# Patient Record
Sex: FEMALE | Race: WHITE | NOT HISPANIC OR LATINO | Employment: UNEMPLOYED | URBAN - METROPOLITAN AREA
[De-identification: names, ages, dates, MRNs, and addresses within clinical notes are randomized per-mention and may not be internally consistent; named-entity substitution may affect disease eponyms.]

---

## 2017-07-18 ENCOUNTER — CONVERSION ENCOUNTER (OUTPATIENT)
Dept: OTHER | Facility: HOSPITAL | Age: 82
End: 2017-07-18

## 2017-07-24 ENCOUNTER — CONVERSION ENCOUNTER (OUTPATIENT)
Dept: MAMMOGRAPHY | Facility: HOSPITAL | Age: 82
End: 2017-07-24

## 2018-02-22 ENCOUNTER — OFFICE VISIT CONVERTED (OUTPATIENT)
Dept: FAMILY MEDICINE CLINIC | Age: 83
End: 2018-02-22
Attending: NURSE PRACTITIONER

## 2018-05-10 ENCOUNTER — OFFICE VISIT CONVERTED (OUTPATIENT)
Dept: FAMILY MEDICINE CLINIC | Age: 83
End: 2018-05-10
Attending: NURSE PRACTITIONER

## 2018-06-04 ENCOUNTER — OFFICE VISIT CONVERTED (OUTPATIENT)
Dept: FAMILY MEDICINE CLINIC | Age: 83
End: 2018-06-04
Attending: NURSE PRACTITIONER

## 2018-06-21 ENCOUNTER — OFFICE VISIT CONVERTED (OUTPATIENT)
Dept: FAMILY MEDICINE CLINIC | Age: 83
End: 2018-06-21
Attending: NURSE PRACTITIONER

## 2018-07-12 ENCOUNTER — OFFICE VISIT CONVERTED (OUTPATIENT)
Dept: FAMILY MEDICINE CLINIC | Age: 83
End: 2018-07-12
Attending: NURSE PRACTITIONER

## 2018-07-17 ENCOUNTER — OFFICE VISIT CONVERTED (OUTPATIENT)
Dept: SURGERY | Facility: CLINIC | Age: 83
End: 2018-07-17
Attending: SURGERY

## 2018-08-14 ENCOUNTER — OFFICE VISIT CONVERTED (OUTPATIENT)
Dept: SURGERY | Facility: CLINIC | Age: 83
End: 2018-08-14
Attending: SURGERY

## 2018-08-23 ENCOUNTER — CONVERSION ENCOUNTER (OUTPATIENT)
Dept: MAMMOGRAPHY | Facility: HOSPITAL | Age: 83
End: 2018-08-23

## 2018-10-16 ENCOUNTER — OFFICE VISIT CONVERTED (OUTPATIENT)
Dept: FAMILY MEDICINE CLINIC | Age: 83
End: 2018-10-16
Attending: NURSE PRACTITIONER

## 2018-11-12 ENCOUNTER — OFFICE VISIT CONVERTED (OUTPATIENT)
Dept: FAMILY MEDICINE CLINIC | Age: 83
End: 2018-11-12
Attending: NURSE PRACTITIONER

## 2019-05-13 ENCOUNTER — HOSPITAL ENCOUNTER (OUTPATIENT)
Dept: OTHER | Facility: HOSPITAL | Age: 84
Discharge: HOME | End: 2019-05-13
Attending: NURSE PRACTITIONER

## 2019-05-13 ENCOUNTER — OFFICE VISIT CONVERTED (OUTPATIENT)
Dept: FAMILY MEDICINE CLINIC | Age: 84
End: 2019-05-13
Attending: NURSE PRACTITIONER

## 2019-05-13 LAB
ALBUMIN SERPL-MCNC: 4.5 G/DL (ref 3.5–5)
ALBUMIN/GLOB SERPL: 1.4 {RATIO} (ref 1.4–2.6)
ALP SERPL-CCNC: 66 U/L (ref 43–160)
ALT SERPL-CCNC: 14 U/L (ref 10–40)
ANION GAP SERPL CALC-SCNC: 19 MMOL/L (ref 8–19)
AST SERPL-CCNC: 23 U/L (ref 15–50)
BASOPHILS # BLD MANUAL: 0.05 10*3/UL (ref 0–0.2)
BASOPHILS NFR BLD MANUAL: 0.6 % (ref 0–3)
BILIRUB SERPL-MCNC: 0.28 MG/DL (ref 0.2–1.3)
BUN SERPL-MCNC: 15 MG/DL (ref 5–25)
BUN/CREAT SERPL: 20 {RATIO} (ref 6–20)
CALCIUM SERPL-MCNC: 10.1 MG/DL (ref 8.7–10.4)
CHLORIDE SERPL-SCNC: 100 MMOL/L (ref 99–111)
CONV CO2: 27 MMOL/L (ref 22–32)
CONV TOTAL PROTEIN: 7.8 G/DL (ref 6.3–8.2)
CREAT UR-MCNC: 0.75 MG/DL (ref 0.5–0.9)
DEPRECATED RDW RBC AUTO: 45.9 FL
EOSINOPHIL # BLD MANUAL: 0.29 10*3/UL (ref 0–0.7)
EOSINOPHIL NFR BLD MANUAL: 3.5 % (ref 0–7)
ERYTHROCYTE [DISTWIDTH] IN BLOOD BY AUTOMATED COUNT: 13.7 % (ref 11.5–14.5)
GFR SERPLBLD BASED ON 1.73 SQ M-ARVRAT: >60 ML/MIN/{1.73_M2}
GLOBULIN UR ELPH-MCNC: 3.3 G/DL (ref 2–3.5)
GLUCOSE SERPL-MCNC: 98 MG/DL (ref 65–99)
GRANS (ABSOLUTE): 5.09 10*3/UL (ref 2–8)
GRANS: 61.5 % (ref 30–85)
HBA1C MFR BLD: 13.9 G/DL (ref 12–16)
HCT VFR BLD AUTO: 42.7 % (ref 37–47)
IMM GRANULOCYTES # BLD: 0.01 10*3/UL (ref 0–0.54)
IMM GRANULOCYTES NFR BLD: 0.1 % (ref 0–0.43)
LYMPHOCYTES # BLD MANUAL: 2.3 10*3/UL (ref 1–5)
LYMPHOCYTES NFR BLD MANUAL: 6.6 % (ref 3–10)
MCH RBC QN AUTO: 29.6 PG (ref 27–31)
MCHC RBC AUTO-ENTMCNC: 32.6 G/DL (ref 33–37)
MCV RBC AUTO: 90.9 FL (ref 81–99)
MONOCYTES # BLD AUTO: 0.55 10*3/UL (ref 0.2–1.2)
OSMOLALITY SERPL CALC.SUM OF ELEC: 295 MOSM/KG (ref 273–304)
PLATELET # BLD AUTO: 419 10*3/UL (ref 130–400)
PMV BLD AUTO: 10.8 FL (ref 7.4–10.4)
POTASSIUM SERPL-SCNC: 4.3 MMOL/L (ref 3.5–5.3)
RBC # BLD AUTO: 4.7 10*6/UL (ref 4.2–5.4)
SODIUM SERPL-SCNC: 142 MMOL/L (ref 135–147)
TSH SERPL-ACNC: 2.56 M[IU]/L (ref 0.27–4.2)
VARIANT LYMPHS NFR BLD MANUAL: 27.7 % (ref 20–45)
VIT B12 SERPL-MCNC: 577 PG/ML (ref 211–911)
WBC # BLD AUTO: 8.29 10*3/UL (ref 4.8–10.8)

## 2019-05-15 LAB — BACTERIA UR CULT: NORMAL

## 2019-06-03 ENCOUNTER — OFFICE VISIT CONVERTED (OUTPATIENT)
Dept: FAMILY MEDICINE CLINIC | Age: 84
End: 2019-06-03
Attending: NURSE PRACTITIONER

## 2019-06-03 ENCOUNTER — HOSPITAL ENCOUNTER (OUTPATIENT)
Dept: OTHER | Facility: HOSPITAL | Age: 84
Discharge: HOME | End: 2019-06-03
Attending: NURSE PRACTITIONER

## 2019-06-03 LAB
BASOPHILS # BLD MANUAL: 0.06 10*3/UL (ref 0–0.2)
BASOPHILS NFR BLD MANUAL: 0.8 % (ref 0–3)
DEPRECATED RDW RBC AUTO: 46.5 FL
EOSINOPHIL # BLD MANUAL: 0.25 10*3/UL (ref 0–0.7)
EOSINOPHIL NFR BLD MANUAL: 3.2 % (ref 0–7)
ERYTHROCYTE [DISTWIDTH] IN BLOOD BY AUTOMATED COUNT: 13.6 % (ref 11.5–14.5)
GRANS (ABSOLUTE): 4.36 10*3/UL (ref 2–8)
GRANS: 55.1 % (ref 30–85)
HBA1C MFR BLD: 12.9 G/DL (ref 12–16)
HCT VFR BLD AUTO: 39 % (ref 37–47)
IMM GRANULOCYTES # BLD: 0 10*3/UL (ref 0–0.54)
IMM GRANULOCYTES NFR BLD: 0 % (ref 0–0.43)
LYMPHOCYTES # BLD MANUAL: 2.45 10*3/UL (ref 1–5)
LYMPHOCYTES NFR BLD MANUAL: 9.9 % (ref 3–10)
MCH RBC QN AUTO: 30.2 PG (ref 27–31)
MCHC RBC AUTO-ENTMCNC: 33.1 G/DL (ref 33–37)
MCV RBC AUTO: 91.3 FL (ref 81–99)
MONOCYTES # BLD AUTO: 0.78 10*3/UL (ref 0.2–1.2)
PLATELET # BLD AUTO: 420 10*3/UL (ref 130–400)
PMV BLD AUTO: 11.4 FL (ref 7.4–10.4)
RBC # BLD AUTO: 4.27 10*6/UL (ref 4.2–5.4)
VARIANT LYMPHS NFR BLD MANUAL: 31 % (ref 20–45)
WBC # BLD AUTO: 7.9 10*3/UL (ref 4.8–10.8)

## 2019-08-08 ENCOUNTER — HOSPITAL ENCOUNTER (OUTPATIENT)
Dept: OTHER | Facility: HOSPITAL | Age: 84
Discharge: HOME | End: 2019-08-08
Attending: NURSE PRACTITIONER

## 2019-08-08 LAB
BASOPHILS # BLD MANUAL: 0.05 10*3/UL (ref 0–0.2)
BASOPHILS NFR BLD MANUAL: 0.7 % (ref 0–3)
DEPRECATED RDW RBC AUTO: 46.7 FL
EOSINOPHIL # BLD MANUAL: 0.32 10*3/UL (ref 0–0.7)
EOSINOPHIL NFR BLD MANUAL: 4.7 % (ref 0–7)
ERYTHROCYTE [DISTWIDTH] IN BLOOD BY AUTOMATED COUNT: 13.9 % (ref 11.5–14.5)
GRANS (ABSOLUTE): 3.55 10*3/UL (ref 2–8)
GRANS: 52.8 % (ref 30–85)
HBA1C MFR BLD: 13.3 G/DL (ref 12–16)
HCT VFR BLD AUTO: 40.6 % (ref 37–47)
IMM GRANULOCYTES # BLD: 0.01 10*3/UL (ref 0–0.54)
IMM GRANULOCYTES NFR BLD: 0.1 % (ref 0–0.43)
LYMPHOCYTES # BLD MANUAL: 2.04 10*3/UL (ref 1–5)
LYMPHOCYTES NFR BLD MANUAL: 11.4 % (ref 3–10)
MCH RBC QN AUTO: 29.6 PG (ref 27–31)
MCHC RBC AUTO-ENTMCNC: 32.8 G/DL (ref 33–37)
MCV RBC AUTO: 90.2 FL (ref 81–99)
MONOCYTES # BLD AUTO: 0.77 10*3/UL (ref 0.2–1.2)
PLATELET # BLD AUTO: 415 10*3/UL (ref 130–400)
PMV BLD AUTO: 11 FL (ref 7.4–10.4)
RBC # BLD AUTO: 4.5 10*6/UL (ref 4.2–5.4)
VARIANT LYMPHS NFR BLD MANUAL: 30.3 % (ref 20–45)
WBC # BLD AUTO: 6.74 10*3/UL (ref 4.8–10.8)

## 2019-08-26 ENCOUNTER — HOSPITAL ENCOUNTER (OUTPATIENT)
Dept: OTHER | Facility: HOSPITAL | Age: 84
Discharge: HOME | End: 2019-08-26
Attending: NURSE PRACTITIONER

## 2019-11-20 ENCOUNTER — HOSPITAL ENCOUNTER (OUTPATIENT)
Dept: OTHER | Facility: HOSPITAL | Age: 84
Discharge: HOME | End: 2019-11-20
Attending: NURSE PRACTITIONER

## 2019-11-20 LAB
BASOPHILS # BLD AUTO: 0.06 10*3/UL (ref 0–0.2)
BASOPHILS NFR BLD AUTO: 0.8 % (ref 0–3)
CONV ABS IMM GRAN: 0.01 10*3/UL (ref 0–0.2)
CONV IMMATURE GRAN: 0.1 % (ref 0–1.8)
DEPRECATED RDW RBC AUTO: 46.4 FL (ref 36.4–46.3)
EOSINOPHIL # BLD AUTO: 0.24 10*3/UL (ref 0–0.7)
EOSINOPHIL # BLD AUTO: 3.2 % (ref 0–7)
ERYTHROCYTE [DISTWIDTH] IN BLOOD BY AUTOMATED COUNT: 13.5 % (ref 11.7–14.4)
HCT VFR BLD AUTO: 42 % (ref 37–47)
HGB BLD-MCNC: 13 G/DL (ref 12–16)
LYMPHOCYTES # BLD AUTO: 2.24 10*3/UL (ref 1–5)
LYMPHOCYTES NFR BLD AUTO: 30.1 % (ref 20–45)
MCH RBC QN AUTO: 29 PG (ref 27–31)
MCHC RBC AUTO-ENTMCNC: 31 G/DL (ref 33–37)
MCV RBC AUTO: 93.8 FL (ref 81–99)
MONOCYTES # BLD AUTO: 0.6 10*3/UL (ref 0.2–1.2)
MONOCYTES NFR BLD AUTO: 8.1 % (ref 3–10)
NEUTROPHILS # BLD AUTO: 4.29 10*3/UL (ref 2–8)
NEUTROPHILS NFR BLD AUTO: 57.7 % (ref 30–85)
NRBC CBCN: 0 % (ref 0–0.7)
PLATELET # BLD AUTO: 368 10*3/UL (ref 130–400)
PMV BLD AUTO: 11.3 FL (ref 9.4–12.3)
RBC # BLD AUTO: 4.48 10*6/UL (ref 4.2–5.4)
WBC # BLD AUTO: 7.44 10*3/UL (ref 4.8–10.8)

## 2019-11-26 ENCOUNTER — HOSPITAL ENCOUNTER (OUTPATIENT)
Dept: OTHER | Facility: HOSPITAL | Age: 84
Discharge: HOME | End: 2019-11-26
Attending: NURSE PRACTITIONER

## 2019-11-26 ENCOUNTER — OFFICE VISIT CONVERTED (OUTPATIENT)
Dept: FAMILY MEDICINE CLINIC | Age: 84
End: 2019-11-26
Attending: NURSE PRACTITIONER

## 2019-12-04 ENCOUNTER — HOSPITAL ENCOUNTER (OUTPATIENT)
Dept: OTHER | Facility: HOSPITAL | Age: 84
Discharge: HOME | End: 2019-12-04
Attending: NURSE PRACTITIONER

## 2019-12-09 ENCOUNTER — OFFICE VISIT CONVERTED (OUTPATIENT)
Dept: FAMILY MEDICINE CLINIC | Age: 84
End: 2019-12-09
Attending: NURSE PRACTITIONER

## 2020-03-05 ENCOUNTER — OFFICE VISIT CONVERTED (OUTPATIENT)
Dept: FAMILY MEDICINE CLINIC | Age: 85
End: 2020-03-05
Attending: NURSE PRACTITIONER

## 2020-03-05 ENCOUNTER — HOSPITAL ENCOUNTER (OUTPATIENT)
Dept: OTHER | Facility: HOSPITAL | Age: 85
Discharge: HOME | End: 2020-03-05
Attending: NURSE PRACTITIONER

## 2020-03-05 LAB
APPEARANCE UR: CLEAR
BACTERIA UR QL AUTO: NORMAL
BILIRUB UR QL: NEGATIVE
CASTS URNS QL MICRO: NORMAL /[LPF]
COLOR UR: YELLOW
CONV LEUKOCYTE ESTERASE: NEGATIVE
CONV UROBILINOGEN IN URINE BY AUTOMATED TEST STRIP: 0.2 {EHRLICHU}/DL (ref 0.1–1)
EPI CELLS #/AREA URNS HPF: NORMAL /[HPF]
GLUCOSE 24H UR-MCNC: NEGATIVE MG/DL
HGB UR QL STRIP: NEGATIVE
KETONES UR QL STRIP: NEGATIVE MG/DL
MUCOUS THREADS URNS QL MICRO: NORMAL
NITRITE UR-MCNC: NEGATIVE MG/ML
PH UR STRIP.AUTO: 5.5 [PH] (ref 5–8)
PROT UR-MCNC: NEGATIVE MG/DL
RBC # BLD AUTO: NORMAL /[HPF]
SP GR UR STRIP: 1.01 (ref 1–1.03)
SPECIMEN SOURCE: NORMAL
UNIDENT CRYS URNS QL MICRO: NORMAL /[HPF]
WBC #/AREA URNS HPF: NORMAL /[HPF]

## 2020-04-08 ENCOUNTER — OFFICE VISIT CONVERTED (OUTPATIENT)
Dept: FAMILY MEDICINE CLINIC | Age: 85
End: 2020-04-08
Attending: NURSE PRACTITIONER

## 2020-04-09 ENCOUNTER — HOSPITAL ENCOUNTER (OUTPATIENT)
Dept: OTHER | Facility: HOSPITAL | Age: 85
Discharge: HOME | End: 2020-04-09
Attending: FAMILY MEDICINE

## 2020-04-09 LAB
ALBUMIN SERPL-MCNC: 3.4 G/DL (ref 3.5–5)
ALBUMIN/GLOB SERPL: 1.2 {RATIO} (ref 1.4–2.6)
ALP SERPL-CCNC: 54 U/L (ref 43–160)
ALT SERPL-CCNC: 18 U/L (ref 10–40)
ANION GAP SERPL CALC-SCNC: 19 MMOL/L (ref 8–19)
AST SERPL-CCNC: 24 U/L (ref 15–50)
BASOPHILS # BLD MANUAL: 0.1 10*3/UL (ref 0–0.2)
BASOPHILS NFR BLD MANUAL: 0.8 % (ref 0–3)
BILIRUB SERPL-MCNC: 0.24 MG/DL (ref 0.2–1.3)
BUN SERPL-MCNC: 21 MG/DL (ref 5–25)
BUN/CREAT SERPL: 27 {RATIO} (ref 6–20)
CALCIUM SERPL-MCNC: 9.2 MG/DL (ref 8.7–10.4)
CHLORIDE SERPL-SCNC: 102 MMOL/L (ref 99–111)
CONV CO2: 22 MMOL/L (ref 22–32)
CONV TOTAL PROTEIN: 6.3 G/DL (ref 6.3–8.2)
CREAT UR-MCNC: 0.77 MG/DL (ref 0.5–0.9)
DEPRECATED RDW RBC AUTO: 50.3 FL
EOSINOPHIL # BLD MANUAL: 0.4 10*3/UL (ref 0–0.7)
EOSINOPHIL NFR BLD MANUAL: 3.2 % (ref 0–7)
ERYTHROCYTE [DISTWIDTH] IN BLOOD BY AUTOMATED COUNT: 15.2 % (ref 11.5–14.5)
GFR SERPLBLD BASED ON 1.73 SQ M-ARVRAT: >60 ML/MIN/{1.73_M2}
GLOBULIN UR ELPH-MCNC: 2.9 G/DL (ref 2–3.5)
GLUCOSE SERPL-MCNC: 104 MG/DL (ref 65–99)
GRANS (ABSOLUTE): 8.52 10*3/UL (ref 2–8)
GRANS: 68.1 % (ref 30–85)
HBA1C MFR BLD: 6.2 G/DL (ref 12–16)
HCT VFR BLD AUTO: 19.5 % (ref 37–47)
IMM GRANULOCYTES # BLD: 0.07 10*3/UL (ref 0–0.54)
IMM GRANULOCYTES NFR BLD: 0.6 % (ref 0–0.43)
LYMPHOCYTES # BLD MANUAL: 2.53 10*3/UL (ref 1–5)
LYMPHOCYTES NFR BLD MANUAL: 7 % (ref 3–10)
MAGNESIUM SERPL-MCNC: 2.14 MG/DL (ref 1.6–2.3)
MCH RBC QN AUTO: 29.7 PG (ref 27–31)
MCHC RBC AUTO-ENTMCNC: 31.8 G/DL (ref 33–37)
MCV RBC AUTO: 93.3 FL (ref 81–99)
MONOCYTES # BLD AUTO: 0.87 10*3/UL (ref 0.2–1.2)
OSMOLALITY SERPL CALC.SUM OF ELEC: 289 MOSM/KG (ref 273–304)
PLATELET # BLD AUTO: 618 10*3/UL (ref 130–400)
PMV BLD AUTO: 10.2 FL (ref 7.4–10.4)
POTASSIUM SERPL-SCNC: 5 MMOL/L (ref 3.5–5.3)
RBC # BLD AUTO: 2.09 10*6/UL (ref 4.2–5.4)
SODIUM SERPL-SCNC: 138 MMOL/L (ref 135–147)
VARIANT LYMPHS NFR BLD MANUAL: 20.3 % (ref 20–45)
WBC # BLD AUTO: 12.49 10*3/UL (ref 4.8–10.8)

## 2020-04-20 ENCOUNTER — HOSPITAL ENCOUNTER (OUTPATIENT)
Dept: OTHER | Facility: HOSPITAL | Age: 85
Discharge: HOME | End: 2020-04-20
Attending: FAMILY MEDICINE

## 2020-04-20 LAB
BASOPHILS # BLD MANUAL: 0.06 10*3/UL (ref 0–0.2)
BASOPHILS NFR BLD MANUAL: 0.7 % (ref 0–3)
DEPRECATED RDW RBC AUTO: 48.3 FL
EOSINOPHIL # BLD MANUAL: 0.7 10*3/UL (ref 0–0.7)
EOSINOPHIL NFR BLD MANUAL: 8.4 % (ref 0–7)
ERYTHROCYTE [DISTWIDTH] IN BLOOD BY AUTOMATED COUNT: 14.5 % (ref 11.5–14.5)
GRANS (ABSOLUTE): 5.26 10*3/UL (ref 2–8)
GRANS: 62.9 % (ref 30–85)
HBA1C MFR BLD: 12.5 G/DL (ref 12–16)
HCT VFR BLD AUTO: 39.7 % (ref 37–47)
IMM GRANULOCYTES # BLD: 0.02 10*3/UL (ref 0–0.54)
IMM GRANULOCYTES NFR BLD: 0.2 % (ref 0–0.43)
LYMPHOCYTES # BLD MANUAL: 1.71 10*3/UL (ref 1–5)
LYMPHOCYTES NFR BLD MANUAL: 7.4 % (ref 3–10)
MCH RBC QN AUTO: 28.5 PG (ref 27–31)
MCHC RBC AUTO-ENTMCNC: 31.5 G/DL (ref 33–37)
MCV RBC AUTO: 90.4 FL (ref 81–99)
MONOCYTES # BLD AUTO: 0.62 10*3/UL (ref 0.2–1.2)
PLATELET # BLD AUTO: 604 10*3/UL (ref 130–400)
PMV BLD AUTO: 10.2 FL (ref 7.4–10.4)
RBC # BLD AUTO: 4.39 10*6/UL (ref 4.2–5.4)
VARIANT LYMPHS NFR BLD MANUAL: 20.4 % (ref 20–45)
WBC # BLD AUTO: 8.37 10*3/UL (ref 4.8–10.8)

## 2020-04-22 ENCOUNTER — OFFICE VISIT CONVERTED (OUTPATIENT)
Dept: FAMILY MEDICINE CLINIC | Age: 85
End: 2020-04-22
Attending: NURSE PRACTITIONER

## 2020-04-27 ENCOUNTER — HOSPITAL ENCOUNTER (OUTPATIENT)
Dept: OTHER | Facility: HOSPITAL | Age: 85
Discharge: HOME | End: 2020-04-27
Attending: NURSE PRACTITIONER

## 2020-04-27 LAB
ANION GAP SERPL CALC-SCNC: 18 MMOL/L (ref 8–19)
BUN SERPL-MCNC: 16 MG/DL (ref 5–25)
BUN/CREAT SERPL: 20 {RATIO} (ref 6–20)
CALCIUM SERPL-MCNC: 9.6 MG/DL (ref 8.7–10.4)
CHLORIDE SERPL-SCNC: 102 MMOL/L (ref 99–111)
CONV CO2: 25 MMOL/L (ref 22–32)
CREAT UR-MCNC: 0.82 MG/DL (ref 0.5–0.9)
GFR SERPLBLD BASED ON 1.73 SQ M-ARVRAT: >60 ML/MIN/{1.73_M2}
GLUCOSE SERPL-MCNC: 112 MG/DL (ref 65–99)
OSMOLALITY SERPL CALC.SUM OF ELEC: 294 MOSM/KG (ref 273–304)
POTASSIUM SERPL-SCNC: 4.4 MMOL/L (ref 3.5–5.3)
SODIUM SERPL-SCNC: 141 MMOL/L (ref 135–147)

## 2020-04-29 LAB — ALT SERPL-CCNC: 20 U/L (ref 10–40)

## 2020-05-18 ENCOUNTER — OFFICE VISIT CONVERTED (OUTPATIENT)
Dept: FAMILY MEDICINE CLINIC | Age: 85
End: 2020-05-18
Attending: NURSE PRACTITIONER

## 2020-05-21 ENCOUNTER — HOSPITAL ENCOUNTER (OUTPATIENT)
Dept: OTHER | Facility: HOSPITAL | Age: 85
Discharge: HOME | End: 2020-05-21
Attending: NURSE PRACTITIONER

## 2020-05-21 LAB
BASOPHILS # BLD MANUAL: 0.07 10*3/UL (ref 0–0.2)
BASOPHILS NFR BLD MANUAL: 0.7 % (ref 0–3)
DEPRECATED RDW RBC AUTO: 49.1 FL
EOSINOPHIL # BLD MANUAL: 0.86 10*3/UL (ref 0–0.7)
EOSINOPHIL NFR BLD MANUAL: 8.9 % (ref 0–7)
ERYTHROCYTE [DISTWIDTH] IN BLOOD BY AUTOMATED COUNT: 14.8 % (ref 11.5–14.5)
GRANS (ABSOLUTE): 5.64 10*3/UL (ref 2–8)
GRANS: 58.8 % (ref 30–85)
HBA1C MFR BLD: 12.7 G/DL (ref 12–16)
HCT VFR BLD AUTO: 40.4 % (ref 37–47)
IMM GRANULOCYTES # BLD: 0.01 10*3/UL (ref 0–0.54)
IMM GRANULOCYTES NFR BLD: 0.1 % (ref 0–0.43)
LYMPHOCYTES # BLD MANUAL: 2.25 10*3/UL (ref 1–5)
LYMPHOCYTES NFR BLD MANUAL: 8.1 % (ref 3–10)
MCH RBC QN AUTO: 28.3 PG (ref 27–31)
MCHC RBC AUTO-ENTMCNC: 31.4 G/DL (ref 33–37)
MCV RBC AUTO: 90.2 FL (ref 81–99)
MONOCYTES # BLD AUTO: 0.78 10*3/UL (ref 0.2–1.2)
PLATELET # BLD AUTO: 393 10*3/UL (ref 130–400)
PMV BLD AUTO: 10.7 FL (ref 7.4–10.4)
RBC # BLD AUTO: 4.48 10*6/UL (ref 4.2–5.4)
VARIANT LYMPHS NFR BLD MANUAL: 23.4 % (ref 20–45)
WBC # BLD AUTO: 9.61 10*3/UL (ref 4.8–10.8)

## 2020-09-23 ENCOUNTER — OFFICE VISIT CONVERTED (OUTPATIENT)
Dept: FAMILY MEDICINE CLINIC | Age: 85
End: 2020-09-23
Attending: NURSE PRACTITIONER

## 2021-01-11 ENCOUNTER — HOSPITAL ENCOUNTER (OUTPATIENT)
Dept: OTHER | Facility: HOSPITAL | Age: 86
Discharge: HOME | End: 2021-01-11
Attending: NURSE PRACTITIONER

## 2021-03-23 ENCOUNTER — HOSPITAL ENCOUNTER (OUTPATIENT)
Dept: OTHER | Facility: HOSPITAL | Age: 86
Discharge: HOME | End: 2021-03-23
Attending: NURSE PRACTITIONER

## 2021-03-23 ENCOUNTER — OFFICE VISIT CONVERTED (OUTPATIENT)
Dept: FAMILY MEDICINE CLINIC | Age: 86
End: 2021-03-23
Attending: NURSE PRACTITIONER

## 2021-03-23 LAB
ALBUMIN SERPL-MCNC: 4.3 G/DL (ref 3.5–5)
ALBUMIN/GLOB SERPL: 1.4 {RATIO} (ref 1.4–2.6)
ALP SERPL-CCNC: 57 U/L (ref 43–160)
ALT SERPL-CCNC: 13 U/L (ref 10–40)
ANION GAP SERPL CALC-SCNC: 16 MMOL/L (ref 8–19)
AST SERPL-CCNC: 25 U/L (ref 15–50)
BASOPHILS # BLD MANUAL: 0.06 10*3/UL (ref 0–0.2)
BASOPHILS NFR BLD MANUAL: 0.8 % (ref 0–3)
BILIRUB SERPL-MCNC: 0.29 MG/DL (ref 0.2–1.3)
BUN SERPL-MCNC: 17 MG/DL (ref 5–25)
BUN/CREAT SERPL: 22 {RATIO} (ref 6–20)
CALCIUM SERPL-MCNC: 9.7 MG/DL (ref 8.7–10.4)
CHLORIDE SERPL-SCNC: 101 MMOL/L (ref 99–111)
CHOLEST SERPL-MCNC: 290 MG/DL (ref 107–200)
CHOLEST/HDLC SERPL: 4.8 {RATIO} (ref 3–6)
CONV CO2: 26 MMOL/L (ref 22–32)
CONV TOTAL PROTEIN: 7.3 G/DL (ref 6.3–8.2)
CREAT UR-MCNC: 0.76 MG/DL (ref 0.5–0.9)
DEPRECATED RDW RBC AUTO: 45.2 FL
EOSINOPHIL # BLD MANUAL: 0.15 10*3/UL (ref 0–0.7)
EOSINOPHIL NFR BLD MANUAL: 2.1 % (ref 0–7)
ERYTHROCYTE [DISTWIDTH] IN BLOOD BY AUTOMATED COUNT: 13.4 % (ref 11.5–14.5)
GFR SERPLBLD BASED ON 1.73 SQ M-ARVRAT: >60 ML/MIN/{1.73_M2}
GLOBULIN UR ELPH-MCNC: 3 G/DL (ref 2–3.5)
GLUCOSE SERPL-MCNC: 91 MG/DL (ref 65–99)
GRANS (ABSOLUTE): 3.97 10*3/UL (ref 2–8)
GRANS: 55.9 % (ref 30–85)
HBA1C MFR BLD: 12.9 G/DL (ref 12–16)
HCT VFR BLD AUTO: 39.2 % (ref 37–47)
HDLC SERPL-MCNC: 61 MG/DL (ref 40–60)
IMM GRANULOCYTES # BLD: 0 10*3/UL (ref 0–0.54)
IMM GRANULOCYTES NFR BLD: 0 % (ref 0–0.43)
LDLC SERPL CALC-MCNC: 202 MG/DL (ref 70–100)
LYMPHOCYTES # BLD MANUAL: 2.26 10*3/UL (ref 1–5)
LYMPHOCYTES NFR BLD MANUAL: 9.4 % (ref 3–10)
MCH RBC QN AUTO: 29.7 PG (ref 27–31)
MCHC RBC AUTO-ENTMCNC: 32.9 G/DL (ref 33–37)
MCV RBC AUTO: 90.1 FL (ref 81–99)
MONOCYTES # BLD AUTO: 0.67 10*3/UL (ref 0.2–1.2)
OSMOLALITY SERPL CALC.SUM OF ELEC: 287 MOSM/KG (ref 273–304)
PLATELET # BLD AUTO: 373 10*3/UL (ref 130–400)
PMV BLD AUTO: 10 FL (ref 7.4–10.4)
POTASSIUM SERPL-SCNC: 5 MMOL/L (ref 3.5–5.3)
RBC # BLD AUTO: 4.35 10*6/UL (ref 4.2–5.4)
SODIUM SERPL-SCNC: 138 MMOL/L (ref 135–147)
TRIGL SERPL-MCNC: 134 MG/DL (ref 40–150)
TSH SERPL-ACNC: 3 M[IU]/L (ref 0.27–4.2)
VARIANT LYMPHS NFR BLD MANUAL: 31.8 % (ref 20–45)
VLDLC SERPL-MCNC: 27 MG/DL (ref 5–37)
WBC # BLD AUTO: 7.11 10*3/UL (ref 4.8–10.8)

## 2021-04-27 ENCOUNTER — OFFICE VISIT CONVERTED (OUTPATIENT)
Dept: SURGERY | Facility: CLINIC | Age: 86
End: 2021-04-27
Attending: SURGERY

## 2021-05-06 ENCOUNTER — HOSPITAL ENCOUNTER (OUTPATIENT)
Dept: OTHER | Facility: HOSPITAL | Age: 86
Discharge: HOME | End: 2021-05-06
Attending: SURGERY

## 2021-05-11 ENCOUNTER — HOSPITAL ENCOUNTER (OUTPATIENT)
Dept: OTHER | Facility: HOSPITAL | Age: 86
Discharge: HOME | End: 2021-05-11
Attending: SURGERY

## 2021-05-11 ENCOUNTER — OFFICE VISIT CONVERTED (OUTPATIENT)
Dept: SURGERY | Facility: CLINIC | Age: 86
End: 2021-05-11
Attending: SURGERY

## 2021-05-11 NOTE — H&P
"   History and Physical      Patient Name: Danyell Osborne   Patient ID: [id number]   Sex: Female   YOB: 1935    Primary Care Provider: Shanae VELEZ   Referring Provider: Shanae VELEZ    Visit Date: April 27, 2021    Provider: Gabriel Friend MD   Location: [facility] [place]   Location Address: [address] [place] [address]  [address] [place] [address]  [id number]   Location Phone: [phone]          Chief Complaint  · Hemorrhoids  · epigastric pain      History Of Present Illness     Ms. Osborne came back for follow-up. She is a very nice lady that we have taken care of in the past. I saw her a few years ago with some hemorrhoid symptoms. She had gotten better with some hydrocortisone ointment. Her hemorrhoids are bothering her a little bit more now. They frequently prolapse out and require manual reduction. She is not seeing a whole lot of blood. She is also complaining of episodic upper abdominal pain. She was questioning whether she might be having some gallbladder related problems.       Past Medical History  Anxiety; Coronary artery disease; Rectal bleeding         Past Surgical History  Breast biopsy; Colonoscopy; Coronary artery bypass graft; EGD; Hysterectomy; Neuroma excision         Medication List  aspirin 81 mg oral tablet,chewable; Centrum  mg-mcg oral tablet; clopidogrel 75 mg oral tablet; hydrocortisone 2.5 % topical ointment; metoprolol succinate 50 mg oral tablet extended release 24 hr; Miralax 17 gram/dose oral powder; pantoprazole 40 mg oral tablet,delayed release (DR/EC); Vitamin D3 50 mcg (2,000 unit) oral capsule         Allergy List  CT Contrast Dye; PENICILLINS         Social History  Tobacco (Never)         Vitals  Date Time BP Position Site L\R Cuff Size HR RR TEMP (F) WT  HT  BMI kg/m2 BSA m2 O2 Sat FR L/min FiO2 HC       04/27/2021 10:21 AM       16 97.5 126lbs 4oz 5'  6\" 20.38 1.63             Physical Examination     Today on " physical exam, she appears well. Her abdomen is soft.           Assessment  · Abdominal Pain, Epigastric     789.06/R10.13  · Hemorrhoids, Internal     455.0/K64.8       1. Prolapsing internal hemorrhoids.   2. Epigastric abdominal pain of uncertain etiology.       Plan  · Orders  o Gallbladder/Liver U/S (41419) - - 04/27/2021  · Medications  o Medications have been Reconciled  o Transition of Care or Provider Policy     We will go ahead and get a gallbladder ultrasound and make sure she does not have any gallstones. I also briefly talked to her about doing a hemorrhoid banding. We had talked about that a couple of years ago. She indicated that will think about that and she will come back to see me in two weeks. We will review her ultrasound and make some plans regarding what she wants to do about the hemorrhoid complaints.             Electronically Signed by: Rakel Loza-, -Author on April 28, 2021 12:47:24 PM  Electronically Co-signed by: Gabriel Friend MD -Reviewer on April 28, 2021 03:09:08 PM

## 2021-05-14 VITALS — WEIGHT: 126.25 LBS | BODY MASS INDEX: 20.29 KG/M2 | RESPIRATION RATE: 16 BRPM | TEMPERATURE: 97.5 F | HEIGHT: 66 IN

## 2021-05-16 VITALS — WEIGHT: 130 LBS | RESPIRATION RATE: 16 BRPM | HEIGHT: 64 IN | BODY MASS INDEX: 22.2 KG/M2

## 2021-05-16 VITALS — WEIGHT: 128.56 LBS | BODY MASS INDEX: 20.66 KG/M2 | HEIGHT: 66 IN | RESPIRATION RATE: 16 BRPM

## 2021-05-18 NOTE — PROGRESS NOTES
"Danyell Osborne 1935     Office/Outpatient Visit    Visit Date: Mon, Jun 4, 2018 10:34 am    Provider: Kimberley Osorio N.P. (Assistant: Rosie Nino MA)    Location: [facility]        Electronically signed by Kimberley Osorio N.P. on  06/04/2018 08:59:03 PM                             SUBJECTIVE:        CC:     Ms. Osborne is a 82 year old White female.  cough;         HPI:         Patient complains of cough.  This has been a problem for the past one week.  It is associated with hemoptysis ( scant amount, last PM ), scant amount, \"yellow\" colored productive sputum and L ear pain.  There are no associated symptoms of chest pain, dyspnea, nasal congestion, night sweats, peripheral edema or wheezing.  It seems worse with out in yard around pine trees.  The cough is lessened with Mucinex 12 hour & Loratidine.  No new medications have been initiated recently.  She does not use tobacco products and is not exposed to passive cigarette smoke.  Her medical history is remarkable for allergies.          Additionally, she presents with history of ear pain.      Ms. Osborne complains of left ear pain.  Associated symptoms include productive, scant production cough.  The symptoms began 2 days ago.  She has not tried any medications for symptomatic relief.      ROS:     CONSTITUTIONAL:  Negative for chills, fatigue, fever, and weight change.      EYES:  Negative for blurred vision.      E/N/T:  Positive for ear pain ( left ).      CARDIOVASCULAR:  Negative for chest pain, orthopnea, paroxysmal nocturnal dyspnea and pedal edema.      RESPIRATORY:  Positive for recent cough ( with scant amounts of purulent sputum; worse at night ) and hemoptysis ( infrequent coughing up of scant, blood tinged sputum ).   Negative for dyspnea, pleuritic chest pain or frequent wheezing.      GASTROINTESTINAL:  Negative for abdominal pain, constipation, diarrhea, nausea and vomiting.      NEUROLOGICAL:  Negative for " dizziness, headaches, paresthesias, and weakness.      PSYCHIATRIC:  Negative for anxiety, depression, and sleep disturbances.          PMH/FMH/SH:     Last Reviewed on 2018 08:57 PM by Kimberley Osorio    Past Medical History:             PAST MEDICAL HISTORY         Carotid Artery Stenosis: yuridia madera;     AAA             PAST MEDICAL HISTORY             CURRENT MEDICAL PROVIDERS:    Cardiologist: Fozia         PREVENTIVE HEALTH MAINTENANCE             COLORECTAL CANCER SCREENING: Up to date (colonoscopy q10y; sigmoidoscopy q5y; Cologuard q3y) was last done 3/2017, Results are in chart; colonoscopy with normal results     MAMMOGRAM: Done within last 2 years and results in are chart was last done 2017 with normal results         Surgical History:         Biopsy of breast; benign    Coronary Artery Stent Placement: 2011;     Hysterectomy: at age 30; ovaries intact;     left foot neuroma;     Procedures:    Colonoscopy ( 11/normal/Ronna )    EGD (  normal )         Family History:     Father:  at age 80's; Cause of death was TB;  Parkinson's Disease     Mother:  at age 30's; Cause of death was renal issues;  goiter     Sister(s): 3 sister(s) total; 1 ; Neurological/Genetic Cerebrovascular Accident; Endocrine Type 2 Diabetes         Social History:     Occupation: quit working 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/[employer];     Marital Status:      Children: 6         Tobacco/Alcohol/Supplements:     Last Reviewed on 2018 10:40 AM by Rosie Nino    Tobacco: She has never smoked.          Substance Abuse History:     Last Reviewed on 2017 02:58 PM by Quin Calixto        Mental Health History:     Last Reviewed on 2017 02:58 PM by Quin Calixto        Communicable Diseases (eg STDs):     Last Reviewed on 2017 02:58 PM by Quin Calixto            Current Problems:     Last Reviewed on 2018 08:57 PM by Kimberley Osorio    Coronary artery  disease, of native coronary artery     Joint pain, other specified site     Other constipation     Fatigue     Hip pain     Urinary frequency     Dizziness     Anxiety     Low back pain     Coronary artery disease     Ear pain     Cough         Immunizations:     None        Allergies:     Last Reviewed on 6/04/2018 10:38 AM by Rosie Nino    Penicillins:    radio active dye:        Current Medications:     Last Reviewed on 6/04/2018 08:57 PM by Kimberley Osorio    Amlodipine  2.5mg Tablet 1 tab daily     Metoprolol Succinate 50mg Tablets, Extended Release Take 1 tablet(s) by mouth daily     Aspirin (ASA) 81mg Tablets, Enteric Coated 1 tab daily     Centrum Silver Vitamin*     Loratadine     Vitamin D3 2,000IU Capsules 1 capsule daily         OBJECTIVE:        Vitals:         Current: 6/4/2018 10:37:16 AM    Ht:  5 ft, 3 in;  Wt: 130.9 lbs;  BMI: 23.2    T: 99.5 F (oral);  BP: 137/64 mm Hg (left arm, sitting);  P: 62 bpm (left arm (BP Cuff), sitting);  sCr: 0.77 mg/dL;  GFR: 53.68    O2 Sat: 98 % (room air)        Exams:     PHYSICAL EXAM:     GENERAL: vital signs recorded - well developed, well nourished;  no apparent distress;     EYES: extraocular movements intact; conjunctiva and cornea are normal; PERRLA;     E/N/T: EARS: external auditory canal erythematous on the left;  the left TM is bulging and has fluid behind it;  NOSE: nasal mucosa is erythematous;  turbinates are mildly swollen bilaterally;  no sinus tenderness;     NECK: range of motion is normal; thyroid is non-palpable;     RESPIRATORY: normal respiratory rate and pattern with no distress; coarse breath sounds in the bases;     CARDIOVASCULAR: normal rate; rhythm is regular;  no systolic murmur; no edema;     GASTROINTESTINAL: nontender; normal bowel sounds; no organomegaly;     NEUROLOGICAL:  cranial nerves, motor and sensory function, reflexes, gait and coordination are all intact;     PSYCHIATRIC:  appropriate affect and demeanor; normal  speech pattern; grossly normal memory;         ASSESSMENT:           786.2   R05  Cough              DDx:     388.70   H65.02  Ear pain              DDx:         ORDERS:         Meds Prescribed:       Cefdinir 300mg Capsules 1 bid for 7 days  #14 (Fourteen) capsule(s) Refills: 0       Benzonatate 200mg Capsules 1 capsule tid  #30 (Thirty) capsule(s) Refills: 0                 PLAN:          Cough           Prescriptions:       Benzonatate 200mg Capsules 1 capsule tid  #30 (Thirty) capsule(s) Refills: 0          Ear pain           Prescriptions:       Cefdinir 300mg Capsules 1 bid for 7 days  #14 (Fourteen) capsule(s) Refills: 0             CHARGE CAPTURE:           Primary Diagnosis:     786.2 Cough            R05    Cough              Orders:          02086   Office/outpatient visit; established patient, level 3  (In-House)           388.70 Ear pain            H65.02    Acute serous otitis media, left ear

## 2021-05-18 NOTE — PROGRESS NOTES
Danyell Osborne 1935     Office/Outpatient Visit    Visit Date: Mon, Femi 3, 2019 01:52 pm    Provider: Shanae Osborne N.P. (Assistant: Page Loza LPN)    Location: [facility]        Electronically signed by Shanae Osborne N.P. on  06/03/2019 02:41:05 PM                             SUBJECTIVE:        CC:     Ms. Osborne is a 83 year old White female.  Medicare Wellness;         HPI:         Ms. Osborne is here for a Medicare wellness visit.  ADVANCED DIRECTIVES: None             Returning to health checkup, self-Assessment of Health: She rates her health as good. She rates her confidence of being able to control/manage most of her health problems as somewhat confident. Her physical/emotional health has limited her social activites slightly.  A review of cognitive impairment was performed, including ability to drive a car, manage finances, and any memory changes, and was found to be negative.  A review of functional ability, including bathing, dressing, walking, and urine/bowel continence as well as level of safety was performed and was found to be negative.  Falls Risk: Has not had any falls or only one fall without injury in the past year.  She denies having trouble hearing the TV/radio when others do not, having to strain to hear or understand conversations and wearing hearing aid(s).  Concerning home safety, she reports that at home she DOES have adequate lighting, a skid resistant shower/tub, grab bars in the bath, handrails on stairs and functioning smoke alarms, but not absence of throw rugs.          Immunization Status: Up to date; Physical Activity: She never excercises.; Type of diet patient normally eats is described as well-balanced with fruits and vegetables Tobacco: She has never smoked.  Preventative Health updated today         PHQ-9 Depression Screening: Completed form scanned and in chart; Total Score 5 Alcohol Consumption Screening: Completed form scanned and in chart;  Total Score 0     ROS:     CONSTITUTIONAL:  Positive for fatigue.   Negative for chills or fever.      EYES:  Negative for blurred vision.      E/N/T:  Negative for diminished hearing and nasal congestion.      CARDIOVASCULAR:  Negative for chest pain and palpitations.      RESPIRATORY:  Negative for recent cough and dyspnea.      GASTROINTESTINAL:  Positive for constipation.   Negative for abdominal pain, nausea or vomiting.  if she takes the miralax daily it helps     MUSCULOSKELETAL:  Positive for back pain ( chronic ) and legs hurt at night.   Negative for arthralgias.  says she cannot lay on her back at all     INTEGUMENTARY/BREAST:  Negative for atypical mole(s) and rash.      NEUROLOGICAL:  Negative for paresthesias and weakness.      PSYCHIATRIC:  Positive for feelings of stress ( (related to her daughter's health issues) ) and insomnia.  wakes up frequently         PMH/FMH/SH:     Last Reviewed on 2019 02:37 PM by Shanae Osborne    Past Medical History:             PAST MEDICAL HISTORY         Carotid Artery Stenosis: yuridia madera;     AAA             PAST MEDICAL HISTORY             CURRENT MEDICAL PROVIDERS:    Cardiologist: Fozia         PREVENTIVE HEALTH MAINTENANCE             BONE DENSITY: was last done 06/25/15 with the following abnormality noted-- Osteoporosis     COLORECTAL CANCER SCREENING: Up to date (colonoscopy q10y; sigmoidoscopy q5y; Cologuard q3y) was last done 3/2017, Results are in chart; colonoscopy with normal results     MAMMOGRAM: Done within last 2 years and results in are chart was last done 2018 stable         Surgical History:         Biopsy of breast; benign    Coronary Artery Stent Placement: 2011;     Hysterectomy: at age 30; ovaries intact;     left foot neuroma;     Procedures:    Colonoscopy ( 11/normal/Ronna )    EGD (  normal )         Family History:     Father:  at age 80's; Cause of death was TB;  Parkinson's Disease     Mother:   at age 30's; Cause of death was renal issues;  goiter     Sister(s): 3 sister(s) total; 1 ; Neurological/Genetic Cerebrovascular Accident; Endocrine Type 2 Diabetes         Social History:     Occupation: 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              Immunizations:     None        Allergies:     Last Reviewed on 2019 11:22 AM by Rosie Nino    Penicillins:    radio active dye:        Current Medications:     Last Reviewed on 2019 02:10 PM by Shanae Osborne    Amlodipine  2.5mg Tablet 1 tab daily     Metoprolol Succinate 50mg Tablets, Extended Release Take 1 tablet(s) by mouth daily     Gas X PRN     hydrocortisone ointment     Miralax     Phyillips stool softner PRN     Aspirin (ASA) 81mg Tablets, Enteric Coated 1 tab daily     Centrum Silver Vitamin*     Loratadine 10mg Tablet 1 tab daily     Vitamin D3 2,000IU Capsules 1 capsule daily         OBJECTIVE:        Vitals:         Current: 6/3/2019 2:02:39 PM    Ht:  5 ft, 3 in;  Wt: 132 lbs;  BMI: 23.4    T: 98.3 F (oral);  BP: 135/54 mm Hg (left arm, sitting);  P: 66 bpm (left arm (BP Cuff), sitting);  sCr: 0.75 mg/dL;  GFR: 54.41    VA: 20/70 OD, 20/70 OS        Exams:     PHYSICAL EXAM:     GENERAL: vital signs recorded - well developed, well nourished;  no apparent distress;     EYES: extraocular movements intact; conjunctiva and cornea are normal; PERRLA;     E/N/T:  normal EACs, TMs, nasal/oral mucosa, teeth, gingiva, and oropharynx;     NECK: range of motion is normal; thyroid is non-palpable;     RESPIRATORY: normal respiratory rate and pattern with no distress; normal breath sounds with no rales, rhonchi, wheezes or rubs;     CARDIOVASCULAR: normal rate; rhythm is regular;  no systolic murmur; no edema;     GASTROINTESTINAL: nontender; normal  bowel sounds; no organomegaly;     LYMPHATIC: no enlargement of cervical or facial nodes; no axillary adenopathy;     BREAST/INTEGUMENT: BREASTS: breast exam is normal without masses, skin changes, or nipple discharge; SKIN: no significant rashes or lesions; no suspicious moles;     MUSCULOSKELETAL: legs symmetrical;     NEUROLOGIC: GROSSLY INTACT     PSYCHIATRIC:  appropriate affect and demeanor; normal speech pattern; grossly normal memory;         ASSESSMENT           V70.0   Z00.00  Health checkup              DDx:     V79.0   Z13.89  Screening for depression              DDx:     238.71   R79.89  Essential thrombocytosis              DDx:     V76.19   Z12.39  Screening breast exam - other              DDx:     729.5   M79.604   M79.605  Leg pain              DDx:     414.01   I25.10  Coronary artery disease              DDx:     780.52   G47.00  Insomnia              DDx:         ORDERS:         Meds Prescribed:       Refill of: Amlodipine  2.5mg Tablet 1 tab daily  #90 (Ninety) tablet(s) Refills: 1       Refill of: Metoprolol Succinate 50mg Tablets, Extended Release Take 1 tablet(s) by mouth daily  #90 (Ninety) tablet(s) Refills: 1       Requip (Ropinirole HCl) 0.5mg Tablet 1 tab q hs  #30 (Thirty) tablet(s) Refills: 1         Radiology/Test Orders:       [id number]  Screening digital breast tomosynthesis bi  (Send-Out)           Lab Orders:       [id number]  [facility] CBC with 3 part diff  (Send-Out)           Procedures Ordered:         Annual wellness visit, includes a PPPS, subsequent visit  (In-House)           Other Orders:         Depression screen negative  (In-House)           Negative EtOH screen  (In-House)         1101F  Pt screen for fall risk; document no falls in past year or only 1 fall w/o injury in past year (CESAR)  (In-House)                   PLAN:          Health checkup declines dexa scan and vaccines         COUNSELING provided on: breast self-exam, fall prevention, healthy eating  habits, regular exercise, use of seat belts, Advance Directive info given., and ADVISED TO SEE AN EYE DOCTOR AND DENTIST REGULARLY.  MIPS Has had no falls in the past year           Orders:       1101F  Pt screen for fall risk; document no falls in past year or only 1 fall w/o injury in past year (CESAR)  (In-House)           Annual wellness visit, includes a PPPS, subsequent visit  (In-House)            Screening for depression     MIPS PHQ-9 Depression Screening Completed form scanned and in chart; Total Score 5 Negative alcohol screen           Orders:         Depression screen negative  (In-House)           Negative EtOH screen  (In-House)            Essential thrombocytosis reviewed recent labs, recheck a CBC for platelets     LABORATORY:  Labs ordered to be performed today include CBC.            Orders:       [id number]  [facility] CBC with 3 part diff  (Send-Out)            Screening breast exam - other         FOLLOW-UP TESTING #1:    RADIOLOGY:  I have ordered Screening 3D Mammogram Bilateral to be done today.            Orders:       [id number]  Screening digital breast tomosynthesis bi  (Send-Out)            Leg pain trial of requip         FOLLOW-UP: Advised to call if there is no improvement in 3-4 week(s).            Prescriptions:       Requip (Ropinirole HCl) 0.5mg Tablet 1 tab q hs  #30 (Thirty) tablet(s) Refills: 1          Coronary artery disease she needs refills of her rx's           Prescriptions:       Refill of: Amlodipine  2.5mg Tablet 1 tab daily  #90 (Ninety) tablet(s) Refills: 1       Refill of: Metoprolol Succinate 50mg Tablets, Extended Release Take 1 tablet(s) by mouth daily  #90 (Ninety) tablet(s) Refills: 1          Insomnia will see if the requip helps             Patient Recommendations:        For  Health checkup:         You should regularly examine your breasts, easily done while in the shower or with lotion.  Feel and look for differences in consistency from month to  month, especially noting knots or lumps, changes in skin appearance, nipple retraction or discharge.    Regularly exercise within recommended guidelines, especially to maintain balance. Remove obstacles in walkways at home.  Use non-skid material for bathtub safety.  Remove loose throw rugs from floor. Use nightlights in bedrooms, hallways, and bathrooms.    Limit dietary intake of fat (especially saturated fat) and cholesterol.  Eat a variety of foods, including plenty of fruits, vegetables, and grain containg fiber, limit fat intake to 30% of total calories. Balance caloric intake with energy expended.    Maintaining regular physical activity is advised to help prevent heart disease, hypertension, diabetes, and obesity.    Always use shoulder/lap restraints when driving or riding in a vehicle, even those equipped with air bags.              CHARGE CAPTURE           **Please note: ICD descriptions below are intended for billing purposes only and may not represent clinical diagnoses**        Primary Diagnosis:         V70.0 Health checkup            Z00.00    Encounter for general adult medical examination without abnormal findings              Orders:          1101F   Pt screen for fall risk; document no falls in past year or only 1 fall w/o injury in past year (CESAR)  (In-House)                Annual wellness visit, includes a PPPS, subsequent visit  (In-House)           V79.0 Screening for depression            Z13.89    Encounter for screening for other disorder              Orders:             Depression screen negative  (In-House)                Negative EtOH screen  (In-House)           238.71 Essential thrombocytosis            R79.89    Other specified abnormal findings of blood chemistry    V76.19 Screening breast exam - other            Z12.39    Encounter for other screening for malignant neoplasm of breast    729.5 Leg pain            M79.604    Pain in right leg           M79.605    Pain  in left leg    414.01 Coronary artery disease            I25.10    Atherosclerotic heart disease of native coronary artery without angina pectoris    780.52 Insomnia            G47.00    Insomnia, unspecified

## 2021-05-18 NOTE — PROGRESS NOTES
Danyell Osborne  1935     Office/Outpatient Visit    Visit Date: Mon, Dec 9, 2019 11:06 am    Provider: Shanae Osborne N.P. (Assistant: Hermila Rebollar, )    Location: [facility]        Electronically signed by Shanae Osborne N.P. on  12/09/2019 11:34:46 AM                             Subjective:        CC: Ms. Osborne is a 83 year old White female.  presents today due to headache, cough         HPI:           Ms. Osborne presents with acute nasopharyngitis [common cold].  These have been present for the past 6 days.  The symptoms include productive cough, headache and purulent sputum production.  She denies exposure to ill contacts.  She has already tried to relieve the symptoms with antihistamines and mucinex.  Medical history is significant for raked leaves last week.      ROS:     CONSTITUTIONAL:  Positive for fever ( subjective ).      E/N/T:  Positive for sore throat.      CARDIOVASCULAR:  Negative for chest pain, palpitations, tachycardia, orthopnea, and edema.      RESPIRATORY:  Negative for dyspnea and frequent wheezing.      MUSCULOSKELETAL:  Positive for history of back pain / had a MRI of her lumbar spine last week.      NEUROLOGICAL:  Positive for left leg pain /numbness better.          Past Medical History / Family History / Social History:         Last Reviewed on 12/09/2019 11:32 AM by Shanae Osborne    Past Medical History:             PAST MEDICAL HISTORY         Carotid Artery Stenosis: yuridia madera;     AAA             PAST MEDICAL HISTORY             CURRENT MEDICAL PROVIDERS:    Cardiologist: Fozia         PREVENTIVE HEALTH MAINTENANCE             BONE DENSITY: was last done 06/25/15 with the following abnormality noted-- Osteoporosis     COLORECTAL CANCER SCREENING: Up to date (colonoscopy q10y; sigmoidoscopy q5y; Cologuard q3y) was last done 3/2017, Results are in chart; colonoscopy with normal results     MAMMOGRAM: Done within last 2 years and results  in are chart was last done 2019 stable         Surgical History:         Biopsy of breast; benign    Coronary Artery Stent Placement:  2;     Hysterectomy: at age 30; ovaries intact;     left foot neuroma;     Procedures:    Colonoscopy ( 11/normal/Ronna )    EGD (  normal )         Family History:     Father:  at age 80's; Cause of death was TB;  Parkinson's Disease     Mother:  at age 30's; Cause of death was renal issues;  goiter     Sister(s): 3 sister(s) total; 1 ; Neurological/Genetic Cerebrovascular Accident; Endocrine Type 2 Diabetes         Social History:     Occupation: quit working 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/[employer];     Marital Status:      Children: 6         Tobacco/Alcohol/Supplements:     Last Reviewed on 2019 03:18 PM by Bud Berrios    Tobacco: She has never smoked.          Substance Abuse History:     Last Reviewed on 2018 09:49 AM by Shanae Osborne        Mental Health History:     Last Reviewed on 2018 09:49 AM by Shanae Osborne        family issues/ deaths in the past         Communicable Diseases (eg STDs):     Last Reviewed on 2018 09:49 AM by Shanae Osborne    Reportable health conditions; NEGATIVE         Immunizations:     None        Allergies:     Last Reviewed on 2019 11:10 AM by Hermila Rebollar    Penicillins:      radio active dye:          Current Medications:     Last Reviewed on 2019 11:10 AM by Hermila Rebollar    Centrum Silver Vitamin*     Vitamin D3 2,000 unit oral capsule [1 capsule daily]    Loratadine 10 mg oral tablet [1 tab daily]    aspirin 81 mg oral tablet, delayed release (enteric coated) [1 tab daily]    Amlodipine  2.5 mg oral tablet [1 tab daily]    Metoprolol Succinate 50 mg oral Tablet, Extended Release 24 hr [Take 1 tablet(s) by mouth daily]    Miralax      Phyillips stool softner PRN     hydrocortisone ointment      Gas X PRN         Objective:        Vitals:          Current: 12/9/2019 11:13:00 AM    Ht:  5 ft, 3 in;  Wt: 129.6 lbs;  BMI: 23.0T: 98.9 F (oral);  BP: 127/69 mm Hg (left arm, sitting);  P: 77 bpm (left arm (BP Cuff), sitting);  sCr: 0.75 mg/dL;  GFR: 53.99        Exams:     PHYSICAL EXAM:     GENERAL:  well developed and nourished; appropriately groomed; in no apparent distress;     E/N/T: EARS:  normal external auditory canals and tympanic membranes;  grossly normal hearing; NOSE: no sinus tenderness; OROPHARYNX:  normal mucosa, dentition, gingiva, and posterior pharynx;     RESPIRATORY: normal respiratory rate and pattern with no distress; normal breath sounds with no rales, rhonchi, wheezes or rubs;     CARDIOVASCULAR: normal rate; rhythm is regular;  no systolic murmur;     LYMPHATIC: no enlargement of cervical or facial nodes;     MUSCULOSKELETAL: normal gait;         Assessment:         J20   Acute bronchitis       M54.5   Low back pain           ORDERS:         Meds Prescribed:       [New Rx] Zithromax 250 mg oral tablet [take 2 tablets (500 mg) by oral route once daily for 1 day then 1 tablet (250 mg) by oral route once daily for 4 days], #6 (six) tablets, Refills: 0 (zero)                 Plan:         Acute bronchitiswants codeine cough rx ( sending in jorje with codeine to her pharmacy 1 tsp at HS 4 oz)        RECOMMENDATIONS given include: rest, increase fluids.      FOLLOW-UP: Advised to call if there is no improvement 3 days.  :.            Prescriptions:       [New Rx] Zithromax 250 mg oral tablet [take 2 tablets (500 mg) by oral route once daily for 1 day then 1 tablet (250 mg) by oral route once daily for 4 days], #6 (six) tablets, Refills: 0 (zero)         Low back paingave her a copy of and reviewed MRI of back with patient, follow up if anything changes, persist or worsens             Patient Recommendations:        For  Acute bronchitis:    Follow-up by phone if no improvement in 3 days.                APPOINTMENT INFORMATION:        Monday   Tuesday Wednesday Thursday Friday Saturday Sunday            Time:___________________AM  PM   Date:_____________________             Charge Capture:         Primary Diagnosis:     J20  Acute bronchitis           Orders:      [id number]  Office/outpatient visit; established patient, level 3  (In-House)              M54.5  Low back pain

## 2021-05-18 NOTE — PROGRESS NOTES
Danyell Osborne  1935     Office/Outpatient Visit    Visit Date: Tue, Nov 26, 2019 03:15 pm    Provider: Shanae Osborne NARISTEO (Assistant: Bud Berrios, )    Location: [facility]        Electronically signed by Shanae Osborne N.P. on  11/26/2019 06:02:14 PM                             Subjective:        CC: Ms. Osborne is a 83 year old White female.  presents today due to left leg numbness x2 days         HPI:           Low back pain noted.  The location is primarily in the lower, left lumbar spine.  She states that the current episode of pain started 3 days ago.  Associated symptoms include numbness in the left lower leg.  feels numbness in rectum and vulva as well (she has a history of back pain, but 3 days area she had some discomfort in the left lower back but is just now having numbness in her left leg down to her foot and the vaginal / rectal area           Patient needs a refill of her Bp rx's her amlodipine and her beta blocker     ROS:     CONSTITUTIONAL:  Negative for chills, fatigue, fever, and weight change.      GASTROINTESTINAL:  Negative for change in bowel control and stool incontinence.      GENITOURINARY:  Negative for urinary incontinence and change in bladder control.      NEUROLOGICAL:  Negative for paresthesias.          Past Medical History / Family History / Social History:         Last Reviewed on 11/26/2019 05:53 PM by Shanae Osborne    Past Medical History:             PAST MEDICAL HISTORY         Carotid Artery Stenosis: yuridia madera;     AAA             PAST MEDICAL HISTORY             CURRENT MEDICAL PROVIDERS:    Cardiologist: [facility]         PREVENTIVE HEALTH MAINTENANCE             BONE DENSITY: was last done 06/25/15 with the following abnormality noted-- Osteoporosis     COLORECTAL CANCER SCREENING: Up to date (colonoscopy q10y; sigmoidoscopy q5y; Cologuard q3y) was last done 3/2017, Results are in chart; colonoscopy with normal results      MAMMOGRAM: Done within last 2 years and results in are chart was last done 2019 stable         Surgical History:         Biopsy of breast; benign    Coronary Artery Stent Placement:  2;     Hysterectomy: at age 30; ovaries intact;     left foot neuroma;     Procedures:    Colonoscopy ( 11/normal/Rnona )    EGD (  normal )         Family History:     Father:  at age 80's; Cause of death was TB;  Parkinson's Disease     Mother:  at age 30's; Cause of death was renal issues;  goiter     Sister(s): 3 sister(s) total; 1 ; Neurological/Genetic Cerebrovascular Accident; Endocrine Type 2 Diabetes         Social History:     Occupation: quit working 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/[employer];     Marital Status:      Children: 6         Tobacco/Alcohol/Supplements:     Last Reviewed on 2019 03:18 PM by Bud Berrios    Tobacco: She has never smoked.          Substance Abuse History:     Last Reviewed on 2018 09:49 AM by Shanae Osborne        Mental Health History:     Last Reviewed on 2018 09:49 AM by Shanae Osborne        family issues/ deaths in the past         Communicable Diseases (eg STDs):     Last Reviewed on 2018 09:49 AM by Shanae Osborne    Reportable health conditions; NEGATIVE         Immunizations:     None        Allergies:     Last Reviewed on 2019 03:18 PM by Bud Berrios    Penicillins:      radio active dye:          Current Medications:     Last Reviewed on 2019 03:18 PM by Bud Berrios    Centrum Silver Vitamin*     Vitamin D3 2,000IU Capsules [1 capsule daily]    Loratadine 10mg Tablet [1 tab daily]    Aspirin (ASA) 81mg Tablets, Enteric Coated [1 tab daily]    Amlodipine  2.5mg Tablet [1 tab daily]    Metoprolol Succinate 50mg Tablets, Extended Release [Take 1 tablet(s) by mouth daily]    Miralax     Phyillips stool softner PRN    hydrocortisone ointment     Gas X PRN        Objective:        Vitals:         Current:  11/26/2019 3:20:11 PM    Ht:  5 ft, 3 in;  Wt: 132.4 lbs;  BMI: 23.5T: 98.3 F (oral);  BP: 144/82 mm Hg (left arm, sitting);  P: 72 bpm (left arm (BP Cuff), sitting);  sCr: 0.75 mg/dL;  GFR: 54.48        Exams:     PHYSICAL EXAM:     GENERAL: vital signs recorded - well developed, well nourished;  no apparent distress;     RESPIRATORY: normal respiratory rate and pattern with no distress; normal breath sounds with no rales, rhonchi, wheezes or rubs;     CARDIOVASCULAR: normal rate; rhythm is regular;  no systolic murmur;     MUSCULOSKELETAL: pain with range of motion in: back left lateral flexion;  SLR negative, equal strength and reflexes to bilateral  lower ext     PSYCHIATRIC:  appropriate affect and demeanor; normal speech pattern; grossly normal memory;         Assessment:         M54.5   Low back pain       I25.10   Atherosclerotic heart disease of native coronary artery without angina pectoris       R20.2   Paresthesia of skin           ORDERS:         Meds Prescribed:       [Refilled] Amlodipine  2.5 mg oral tablet [1 tab daily], #90 (ninety) tablets, Refills: 1 (one)       [Refilled] Metoprolol Succinate 50 mg oral Tablet, Extended Release 24 hr [Take 1 tablet(s) by mouth daily], #90 (ninety) tablets, Refills: 1 (one)         Radiology/Test Orders:       [id number]  Radiologic examination, spine, lumbosacral;  minimum of four views  (Send-Out)                      Plan:         Low back paindeclines flu vaccine, she may need a MRI, will get plain films, reviewed previous x-ray and MRI's        RADIOLOGY:  I have ordered Lumbar/Sacral Spine X-ray to be done today.      FOLLOW-UP:.  :for pending x-ray results           Orders:       [id number]  Radiologic examination, spine, lumbosacral;  minimum of four views  (Send-Out)              Atherosclerotic heart disease of native coronary artery without angina pectoris2 rx to crume          Prescriptions:       [Refilled] Amlodipine  2.5 mg oral tablet [1 tab daily], #90  (ninety) tablets, Refills: 1 (one)       [Refilled] Metoprolol Succinate 50 mg oral Tablet, Extended Release 24 hr [Take 1 tablet(s) by mouth daily], #90 (ninety) tablets, Refills: 1 (one)         Paresthesia of skinafter x-ray will determine what further evaluation is needed             Patient Recommendations:        For  Low back pain:                    APPOINTMENT INFORMATION:        Monday Tuesday Wednesday Thursday Friday Saturday Sunday            Time:___________________AM  PM   Date:_____________________             Charge Capture:         Primary Diagnosis:     M54.5  Low back pain           Orders:      [id number]  Office/outpatient visit; established patient, level 3  (In-House)              I25.10  Atherosclerotic heart disease of native coronary artery without angina pectoris     R20.2  Paresthesia of skin

## 2021-05-18 NOTE — PROGRESS NOTES
Danyell Osborne 1935     Office/Outpatient Visit    Visit Date: Thu, Jun 21, 2018 11:37 am    Provider: Shanae Osborne N.P. (Assistant: Ml Dubon MA)    Location: [facility]        Electronically signed by Shanae Osborne N.P. on  06/21/2018 02:21:51 PM                             SUBJECTIVE:        CC:     Ms. Osborne is a 82 year old White female.  presents today due to rectal bleeding; pain in left knee due to fall 2 weeks ago         HPI:         Ms. Osborne presents with melena.  The first time the hematochezia occured was 6 months ago.  The hematochezia is described as blood in toilet, quite a bit last night.  She has also noted fatigue.  She denies constipation or diarrhea.      ROS:     CONSTITUTIONAL:  Negative for fever.      CARDIOVASCULAR:  Negative for chest pain, palpitations, tachycardia, orthopnea, and edema.      RESPIRATORY:  Negative for cough, dyspnea, and hemoptysis.      GASTROINTESTINAL:  Negative for abdominal pain.      MUSCULOSKELETAL:  Positive for left knee pain, fell two weeks ago.  tried tylenol     NEUROLOGICAL:  Negative for dizziness, headaches, paresthesias, and weakness.      PSYCHIATRIC:  Positive for feelings of stress ( (with an issue with one of her daughter's) ).          PMH/FMH/SH:     Last Reviewed on 6/21/2018 02:19 PM by Shanae Osborne    Past Medical History:             PAST MEDICAL HISTORY         Carotid Artery Stenosis: yuridia madera;     AAA             PAST MEDICAL HISTORY             CURRENT MEDICAL PROVIDERS:    Cardiologist: Fozia         PREVENTIVE HEALTH MAINTENANCE             COLORECTAL CANCER SCREENING: Up to date (colonoscopy q10y; sigmoidoscopy q5y; Cologuard q3y) was last done 3/2017, Results are in chart; colonoscopy with normal results     MAMMOGRAM: Done within last 2 years and results in are chart was last done 7/2017 with normal results         Surgical History:         Biopsy of breast; benign    Coronary Artery  Stent Placement: 2011;     Hysterectomy: at age 30; ovaries intact;     left foot neuroma;     Procedures:    Colonoscopy ( 11/normal/Ronna )    EGD (  normal )         Family History:     Father:  at age 80's; Cause of death was TB;  Parkinson's Disease     Mother:  at age 30's; Cause of death was renal issues;  goiter     Sister(s): 3 sister(s) total; 1 ; Neurological/Genetic Cerebrovascular Accident; Endocrine Type 2 Diabetes         Social History:     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              Immunizations:     None        Allergies:     Last Reviewed on 2018 11:39 AM by Ml Dubon    Penicillins:    radio active dye:        Current Medications:     Last Reviewed on 2018 11:40 AM by Ml Dubon    Amlodipine  2.5mg Tablet 1 tab daily     Metoprolol Succinate 50mg Tablets, Extended Release Take 1 tablet(s) by mouth daily     Aspirin (ASA) 81mg Tablets, Enteric Coated 1 tab daily     Centrum Silver Vitamin*     Loratadine     Vitamin D3 2,000IU Capsules 1 capsule daily     Vitamin B12         OBJECTIVE:        Vitals:         Current: 2018 11:38:46 AM    Ht:  5 ft, 3 in;  Wt: 131.9 lbs;  BMI: 23.4    T: 98.6 F (oral);  BP: 134/69 mm Hg (left arm, sitting);  P: 64 bpm (left arm (BP Cuff), sitting);  sCr: 0.77 mg/dL;  GFR: 53.86        Exams:     PHYSICAL EXAM:     GENERAL: vital signs recorded - well developed, well nourished;  no apparent distress;     RESPIRATORY: normal respiratory rate and pattern with no distress; normal breath sounds with no rales, rhonchi, wheezes or rubs;     CARDIOVASCULAR: normal rate; rhythm is regular;  no systolic murmur;     MUSCULOSKELETAL: no pain with ROM of left knee, but tender to palpation of anterior knee;      PSYCHIATRIC:  appropriate affect and demeanor; normal speech pattern; grossly normal memory;         ASSESSMENT           578.1   K92.1  Melena              DDx:     719.46   M25.562  Knee pain              DDx:         ORDERS:         Radiology/Test Orders:       [id number]  Left  radiologic examination, knee; three views  (Send-Out)           Lab Orders:       [id number]  [facility] Basic Metabolic Panel  (Send-Out)         [id number]  [facility] CBC with 3 part diff  (Send-Out)           Procedures Ordered:       REFER  Referral to Specialist or Other Facility  (Send-Out)                   PLAN:          Melena     LABORATORY:  Labs ordered to be performed today include basic metabolic panel and CBC.      REFERRALS:  Referral initiated to a general surgeon ( Dr. Gabriel Friend ).            Orders:       REFER  Referral to Specialist or Other Facility  (Send-Out)         [id number]  [facility] Basic Metabolic Panel  (Send-Out)         [id number]  [facility] CBC with 3 part diff  (Send-Out)            Knee pain         RADIOLOGY:  I have ordered a left knee x-ray to be done today.            Orders:       [id number]  Left  radiologic examination, knee; three views  (Send-Out)               CHARGE CAPTURE           **Please note: ICD descriptions below are intended for billing purposes only and may not represent clinical diagnoses**        Primary Diagnosis:         578.1 Melena            K92.1    Melena              Orders:          [id number]   Office/outpatient visit; established patient, level 3  (In-House)           719.46 Knee pain            M25.562    Pain in left knee        ADDENDUMS:      ____________________________________    Addendum: 06/22/2018 11:25 AM - Paola John         Visit Note Faxed to:        Gabriel Friend  (General Surgery); Number [phone]

## 2021-05-18 NOTE — PROGRESS NOTES
Danyell Osborne 1935     Office/Outpatient Visit    Visit Date: Mon, May 13, 2019 11:20 am    Provider: Kimberley Osorio N.P. (Assistant: Rosie Nino MA)    Location: [facility]        Electronically signed by Kimberley Osorio N.P. on  05/13/2019 12:37:14 PM                             SUBJECTIVE:        CC:     Ms. Osborne is a 83 year old White female.  She presents with weak, tired,pelvis pain, mid back pain.          HPI:         Ms. Osborne complains of fatigue.  This has been a problem for 3 weeks.  It is described as quite severe in intensity.  She averages 3 to 4 hours of sleep per night.  Patient states that she is not depressed.  Associated symptoms include chronic back pain, constipation.  She denies chills, depression or fever.  Current affective symptoms include sadness.  Medical history is negative for depression.  Ms. Osborne notes recent stressors, including has granddaughter that is still missing - deaths in the family - daughter lives in home that has some problems of her own.      ROS:     CONSTITUTIONAL:  Negative for chills, fatigue, fever, and weight change.      CARDIOVASCULAR:  Negative for chest pain, orthopnea, paroxysmal nocturnal dyspnea and pedal edema.      RESPIRATORY:  Negative for dyspnea.      GASTROINTESTINAL:  Positive for abdominal pain, abdominal bloating, constipation ( takes miralax PRN ) and pelvic pain - sharp and shooting.   Negative for diarrhea, nausea or vomiting.      MUSCULOSKELETAL:  Positive for back pain.      NEUROLOGICAL:  Negative for dizziness, headaches, paresthesias, and weakness.      ALLERGIC/IMMUNOLOGIC:  Positive for seasonal allergies.          PMH/FMH/SH:     Last Reviewed on 5/13/2019 12:36 PM by Kimberley Osorio    Past Medical History:             PAST MEDICAL HISTORY         Carotid Artery Stenosis: seegill madera;     AAA             PAST MEDICAL HISTORY             CURRENT MEDICAL PROVIDERS:    Cardiologist: Fozia          PREVENTIVE HEALTH MAINTENANCE             COLORECTAL CANCER SCREENING: Up to date (colonoscopy q10y; sigmoidoscopy q5y; Cologuard q3y) was last done 3/2017, Results are in chart; colonoscopy with normal results     MAMMOGRAM: Done within last 2 years and results in are chart was last done 2018 stable         Surgical History:         Biopsy of breast; benign    Coronary Artery Stent Placement: 2011;     Hysterectomy: at age 30; ovaries intact;     left foot neuroma;     Procedures:    Colonoscopy ( 11/normal/Ronna )    EGD (  normal )         Family History:     Father:  at age 80's; Cause of death was TB;  Parkinson's Disease     Mother:  at age 30's; Cause of death was renal issues;  goiter     Sister(s): 3 sister(s) total; 1 ; Neurological/Genetic Cerebrovascular Accident; Endocrine Type 2 Diabetes         Social History:     Occupation: 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          Substance Abuse History:     Last Reviewed on 2018 09:49 AM by Shanae Osborne        Mental Health History:     Last Reviewed on 2018 09:49 AM by Shanae Osborne        family issues/ deaths in the past         Communicable Diseases (eg STDs):     Last Reviewed on 2018 09:49 AM by Shanae Osborne    Reportable health conditions; NEGATIVE             Current Problems:     Last Reviewed on 2019 12:36 PM by Kimberley Osorio    Fatigue     Coronary artery disease, of native coronary artery     Joint pain, other specified site     Other constipation     Hip pain     Urinary frequency     Dizziness     Anxiety     Low back pain     Coronary artery disease     Insomnia     Pelvic pain, female     Cough         Immunizations:     None        Allergies:     Last Reviewed on 2019 11:22 AM by  Rosei Nino    Penicillins:    radio active dye:        Current Medications:     Last Reviewed on 5/13/2019 11:25 AM by Rosie Nino    Amlodipine  2.5mg Tablet 1 tab daily     Metoprolol Succinate 50mg Tablets, Extended Release Take 1 tablet(s) by mouth daily     Aspirin (ASA) 81mg Tablets, Enteric Coated 1 tab daily     Centrum Silver Vitamin*     Loratadine 10mg Tablet 1 tab daily     Vitamin D3 2,000IU Capsules 1 capsule daily     Gas X PRN     hydrocortisone ointment     Miralax     Phyillips stool softner PRN         OBJECTIVE:        Vitals:         Current: 5/13/2019 11:27:40 AM    Ht:  5 ft, 3 in;  Wt: 134 lbs;  BMI: 23.7    T: 98.2 F (oral);  BP: 140/52 mm Hg (right arm, sitting);  P: 62 bpm (right arm (BP Cuff), sitting);  sCr: 0.83 mg/dL;  GFR: 49.48        Repeat:     11:30:39 AM     BP:   141/58mm Hg (left arm, sitting)         Exams:     PHYSICAL EXAM:     GENERAL: vital signs recorded - well developed, well nourished;  no apparent distress;     NECK: range of motion is normal; thyroid is non-palpable;     RESPIRATORY: normal respiratory rate and pattern with no distress; normal breath sounds with no rales, rhonchi, wheezes or rubs;     CARDIOVASCULAR: normal rate; rhythm is regular;  no systolic murmur; no edema;     GASTROINTESTINAL: nontender; normal bowel sounds; firm but not rigid;     NEUROLOGICAL:  cranial nerves, motor and sensory function, reflexes, gait and coordination are all intact;     PSYCHIATRIC: affect/demeanor: tearful;         Lab/Test Results:             Glucose, Urine:  Neg (05/13/2019),     Bilirubin, urine:  Negative (05/13/2019),     Ketones, Urine Strip:  Negative (05/13/2019),     Specific Gravity, urine:  1.025 (05/13/2019),     Blood in Urine:  negative (05/13/2019),     pH, urine:  5.5 (05/13/2019),     Protein Urine QL:  negative (05/13/2019),     Urobilinogen, urine:  0.2 E.U./dL (05/13/2019),     Nitrite, Urine:  Negative (05/13/2019),     Leukoctyes, urine:   Negative (05/13/2019),     Appearance:  Clear (05/13/2019),     collection source:  Clean-catch (05/13/2019),     Color:  Yellow (05/13/2019),     Performed by::  [facility] (05/13/2019),             ASSESSMENT:           780.79   R53.83  Fatigue              DDx:     625.9   R10.2  Pelvic pain, female              DDx:     780.52   G47.00  Insomnia              DDx:         ORDERS:         Radiology/Test Orders:       [id number]  Radiologic exam abdomen 2 views  (Send-Out)           Lab Orders:       [id number]  URCU - [facility] Urine Culture  (Send-Out)         16701  Urinalysis, automated, without microscopy  (In-House)         98769  FF[facility] - [facility] CBC, CMP, TSH, B12 levels FATIGUE PANEL  (Send-Out)                   PLAN:          Fatigue will check labs  - this may be due to many things - insomnia, constipation, allergies, stress/depression - will start with labs and have her follwo up with PCP if no indication on labs - may need assistance with sleeping     LABORATORY:  Labs ordered to be performed today include Female Fatigue Panel (CBC, CMP, TSH, B12).            Orders:       [id number]  FFAT - [facility] CBC, CMP, TSH, B12 levels FATIGUE PANEL  (Send-Out)            Pelvic pain, female will check to make sure not due to constipation - if not, will need to follow up with PCP     LABORATORY:  Labs ordered to be performed today include Urine culture and UA dip in office no micro.      RADIOLOGY:  I have ordered Abdomen Flat & Upright to be done today.            Orders:       [id number]  URCU - [facility] Urine Culture  (Send-Out)         95775  Urinalysis, automated, without microscopy  (In-House)         38156  Radiologic exam abdomen 2 views  (Send-Out)            Insomnia follow up with PCP             CHARGE CAPTURE:           Primary Diagnosis:     780.79 Fatigue            R53.83    Other fatigue              Orders:          [id number]   Office/outpatient visit; established patient, level 3  (In-House)           625.9 Pelvic pain, female             R10.2    Pelvic and perineal pain              Orders:          [id number]   Urinalysis, automated, without microscopy  (In-House)           780.52 Insomnia            G47.00    Insomnia, unspecified

## 2021-05-18 NOTE — PROGRESS NOTES
Danyell Osborne 1935     Office/Outpatient Visit    Visit Date: Thu, Jul 12, 2018 09:24 am    Provider: Shanae Osborne N.P. (Assistant: Karena Vernon MA)    Location: [facility]        Electronically signed by Shanae Osborne N.P. on  07/12/2018 01:33:20 PM                             SUBJECTIVE:        CC:     Ms. Osborne is a 82 year old White female.  patient is here for congestion;         HPI:         Patient to be evaluated for cough.  This has been a problem for the past one month.  It is associated with hoarseness.  There are no associated symptoms of productive sputum.  was feeling better but now flared again Went to [facility] on 7-2-18 and was given Mucinex, claritin and Zpack.  But has stopped/ finished all this.      ROS:     CONSTITUTIONAL:  Positive for fatigue.   Negative for fever.      E/N/T:  Negative for sore throat.      CARDIOVASCULAR:  Negative for chest pain, palpitations, tachycardia, orthopnea, and edema.      RESPIRATORY:  Negative for frequent wheezing.      GASTROINTESTINAL:  Positive for constipation, nausea and gas.  (has an appt next week with Ronna, not seeing much blood)     PSYCHIATRIC:  Positive for feelings of stress.  this the anniversary of her grand daughter's disappearance and stress related to the murder of her son in the past          PMH/[facility]/:     Last Reviewed on 7/12/2018 09:49 AM by Shanae Osborne    Past Medical History:             PAST MEDICAL HISTORY         Carotid Artery Stenosis: sees santy;     AAA             PAST MEDICAL HISTORY             CURRENT MEDICAL PROVIDERS:    Cardiologist: [facility]ia         PREVENTIVE HEALTH MAINTENANCE             COLORECTAL CANCER SCREENING: Up to date (colonoscopy q10y; sigmoidoscopy q5y; Cologuard q3y) was last done 3/2017, Results are in chart; colonoscopy with normal results     MAMMOGRAM: Done within last 2 years and results in are chart was last done 7/2017 with normal results         Surgical  History:         Biopsy of breast; benign    Coronary Artery Stent Placement: 2011;     Hysterectomy: at age 30; ovaries intact;     left foot neuroma;     Procedures:    Colonoscopy ( 11/normal/Ronna )    EGD (  normal )         Family History:     Father:  at age 80's; Cause of death was TB;  Parkinson's Disease     Mother:  at age 30's; Cause of death was renal issues;  goiter     Sister(s): 3 sister(s) total; 1 ; Neurological/Genetic Cerebrovascular Accident; Endocrine Type 2 Diabetes         Social History:     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              Immunizations:     None        Allergies:     Last Reviewed on 2018 09:26 AM by Karena Vernon    Penicillins:    radio active dye:        Current Medications:     Last Reviewed on 2018 09:28 AM by Karena Vernon    Amlodipine  2.5mg Tablet 1 tab daily     Metoprolol Succinate 50mg Tablets, Extended Release Take 1 tablet(s) by mouth daily     Aspirin (ASA) 81mg Tablets, Enteric Coated 1 tab daily     Centrum Silver Vitamin*     Loratadine     Vitamin D3 2,000IU Capsules 1 capsule daily         OBJECTIVE:        Vitals:         Current: 2018 9:26:24 AM    Ht:  5 ft, 3 in;  Wt: 128.1 lbs;  BMI: 22.7    T: 99.5 F (oral);  BP: 133/70 mm Hg (left arm, sitting);  P: 64 bpm (left arm (BP Cuff), sitting);  sCr: 0.83 mg/dL;  GFR: 49.35        Exams:     PHYSICAL EXAM:     GENERAL:  well developed and nourished; appropriately groomed; in no apparent distress;     E/N/T: EARS:  normal external auditory canals and tympanic membranes;  grossly normal hearing; NOSE:  normal nasal mucosa, septum, turbinates, and sinuses; OROPHARYNX:  normal mucosa, dentition, gingiva, and posterior pharynx;     RESPIRATORY: normal respiratory rate  and pattern with no distress; normal breath sounds with no rales, rhonchi, wheezes or rubs;     CARDIOVASCULAR: normal rate; rhythm is regular;  no systolic murmur;     LYMPHATIC: no enlargement of cervical or facial nodes;         ASSESSMENT           786.2   R05  Cough              DDx:         ORDERS:         Meds Prescribed:       Medrol (Methylprednisolone) 4mg Dosepak Take as directed with food  #1 (One) dose pack Refills: 0         Radiology/Test Orders:       [id number]  Radiologic exam chest 2 views  (Send-Out)                   PLAN:          Cough will consider medrol dose to crume /chronic changes seen on CXR, will try a medrol dose pack and see how she does         RADIOLOGY:  I have ordered a chest x-ray (PA and lateral) to be done today.      FOLLOW-UP: Advised to call if there is no improvement in 1 week(s).            Prescriptions:       Medrol (Methylprednisolone) 4mg Dosepak Take as directed with food  #1 (One) dose pack Refills: 0           Orders:       [id number]  Radiologic exam chest 2 views  (Send-Out)               CHARGE CAPTURE           **Please note: ICD descriptions below are intended for billing purposes only and may not represent clinical diagnoses**        Primary Diagnosis:         786.2 Cough            R05    Cough              Orders:          [id number]   Office/outpatient visit; established patient, level 3  (In-House)

## 2021-05-18 NOTE — PROGRESS NOTES
Danyell Osborne 1935     Office/Outpatient Visit    Visit Date: Mon, Nov 12, 2018 06:13 pm    Provider: Shanae Osborne N.P. (Assistant: Melanie Finch MA)    Location: [facility]        Electronically signed by Shanae Osborne N.P. on  11/12/2018 10:28:58 PM                             SUBJECTIVE:        CC:     Ms. Osborne is a 82 year old White female.  presents today due to complaints of back pain that has been going on for a couple of years but is now getting worse         HPI:         Low back pain noted.  Reason for visit: Pain.  The discomfort is most prominent in the lower thoracic spine.  She characterizes it as rubbing pain.  This is a chronic, but intermittent problem with an acute exacerbation.  She states that the current episode of pain started one week ago.  She does not recall any precipitating event or injury.  The pain worsens with certain movements.  Other details: worse in last week was going to chiropractor  2 years ago and stopped.  (she stopped when her son was murdered, which will be 2 years later this month )     ROS:     CONSTITUTIONAL:  Negative for chills, fatigue, fever, and weight change.      CARDIOVASCULAR:  Negative for chest pain, palpitations, tachycardia, orthopnea, and edema.      RESPIRATORY:  Negative for cough, dyspnea, and hemoptysis.      MUSCULOSKELETAL:  Positive for recent rib fractures, improving.      NEUROLOGICAL:  Negative for paresthesias.      PSYCHIATRIC:  Positive for feelings of stress ( (related to several family member murders and her daughter's health) ).          PMH/FM/SH:     Last Reviewed on 11/12/2018 10:22 PM by Shanae Osborne    Past Medical History:             PAST MEDICAL HISTORY         Carotid Artery Stenosis: yuridia madera;     AAA             PAST MEDICAL HISTORY             CURRENT MEDICAL PROVIDERS:    Cardiologist: Fozia         PREVENTIVE HEALTH MAINTENANCE             COLORECTAL CANCER SCREENING: Up to date  (colonoscopy q10y; sigmoidoscopy q5y; Cologuard q3y) was last done 3/2017, Results are in chart; colonoscopy with normal results     MAMMOGRAM: Done within last 2 years and results in are chart was last done 2018 stable         Surgical History:         Biopsy of breast; benign    Coronary Artery Stent Placement: 2011;     Hysterectomy: at age 30; ovaries intact;     left foot neuroma;     Procedures:    Colonoscopy ( 11/normal/Ronna )    EGD (  normal )         Family History:     Father:  at age 80's; Cause of death was TB;  Parkinson's Disease     Mother:  at age 30's; Cause of death was renal issues;  goiter     Sister(s): 3 sister(s) total; 1 ; Neurological/Genetic Cerebrovascular Accident; Endocrine Type 2 Diabetes         Social History:     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          Substance Abuse History:     Last Reviewed on 2018 09:49 AM by Shanae Osborne        Mental Health History:     Last Reviewed on 2018 09:49 AM by Shanae Osborne        family issues/ deaths in the past         Communicable Diseases (eg STDs):     Last Reviewed on 2018 09:49 AM by Shanae Osborne    Reportable health conditions; NEGATIVE             Immunizations:     None        Allergies:     Last Reviewed on 2018 06:15 PM by Melanie Finch    Penicillins:    radio active dye:        Current Medications:     Last Reviewed on 2018 06:15 PM by Melanie Finch    Amlodipine  2.5mg Tablet 1 tab daily     Metoprolol Succinate 50mg Tablets, Extended Release Take 1 tablet(s) by mouth daily     Aspirin (ASA) 81mg Tablets, Enteric Coated 1 tab daily     Centrum Silver Vitamin*     Loratadine     Vitamin D3 2,000IU Capsules 1 capsule daily         OBJECTIVE:        Vitals:          Current: 11/12/2018 6:15:11 PM    Ht:  5 ft, 3 in;  Wt: 132.8 lbs;  BMI: 23.5    T: 99.1 F (oral);  BP: 150/63 mm Hg (right arm, sitting);  P: 70 bpm (right arm (BP Cuff), sitting);  sCr: 0.83 mg/dL;  GFR: 50.11        Exams:     PHYSICAL EXAM:     GENERAL: vital signs recorded - well developed, well nourished;  no apparent distress;     RESPIRATORY: normal respiratory rate and pattern with no distress; normal breath sounds with no rales, rhonchi, wheezes or rubs;     CARDIOVASCULAR: normal rate; rhythm is regular;  no systolic murmur;     MUSCULOSKELETAL: spine: kyphosis;  no pain with ROM of back, pain to left mid to lower thoracic para spinous muscles;     PSYCHIATRIC:  appropriate affect and demeanor; normal speech pattern; grossly normal memory;         ASSESSMENT           724.2   M54.5  Low back pain              DDx:     724.1   M54.6  Mid back pain              DDx:         ORDERS:         Radiology/Test Orders:       [id number]  Radiologic examination, spine, lumbosacral;  minimum of four views  (Send-Out)         31379  Radiologic examination, spine; thoracic, three views  (Send-Out)         92002  DXA, bone density study, 1 or more sites; axial skeleton (eg hips, pelvis, spine)  (Send-Out)                   PLAN:          Low back pain reviewed recent rib/ chest x-ray, her bones were osteopenic, no recent dexa, vit d was normal in 2017 /may need a lateral views, discussed compression fractures         RADIOLOGY:  I have ordered Lumbar/Sacral Spine X-ray to be done today.      FOLLOW-UP TESTING #1:    RADIOLOGY:  I have ordered Dexa Scan to be done today.      FOLLOW-UP: after x-ray's           Orders:       [id number]  Radiologic examination, spine, lumbosacral;  minimum of four views  (Send-Out)         92710  DXA, bone density study, 1 or more sites; axial skeleton (eg hips, pelvis, spine)  (Send-Out)            Mid back pain         RADIOLOGY:  I have ordered Thoracic Spine to be done today.             Orders:       [id number]  Radiologic examination, spine; thoracic, three views  (Send-Out)               CHARGE CAPTURE           **Please note: ICD descriptions below are intended for billing purposes only and may not represent clinical diagnoses**        Primary Diagnosis:         724.2 Low back pain            M54.5    Low back pain              Orders:          [id number]   Office/outpatient visit; established patient, level 4  (In-House)           724.1 Mid back pain            M54.6    Pain in thoracic spine        ADDENDUMS:      ____________________________________    Date: 11/16/2018 04:07 PM    Author: Chandrika Irizarry         Visit Note Faxed to:        User Entered Recipient; Number [phone]

## 2021-05-18 NOTE — PROGRESS NOTES
Danyell Osborne  1935     Office/Outpatient Visit    Visit Date: Thu, Mar 5, 2020 12:12 pm    Provider: Shanae Osborne NARISTEO (Assistant: Ml Dubon MA)    Location: [facility]        Electronically signed by Shanae Osborne N.P. on  03/05/2020 12:47:20 PM                             Subjective:        CC: Ms. Osborne is a 84 year old White female.  presents today due to discharge, pt unsure if its from rectum or vagina         HPI:           Ms. Osborne presents with other hemorrhoids.  The patient has been having gastrointestinal problems for more than 5 years.  Gastrointestinal symptoms include nausea, blood with hemorrhoids, gas and leaking from stool vs vagina, odor for two days.  She estimates the stool frequency to be 1 stools per day.  Usually is constipated, so has taken some miralax and now having loose stools. Trying rx rectal cream for hemorrhoids not helping. Has seen Ronna.  She and her  are not sexually active.    ROS:     CONSTITUTIONAL:  Negative for fever.      CARDIOVASCULAR:  Negative for chest pain, palpitations, tachycardia, orthopnea, and edema.      RESPIRATORY:  Negative for cough, dyspnea, and hemoptysis.      GASTROINTESTINAL:  Positive for acid reflux symptoms.   Negative for abdominal pain.      GENITOURINARY:  Negative for dysuria.      NEUROLOGICAL:  Negative for dizziness, headaches, paresthesias, and weakness.      PSYCHIATRIC:  Positive for feelings of stress ( (dealing with health issues of her daughter) ).          Past Medical History / Family History / Social History:         Last Reviewed on 3/05/2020 12:33 PM by Shanae Osborne    Past Medical History:             PAST MEDICAL HISTORY         Carotid Artery Stenosis: seegill madera;     AAA             PAST MEDICAL HISTORY             CURRENT MEDICAL PROVIDERS:    Cardiologist: Fozia         PREVENTIVE HEALTH MAINTENANCE             BONE DENSITY: was last done 06/25/15 with the  following abnormality noted-- Osteoporosis     COLORECTAL CANCER SCREENING: Up to date (colonoscopy q10y; sigmoidoscopy q5y; Cologuard q3y) was last done 3/2017, Results are in chart; colonoscopy with normal results     MAMMOGRAM: Done within last 2 years and results in are chart was last done 2019 stable         Surgical History:         Biopsy of breast; benign    Coronary Artery Stent Placement: 2011;     Hysterectomy: at age 30; ovaries intact;     left foot neuroma;     Procedures:    Colonoscopy ( 11/normal/Ronna )    EGD (  normal )         Family History:     Father:  at age 80's; Cause of death was TB;  Parkinson's Disease     Mother:  at age 30's; Cause of death was renal issues;  goiter     Sister(s): 3 sister(s) total; 1 ; Neurological/Genetic Cerebrovascular Accident; Endocrine Type 2 Diabetes         Social History:     Occupation: 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          Substance Abuse History:     Last Reviewed on 2018 09:49 AM by Shanae Osborne        Mental Health History:     Last Reviewed on 2018 09:49 AM by Shanae Osborne        family issues/ deaths in the past         Communicable Diseases (eg STDs):     Last Reviewed on 2018 09:49 AM by Shanae Osborne    Reportable health conditions; NEGATIVE         Immunizations:     None        Allergies:     Last Reviewed on 3/05/2020 12:15 PM by Ml Dubon    Penicillins:      radio active dye:          Current Medications:     Last Reviewed on 3/05/2020 12:15 PM by Ml Dubon    Centrum Silver Vitamin*     Vitamin D3 2,000 unit oral capsule [1 capsule daily]    Loratadine 10 mg oral tablet [1 tab daily]    aspirin 81 mg oral tablet, delayed release (enteric coated) [1 tab daily]     Amlodipine  2.5 mg oral tablet [1 tab daily]    Metoprolol Succinate 50 mg oral Tablet, Extended Release 24 hr [Take 1 tablet(s) by mouth daily]    Miralax      Phyillips stool softner PRN     hydrocortisone ointment      Gas X PRN         Objective:        Vitals:         Current: 3/5/2020 12:17:24 PM    Ht:  5 ft, 3 in;  Wt: 130.8 lbs;  BMI: 23.2T: 98.2 F (oral);  BP: 136/62 mm Hg (left arm, sitting);  P: 65 bpm (left arm (BP Cuff), sitting);  sCr: 0.75 mg/dL;  GFR: 53.31        Exams:     PHYSICAL EXAM:     GENERAL: vital signs recorded - well developed, well nourished;  no apparent distress;     RESPIRATORY: normal respiratory rate and pattern with no distress; normal breath sounds with no rales, rhonchi, wheezes or rubs;     CARDIOVASCULAR: normal rate; rhythm is regular;  no systolic murmur;     GASTROINTESTINAL: rectal exam: (+) internal hemorrhoid(s);  guaiac NEGATIVE stool;     GENITOURINARY: external genitalia: no lesions; ; vagina: atrophic mucosa; rectocele; ;     MUSCULOSKELETAL: No CVA tenderness     PSYCHIATRIC:  appropriate affect and demeanor; normal speech pattern; grossly normal memory;         Assessment:         K64.8   Other hemorrhoids       R82.90   Unspecified abnormal findings in urine           ORDERS:         Lab Orders:       [id number]  [facility] hospital Urinalysis, automated, with micro  (Send-Out)            65110  Blood occult, peroxidase act, qual; feces, colorectal screening  (In-House)              Procedures Ordered:       REFER  Referral to Specialist or Other Facility  (Send-Out)                      Plan:         Other hemorrhoidsreviewed her last colonoscopy and note from surgeon, continue to use miralax daily or QOD, continiue to use the topical hemorrhoid cream he gave her         REFERRALS:  Referral initiated to a general surgeon ( Dr. Gabriel Friend; for evaluation of internal hemorrhoid ).      RECOMMENDATIONS given include: drink plenty of water.            Orders:       REFER   Referral to Specialist or Other Facility  (Send-Out)            01088  Blood occult, peroxidase act, qual; feces, colorectal screening  (In-House)      X 1 @ [facility] [place]        Unspecified abnormal findings in urine    LABORATORY:  Labs ordered to be performed today include urinalysis with micro.      FOLLOW-UP:.  :for pending u/a results           [facility]:       [id number], with micro  (Send-Out)                  Patient Recommendations:        For  Unspecified abnormal findings in urine:                    APPOINTMENT INFORMATION:        Monday Tuesday Wednesday Thursday Friday Saturday Sunday            Time:___________________AM  PM   Date:_____________________             Charge Capture:         Primary Diagnosis:     K64.8  Other hemorrhoids           Orders:      86631  Office/outpatient visit; established patient, level 3  (In-House)            24517  Blood occult, peroxidase act, qual; feces, colorectal screening  (In-House)              R82.90  Unspecified abnormal findings in urine

## 2021-05-18 NOTE — PROGRESS NOTES
Danyell Osborne 1935     Office/Outpatient Visit    Visit Date: Thu, Feb 22, 2018 11:57 am    Provider: Shanae Osborne N.P. (Assistant: Keily Arias MA)    Location: [facility]        Electronically signed by Shanae Osborne N.P. on  02/22/2018 01:16:57 PM                             SUBJECTIVE:        CC:     Ms. Osborne is a 82 year old White female.  check up, sores in mouth, ears stopped up;         HPI:         Ms. Osborne presents with cerumen impaction.      Hearing is diminished in both ears.  Associated symptoms include diminished hearing.  The symptoms began 4 weeks ago.  she has tried using sweet oil     ROS:     CONSTITUTIONAL:  Negative for chills, fatigue, fever, and weight change.      E/N/T:  Positive for mouth sores, intermittent issue.      CARDIOVASCULAR:  Negative for chest pain, palpitations, tachycardia, orthopnea, and edema.      RESPIRATORY:  Negative for cough, dyspnea, and hemoptysis.      GASTROINTESTINAL:  Positive for constipation ( takes miralax prn ) and hemorrhoids.   Negative for melena.      NEUROLOGICAL:  Negative for dizziness, headaches, paresthesias, and weakness.      PSYCHIATRIC:  Positive for feelings of stress ( (related to Crystal / chilo death) ).          PMH/FMH/SH:     Last Reviewed on 2/22/2018 12:06 PM by Shanae Osborne    Past Medical History:             PAST MEDICAL HISTORY         Carotid Artery Stenosis: yuridia madera;     AAA             PAST MEDICAL HISTORY             CURRENT MEDICAL PROVIDERS:    Cardiologist         PREVENTIVE HEALTH MAINTENANCE             COLORECTAL CANCER SCREENING: Up to date (colonoscopy q10y; sigmoidoscopy q5y; Cologuard q3y) was last done 3/2017, Results are in chart; colonoscopy with normal results     MAMMOGRAM: Done within last 2 years and results in are chart was last done 7/2017 with normal results         Surgical History:         Biopsy of breast; benign    Coronary Artery Stent Placement: 2011 2;      Hysterectomy: at age 30; ovaries intact;     left foot neuroma;     Procedures:    Colonoscopy ( 11/normal/Ronna )    EGD (  normal )         Family History:     Father:  at age 80's; Cause of death was TB;  Parkinson's Disease     Mother:  at age 30's; Cause of death was renal issues;  goiter     Sister(s): 3 sister(s) total; 1 ; Neurological/Genetic Cerebrovascular Accident; Endocrine Type 2 Diabetes         Social History:     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              Immunizations:     None        Allergies:     Last Reviewed on 2018 11:59 AM by Keily Arias    Penicillins:    radio active dye:        Current Medications:     Last Reviewed on 2018 12:01 PM by Keily Arias    Metoprolol Succinate 50mg Tablets, Extended Release Take 1 tablet(s) by mouth daily     Amlodipine  2.5mg Tablet 1 tab daily     Aspirin (ASA) 81mg Tablets, Enteric Coated 1 tab daily     Centrum Silver Vitamin*     Loratadine     Vitamin B12 1,000mcg Tablet 1 tab daily     Vitamin D3 2,000IU Capsules 1 capsule daily         OBJECTIVE:        Vitals:         Current: 2018 11:59:27 AM    Ht:  5 ft, 3 in;  Wt: 129.7 lbs;  BMI: 23.0    T: 98.7 F (oral);  BP: 133/69 mm Hg (left arm, sitting);  P: 70 bpm (left arm (BP Cuff), sitting);  sCr: 0.78 mg/dL;  GFR: 52.79        Exams:     PHYSICAL EXAM:     GENERAL: vital signs recorded - well developed, well nourished;  no apparent distress;     E/N/T: EARS: external auditory canal occluded by cerumen on the right;  left TM is normal;  OROPHARYNX: oral mucosa reveals tiny slightly raised area on left distal oral mucosa;;     RESPIRATORY: normal respiratory rate and pattern with no distress; normal breath sounds with no rales, rhonchi, wheezes or rubs;      CARDIOVASCULAR: normal rate; rhythm is regular;  no systolic murmur; no edema;     LYMPHATIC: no enlargement of cervical or facial nodes;     PSYCHIATRIC:  appropriate affect and demeanor; normal speech pattern; grossly normal memory;         Procedures:     Cerumen impaction     Cerumen impaction is noted in both ears The degree of wax accumulation is minor in the left ear and moderate in the right ear.  With minimal difficulty, using a syringe irrigation, the wax is removed.  Removed from ear was hard balls of wax.  The patient tolerated the procedure well.      There were no complications.  Performed by: pr             ASSESSMENT           380.4   H61.21  Cerumen impaction              DDx:     564.09   K59.09  Other constipation              DDx:     528.9   K13.70  Mouth sore              DDx:         ORDERS:         Lab Orders:       [id number]  BDCBC - [facility] CBC with 3 part diff  (Send-Out)         20809  TSH - [facility] TSH  (Send-Out)         62401  FOL - [facility] Folate; folic acid serum  (Send-Out)         95953  VB12 - [facility] Vitamin B12  (Send-Out)           Procedures Ordered:       50946  Removal of impacted cerumen BILATERAL (NURSE)  (In-House)                   PLAN:          Cerumen impaction         TESTS/PROCEDURES:  Will proceed with Cerumen Removal--by Nurse: Both ears, to be performed/scheduled now.      FOLLOW-UP: Schedule follow-up appointments on a p.r.n. basis..            Orders:       [id number]  Removal of impacted cerumen BILATERAL (NURSE)  (In-House)            Other constipation recommend colace and miralax daily, reviewed colon screen done 3-2017     LABORATORY:  Labs ordered to be performed today include CBC and TSH.      RECOMMENDATIONS given include: increase fluid intake and increase fiber, regular exercise.      FOLLOW-UP: Advised to call if there is no improvement Follow-up by phone if no improvement in 1 month..  .            Orders:       88944  BDCBC - [facility] CBC with 3 part diff  (Send-Out)          79674  TSH - [facility] TSH  (Send-Out)             Patient Education Handouts:       Constipation (Adult)           Mouth sore check some labs     LABORATORY:  Labs ordered to be performed today include Anemia profile Folate Vitamin B12.            Prescriptions: nystatin           Orders:       77046  FOL - [facility] Folate; folic acid serum  (Send-Out)         37684  VB12 - [facility] Vitamin B12  (Send-Out)               Patient Recommendations:        For  Cerumen impaction:     Schedule follow-up appointments as needed.                APPOINTMENT INFORMATION:        Monday Tuesday Wednesday Thursday Friday Saturday Sunday            Time:___________________AM  PM   Date:_____________________         For  Other constipation:     Drink plenty of fluids.  Fever increases the loss of fluids and can lead to dehydration.  Follow-up by phone if no improvement in 1 month.                APPOINTMENT INFORMATION:        Monday Tuesday Wednesday Thursday Friday Saturday Sunday            Time:___________________AM  PM   Date:_____________________             CHARGE CAPTURE           **Please note: ICD descriptions below are intended for billing purposes only and may not represent clinical diagnoses**        Primary Diagnosis:         380.4 Cerumen impaction            H61.21    Impacted cerumen, right ear              Orders:          70143   Office/outpatient visit; established patient, level 4  (In-House)             56796   Removal of impacted cerumen BILATERAL (NURSE)  (In-House)           564.09 Other constipation            K59.09    Other constipation    528.9 Mouth sore            K13.70    Unspecified lesions of oral mucosa

## 2021-05-18 NOTE — PROGRESS NOTES
Danyell Osborne  1935     Office/Outpatient Visit    Visit Date: Wed, Sep 23, 2020 02:03 pm    Provider: Shanae Osborne N.P. (Assistant: Nancy Au MA)    Location: [facility]        Electronically signed by Shanae Osborne N.P. on  09/23/2020 03:38:03 PM                             Subjective:        CC: Ms. Osborne is a 84 year old White female.  She presents with mid back pain.          HPI:           In regard to the pain in thoracic spine, the discomfort is most prominent in the right, mid thoracic spine.  She characterizes it as spasm.  This is a chronic, but intermittent problem with an acute exacerbation.  She states that the current episode of pain started >4 hours ago.  She does not recall any precipitating event or injury.  Associated symptoms include spasms.  The pain worsens with movement.  Medical history is significant for osteoporosis.  Other details: She tried tylenol but and it helped some.      ROS:     CONSTITUTIONAL:  Negative for fever.      CARDIOVASCULAR:  Negative for chest pain, palpitations, tachycardia, orthopnea, and edema.      RESPIRATORY:  Negative for recent cough and dyspnea.      GASTROINTESTINAL:  Positive for constipation.  takes miralax for this     GENITOURINARY:  Negative for dysuria.      NEUROLOGICAL:  Negative for dizziness, headaches, paresthesias, and weakness.          Past Medical History / Family History / Social History:         Last Reviewed on 9/23/2020 02:22 PM by Shanae Osborne    Past Medical History:             PAST MEDICAL HISTORY         Atrial Fibrillation: dx'd in 3-2020;     Carotid Artery Stenosis: yuridia madera;     Coronary Artery Disease: dx'd in 3-23-20;     Myocardial Infarction: due to occlusion of the LAD, RCA, and stened again 3-25-20; heart cath/ stenting for NSTEMI;     AAA             PAST MEDICAL HISTORY     Hospitalizations:    Coronary Artery Disease last admit date 3-23-20 anemia, admitted once on  4-9-20         CURRENT MEDICAL PROVIDERS:    Cardiologist: Fozia         PREVENTIVE HEALTH MAINTENANCE             BONE DENSITY: was last done 06/25/15 with the following abnormality noted-- Osteoporosis     COLORECTAL CANCER SCREENING: Up to date (colonoscopy q10y; sigmoidoscopy q5y; Cologuard q3y) was last done 3/2017, Results are in chart; colonoscopy with normal results     MAMMOGRAM: Done within last 2 years and results in are chart was last done 2019 stable         Surgical History:         Biopsy of breast; benign    Coronary Artery Stent Placement: 2011;     Hysterectomy: at age 30; ovaries intact;     left foot neuroma;     Procedures:    Colonoscopy ( 11/normal/Ronna )    EGD (  normal )         Family History:     Father:  at age 80's; Cause of death was TB;  Parkinson's Disease     Mother:  at age 30's; Cause of death was renal issues;  goiter     Sister(s): 3 sister(s) total; 1 ; Neurological/Genetic Cerebrovascular Accident; Endocrine Type 2 Diabetes         Social History:     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          Substance Abuse History:     Last Reviewed on 2018 09:49 AM by Shanae Osborne        Mental Health History:     Last Reviewed on 2018 09:49 AM by Shanae Osborne        family issues/ deaths in the past         Communicable Diseases (eg STDs):     Last Reviewed on 2018 09:49 AM by Shanae Osborne    Reportable health conditions; NEGATIVE         Immunizations:     None        Allergies:     Last Reviewed on 2020 02:09 PM by Nancy Au    Penicillins:      radio active dye:          Current Medications:     Last Reviewed on 2020 03:33 PM by Shanae Osborne    Centrum Silver Vitamin*     aspirin 81 mg oral tablet,  delayed release (enteric coated) [1 tab daily]    Metoprolol Succinate 50 mg oral Tablet, Extended Release 24 hr [Take 1 tablet(s) by mouth daily]    Miralax      Phyillips stool softner PRN     hydrocortisone ointment      Gas X PRN     Lipitor 80 mg oral tablet [take 1 tablet (80 mg) by oral route once daily]    Plavix 75 mg oral tablet [take 1 tablet (75 mg) by oral route once daily]    pantoprazole 40 mg oral tablet, delayed release (enteric coated) [TAKE 1 TABLET BY MOUTH EVERY DAY]    baclofen 10 mg oral tablet [take 1/2-1 1 po tablet (10 mg) by oral route 3 times per day prn muscle pain ]        Objective:        Vitals:         Current: 9/23/2020 2:11:16 PM    Ht:  5 ft, 3 in;  Wt: 125.2 lbs;  BMI: 22.2T: 97.4 F (temporal);  BP: 156/58 mm Hg (left arm, sitting);  P: 66 bpm (left arm (BP Cuff), sitting);  sCr: 0.82 mg/dL;  GFR: 47.86        Repeat:     2:40:11 PM  BP:   156/50mm Hg (left arm, sitting, HR: 60)     Exams:     PHYSICAL EXAM:     GENERAL: vital signs recorded - well developed, well nourished;  no apparent distress, seems to be in pain;     NECK: carotid exam reveals no bruits;     RESPIRATORY: normal respiratory rate and pattern with no distress; normal breath sounds with no rales, rhonchi, wheezes or rubs;     CARDIOVASCULAR: normal rate; rhythm is regular;  no systolic murmur; no edema;     MUSCULOSKELETAL: pain with range of motion in: back flexion and extension;  no tenderness to palpation of thoracic para spinous muscles     PSYCHIATRIC: affect/demeanor: anxious;         Procedures:     Pain in thoracic spine    1. Toradol 60 mg given IM in the right hip; administered by KG;  lot number [id number]; expires 01/01/2022             Assessment:         M54.6   Pain in thoracic spine       I10   Essential (primary) hypertension           ORDERS:         Meds Prescribed:       [Refilled] baclofen 10 mg oral tablet [take 1/2-1 1 po tablet (10 mg) by oral route 3 times per day prn muscle pain ], #20  (twenty) tablets, Refills: 0 (zero)         Radiology/Test Orders:       [id number]  Radiologic examination, spine; thoracic, three views  (Send-Out)              Other Orders:       [id number]  Therapeutic injection  (In-House)              Toradol, per 15 mg  (x1)                  Plan:         Pain in thoracic spine(she took depression screen home today, did not feel like filling out paper work; reviewed last dexa we have 2015 showing osteoporois and the last thoracic x-ray 2018 there appeared to be a compression fracture, she requested toradol, will give her that and can continue tylenol and trial of baclofen, get the x-ray, she may come back to get that and to try OTC pain patch topical relief        RADIOLOGY:  I have ordered Thoracic Spine to be done today.  Narcotic or pain medication Toradol 60 mg           Prescriptions:       [Refilled] baclofen 10 mg oral tablet [take 1/2-1 1 po tablet (10 mg) by oral route 3 times per day prn muscle pain ], #20 (twenty) tablets, Refills: 0 (zero)           Orders:       [id number]  Radiologic examination, spine; thoracic, three views  (Send-Out)            48525  Therapeutic injection  (In-House)              Toradol, per 15 mg  (x1)          Essential (primary) hypertensionrecheck bp         RECOMMENDATIONS given include: perform routine monitoring of blood pressure with home blood pressure cuff.              Patient Recommendations:        For  Essential (primary) hypertension:    Begin monitoring your blood pressure by brief nurse visits at our office, a home blood pressure monitor, or by checking on the machines in pharmacies or stores.  Keep a log of the readings.              Charge Capture:         Primary Diagnosis:     M54.6  Pain in thoracic spine           Orders:      21292  Office/outpatient visit; established patient, level 4  (In-House)            31078  Therapeutic injection  (In-House)              Toradol, per 15 mg  (x1)          I10  Essential  (primary) hypertension

## 2021-05-18 NOTE — PROGRESS NOTES
Danyell Osborne  1935     Office/Outpatient Visit    Visit Date: Wed, Apr 22, 2020 02:31 pm    Provider: Shanae Osborne N.P. (Assistant: Rosie Nino MA)    Location: [facility]        Electronically signed by Shanae Osborne N.P. on  04/22/2020 04:02:22 PM                             Subjective:        CC: CONSENT BY MNP(PT IS NO LONGER TAKING LISINOPRIL OR XARELTO)[phone]  VIDEO/FACETIMEMsScott Osborne is a 84 year old White female.  She is here today following a transition of care from an inpatient hospital: [facility]. The patient was admitted on 4/7/20--4/13/20 for low hemoglobin. Our office called the patient within 48 hours of discharge and scheduled the follow-up appointment.. During the patient's hospital stay the patient was treated by Dr. Flores.  REVIEW LABS        HPI: TELEMEDICINE VISIT:    - Ms Child  consented to this telemedicine visit.    - Persons present during the telemedicine consultation include: Ms Child - patient,  and her daughter Elizabeth VELEZ and patient's wife     - This visit is being conducted over FaceTime with audio and video.          Ms. Osborne presents in follow up from hospital admission. She was admitted to the hospital on 4-9-20 and discharged on 4-13-20.  She was diagnosed with acute blood loss anemia, fatiuge, hypotension, AF, CAD.  The following lab tests were done: troponin I ( 0.99-0.118 ), CBC ( hmg 7 at admission, less than 7 when she had labs done here, at discharge H& 10.8 & 33.6, WBC 8.2 ), electrolyte panel ( Ca 8.1 ).  The following radiology tests were done: chest x-ray ( scattered bilateral opacities ).  The following procedures were done: EKG ( 4-11-20NSR ) The patient received the following prescriptions: Protonix, stopped eliquis and 4 units of PRBC's.  The patient's course has improved.  Associated symptoms include palpitations and weakness.  She only has a occ irr heart beat and it last seconds.  Her weakness has  overall resolved. Her ACE was also stopped.  BP running 130's over 60's usually at home. She had a tele visit earlier this week with cardiology.    ROS:     CONSTITUTIONAL:  Negative for fever.      CARDIOVASCULAR:  Negative for chest pain.      RESPIRATORY:  Negative for recent cough and dyspnea.      GASTROINTESTINAL:  Negative for melena.  she reports that she had some black stools previous this hospitalization and has a history of hemorrhoids/ she has not any any trouble with her bowels since she has been home     NEUROLOGICAL:  Negative for headaches.          Past Medical History / Family History / Social History:         Last Reviewed on 2020 03:55 PM by Shanae Osborne    Past Medical History:             PAST MEDICAL HISTORY         Atrial Fibrillation: dx'd in 3-2020;     Carotid Artery Stenosis: yuridia madera;     Coronary Artery Disease: dx'd in 3-23-20;     Myocardial Infarction: due to occlusion of the LAD, RCA, and stened again 3-25-20; heart cath/ stenting for NSTEMI;     AAA             PAST MEDICAL HISTORY     Hospitalizations:    Coronary Artery Disease last admit date 3-23-20 anemia, admitted once on 20         CURRENT MEDICAL PROVIDERS:    Cardiologist: [facility]         PREVENTIVE HEALTH MAINTENANCE             BONE DENSITY: was last done 06/25/15 with the following abnormality noted-- Osteoporosis     COLORECTAL CANCER SCREENING: Up to date (colonoscopy q10y; sigmoidoscopy q5y; Cologuard q3y) was last done 3/2017, Results are in chart; colonoscopy with normal results     MAMMOGRAM: Done within last 2 years and results in are chart was last done 2019 stable         Surgical History:         Biopsy of breast; benign    Coronary Artery Stent Placement:  2;     Hysterectomy: at age 30; ovaries intact;     left foot neuroma;     Procedures:    Colonoscopy ( 11/normal/Ronna )    EGD (  normal )         Family History:     Father:  at age 80's; Cause of death was  TB;  Parkinson's Disease     Mother:  at age 30's; Cause of death was renal issues;  goiter     Sister(s): 3 sister(s) total; 1 ; Neurological/Genetic Cerebrovascular Accident; Endocrine Type 2 Diabetes         Social History:     Occupation: quit working 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/[employer];     Marital Status:      Children: 6         Tobacco/Alcohol/Supplements:     Last Reviewed on 2020 02:37 PM by Rosie Nino    Tobacco: She has never smoked.          Substance Abuse History:     Last Reviewed on 2018 09:49 AM by Shanae Osborne        Mental Health History:     Last Reviewed on 2018 09:49 AM by Shanae Osborne        family issues/ deaths in the past         Communicable Diseases (eg STDs):     Last Reviewed on 2018 09:49 AM by Shanae Osborne    Reportable health conditions; NEGATIVE         Immunizations:     None        Allergies:     Last Reviewed on 2020 02:34 PM by Rosie Nino    Penicillins:      radio active dye:          Current Medications:     Last Reviewed on 2020 02:37 PM by Rosie Nino    Lipitor 80 mg oral tablet [take 1 tablet (80 mg) by oral route once daily]    Protonix 40 mg oral tablet, delayed release (enteric coated) [take 1 tablet (40 mg) by oral route 1 time daily]    Metoprolol Succinate 50 mg oral Tablet, Extended Release 24 hr [Take 1 tablet(s) by mouth daily]    Centrum Silver Vitamin*     aspirin 81 mg oral tablet, delayed release (enteric coated) [1 tab daily]    Miralax      Phyillips stool softner PRN     hydrocortisone ointment      Gas X PRN     CLOPIDOGREL  TAB 75MG         Objective:        Vitals:         Current: 2020 2:37:58 PM    Ht:  5 ft, 3 inT: 96.7 F (oral);  BP: 112/63 mm Hg (left arm, sitting);  P: 63 bpm (left arm (BP Cuff), sitting);  sCr: 0.77 mg/dL;  GFR: 55.64O2 Sat: 98 % (room air)        Exams:     PHYSICAL EXAM:     GENERAL: vital signs recorded - well developed, well nourished;  no  apparent distress;     RESPIRATORY: normal appearance and symmetric expansion of chest wall;     NEUROLOGIC: GROSSLY INTACT     PSYCHIATRIC:  appropriate affect and demeanor; normal speech pattern; grossly normal memory;         Assessment:         D64.9   Anemia, unspecified       I25.10   Atherosclerotic heart disease of native coronary artery without angina pectoris       I48.0   Paroxysmal atrial fibrillation           ORDERS:         Lab Orders:       [id number]  [facility] Basic Metabolic Panel  (Send-Out)                      Plan:         Anemia, unspecifiedreviewed her discharge summary and the CBC done on 4-20-20        RECOMMENDATIONS given include: continue current medications, her daughter and  live with her and are able to assist her if  needed, she feels so much better, home health comes weekly.  Telehealth: Verbal consent obtained for visit to occur via televideo conferencing     FOLLOW-UP TESTING #1: may need repeat CBC, call her in 2 weeks to see how she is doing         Atherosclerotic heart disease of native coronary artery without angina pectorisreviewed cardiology note from 4-20-20, see if we can add a BMP /continue current rx's  and monitoring her bp and pulse    LABORATORY:  Labs ordered to be performed today include basic metabolic panel.      FOLLOW-UP:    - with me in late May or early June, but may need follow up labs before then and has a follow up with cardiology Bita 3, 2020           Orders:       [id number]  [facility] Basic Metabolic Panel  (Send-Out)              Paroxysmal atrial fibrillationshe has a loop recorder, if her symptoms increase or persist, to let her cardiologist know             Patient Recommendations:        For  Atherosclerotic heart disease of native coronary artery without angina pectoris:        FOLLOW-UP: - with me in late May or early June, but may need follow up labs before then and has a follow up with cardiology Bita 3, 2020             Charge Capture:          Primary Diagnosis:     D64.9  Anemia, unspecified           Orders:      [id number]  Transitional care manage service 14 day discharge  (In-House)              I25.10  Atherosclerotic heart disease of native coronary artery without angina pectoris     I48.0  Paroxysmal atrial fibrillation

## 2021-05-18 NOTE — PROGRESS NOTES
Dnayell Osborne 1935     Office/Outpatient Visit    Visit Date: Thu, May 10, 2018 10:56 am    Provider: Shanae Osborne N.P. (Assistant: Karena Vernon MA)    Location: [facility]        Electronically signed by Shanae Osborne N.P. on  05/10/2018 12:16:59 PM                             SUBJECTIVE:        CC:     Ms. Osborne is a 82 year old White female.  medicine refill;         HPI:         Patient presents with coronary artery disease.  Ms. Osborne denies history of prior myocardial infarction.  Her heart disease was first diagnosed more than 5 years ago.  Currently, her treatment regimen consists of Toprol-XL and Norvasc.  She denies orthopnea.  Had heart cath 2011.     ROS:     CONSTITUTIONAL:  Positive for fatigue.      CARDIOVASCULAR:  Negative for chest pain, palpitations, tachycardia, orthopnea, and edema.      RESPIRATORY:  Negative for cough, dyspnea, and hemoptysis.      GASTROINTESTINAL:  Positive for hemorrhoids and melena.   Negative for abdominal pain.  is not constipated     GENITOURINARY:  Positive for urine dark.   Negative for dysuria or hematuria.      MUSCULOSKELETAL:  Positive for arthralgias.  left elbow pain for a few days and other aches and pains     NEUROLOGICAL:  Negative for dizziness, headaches, paresthesias, and weakness.          PM/FM/SH:     Last Reviewed on 5/10/2018 11:09 AM by Shanae Osborne    Past Medical History:             PAST MEDICAL HISTORY         Carotid Artery Stenosis: yuridia madera;     AAA             PAST MEDICAL HISTORY             CURRENT MEDICAL PROVIDERS:    Cardiologist: [facility]         PREVENTIVE HEALTH MAINTENANCE             COLORECTAL CANCER SCREENING: Up to date (colonoscopy q10y; sigmoidoscopy q5y; Cologuard q3y) was last done 3/2017, Results are in chart; colonoscopy with normal results     MAMMOGRAM: Done within last 2 years and results in are chart was last done 7/2017 with normal results         Surgical History:          Biopsy of breast; benign    Coronary Artery Stent Placement: 2011;     Hysterectomy: at age 30; ovaries intact;     left foot neuroma;     Procedures:    Colonoscopy ( 11/normal/Ronna )    EGD (  normal )         Family History:     Father:  at age 80's; Cause of death was TB;  Parkinson's Disease     Mother:  at age 30's; Cause of death was renal issues;  goiter     Sister(s): 3 sister(s) total; 1 ; Neurological/Genetic Cerebrovascular Accident; Endocrine Type 2 Diabetes         Social History:     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              Immunizations:     None        Allergies:     Last Reviewed on 5/10/2018 11:00 AM by Karena Vernon    Penicillins:    radio active dye:        Current Medications:     Last Reviewed on 5/10/2018 12:11 PM by Shanae Osborne    Metoprolol Succinate 50mg Tablets, Extended Release Take 1 tablet(s) by mouth daily     Amlodipine  2.5mg Tablet 1 tab daily     Aspirin (ASA) 81mg Tablets, Enteric Coated 1 tab daily     Centrum Silver Vitamin*     Loratadine     Vitamin B12 1,000mcg Tablet 1 tab daily     Vitamin D3 2,000IU Capsules 1 capsule daily         OBJECTIVE:        Vitals:         Current: 5/10/2018 10:58:57 AM    Ht:  5 ft, 3 in;  Wt: 131 lbs;  BMI: 23.2    T: 97.8 F (oral);  BP: 132/68 mm Hg (left arm, sitting);  P: 66 bpm (left arm (BP Cuff), sitting);  sCr: 0.78 mg/dL;  GFR: 53.01        Exams:     PHYSICAL EXAM:     GENERAL: vital signs recorded - well developed, well nourished;  no apparent distress;     NECK: carotid exam reveals no bruits;     RESPIRATORY: normal respiratory rate and pattern with no distress; normal breath sounds with no rales, rhonchi, wheezes or rubs;     CARDIOVASCULAR: normal rate; rhythm is regular;  no systolic  murmur; no edema;     MUSCULOSKELETAL: pain to palpation of left elbow, no redness or swelling noted     PSYCHIATRIC:  appropriate affect and demeanor; normal speech pattern; grossly normal memory;         ASSESSMENT           414.01   I25.10  Coronary artery disease, of native coronary artery              DDx:     578.1   K92.1  Melena              DDx:     719.48   M25.50  Joint pain, other specified site              DDx:     791.9   R82.90  Unusual urine odor              DDx:         ORDERS:         Meds Prescribed:       Refill of: Metoprolol Succinate 50mg Tablets, Extended Release Take 1 tablet(s) by mouth daily  #90 (Ninety) tablet(s) Refills: 1       Refill of: Amlodipine  2.5mg Tablet 1 tab daily  #90 (Ninety) tablet(s) Refills: 1         Lab Orders:       [id number]  [facility] CBC with 3 part diff  (Send-Out)         [id number]  [facility] Comp. Metabolic Panel  (Send-Out)         [id number]  [facility] Urinalysis, automated, with micro  (Send-Out)                   PLAN:          Coronary artery disease, of native coronary artery     LABORATORY:  Labs ordered to be performed today include Comprehensive metabolic panel.            Prescriptions:       Refill of: Metoprolol Succinate 50mg Tablets, Extended Release Take 1 tablet(s) by mouth daily  #90 (Ninety) tablet(s) Refills: 1       Refill of: Amlodipine  2.5mg Tablet 1 tab daily  #90 (Ninety) tablet(s) Refills: 1           Orders:       [id number]  [facility] Comp. Metabolic Panel  (Send-Out)            Melena H&H normal /last colon screen was 3-2017     LABORATORY:  Labs ordered to be performed today include CBC.            Orders:       [id number]  [facility] CBC with 3 part diff  (Send-Out)            Joint pain, other specified site to try tylenol arthritis         FOLLOW-UP: Advised to call if there is no improvement in 2 week(s).           Unusual urine odor     LABORATORY:  Labs ordered to be performed today include urinalysis with micro.            Orders:        [id number]  [facility] Urinalysis, automated, with micro  (Send-Out)               CHARGE CAPTURE           **Please note: ICD descriptions below are intended for billing purposes only and may not represent clinical diagnoses**        Primary Diagnosis:         414.01 Coronary artery disease, of native coronary artery            I25.10    Atherosclerotic heart disease of native coronary artery without angina pectoris              Orders:          [id number]   Office/outpatient visit; established patient, level 4  (In-House)           578.1 Melena            K92.1    Melena    719.48 Joint pain, other specified site            M25.50    Pain in unspecified joint    791.9 Unusual urine odor            R82.90    Unspecified abnormal findings in urine

## 2021-05-18 NOTE — PROGRESS NOTES
Danyell Osborne SAMIA  1935     Office/Outpatient Visit    Visit Date: Wed, Apr 8, 2020 01:13 pm    Provider: Shanae Osborne N.P. (Assistant: Ml Dubon MA)    Location: [facility]        Electronically signed by Shanae Osborne N.P. on  04/08/2020 02:35:34 PM                             Subjective:        CC: Ms. Osborne is a 84 year old White female.  She is here today following a transition of care from an inpatient hospital: [facility] for chest pain, a fib, stents placed. The patient was admitted on 3-22-20 and discharged on 3-28-20. Our office called the patient within 48 hours of discharge and scheduled the follow-up appointment.. During the patient's hospital stay the patient was treated by .  information given from daughter Lauren; not taking amlodipine, taken off amiodarone;         HPI:  TELEMEDICINE VISIT:    - Ms Child consented to this telemedicine visit.    - Persons present during the telemedicine consultation include:  Ms Child - patient, BAILEE Osborne APRN    - This visit is being conducted over FaceTime with audio and video.              Ms. Osbonre presents in follow up from hospital admission. She was admitted to the hospital on 3-22-20 and discharged on 3-28-20.  She was diagnosed with NSTEMIs/p PCI to RCA, DAP, X 1 year,  Afib, HTN.  The following lab tests were done: troponin I ( 0.41 followed by 2.61 ), CBC ( hmg 8.7/ hct 25.9 ), creatinine ( 0.79 ), electrolyte panel ( K 4.1 ), Mg 1.86.  The following radiology tests were done: ultrasound of arm, no DVT.  The following procedures were done: heart cath/PCI The patient received the following prescriptions: eliquis and brilinta / 81 mg ASA , ipitor 80.  The patient's course has improved.  she will stop the ASA after one month and see cardiology 4 weeks out She did start having left neck pain radiating across shoulder then to her anterior chest on 3-22-20, went by ambulance to [facility], heart cath on 3-23-20.   She has been feeling weak since she came home and does not feel up to coming into office.  Is due a BMP for cardiology.  Spoke with otilia be today, he stopped her amioderone.      ROS:     CONSTITUTIONAL:  Positive for fatigue.      CARDIOVASCULAR:  Negative for chest pain and pedal edema.      RESPIRATORY:  Negative for recent cough and dyspnea.      GASTROINTESTINAL:  Positive for anorexia and bowels  moving fine.      GENITOURINARY:  Negative for dysuria.      NEUROLOGICAL:  Positive for weakness ( generalized ).      PSYCHIATRIC:  Positive for anxiety ( (felt anxious this am) ).          Past Medical History / Family History / Social History:         Last Reviewed on 2020 02:33 PM by Shanae Osborne    Past Medical History:             PAST MEDICAL HISTORY         Atrial Fibrillation: dx'd in 3-2020;     Carotid Artery Stenosis: yuridia madera;     Coronary Artery Disease: dx'd in 3-23-20;     Myocardial Infarction: due to occlusion of the LAD, RCA, and stened again 3-25-20; heart cath/ stenting for NSTEMI;     AAA             PAST MEDICAL HISTORY     Hospitalizations:    Coronary Artery Disease last admit date 3-23-20         CURRENT MEDICAL PROVIDERS:    Cardiologist: [facility]         PREVENTIVE HEALTH MAINTENANCE             BONE DENSITY: was last done 06/25/15 with the following abnormality noted-- Osteoporosis     COLORECTAL CANCER SCREENING: Up to date (colonoscopy q10y; sigmoidoscopy q5y; Cologuard q3y) was last done 3/2017, Results are in chart; colonoscopy with normal results     MAMMOGRAM: Done within last 2 years and results in are chart was last done 2019 stable         Surgical History:         Biopsy of breast; benign    Coronary Artery Stent Placement:  2;     Hysterectomy: at age 30; ovaries intact;     left foot neuroma;     Procedures:    Colonoscopy ( 11/normal/Ronna )    EGD (  normal )         Family History:     Father:  at age 80's; Cause of death was  TB;  Parkinson's Disease     Mother:  at age 30's; Cause of death was renal issues;  goiter     Sister(s): 3 sister(s) total; 1 ; Neurological/Genetic Cerebrovascular Accident; Endocrine Type 2 Diabetes         Social History:     Occupation: quit working 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/[employer];     Marital Status:      Children: 6         Tobacco/Alcohol/Supplements:     Last Reviewed on 2020 01:17 PM by Ml Dubon    Tobacco: She has never smoked.          Substance Abuse History:     Last Reviewed on 2018 09:49 AM by Shanae Osborne        Mental Health History:     Last Reviewed on 2018 09:49 AM by Shanae Osborne        family issues/ deaths in the past         Communicable Diseases (eg STDs):     Last Reviewed on 2018 09:49 AM by Shanae Osborne    Reportable health conditions; NEGATIVE         Immunizations:     None        Allergies:     Last Reviewed on 2020 01:14 PM by Ml Dubon    Penicillins:      radio active dye:          Current Medications:     Last Reviewed on 2020 01:17 PM by Ml Dubon    amiodarone 200 mg oral tablet [take 1 tablet daily]    lisinopriL 5 mg oral tablet [take 1 tablet (5 mg) by oral route once daily]    Amlodipine  2.5 mg oral tablet [1 tab daily]    Metoprolol Succinate 50 mg oral Tablet, Extended Release 24 hr [Take 1 tablet(s) by mouth daily]    Centrum Silver Vitamin*     Vitamin D3 2,000 unit oral capsule [1 capsule daily]    aspirin 81 mg oral tablet, delayed release (enteric coated) [1 tab daily]    Miralax      Phyillips stool softner PRN     hydrocortisone ointment      Gas X PRN     XARELTO      TAB 20MG  [Take one daily]    Lipitor 80 mg oral tablet [take 1 tablet (80 mg) by oral route once daily]    CLOPIDOGREL  TAB 75MG        Objective:        Vitals:         Current: 2020 1:49:11 PM    Ht:  5 ft, 3 inBP: 112/72 mm Hg (left arm, sitting);  P: 72 bpm (left arm (BP Cuff), sitting, regular);   sCr: 0.75 mg/dL;  GFR: 57.12O2 Sat: 99 %        Exams:     PHYSICAL EXAM:     GENERAL: vital signs recorded - well developed, well nourished;  no apparent distress, tired-appearing;     RESPIRATORY: normal appearance and symmetric expansion of chest wall;     NEUROLOGIC: GROSSLY INTACT     PSYCHIATRIC:  appropriate affect and demeanor; normal speech pattern; grossly normal memory;         Assessment:         I21.4   Non-ST elevation (NSTEMI) myocardial infarction       E61.2   Magnesium deficiency       I48.0   Paroxysmal atrial fibrillation       M62.81   Muscle weakness (generalized)       I10   Essential (primary) hypertension           ORDERS:         Lab Orders:       FUTURE  Future order to be done at patients convenience  (Send-Out)            55183  [facility] - [facility] Magnesium, Serum  (Send-Out)            FUTURE  Future order to be done at patients convenience  (Send-Out)            93140  [facility] Comp. Metabolic Panel  (Send-Out)            FUTURE  Future order to be done at patients convenience  (Send-Out)            [id number]  [facility] CBC with 3 part diff  (Send-Out)                      Plan:         Non-ST elevation (NSTEMI) myocardial infarctionreviewed discharge summary / will initiate home health to go and make an assessment, get labs    Telehealth: Verbal consent obtained for visit to occur via televideo conferencing; Total time spent was 15 minutes     FOLLOW-UP TESTING #1: FOLLOW-UP LABORATORY:  Labs to be scheduled in the future include CMP.            Orders:       FUTURE  Future order to be done at patients convenience  (Send-Out)            [id number]  COMP - [facility] Comp. Metabolic Panel  (Send-Out)              Magnesium deficiency        FOLLOW-UP TESTING #1: FOLLOW-UP LABORATORY:  Labs to be scheduled in the future include Magnesium level.            Orders:       FUTURE  Future order to be done at patients convenience  (Send-Out)            86472  [facility] - [facility] Magnesium, Serum  (Send-Out)               Paroxysmal atrial fibrillationshe had already taken her dose of amioderone today, will be holding that rx from this point on, will send for cardiology note from today, her daughter has been monitoring her BP, Pulse and oxygen, to continue         Muscle weakness (generalized)she is off vit d, was told to hold, will continue holding that for now /discussed staying well hydrated        FOLLOW-UP TESTING #1: FOLLOW-UP LABORATORY:  Labs to be scheduled in the future include CBC.            Orders:       FUTURE  Future order to be done at patients convenience  (Send-Out)            [id number]  [facility] CBC with 3 part diff  (Send-Out)              Essential (primary) hypertension        RECOMMENDATIONS given include: perform routine monitoring of blood pressure with home blood pressure cuff and continue current therapy.              Patient Recommendations:        For  Non-ST elevation (NSTEMI) myocardial infarction:            The following laboratory testing has been ordered: metabolic panel, comprehensive         For  Magnesium deficiency:            The following laboratory testing has been ordered:         For  Muscle weakness (generalized):            The following laboratory testing has been ordered: CBC         For  Essential (primary) hypertension:    Begin monitoring your blood pressure by brief nurse visits at our office, a home blood pressure monitor, or by checking on the machines in pharmacies or stores.  Keep a log of the readings.              Charge Capture:         Primary Diagnosis:     I21.4  Non-ST elevation (NSTEMI) myocardial infarction           Orders:      [id number]  Transitional care manage service 14 day discharge  (In-House)              E61.2  Magnesium deficiency     I48.0  Paroxysmal atrial fibrillation     M62.81  Muscle weakness (generalized)     I10  Essential (primary) hypertension

## 2021-05-18 NOTE — PROGRESS NOTES
Danyell Osborne 1935     Office/Outpatient Visit    Visit Date: Tue, Oct 16, 2018 01:40 pm    Provider: Shanae Osborne N.P. (Assistant: Amara Arnold LPN)    Location: [facility]        Electronically signed by Shanae Osborne N.P. on  10/16/2018 07:22:42 PM                             SUBJECTIVE:        CC:     Ms. Osborne is a 82 year old White female.  Fell 10/12/18 getting in bed, left upper ribs hit the night stand.;         HPI:         Ms. Osborne presents with rib pain.  The discomfort is located primarily in the left axilla.  The pain initially began 5 days ago.  It seems to be worse with deep breathing.  fell out of bed and hit her left axilla on night stand     ROS:     CONSTITUTIONAL:  Negative for chills, fatigue, fever, and weight change.      CARDIOVASCULAR:  Negative for chest pain, palpitations, tachycardia, orthopnea, and edema.      RESPIRATORY:  Negative for dyspnea.      MUSCULOSKELETAL:  Positive for upper left back pain.  her back bothers her every night, she used to go to a chiropractor     NEUROLOGICAL:  Negative for dizziness, headaches, paresthesias, and weakness.      PSYCHIATRIC:  Positive for feelings of stress.  Dealing with her daughter in law /loss of her son, her grand daughter is missing and her daughter's health issues.         [facility]/:     Last Reviewed on 10/16/2018 07:18 PM by Shanae Osborne    Past Medical History:             PAST MEDICAL HISTORY         Carotid Artery Stenosis: sees santy;     AAA             PAST MEDICAL HISTORY             CURRENT MEDICAL PROVIDERS:    Cardiologist: [facility]         PREVENTIVE HEALTH MAINTENANCE             COLORECTAL CANCER SCREENING: Up to date (colonoscopy q10y; sigmoidoscopy q5y; Cologuard q3y) was last done 3/2017, Results are in chart; colonoscopy with normal results     MAMMOGRAM: Done within last 2 years and results in are chart was last done 08- stable         Surgical History:          Biopsy of breast; benign    Coronary Artery Stent Placement:  2;     Hysterectomy: at age 30; ovaries intact;     left foot neuroma;     Procedures:    Colonoscopy ( 11/normal/Ronna )    EGD (  normal )         Family History:     Father:  at age 80's; Cause of death was TB;  Parkinson's Disease     Mother:  at age 30's; Cause of death was renal issues;  goiter     Sister(s): 3 sister(s) total; 1 ; Neurological/Genetic Cerebrovascular Accident; Endocrine Type 2 Diabetes         Social History:     Occupation: quit working 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/[employer];     Marital Status:      Children: 6         Tobacco/Alcohol/Supplements:     Last Reviewed on 10/16/2018 01:44 PM by Amara Arnold    Tobacco: She has never smoked.          Substance Abuse History:     Last Reviewed on 2018 09:49 AM by Shanae Osborne        Mental Health History:     Last Reviewed on 2018 09:49 AM by Shanae Osborne        family issues/ deaths in the past         Communicable Diseases (eg STDs):     Last Reviewed on 2018 09:49 AM by Shanae Osborne    Reportable health conditions; NEGATIVE             Immunizations:     None        Allergies:     Last Reviewed on 10/16/2018 01:44 PM by Amraa Arnold    Penicillins:    radio active dye:        Current Medications:     Last Reviewed on 10/16/2018 01:45 PM by Amara Arnold    Amlodipine  2.5mg Tablet 1 tab daily     Metoprolol Succinate 50mg Tablets, Extended Release Take 1 tablet(s) by mouth daily     Aspirin (ASA) 81mg Tablets, Enteric Coated 1 tab daily     Centrum Silver Vitamin*     Loratadine     Vitamin D3 2,000IU Capsules 1 capsule daily         OBJECTIVE:        Vitals:         Current: 10/16/2018 1:44:24 PM    Ht:  5 ft, 3 in;  Wt: 131.6 lbs;  BMI: 23.3    T: 98.2 F (oral);  BP: 159/65 mm Hg (right arm, sitting);  P: 67 bpm (right arm (BP Cuff), sitting);  sCr: 0.83 mg/dL;  GFR: 49.92    O2 Sat: 99 %        Repeat:      1:50:12 PM     BP:   171/77mm Hg (right arm, sitting)     1:50:39 PM     P:   67bpm (right arm (BP Cuff), sitting)         Exams:     PHYSICAL EXAM:     GENERAL: vital signs recorded - well developed, well nourished;  no apparent distress;     NECK: carotid exam reveals no bruits;     RESPIRATORY: normal respiratory rate and pattern with no distress; normal breath sounds with no rales, rhonchi, wheezes or rubs;     CARDIOVASCULAR: normal rate; rhythm is regular;  no systolic murmur; no edema;     BREAST/INTEGUMENT: skin of anterior left chest and axilla clear;     MUSCULOSKELETAL: pain to palpation of lext axilla and left upper ribs inferior to axilla;     PSYCHIATRIC: affect/demeanor: anxious;         ASSESSMENT           786.50   R07.9  Rib pain              DDx:     414.01   I25.10  Coronary artery disease, of native coronary artery              DDx:         ORDERS:         Radiology/Test Orders:       [id number]  Radiologic exam chest 2 views  (Send-Out)         [id number]  Radiologic exam, ribs, left bilateral; 3 views  (Send-Out)                   PLAN:          Rib pain will call her at home with x-ray results         RADIOLOGY:  I have ordered CXR 2 view with PA and Left Unilateral Rib 2 view Chest XRAY Left Unilateral Rib to be done today.            Orders:       [id number]  Radiologic exam chest 2 views  (Send-Out)         [id number]  Radiologic exam, ribs, left bilateral; 3 views  (Send-Out)            Coronary artery disease, of native coronary artery have her blood pressure rechecked             CHARGE CAPTURE           **Please note: ICD descriptions below are intended for billing purposes only and may not represent clinical diagnoses**        Primary Diagnosis:         786.50 Rib pain            R07.9    Chest pain, unspecified              Orders:          [id number]   Office/outpatient visit; established patient, level 3  (In-House)           414.01 Coronary artery disease, of native coronary artery             I25.10    Atherosclerotic heart disease of native coronary artery without angina pectoris

## 2021-05-18 NOTE — PROGRESS NOTES
Danyell Osborne  1935     Office/Outpatient Visit    Visit Date: Mon, May 18, 2020 10:59 am    Provider: Shanae Osborne NARISTEO (Assistant: Harika Biggs MA)    Location: [facility]        Electronically signed by Shanae Osborne N.P. on  05/18/2020 01:40:42 PM                             Subjective:        CC: Ms. Osborne is a 84 year old White female.  This is a follow-up visit.  phone call [phone]        HPI: Ms Child consented to this telemedicine visit.    - Persons present during the telemedicine consultation include: Ms Child - patient, BAILEE VELEZ and daughter Elizabeth    - This visit is being conducted with audio and video          elevated platelets     Prior work-up has included labwork ( including platelets; Abnormal results: 618,  604 ).            Dx with anemia, unspecified; the patients has anemia due to acute gastrointestinal blood loss.  Hmg of 6.2 last month, then was hospitalized She was going to have an EGD, but  due to COVID she was given packed RBC's and rx was changed.      ROS:     CONSTITUTIONAL:  Negative for fatigue and fever.      CARDIOVASCULAR:  Negative for chest pain and palpitations.  has a fib/ apt in June with cardiology     RESPIRATORY:  Negative for recent cough and dyspnea.      HEMATOLOGIC/LYMPHATIC:  Positive for easy bruising (seems to bruise easily).      MUSCULOSKELETAL:  Positive for occ she has joint pains in her fingers.      NEUROLOGICAL:  Negative for dizziness, headaches, paresthesias, and weakness.          Past Medical History / Family History / Social History:         Last Reviewed on 5/18/2020 01:36 PM by Shanae Osborne    Past Medical History:             PAST MEDICAL HISTORY         Atrial Fibrillation: dx'd in 3-2020;     Carotid Artery Stenosis: yuridia madera;     Coronary Artery Disease: dx'd in 3-23-20;     Myocardial Infarction: due to occlusion of the LAD, RCA, and stened again 3-25-20; heart cath/ stenting for NSTEMI;      AAA             PAST MEDICAL HISTORY     Hospitalizations:    Coronary Artery Disease last admit date 3-23-20 anemia, admitted once on 20         CURRENT MEDICAL PROVIDERS:    Cardiologist: [facility]         PREVENTIVE HEALTH MAINTENANCE             BONE DENSITY: was last done 06/25/15 with the following abnormality noted-- Osteoporosis     COLORECTAL CANCER SCREENING: Up to date (colonoscopy q10y; sigmoidoscopy q5y; Cologuard q3y) was last done 3/2017, Results are in chart; colonoscopy with normal results     MAMMOGRAM: Done within last 2 years and results in are chart was last done 2019 stable         Surgical History:         Biopsy of breast; benign    Coronary Artery Stent Placement: 2011;     Hysterectomy: at age 30; ovaries intact;     left foot neuroma;     Procedures:    Colonoscopy ( 11/normal/Ronna )    EGD (  normal )         Family History:     Father:  at age 80's; Cause of death was TB;  Parkinson's Disease     Mother:  at age 30's; Cause of death was renal issues;  goiter     Sister(s): 3 sister(s) total; 1 ; Neurological/Genetic Cerebrovascular Accident; Endocrine Type 2 Diabetes         Social History:     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          Substance Abuse History:     Last Reviewed on 2018 09:49 AM by Shanae Osborne        Mental Health History:     Last Reviewed on 2018 09:49 AM by Shanae Osborne        family issues/ deaths in the past         Communicable Diseases (eg STDs):     Last Reviewed on 2018 09:49 AM by Shanae Osborne    Reportable health conditions; NEGATIVE         Immunizations:     None        Allergies:     Last Reviewed on 2020 10:59 AM by Harika Biggs    Penicillins:      radio active dye:          Current  Medications:     Last Reviewed on 5/18/2020 10:59 AM by Harika Biggs    Protonix 40 mg oral tablet, delayed release (enteric coated) [take 1 tablet (40 mg) by oral route 1 time daily]    Metoprolol Succinate 50 mg oral Tablet, Extended Release 24 hr [Take 1 tablet(s) by mouth daily]    Centrum Silver Vitamin*     aspirin 81 mg oral tablet, delayed release (enteric coated) [1 tab daily]    Miralax      Phyillips stool softner PRN     hydrocortisone ointment      Gas X PRN     CLOPIDOGREL  TAB 75MG     Lipitor 80 mg oral tablet [take 1 tablet (80 mg) by oral route once daily]        Objective:        Vitals:         Current: 5/18/2020 11:33:28 AM    Ht:  5 ft, 3 inBP: 135/75 mm Hg (left arm, sitting);  P: 68 bpm (left arm (BP Cuff), sitting);  sCr: 0.82 mg/dL;  GFR: 52.24O2 Sat: 97 %        Exams:     PHYSICAL EXAM:     GENERAL: vital signs recorded - well developed, well nourished;  no apparent distress;     RESPIRATORY: normal appearance and symmetric expansion of chest wall;     BREAST/INTEGUMENT: bruising noted on the left hand, dorsum:     NEUROLOGIC: GROSSLY INTACT     PSYCHIATRIC:  appropriate affect and demeanor; normal speech pattern; grossly normal memory;         Assessment:         R79.89   Other specified abnormal findings of blood chemistry       D64.9   Anemia, unspecified       I48.0   Paroxysmal atrial fibrillation           ORDERS:         Lab Orders:       FUTURE  Future order to be done at patients convenience  (Send-Out)            [id number]  [facility] CBC with 3 part diff  (Send-Out)                      Plan:         Other specified abnormal findings of blood chemistryhome health nurse will be out to her home later this week, reviewed last labs, will recheck a CBC, she asks about taking vit d, okay to restart that OTC dose     Telehealth: Verbal consent obtained for visit to occur via televideo conferencing     FOLLOW-UP:.  :for pending CBC results     FOLLOW-UP TESTING #1: FOLLOW-UP LABORATORY:   Labs to be scheduled in the future include CBC.            Orders:       FUTURE  Future order to be done at patients convenience  (Send-Out)            [id number]  [facility] CBC with 3 part diff  (Send-Out)              Anemia, unspecifiedrechecking CBC         Paroxysmal atrial fibrillationkeep cardiology follow up appt             Patient Recommendations:        For  Other specified abnormal findings of blood chemistry:                    APPOINTMENT INFORMATION:        Monday Tuesday Wednesday Thursday Friday Saturday Sunday            Time:___________________AM  PM   Date:_____________________         The following laboratory testing has been ordered: CBC             Charge Capture:         Primary Diagnosis:     R79.89  Other specified abnormal findings of blood chemistry           Orders:      [id number]  Office/outpatient visit; established patient, level 3  (In-House)              D64.9  Anemia, unspecified     I48.0  Paroxysmal atrial fibrillation

## 2021-05-18 NOTE — PROGRESS NOTES
Danyell Osborne  1935     Office/Outpatient Visit    Visit Date: Tue, Mar 23, 2021 01:36 pm    Provider: Shanae Osborne NARISTEO (Assistant: Karlie Osman MA)    Location: [facility]        Electronically signed by Shanae Osborne N.P. on  03/23/2021 03:54:44 PM                             Subjective:        CC: Ms. Osborne is a 85 year old White female.  presents today due to stomach issues - lots of gas, stomach makes noises congestion    not taking baclofen or lipitorlight headed/ dizzy when gets up in night         HPI:           Patient complains of dizziness and giddiness.  This first began 3 days ago.  She describes the sensation as lightheadedness and imbalance.  The dizziness improves with lying still.  No associated symptoms are reported.  She has noted this when she got up to go to the bathroom for the last 3 nights     ROS:     CONSTITUTIONAL:  Negative for fever.      CARDIOVASCULAR:  Negative for chest pain and palpitations ( has a fib ).      RESPIRATORY:  Positive for dyspnea ( with moderate exertion ).   Negative for recent cough.  appt thursday with her cardiologist     GASTROINTESTINAL:  Positive for constipation and gas pains /noisy.  over a year, was on Protonix, stopped since it did not seem to help, has a hemorrhoid, bulges but not bleed /3-6-17 last colonoscopy    NEUROLOGICAL:  Negative for headaches.      PSYCHIATRIC:  Positive for feelings of stress ( (about some health issues of her daughter's) ).          Past Medical History / Family History / Social History:         Last Reviewed on 3/23/2021 03:50 PM by Shanae Osborne    Past Medical History:             PAST MEDICAL HISTORY         Atrial Fibrillation: dx'd in 3-2020;     Carotid Artery Stenosis: sees santy;     Coronary Artery Disease: dx'd in 3-23-20;     Myocardial Infarction: due to occlusion of the LAD, RCA, and stened again 3-25-20; heart cath/ stenting for NSTEMI;     AAA             PAST  MEDICAL HISTORY     Hospitalizations:    Coronary Artery Disease last admit date 3-23-20 anemia, admitted once on 20         CURRENT MEDICAL PROVIDERS:    Cardiologist: [facility]         PREVENTIVE HEALTH MAINTENANCE             BONE DENSITY: was last done 06/25/15 with the following abnormality noted-- Osteoporosis     COLORECTAL CANCER SCREENING: Up to date (colonoscopy q10y; sigmoidoscopy q5y; Cologuard q3y) was last done 3/2017, Results are in chart; colonoscopy with normal results     MAMMOGRAM: Done within last 2 years and results in are chart was last done 21 stable         Surgical History:         Biopsy of breast; benign    Coronary Artery Stent Placement: 2011;     Hysterectomy: at age 30; ovaries intact;     left foot neuroma;     Procedures:    Colonoscopy ( 11/normal/Ronna )    EGD (  normal )         Family History:     Father:  at age 80's; Cause of death was TB;  Parkinson's Disease     Mother:  at age 30's; Cause of death was renal issues;  goiter     Sister(s): 3 sister(s) total; 1 ; Neurological/Genetic Cerebrovascular Accident; Endocrine Type 2 Diabetes         Social History:     Occupation: quit working 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/[employer];     Marital Status:      Children: 6         Tobacco/Alcohol/Supplements:     Last Reviewed on 3/23/2021 01:53 PM by Karlie Osman    Tobacco: She has never smoked.          Substance Abuse History:     Last Reviewed on 2018 09:49 AM by Shanae Osborne        Mental Health History:     Last Reviewed on 2018 09:49 AM by Shanae Osborne        family issues/ deaths in the past         Communicable Diseases (eg STDs):     Last Reviewed on 2018 09:49 AM by Shanae Osborne    Reportable health conditions; NEGATIVE         Immunizations:     SARS-COV-2 (COVID-19) vaccine, UNSPECIFIED 2021    SARS-COV-2 (COVID-19) vaccine, UNSPECIFIED 3/10/2021        Allergies:     Last Reviewed on 3/23/2021  01:53 PM by Karlie Osman    Penicillins:      radio active dye:          Current Medications:     Last Reviewed on 3/23/2021 01:53 PM by Karlie Osman    Centrum Silver Vitamin*     aspirin 81 mg oral tablet, delayed release (enteric coated) [1 tab daily]    Metoprolol Succinate 50 mg oral Tablet, Extended Release 24 hr [Take 1 tablet(s) by mouth daily]    Miralax      Phyillips stool softner PRN     Gas X PRN     Lipitor 80 mg oral tablet [take 1 tablet (80 mg) by oral route once daily]    Plavix 75 mg oral tablet [take 1 tablet (75 mg) by oral route once daily]    baclofen 10 mg oral tablet [take 1/2-1 1 po tablet (10 mg) by oral route 3 times per day prn muscle pain ]        Objective:        Vitals:         Current: 3/23/2021 1:51:37 PM    Ht: 160.02 cm;  Wt: 126.4 lbs;  BMI: 22.4T: 96.8 F (temporal);  BP: 176/62 mm Hg (right arm, sitting);  P: 62 bpm (right arm (BP Cuff), sitting);  sCr: 0.82 mg/dL;  GFR: 47.24        Repeat:     2:24:40 PM  BP:   156/49mm Hg (left arm, sitting, pulse-62)     Exams:     PHYSICAL EXAM:     GENERAL: vital signs recorded - well developed, well nourished;  no apparent distress;     EYES: PERRLA;     RESPIRATORY: normal respiratory rate and pattern with no distress; normal breath sounds with no rales, rhonchi, wheezes or rubs;     CARDIOVASCULAR: normal rate; rhythm is regular;  no systolic murmur; no edema;     GASTROINTESTINAL: nontender; normal bowel sounds; no organomegaly;     NEUROLOGIC: GROSSLY INTACT     PSYCHIATRIC:  appropriate affect and demeanor; normal speech pattern; grossly normal memory;         Assessment:         R42   Dizziness and giddiness       K64.8   Other hemorrhoids       I48.0   Paroxysmal atrial fibrillation       I65.23   Occlusion and stenosis of bilateral carotid arteries           ORDERS:         Lab Orders:       [id number]  [facility] CBC with 3 part diff  (Send-Out)            37692  [facility] Comp. Metabolic Panel  (Send-Out)             30593  TSH - [facility] TSH  (Send-Out)            51506  LPDP - [facility] Lipid Panel  (Send-Out)                      Plan:         Dizziness and giddinessCBC okay, other labs pending     LABORATORY:  Labs ordered to be performed today include CBC, Comprehensive metabolic panel, and TSH.      RECOMMENDATIONS given include: rest whenever possible while symptoms persist.      FOLLOW-UP: pending labs           Orders:       [id number]  BDCBC - [facility] CBC with 3 part diff  (Send-Out)            [id number]  COMP - [facility] Comp. Metabolic Panel  (Send-Out)            59838  TSH - [facility] TSH  (Send-Out)              Other hemorrhoidsafter labs will get her back in with Dr Friend /recommend she try probiotic OTC/reviewed her last colonoscopy from 3-2017        Paroxysmal atrial fibrillationkeep appt with cardiology thursday /off lipitor since  due to leg pain, but admits she has issues with her knees and it may not be related to lipitor        Occlusion and stenosis of bilateral carotid arteriesseen vascular earlier this month, I reviewed her note /had a small bowl of raisin bran and 2 % milk this am    LABORATORY:  Labs ordered to be performed today include lipid panel.            Orders:       [id number]  LP - [facility] Lipid Panel  (Send-Out)                  Patient Recommendations:        For  Dizziness and giddiness:    Get plenty of rest.              Charge Capture:         Primary Diagnosis:     R42  Dizziness and giddiness           Orders:      [id number]  Office/outpatient visit; established patient, level 4  (In-House)              K64.8  Other hemorrhoids     I48.0  Paroxysmal atrial fibrillation     I65.23  Occlusion and stenosis of bilateral carotid arteries

## 2021-05-20 ENCOUNTER — OFFICE VISIT CONVERTED (OUTPATIENT)
Dept: FAMILY MEDICINE CLINIC | Age: 86
End: 2021-05-20
Attending: NURSE PRACTITIONER

## 2021-06-05 NOTE — H&P
History and Physical      Patient Name: Danyell Osborne   Patient ID: [id number]   Sex: Female   YOB: 1935    Primary Care Provider: Shanae VELEZ   Referring Provider: Shanae VELEZ    Visit Date: May 11, 2021    Provider: Gabriel Friend MD   Location: [facility] [place]   Location Address: [address] [place] [address]  [address] [place] [address]  [id number]   Location Phone: [phone]          Chief Complaint  · Hemorrhoids  · epigastric pain      History Of Present Illness     Ms. Osborne came back for follow-up. We did an abdominal ultrasound to evaluate her upper abdominal pain. Her gallbladder looked normal. Her bile duct was mildly dilated and she did have a little bit of an ectatic aorta but it was not even 3.0 cm in diameter. She has been off of Protonix for 2-3 days because she was not sure if that had anything to do with her abdominal pain. She does have a prior history of an H. Pylori infection and was treated for that a few years back. Her hemorrhoids are not bothering her too much. She is not having severe pain or severe bleeding.       Past Medical History  Anxiety; Coronary artery disease; Rectal bleeding         Past Surgical History  Breast biopsy; Colonoscopy; Coronary artery bypass graft; EGD; Hysterectomy; Neuroma excision         Medication List  aspirin 81 mg oral tablet,chewable; Centrum  mg-mcg oral tablet; clopidogrel 75 mg oral tablet; hydrocortisone 2.5 % topical ointment; metoprolol succinate 50 mg oral tablet extended release 24 hr; Miralax 17 gram/dose oral powder; pantoprazole 40 mg oral tablet,delayed release (DR/EC); Vitamin D3 50 mcg (2,000 unit) oral capsule         Allergy List  CT Contrast Dye; PENICILLINS         Social History  Tobacco (Never)         Vitals  Date Time BP Position Site L\R Cuff Size HR RR TEMP (F) WT  HT  BMI kg/m2 BSA m2 O2 Sat FR L/min FiO2 HC       05/11/2021 10:28 AM       16 97.2 125lbs 2oz  "5'  6\" 20.2 1.63             Physical Examination     Today on physical exam, she appears well. Her abdomen is soft.           Assessment  · Abdominal Pain, Epigastric     789.06/R10.13  · Hemorrhoids     455.6/K64.9       1. Epigastric abdominal pain. Her gallbladder ultrasound was normal. She does have a prior history of H. Pylori infection.   2. Hemorrhoids, which appear to be stable. At this point, she indicated that she really did not want  to do any type of procedure for her hemorrhoids.       Plan  · Orders  o Urea breath test, C-14; analysis (85316) - - 05/11/2021  · Medications  o Medications have been Reconciled  o Transition of Care or Provider Policy     We will get a urea breath test and make sure she does not have an active H. Pylori infection. We will have her continue her current PPI for now.             Electronically Signed by: Rakel Loza-, -Author on May 11, 2021 03:20:52 PM  Electronically Co-signed by: Gabriel Friend MD -Reviewer on May 12, 2021 03:49:22 PM  "

## 2021-06-05 NOTE — PROGRESS NOTES
Danyell Osborne  1935     Office/Outpatient Visit    Visit Date: Thu, May 20, 2021 11:36 am    Provider: Shanae Osborne NARISTEO (Assistant: July Yeung MA)    Location: [facility]        Electronically signed by Shanae Osborne N.P. on  05/20/2021 01:01:13 PM                             Subjective:        CC: Ms. Osborne is a 85 year old White female.  Knots on the back of head, appeared Sunday morning, size has decreased since.;         HPI:           Ms. Osborne presents with a rash.  It is located on the occipital scalp.  She describes the rash as red,  raised, and 3 areas in posterior scalp.  This has been a problem for 5 days.  Symptoms include moderate pruritus.  She denies any associated symptoms.  She says today they are better, don't feel as swollen.      ROS:     CONSTITUTIONAL:  Negative for fever.      CARDIOVASCULAR:  Negative for chest pain, palpitations, tachycardia, orthopnea, and edema.      RESPIRATORY:  Negative for recent cough and dyspnea.      GASTROINTESTINAL:  Positive for abdominal pain ( epigastric ) and constipation ( issues vary with her stools ).   Negative for acid reflux symptoms ( and reports her H pylori test was negative ).  is on PPI, comes and goes, better now but wonders if there is any other evaluation she needs, saw Dr Friend, she had an abd ultrasound    NEUROLOGICAL:  Negative for dizziness, headaches, paresthesias, and weakness.          Past Medical History / Family History / Social History:         Last Reviewed on 5/20/2021 12:59 PM by Shanae Osborne    Past Medical History:             PAST MEDICAL HISTORY         Atrial Fibrillation: dx'd in 3-2020;     Carotid Artery Stenosis: [facility];     Coronary Artery Disease: dx'd in 3-23-20;     Myocardial Infarction: due to occlusion of the LAD, RCA, and stened again 3-25-20; heart cath/ stenting for NSTEMI;     AAA             PAST MEDICAL HISTORY     Hospitalizations:    Coronary  Artery Disease last admit date 3-23-20 anemia, admitted once on 20         CURRENT MEDICAL PROVIDERS:    Cardiologist: [facility]         PREVENTIVE HEALTH MAINTENANCE             BONE DENSITY: was last done 06/25/15 with the following abnormality noted-- Osteoporosis     COLORECTAL CANCER SCREENING: Up to date (colonoscopy q10y; sigmoidoscopy q5y; Cologuard q3y) was last done 3/2017, Results are in chart; colonoscopy with normal results     MAMMOGRAM: Done within last 2 years and results in are chart was last done 21 stable         Surgical History:         Biopsy of breast; benign    Coronary Artery Stent Placement: 2011;     Hysterectomy: at age 30; ovaries intact;     left foot neuroma;     Procedures:    Colonoscopy ( 11/normal/Ronna )    EGD (  normal )         Family History:     Father:  at age 80's; Cause of death was TB;  Parkinson's Disease     Mother:  at age 30's; Cause of death was renal issues;  goiter     Sister(s): 3 sister(s) total; 1 ; Neurological/Genetic Cerebrovascular Accident; Endocrine Type 2 Diabetes         Social History:     Occupation: quit working 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/[employer];     Marital Status:      Children: 6         Tobacco/Alcohol/Supplements:     Last Reviewed on 2021 11:43 AM by July Yeung    Tobacco: She has never smoked.          Substance Abuse History:     Last Reviewed on 2018 09:49 AM by Shanae Osborne        Mental Health History:     Last Reviewed on 2018 09:49 AM by Shanae Osborne        family issues/ deaths in the past         Communicable Diseases (eg STDs):     Last Reviewed on 2018 09:49 AM by Shanae Osborne    Reportable health conditions; NEGATIVE         Immunizations:     SARS-COV-2 (COVID-19) vaccine, UNSPECIFIED 2021    SARS-COV-2 (COVID-19) vaccine, UNSPECIFIED 3/10/2021        Allergies:     Last Reviewed on 2021 11:43 AM by July Yeung    Penicillins:       radio active dye:          Current Medications:     Last Reviewed on 5/20/2021 11:43 AM by July Yeung Silver Vitamin*     aspirin 81 mg oral tablet, delayed release (enteric coated) [1 tab daily]    Metoprolol Succinate 50 mg oral Tablet, Extended Release 24 hr [Take 1 tablet(s) by mouth daily]    Miralax      Phyillips stool softner PRN     Gas X PRN     Lipitor 80 mg oral tablet [take 1 tablet (80 mg) by oral route once daily]    Plavix 75 mg oral tablet [take 1 tablet (75 mg) by oral route once daily]    baclofen 10 mg oral tablet [take 1/2-1 1 po tablet (10 mg) by oral route 3 times per day prn muscle pain ]        Objective:        Vitals:         Current: 5/20/2021 11:42:57 AM    Ht:  5 ft, 3 in;  Wt: 124.6 lbs;  BMI: 22.1T: 97.1 F (temporal);  BP: 151/63 mm Hg (left arm, sitting);  P: 66 bpm (left arm (BP Cuff), sitting);  sCr: 0.76 mg/dL;  GFR: 50.66        Exams:     PHYSICAL EXAM:     GENERAL: vital signs recorded - well developed, well nourished;  no apparent distress;     NECK: carotid exam reveals no bruits;     RESPIRATORY: normal respiratory rate and pattern with no distress; normal breath sounds with no rales, rhonchi, wheezes or rubs;     CARDIOVASCULAR: normal rate; rhythm is regular;  no systolic murmur; no edema;     BREAST/INTEGUMENT: three raised/indurated erythematous slightly tender in her posterior scalp;     PSYCHIATRIC:  appropriate affect and demeanor; normal speech pattern; grossly normal memory;         Assessment:         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  :.            Prescriptions:       [New Rx] doxycycline monohydrate 100 mg oral capsule [take 1 capsule (100 mg) by oral route 2 times per day], #20 (twenty) capsules, Refills: 0 (zero)         Upper abdominal pain, unspecifiedcontinue PPI, consider refer to [facility] at [facility], reviewed Dr Friend's notes, don't eat late, may need to raise the head of her bed             Patient Recommendations:        For  Other specified follicular disorders:    Follow-up by phone if no improvement in 1 week.                APPOINTMENT INFORMATION:        Monday Tuesday Wednesday Thursday Friday Saturday Sunday            Time:___________________AM  PM   Date:_____________________             Charge Capture:         Primary Diagnosis:     L73.8  Other specified follicular disorders           Orders:      [id number]  Office/outpatient visit; established patient, level 3  (In-House)              R10.10  Upper abdominal pain, unspecified

## 2021-07-01 VITALS
HEIGHT: 63 IN | SYSTOLIC BLOOD PRESSURE: 144 MMHG | DIASTOLIC BLOOD PRESSURE: 82 MMHG | WEIGHT: 132.4 LBS | TEMPERATURE: 98.3 F | BODY MASS INDEX: 23.46 KG/M2 | HEART RATE: 72 BPM

## 2021-07-01 VITALS
DIASTOLIC BLOOD PRESSURE: 64 MMHG | SYSTOLIC BLOOD PRESSURE: 137 MMHG | OXYGEN SATURATION: 98 % | WEIGHT: 130.9 LBS | TEMPERATURE: 99.5 F | HEART RATE: 62 BPM | BODY MASS INDEX: 23.2 KG/M2 | HEIGHT: 63 IN

## 2021-07-01 VITALS
SYSTOLIC BLOOD PRESSURE: 141 MMHG | WEIGHT: 134 LBS | DIASTOLIC BLOOD PRESSURE: 58 MMHG | BODY MASS INDEX: 23.74 KG/M2 | TEMPERATURE: 98.2 F | HEIGHT: 63 IN | HEART RATE: 62 BPM

## 2021-07-01 VITALS
SYSTOLIC BLOOD PRESSURE: 133 MMHG | BODY MASS INDEX: 22.7 KG/M2 | HEIGHT: 63 IN | DIASTOLIC BLOOD PRESSURE: 70 MMHG | TEMPERATURE: 99.5 F | WEIGHT: 128.1 LBS | HEART RATE: 64 BPM

## 2021-07-01 VITALS
HEART RATE: 64 BPM | DIASTOLIC BLOOD PRESSURE: 69 MMHG | BODY MASS INDEX: 23.37 KG/M2 | SYSTOLIC BLOOD PRESSURE: 134 MMHG | TEMPERATURE: 98.6 F | WEIGHT: 131.9 LBS | HEIGHT: 63 IN

## 2021-07-01 VITALS
SYSTOLIC BLOOD PRESSURE: 127 MMHG | HEIGHT: 63 IN | WEIGHT: 129.6 LBS | TEMPERATURE: 98.9 F | DIASTOLIC BLOOD PRESSURE: 69 MMHG | HEART RATE: 77 BPM | BODY MASS INDEX: 22.96 KG/M2

## 2021-07-01 VITALS
BODY MASS INDEX: 23.39 KG/M2 | HEART RATE: 66 BPM | DIASTOLIC BLOOD PRESSURE: 54 MMHG | TEMPERATURE: 98.3 F | SYSTOLIC BLOOD PRESSURE: 135 MMHG | WEIGHT: 132 LBS | HEIGHT: 63 IN

## 2021-07-01 VITALS
TEMPERATURE: 97.8 F | HEIGHT: 63 IN | BODY MASS INDEX: 23.21 KG/M2 | WEIGHT: 131 LBS | SYSTOLIC BLOOD PRESSURE: 132 MMHG | DIASTOLIC BLOOD PRESSURE: 68 MMHG | HEART RATE: 66 BPM

## 2021-07-01 VITALS
OXYGEN SATURATION: 99 % | HEART RATE: 67 BPM | HEIGHT: 63 IN | WEIGHT: 131.6 LBS | BODY MASS INDEX: 23.32 KG/M2 | SYSTOLIC BLOOD PRESSURE: 171 MMHG | DIASTOLIC BLOOD PRESSURE: 77 MMHG | TEMPERATURE: 98.2 F

## 2021-07-01 VITALS
WEIGHT: 132.8 LBS | BODY MASS INDEX: 23.53 KG/M2 | SYSTOLIC BLOOD PRESSURE: 150 MMHG | DIASTOLIC BLOOD PRESSURE: 63 MMHG | HEIGHT: 63 IN | TEMPERATURE: 99.1 F | HEART RATE: 70 BPM

## 2021-07-01 VITALS
BODY MASS INDEX: 22.98 KG/M2 | DIASTOLIC BLOOD PRESSURE: 69 MMHG | WEIGHT: 129.7 LBS | HEART RATE: 70 BPM | TEMPERATURE: 98.7 F | HEIGHT: 63 IN | SYSTOLIC BLOOD PRESSURE: 133 MMHG

## 2021-07-02 VITALS
HEIGHT: 63 IN | WEIGHT: 125.2 LBS | HEART RATE: 66 BPM | BODY MASS INDEX: 22.18 KG/M2 | DIASTOLIC BLOOD PRESSURE: 50 MMHG | SYSTOLIC BLOOD PRESSURE: 156 MMHG | TEMPERATURE: 97.4 F

## 2021-07-02 VITALS
SYSTOLIC BLOOD PRESSURE: 151 MMHG | TEMPERATURE: 97.1 F | BODY MASS INDEX: 22.08 KG/M2 | HEIGHT: 63 IN | HEART RATE: 66 BPM | WEIGHT: 124.6 LBS | DIASTOLIC BLOOD PRESSURE: 63 MMHG

## 2021-07-02 VITALS
HEART RATE: 62 BPM | SYSTOLIC BLOOD PRESSURE: 156 MMHG | BODY MASS INDEX: 22.39 KG/M2 | DIASTOLIC BLOOD PRESSURE: 49 MMHG | TEMPERATURE: 96.8 F | WEIGHT: 126.4 LBS

## 2021-07-02 VITALS
HEART RATE: 63 BPM | BODY MASS INDEX: 23.17 KG/M2 | OXYGEN SATURATION: 98 % | SYSTOLIC BLOOD PRESSURE: 112 MMHG | HEIGHT: 63 IN | TEMPERATURE: 96.7 F | DIASTOLIC BLOOD PRESSURE: 63 MMHG

## 2021-07-02 VITALS
DIASTOLIC BLOOD PRESSURE: 72 MMHG | HEART RATE: 72 BPM | BODY MASS INDEX: 23.17 KG/M2 | OXYGEN SATURATION: 99 % | SYSTOLIC BLOOD PRESSURE: 112 MMHG | HEIGHT: 63 IN

## 2021-07-02 VITALS
DIASTOLIC BLOOD PRESSURE: 62 MMHG | BODY MASS INDEX: 23.18 KG/M2 | WEIGHT: 130.8 LBS | SYSTOLIC BLOOD PRESSURE: 136 MMHG | HEART RATE: 65 BPM | TEMPERATURE: 98.2 F | HEIGHT: 63 IN

## 2021-07-02 VITALS
SYSTOLIC BLOOD PRESSURE: 135 MMHG | BODY MASS INDEX: 23.17 KG/M2 | DIASTOLIC BLOOD PRESSURE: 75 MMHG | HEART RATE: 68 BPM | HEIGHT: 63 IN | OXYGEN SATURATION: 97 %

## 2021-07-15 VITALS — WEIGHT: 125.12 LBS | RESPIRATION RATE: 16 BRPM | HEIGHT: 66 IN | TEMPERATURE: 97.2 F | BODY MASS INDEX: 20.11 KG/M2

## 2021-07-27 RX ORDER — CODEINE PHOSPHATE AND GUAIFENESIN 10; 100 MG/5ML; MG/5ML
SOLUTION ORAL
Qty: 120 ML | Refills: 0 | OUTPATIENT
Start: 2021-07-27

## 2021-10-12 ENCOUNTER — NURSE TRIAGE (OUTPATIENT)
Dept: CALL CENTER | Facility: HOSPITAL | Age: 86
End: 2021-10-12

## 2021-10-12 ENCOUNTER — TELEPHONE (OUTPATIENT)
Dept: FAMILY MEDICINE CLINIC | Age: 86
End: 2021-10-12

## 2021-10-12 NOTE — TELEPHONE ENCOUNTER
Daughter Elizabeth said her mother started to c/o of her neck hurting in the front, she was pale and felt sick to her stomach. She said she checked her heart rate and it would go between  and her b/p 119//106. She went ahead and took her to [facility] ER. They are there now.

## 2021-10-12 NOTE — TELEPHONE ENCOUNTER
Hub to read    pts daughter called in regards to needing to speak with nurse. Best call back number is [phone]

## 2021-10-12 NOTE — TELEPHONE ENCOUNTER
"HUB call, Shanae, wanting appt. With office,  Pt. HR is  135 no lower than 110, no chest pain, but does feel sick, just started few minutes ago, daughter says looks sick, no chest pain, had MI last year and has hx. Of afib.  says she feels sick, bp is little high, now back down, what to do? Told her to go to ER,      Reason for Disposition  • [1] Heart beating very rapidly (e.g., > 140 / minute) AND [2] present now  (Exception: during exercise)    Additional Information  • Negative: Passed out (i.e., lost consciousness, collapsed and was not responding)  • Negative: Shock suspected (e.g., cold/pale/clammy skin, too weak to stand, low BP, rapid pulse)  • Negative: Difficult to awaken or acting confused (e.g., disoriented, slurred speech)  • Negative: Visible sweat on face or sweat dripping down face  • Negative: Unable to walk, or can only walk with assistance (e.g., requires support)  • Negative: [1] Received SHOCK from implantable cardiac defibrillator AND [2] persisting symptoms (i.e., palpitations, lightheadedness)  • Negative: [1] Dizziness, lightheadedness, or weakness AND [2] heart beating very rapidly (e.g., > 140 / minute)  • Negative: [1] Dizziness, lightheadedness, or weakness AND [2] heart beating very slowly  (e.g., < 50 / minute)  • Negative: Sounds like a life-threatening emergency to the triager  • Negative: Chest pain  • Negative: Implantable Cardiac Defibrillator (ICD) or a pacemaker symptoms or questions  • Negative: Difficulty breathing  • Negative: Dizziness, lightheadedness, or weakness  • Negative: Heart beating very slowly (e.g., < 50 / minute)  (Exception: athlete and heart rate normal for caller)  • Negative: New or worsened shortness of breath with activity (dyspnea on exertion)    Answer Assessment - Initial Assessment Questions  1. DESCRIPTION: \"Please describe your heart rate or heartbeat that you are having\" (e.g., fast/slow, regular/irregular, skipped or extra beats, \"palpitations\")    " "  HR fast 100-135  2. ONSET: \"When did it start?\" (Minutes, hours or days)       Few minutes ago  3. DURATION: \"How long does it last\" (e.g., seconds, minutes, hours)      constant  4. PATTERN \"Does it come and go, or has it been constant since it started?\"  \"Does it get worse with exertion?\"   \"Are you feeling it now?\"      Constant since few minutes ago  5. TAP: \"Using your hand, can you tap out what you are feeling on a chair or table in front of you, so that I can hear?\" (Note: not all patients can do this)        irregular  6. HEART RATE: \"Can you tell me your heart rate?\" \"How many beats in 15 seconds?\"  (Note: not all patients can do this)        100-135  7. RECURRENT SYMPTOM: \"Have you ever had this before?\" If Yes, ask: \"When was the last time?\" and \"What happened that time?\"       afib  8. CAUSE: \"What do you think is causing the palpitations?\"      Feeling palpitations  9. CARDIAC HISTORY: \"Do you have any history of heart disease?\" (e.g., heart attack, angina, bypass surgery, angioplasty, arrhythmia)       Has had MI  10. OTHER SYMPTOMS: \"Do you have any other symptoms?\" (e.g., dizziness, chest pain, sweating, difficulty breathing)        Looks pale  11. PREGNANCY: \"Is there any chance you are pregnant?\" \"When was your last menstrual period?\"        no    Protocols used: HEART RATE AND HEARTBEAT QUESTIONS-ADULT-AH      "

## 2021-10-14 NOTE — TELEPHONE ENCOUNTER
Talked to Mrs Osborne and she said the ER told her it was her A Fib. They gave her 4 baby aspirins while she was there and told her to f/u with STEPHEN Osborne. No further issues. She has an appt here on Monday 10/18/21.   Requested ER notes from [facility].

## 2021-10-15 NOTE — TELEPHONE ENCOUNTER
If it was her A fib, I would usually recommend she see her cardiologist, but I can tell her that Monday

## 2021-10-18 ENCOUNTER — HOSPITAL ENCOUNTER (OUTPATIENT)
Dept: GENERAL RADIOLOGY | Facility: HOSPITAL | Age: 86
Discharge: HOME | End: 2021-10-18
Admitting: NURSE PRACTITIONER

## 2021-10-18 ENCOUNTER — OFFICE VISIT (OUTPATIENT)
Dept: FAMILY MEDICINE CLINIC | Age: 86
End: 2021-10-18

## 2021-10-18 VITALS
WEIGHT: 122.2 LBS | SYSTOLIC BLOOD PRESSURE: 165 MMHG | BODY MASS INDEX: 21.65 KG/M2 | DIASTOLIC BLOOD PRESSURE: 48 MMHG | HEART RATE: 63 BPM

## 2021-10-18 DIAGNOSIS — I25.10 CORONARY ARTERY DISEASE INVOLVING NATIVE CORONARY ARTERY OF NATIVE HEART WITHOUT ANGINA PECTORIS: ICD-10-CM

## 2021-10-18 DIAGNOSIS — I10 HYPERTENSION, UNSPECIFIED TYPE: ICD-10-CM

## 2021-10-18 DIAGNOSIS — R93.89 ABNORMAL CHEST X-RAY: ICD-10-CM

## 2021-10-18 DIAGNOSIS — R93.89 ABNORMAL CHEST X-RAY: Primary | ICD-10-CM

## 2021-10-18 PROCEDURE — 99214 OFFICE O/P EST MOD 30 MIN: CPT | Performed by: NURSE PRACTITIONER

## 2021-10-18 PROCEDURE — 71046 X-RAY EXAM CHEST 2 VIEWS: CPT

## 2021-10-18 RX ORDER — POLYETHYLENE GLYCOL 3350 17 G/17G
17 POWDER, FOR SOLUTION ORAL DAILY PRN
COMMUNITY
End: 2022-02-16

## 2021-10-18 RX ORDER — PANTOPRAZOLE SODIUM 40 MG/1
40 TABLET, DELAYED RELEASE ORAL DAILY
COMMUNITY
Start: 2021-03-25 | End: 2022-03-25

## 2021-10-18 RX ORDER — CLOPIDOGREL BISULFATE 75 MG/1
1 TABLET ORAL DAILY
COMMUNITY
Start: 2021-08-03

## 2021-10-18 RX ORDER — ASPIRIN 81 MG/1
81 TABLET ORAL DAILY
COMMUNITY

## 2021-10-18 RX ORDER — METOPROLOL SUCCINATE 50 MG/1
1.5 TABLET, EXTENDED RELEASE ORAL DAILY
COMMUNITY
Start: 2021-08-17 | End: 2023-01-04 | Stop reason: SDUPTHER

## 2021-10-18 RX ORDER — VITAMIN E 268 MG
1 CAPSULE ORAL DAILY
COMMUNITY
End: 2022-02-16

## 2021-10-18 NOTE — PROGRESS NOTES
Danyell Osborne presents to [facility].    Chief Complaint:  Hospital Follow Up Visit ([facility] ER 10/13/21 A-fib)         History of Present Illness:  Follow up ER  Went to [facility] ER 10-12-21 for palpitations, no chest pain  She had labs and a CXR, opacity in left upper lobe, ER labs, CXR and ER record, but no EKG, note said monitor in A fib, she was given ASA per pt and takes a beta blocker, has been fine since she was released from ER     Last cardiology note, seen in 9-2021: CAD stable, continue dual anti platelet therapy, follow up in 6 months   Cardiac monitor note, A fib, no EKG sent, labs available and reviewed             Review of Systems:  Review of Systems   Constitutional: Negative for fatigue and fever.   Respiratory: Positive for cough (tickle in her throat ) and shortness of breath (with exertion, occ ).    Cardiovascular: Negative for chest pain, palpitations and leg swelling.   Neurological: Negative for numbness.          Vital Signs:   /48 (BP Location: Left arm, Patient Position: Sitting)   Pulse 63   Wt 55.4 kg (122 lb 3.2 oz)   BMI 21.65 kg/m²       Physical Exam:  Physical Exam  Vitals reviewed.   Constitutional:       General: She is not in acute distress.     Appearance: Normal appearance.   Neck:      Vascular: No carotid bruit.   Cardiovascular:      Rate and Rhythm: Normal rate and regular rhythm.      Heart sounds: Normal heart sounds. No murmur heard.      Pulmonary:      Effort: Pulmonary effort is normal. No respiratory distress.      Breath sounds: Normal breath sounds.   Musculoskeletal:      Right lower leg: No edema.      Left lower leg: No edema.   Neurological:      Mental Status: She is alert.   Psychiatric:         Mood and Affect: Mood normal.         Behavior: Behavior normal.         Result Review   {Labs  Result Review  Imaging  Med Tab  Media  Procedures :23}   The following data was reviewed by: AGUSTIN Jimenez on  10/18/2021:    No results found for this or any previous visit.            Assessment and Plan:          Diagnoses and all orders for this visit:    1. Abnormal chest x-ray (Primary)  Assessment & Plan:  Reviewed CXR, send over to recheck CXR    Orders:  -     XR Chest 2 View; Future  -     Ambulatory Referral to Cardiology    2. Hypertension, unspecified type  Assessment & Plan:  Continue current rx       3. Coronary artery disease involving native coronary artery of native heart without angina pectoris  Assessment & Plan:  Reviewed ER records, and last cardiology note, send her back to cardiology          Follow Up   Return for follow up pending x-ray result.  Patient was given instructions and counseling regarding her condition or for health maintenance advice. Please see specific information pulled into the AVS if appropriate.

## 2022-01-03 ENCOUNTER — IMMUNIZATION (OUTPATIENT)
Dept: FAMILY MEDICINE CLINIC | Age: 87
End: 2022-01-03

## 2022-01-03 DIAGNOSIS — Z23 ENCOUNTER FOR IMMUNIZATION: Primary | ICD-10-CM

## 2022-01-03 PROCEDURE — 0003A COVID-19 (PFIZER): CPT | Performed by: FAMILY MEDICINE

## 2022-01-03 PROCEDURE — 91300 COVID-19 (PFIZER): CPT | Performed by: FAMILY MEDICINE

## 2022-02-16 ENCOUNTER — OFFICE VISIT (OUTPATIENT)
Dept: FAMILY MEDICINE CLINIC | Age: 87
End: 2022-02-16

## 2022-02-16 VITALS
WEIGHT: 123 LBS | TEMPERATURE: 98.3 F | HEIGHT: 63 IN | BODY MASS INDEX: 21.79 KG/M2 | HEART RATE: 58 BPM | DIASTOLIC BLOOD PRESSURE: 74 MMHG | SYSTOLIC BLOOD PRESSURE: 161 MMHG

## 2022-02-16 DIAGNOSIS — K59.00 CONSTIPATION, UNSPECIFIED CONSTIPATION TYPE: Primary | ICD-10-CM

## 2022-02-16 PROCEDURE — 99212 OFFICE O/P EST SF 10 MIN: CPT | Performed by: NURSE PRACTITIONER

## 2022-02-16 RX ORDER — ISOSORBIDE MONONITRATE 30 MG/1
1 TABLET, EXTENDED RELEASE ORAL DAILY
COMMUNITY
Start: 2021-11-24 | End: 2022-02-16

## 2022-02-16 RX ORDER — MULTIPLE VITAMINS W/ MINERALS TAB 9MG-400MCG
1 TAB ORAL DAILY
COMMUNITY

## 2022-02-16 NOTE — PROGRESS NOTES
"Chief Complaint  Constipation and Hernia    Subjective    Patient is an 86-year-old female in today with complaints of constipation.  Patient reports she sometimes has to digitally remove the stool, and sometimes notices blood.  Patient reports stool is firm.  Patiently currently taking MiraLAX and Dulcolax with no relief.  Patient denies fever, nausea or abdominal pain at this time.          Danyell Osborne presents to [facility]  Constipation  This is a chronic problem. The current episode started more than 1 year ago. The problem has been waxing and waning since onset. The stool is described as firm and blood tinged. There has not been adequate water intake. Associated symptoms include diarrhea and hemorrhoids. Pertinent negatives include no abdominal pain, fecal incontinence, fever, nausea or weight loss. She has tried manual disimpaction and stool softeners for the symptoms. The treatment provided mild relief.       Objective   Vital Signs:   /74 (BP Location: Left arm, Patient Position: Sitting)   Pulse 58   Temp 98.3 °F (36.8 °C) (Oral)   Ht 160 cm (62.99\")   Wt 55.8 kg (123 lb)   BMI 21.79 kg/m²     Physical Exam  HENT:      Head: Normocephalic.   Cardiovascular:      Rate and Rhythm: Normal rate and regular rhythm.   Pulmonary:      Effort: Pulmonary effort is normal.      Breath sounds: Normal breath sounds.   Abdominal:      General: Bowel sounds are normal.      Palpations: Abdomen is soft.   Skin:     General: Skin is warm and dry.   Neurological:      Mental Status: She is alert and oriented to person, place, and time.   Psychiatric:         Mood and Affect: Mood normal.        Result Review :                 Assessment and Plan {CC Problem List  Visit Diagnosis   ROS  Review (Popup)  Health Maintenance  Quality  BestPractice  Medications  SmartSets  SnapShot Encounters  Media :23}   Diagnoses and all orders for this visit:    1. Constipation, " unspecified constipation type (Primary)  Comments:  Increase fiber and fluid intake, daily walk for 15 - 30 minutes.        Follow Up   Return in about 3 months (around 5/16/2022) for Medicare Wellness.  Patient was given instructions and counseling regarding her condition or for health maintenance advice. Please see specific information pulled into the AVS if appropriate.

## 2022-02-21 ENCOUNTER — TELEPHONE (OUTPATIENT)
Dept: FAMILY MEDICINE CLINIC | Age: 87
End: 2022-02-21

## 2022-02-21 NOTE — TELEPHONE ENCOUNTER
Yes, I often recommend them, best to ask her pharmacist where she goes, which OTC one would they recommend for her.

## 2022-06-06 ENCOUNTER — OFFICE VISIT (OUTPATIENT)
Dept: FAMILY MEDICINE CLINIC | Age: 87
End: 2022-06-06

## 2022-06-06 VITALS
SYSTOLIC BLOOD PRESSURE: 150 MMHG | WEIGHT: 121 LBS | HEART RATE: 68 BPM | DIASTOLIC BLOOD PRESSURE: 78 MMHG | TEMPERATURE: 98.5 F | BODY MASS INDEX: 21.44 KG/M2 | OXYGEN SATURATION: 94 % | HEIGHT: 63 IN

## 2022-06-06 DIAGNOSIS — R05.9 COUGH: ICD-10-CM

## 2022-06-06 DIAGNOSIS — I10 HYPERTENSION, UNSPECIFIED TYPE: ICD-10-CM

## 2022-06-06 DIAGNOSIS — J00 COMMON COLD: Primary | ICD-10-CM

## 2022-06-06 LAB
EXPIRATION DATE: NORMAL
EXPIRATION DATE: NORMAL
FLUAV AG NPH QL: NEGATIVE
FLUBV AG NPH QL: NEGATIVE
INTERNAL CONTROL: NORMAL
INTERNAL CONTROL: NORMAL
Lab: NORMAL
Lab: NORMAL
SARS-COV-2 AG UPPER RESP QL IA.RAPID: NOT DETECTED

## 2022-06-06 PROCEDURE — 87426 SARSCOV CORONAVIRUS AG IA: CPT | Performed by: NURSE PRACTITIONER

## 2022-06-06 PROCEDURE — 87804 INFLUENZA ASSAY W/OPTIC: CPT | Performed by: NURSE PRACTITIONER

## 2022-06-06 PROCEDURE — 99213 OFFICE O/P EST LOW 20 MIN: CPT | Performed by: NURSE PRACTITIONER

## 2022-06-06 RX ORDER — PANTOPRAZOLE SODIUM 40 MG/1
TABLET, DELAYED RELEASE ORAL
COMMUNITY
Start: 2022-04-15 | End: 2022-10-03

## 2022-06-06 RX ORDER — DEXTROMETHORPHAN HYDROBROMIDE AND PROMETHAZINE HYDROCHLORIDE 15; 6.25 MG/5ML; MG/5ML
5 SYRUP ORAL 4 TIMES DAILY PRN
Qty: 120 ML | Refills: 0 | Status: SHIPPED | OUTPATIENT
Start: 2022-06-06 | End: 2023-01-09 | Stop reason: SDUPTHER

## 2022-06-06 NOTE — PROGRESS NOTES
Assessment and Plan    Diagnoses and all orders for this visit:    1. Common cold (Primary)  Comments:  symptaomtic treatment with mucinex, saline spray and follow up if new or worsening   Orders:  -     POCT SARS-CoV-2 Antigen BRAYDEN  -     POCT Influenza A/B    2. Cough  -     POCT SARS-CoV-2 Antigen BRAYDEN  -     POCT Influenza A/B  -     promethazine-dextromethorphan (PROMETHAZINE-DM) 6.25-15 MG/5ML syrup; Take 5 mL by mouth 4 (Four) Times a Day As Needed for Cough.  Dispense: 120 mL; Refill: 0    3. Hypertension, unspecified type  Comments:  Continue ambulatory monitoring follow-up if BP remains elevated when well        Follow Up   Return if symptoms worsen or fail to improve.    Chief Complaint  Danyell Osborne presents to [facility] for Cough, Headache, Sinusitis, and Sore Throat    Subjective          Patient here today for concerns of possible covid infection or URI     Known Exposure to positive case?   none  Date of exposure?   N/a -  sick but no diagosed   Date of symptoms start?   yesterday  (Symptoms may appear 2-14 days after exposure )    Fever or chills?   Feel fecverish (not taking temperature)  Positive chills   Cough?   yes  Shortness of breath or difficulty breathing?  no  Fatigue?   yes  Muscle or body aches?  yes   Headache?   yes  New loss of taste or smell? no  Sore throat?  no  Congestion or runny nose? yes  Nausea or vomiting?   no  Diarrhea?   no  Any  emergency warning signs for COVID-19.   Trouble breathing?  no  Persistent pain or pressure in the chest?   no  New confusion? no  Inability to wake or stay awake?no  Pale, gray, or blue-colored skin, lips, or nail beds, depending on skin tone?   no    Taking any medications at home to help with symptoms?yes  mucinex  Any prior vaccine to covid?   yes  Any significant health problems / existing lung / heart problems?  no              Review of Systems    Objective     Vitals:    06/06/22 1434 06/06/22  "1438   BP: 157/66 150/78   Pulse: 69 68   Temp: 98.5 °F (36.9 °C)    TempSrc: Oral    SpO2: 94%    Weight: 54.9 kg (121 lb)    Height: 160 cm (62.99\")      Body mass index is 21.44 kg/m².     Physical Exam  Vitals and nursing note reviewed.   Constitutional:       Appearance: She is ill-appearing.   HENT:      Right Ear: Tympanic membrane and ear canal normal. Drainage and tenderness present. No middle ear effusion. There is no impacted cerumen. Tympanic membrane is not scarred or erythematous.      Left Ear: Tympanic membrane and ear canal normal. Drainage and tenderness present.  No middle ear effusion. There is no impacted cerumen. Tympanic membrane is not scarred or erythematous.      Nose:      Right Sinus: No maxillary sinus tenderness or frontal sinus tenderness.      Left Sinus: No maxillary sinus tenderness or frontal sinus tenderness.      Mouth/Throat:      Pharynx: No pharyngeal swelling, oropharyngeal exudate, posterior oropharyngeal erythema or uvula swelling.      Tonsils: No tonsillar exudate. 0 on the right. 0 on the left.   Cardiovascular:      Rate and Rhythm: Normal rate and regular rhythm.   Pulmonary:      Effort: Pulmonary effort is normal.      Breath sounds: Normal breath sounds. No wheezing or rhonchi.   Lymphadenopathy:      Head:      Right side of head: No submandibular or preauricular adenopathy.      Left side of head: No submandibular or preauricular adenopathy.   Skin:     General: Skin is warm and dry.   Neurological:      Mental Status: She is alert and oriented to person, place, and time.   Psychiatric:         Attention and Perception: Attention and perception normal.         Behavior: Behavior normal.         Result Review :     The following data was reviewed by: AGUSTIN Alvarado on 06/06/2022:       POCT Influenza A/B (06/06/2022 14:36)  POCT SARS-CoV-2 Antigen BRAYDEN (06/06/2022 14:36)          Allergies   Allergen Reactions   • Contrast Dye Unknown - High Severity   • " Iodinated Diagnostic Agents Unknown - High Severity   • Penicillins Rash      History reviewed. No pertinent past medical history.  Current Outpatient Medications   Medication Sig Dispense Refill   • aspirin 81 MG EC tablet Take 81 mg by mouth Daily.     • Cholecalciferol 50 MCG (2000 UT) capsule Take 1 capsule by mouth Daily.     • clopidogrel (PLAVIX) 75 MG tablet Take 1 tablet by mouth Daily.     • metoprolol succinate XL (TOPROL-XL) 50 MG 24 hr tablet Take 1.5 tablets by mouth Daily.     • multivitamin with minerals tablet tablet Take 1 tablet by mouth Daily.     • pantoprazole (PROTONIX) 40 MG EC tablet TAKE 1 TABLET (40 MG TOTAL) BY MOUTH 1 (ONE) TIME EACH DAY BEFORE BREAKFAST. DO NOT CRUSH, CHEW, OR SPLIT.     • Polyethylene Glycol 3350 (MIRALAX PO) Take  by mouth.     • Simethicone (GAS-X PO) Take  by mouth.     • promethazine-dextromethorphan (PROMETHAZINE-DM) 6.25-15 MG/5ML syrup Take 5 mL by mouth 4 (Four) Times a Day As Needed for Cough. 120 mL 0     No current facility-administered medications for this visit.     History reviewed. No pertinent surgical history.   Social History     Tobacco Use   • Smoking status: Never Smoker   • Smokeless tobacco: Never Used   Vaping Use   • Vaping Use: Never used   Substance Use Topics   • Alcohol use: Never   • Drug use: Never     History reviewed. No pertinent family history.  Health Maintenance Due   Topic Date Due   • DXA SCAN  Never done   • TDAP/TD VACCINES (1 - Tdap) Never done   • ZOSTER VACCINE (1 of 2) Never done   • Pneumococcal Vaccine 65+ (1 - PCV) Never done   • ANNUAL WELLNESS VISIT  10/12/2021   • COVID-19 Vaccine (4 - Booster for Pfizer series) 05/03/2022      Immunization History   Administered Date(s) Administered   • COVID-19 (PFIZER) PURPLE CAP 02/17/2021, 03/10/2021, 01/03/2022

## 2022-06-09 ENCOUNTER — OFFICE VISIT (OUTPATIENT)
Dept: FAMILY MEDICINE CLINIC | Age: 87
End: 2022-06-09

## 2022-06-09 VITALS
OXYGEN SATURATION: 97 % | HEIGHT: 63 IN | SYSTOLIC BLOOD PRESSURE: 156 MMHG | HEART RATE: 67 BPM | TEMPERATURE: 98.4 F | BODY MASS INDEX: 21.23 KG/M2 | WEIGHT: 119.8 LBS | DIASTOLIC BLOOD PRESSURE: 54 MMHG

## 2022-06-09 DIAGNOSIS — R05.9 COUGH: ICD-10-CM

## 2022-06-09 DIAGNOSIS — U07.1 COVID-19: Primary | ICD-10-CM

## 2022-06-09 DIAGNOSIS — I10 HYPERTENSION, UNSPECIFIED TYPE: ICD-10-CM

## 2022-06-09 LAB
EXPIRATION DATE: ABNORMAL
FLUAV AG UPPER RESP QL IA.RAPID: NOT DETECTED
FLUBV AG UPPER RESP QL IA.RAPID: NOT DETECTED
INTERNAL CONTROL: ABNORMAL
Lab: ABNORMAL
SARS-COV-2 AG UPPER RESP QL IA.RAPID: DETECTED

## 2022-06-09 PROCEDURE — 99213 OFFICE O/P EST LOW 20 MIN: CPT | Performed by: NURSE PRACTITIONER

## 2022-06-09 PROCEDURE — 87428 SARSCOV & INF VIR A&B AG IA: CPT | Performed by: NURSE PRACTITIONER

## 2022-06-09 RX ORDER — AZITHROMYCIN 250 MG/1
TABLET, FILM COATED ORAL
Qty: 6 TABLET | Refills: 0 | Status: SHIPPED | OUTPATIENT
Start: 2022-06-09 | End: 2022-10-03

## 2022-06-09 NOTE — PROGRESS NOTES
Patient here today for concerns of possible covid infection or URI     Known Exposure to positive case?   yes  Date of exposure?   6/4/2022  Date of symptoms start?   6/5/2022  (Symptoms may appear 2-14 days after exposure )    Fever or chills?   yes  Cough?   yes  Shortness of breath or difficulty breathing?  yes  Fatigue?   no  Muscle or body aches?  yes   Headache?   yes  New loss of taste or smell? no  Sore throat?  yes  Congestion or runny nose? yes  Nausea or vomiting?   no  Diarrhea?   no  Any  emergency warning signs for COVID-19.   Trouble breathing?  no  Persistent pain or pressure in the chest?   no  New confusion? no  Inability to wake or stay awake?no  Pale, gray, or blue-colored skin, lips, or nail beds, depending on skin tone?   no    Taking any medications at home to help with symptoms?yes  Any prior vaccine to covid?   yes  Any significant health problems / existing lung / heart problems?  yes  Interested in monoclonal antibody treatment if test result is positive? no  (must be symptomatic, have a positive PCR covid test and meet ONE of the following criteria and be at least 18 YOA)  Age 65 or older  Obesity (BMI of 25 or more)  Pregnancy  Immunosuppressed  Diabetes  CKD  Cardiovascular disease, hypertension, chronic lung disease  Sickle cell disease   Neurodevelopment disorderChief Complaint  Arm Pain ((L). Ongoing for about a couple weeks. ), Earache ((L). ), and Sore Throat (Pt declines strep test. )    Subjective        Danyell Osborne presents to [facility]  Cough  This is a new problem. The current episode started in the past 7 days. The problem has been unchanged. The cough is non-productive. Associated symptoms include chills, a fever, headaches, nasal congestion, postnasal drip, a sore throat and shortness of breath. She has tried OTC cough suppressant for the symptoms. The treatment provided no relief.       Objective   Vital Signs:  /54 (BP  "Location: Left arm, Patient Position: Sitting, Cuff Size: Adult)   Pulse 67   Temp 98.4 °F (36.9 °C) (Oral)   Ht 160 cm (62.99\")   Wt 54.3 kg (119 lb 12.8 oz)   SpO2 97% Comment: room air  BMI 21.23 kg/m²   Estimated body mass index is 21.23 kg/m² as calculated from the following:    Height as of this encounter: 160 cm (62.99\").    Weight as of this encounter: 54.3 kg (119 lb 12.8 oz).    BMI is within normal parameters. No other follow-up for BMI required.      Physical Exam  HENT:      Head: Normocephalic.      Nose: Congestion present.   Cardiovascular:      Rate and Rhythm: Normal rate and regular rhythm.   Pulmonary:      Effort: Pulmonary effort is normal. No respiratory distress.      Breath sounds: Normal breath sounds. No stridor. No wheezing, rhonchi or rales.   Skin:     General: Skin is warm and dry.   Neurological:      Mental Status: She is alert and oriented to person, place, and time.   Psychiatric:         Mood and Affect: Mood normal.        Result Review :                Assessment and Plan   Diagnoses and all orders for this visit:    1. COVID-19 (Primary)  Comments:  Does not wish to receive paxlovid at this time, ER if shortness of breath worsens or you develop chest pain  Orders:  -     azithromycin (Zithromax Z-Jose Miguel) 250 MG tablet; Take 2 tablets by mouth on day 1, then 1 tablet daily on days 2-5  Dispense: 6 tablet; Refill: 0    2. Cough  -     POCT SARS-CoV-2 Antigen BRAYDEN + Flu    3. Hypertension, unspecified type  Assessment & Plan:  Hypertension is elevtaed due to illness.  Continue current treatment regimen.  Dietary sodium restriction.  Regular aerobic exercise.  Continue current medications.  Ambulatory blood pressure monitoring.  Blood pressure will be reassessed at the next regular appointment.             Follow Up   Return if symptoms worsen or fail to improve.  Patient was given instructions and counseling regarding her condition or for health maintenance advice. Please see " specific information pulled into the AVS if appropriate.

## 2022-06-22 NOTE — ASSESSMENT & PLAN NOTE
Hypertension is elevtaed due to illness.  Continue current treatment regimen.  Dietary sodium restriction.  Regular aerobic exercise.  Continue current medications.  Ambulatory blood pressure monitoring.  Blood pressure will be reassessed at the next regular appointment.

## 2022-06-27 ENCOUNTER — TRANSCRIBE ORDERS (OUTPATIENT)
Dept: ADMINISTRATIVE | Facility: HOSPITAL | Age: 87
End: 2022-06-27

## 2022-06-27 DIAGNOSIS — Z12.31 SCREENING MAMMOGRAM FOR BREAST CANCER: Primary | ICD-10-CM

## 2022-07-26 ENCOUNTER — HOSPITAL ENCOUNTER (OUTPATIENT)
Dept: MAMMOGRAPHY | Facility: HOSPITAL | Age: 87
Discharge: HOME | End: 2022-07-26
Admitting: NURSE PRACTITIONER

## 2022-07-26 DIAGNOSIS — Z12.31 SCREENING MAMMOGRAM FOR BREAST CANCER: ICD-10-CM

## 2022-07-26 PROCEDURE — 77063 BREAST TOMOSYNTHESIS BI: CPT

## 2022-07-26 PROCEDURE — 77067 SCR MAMMO BI INCL CAD: CPT

## 2022-10-03 ENCOUNTER — OFFICE VISIT (OUTPATIENT)
Dept: FAMILY MEDICINE CLINIC | Age: 87
End: 2022-10-03

## 2022-10-03 VITALS
TEMPERATURE: 98.4 F | HEIGHT: 63 IN | HEART RATE: 64 BPM | BODY MASS INDEX: 21.15 KG/M2 | OXYGEN SATURATION: 99 % | DIASTOLIC BLOOD PRESSURE: 59 MMHG | SYSTOLIC BLOOD PRESSURE: 169 MMHG | WEIGHT: 119.38 LBS

## 2022-10-03 DIAGNOSIS — K22.4 ESOPHAGEAL SPASM: ICD-10-CM

## 2022-10-03 DIAGNOSIS — F43.9 STRESS: ICD-10-CM

## 2022-10-03 DIAGNOSIS — L98.9 SKIN LESIONS: Primary | ICD-10-CM

## 2022-10-03 DIAGNOSIS — I25.10 CORONARY ARTERY DISEASE INVOLVING NATIVE CORONARY ARTERY OF NATIVE HEART WITHOUT ANGINA PECTORIS: ICD-10-CM

## 2022-10-03 PROCEDURE — 99214 OFFICE O/P EST MOD 30 MIN: CPT | Performed by: NURSE PRACTITIONER

## 2022-10-03 RX ORDER — FAMOTIDINE 40 MG/1
TABLET, FILM COATED ORAL DAILY
COMMUNITY

## 2022-10-03 NOTE — PROGRESS NOTES
Danyell Osborne presents to [facility].    Chief Complaint:  Follow-up (4 month follow up /Was in hospital a few weeks ago for afib and was told to follow up )         History of Present Illness:  A fib/ CAD   Current medication: plavik and toprol   Yuridia Yanes /cardiology   Labs:  Lab Results       Component                Value               Date                       GLUCOSE                  91                  03/23/2021                 BUN                      17                  03/23/2021                 CREATININE               0.76                03/23/2021                 BCR                      22 (H)              03/23/2021                 K                        5.0                 03/23/2021                 CO2                      26                  03/23/2021                 CALCIUM                  9.7                 03/23/2021                 ALBUMIN                  4.3                 03/23/2021                 LABIL2                   1.4                 03/23/2021                 AST                      25                  03/23/2021                 ALT                      13                  03/23/2021             She has seen flaget surgeon and had upper endoscopy     PAST MEDICAL HISTORY changes:       covid 6-2022    Atrial Fibrillation: dx'd in 3-2020;     Carotid Artery Stenosis: yuridia madera;     Coronary Artery Disease: dx'd in 3-23-20;     Myocardial Infarction: due to occlusion of the LAD, RCA, and stened again 3-25-20; heart cath/ stenting for NSTEMI;     AAA         Hospitalizations:    A fib 9-2022    Coronary Artery Disease last admit date 3-23-20 anemia, admitted once on 4-9-20         CURRENT MEDICAL PROVIDERS:    Cardiologist: Fozia         PREVENTIVE HEALTH MAINTENANCE             BONE DENSITY: was last done 06/25/15 with the following abnormality noted-- Osteoporosis     COLORECTAL CANCER SCREENING: Up to date (colonoscopy q10y;  sigmoidoscopy q5y; Cologuard q3y) was last done 3/2017, Results are in chart; colonoscopy with normal results     MAMMOGRAM: Done within last 2 years and results in are chart was last done  stable         Surgical History:         Biopsy of breast; benign    Coronary Artery Stent Placement: 2011;     Hysterectomy: at age 30; ovaries intact;     left foot neuroma;     Procedures:    Colonoscopy ( 11/normal/Ronna )    EGD (  normal ) &  esophageal spasms         Family History:     Father:  at age 80's; Cause of death was TB;  Parkinson's Disease     Mother:  at age 30's; Cause of death was renal issues;  goiter     Sister(s): 3 sister(s) total; 1 ; Neurological/Genetic Cerebrovascular Accident; Endocrine Type 2 Diabetes         Social History:     Occupation: quit working 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/[employer];     Marital Status:      Children: 6          Review of Systems:  Review of Systems   Constitutional: Negative for fatigue and fever.   HENT: Positive for postnasal drip (taking mucinex ).    Respiratory: Negative for cough and shortness of breath.    Cardiovascular: Negative for chest pain, palpitations and leg swelling.   Gastrointestinal: Positive for constipation.        Chest pain, burping helps, canceled her GI work up   Skin:        Skin lesions needs checking    Neurological: Negative for numbness.   Psychiatric/Behavioral: Positive for stress (daughter's health issues, [employer] coming to see her,  appliance issues ).          Current Outpatient Medications:   •  aspirin 81 MG EC tablet, Take 81 mg by mouth Daily., Disp: , Rfl:   •  Cholecalciferol 50 MCG (2000 UT) capsule, Take 1 capsule by mouth Daily., Disp: , Rfl:   •  clopidogrel (PLAVIX) 75 MG tablet, Take 1 tablet by mouth Daily., Disp: , Rfl:   •  famotidine (PEPCID) 40 MG tablet, Take  by mouth Daily., Disp: , Rfl:   •  metoprolol succinate XL (TOPROL-XL) 50 MG 24 hr tablet, Take 1.5 tablets by mouth  "Daily., Disp: , Rfl:   •  multivitamin with minerals tablet tablet, Take 1 tablet by mouth Daily., Disp: , Rfl:   •  Polyethylene Glycol 3350 (MIRALAX PO), Take  by mouth., Disp: , Rfl:   •  Simethicone (GAS-X PO), Take  by mouth., Disp: , Rfl:   •  promethazine-dextromethorphan (PROMETHAZINE-DM) 6.25-15 MG/5ML syrup, Take 5 mL by mouth 4 (Four) Times a Day As Needed for Cough., Disp: 120 mL, Rfl: 0    Vital Signs:   Vitals:    10/03/22 1135   BP: 169/59   BP Location: Right arm   Patient Position: Sitting   Cuff Size: Adult   Pulse: 64   Temp: 98.4 °F (36.9 °C)   TempSrc: Oral   SpO2: 99%   Weight: 54.1 kg (119 lb 6 oz)   Height: 160 cm (62.99\")         Physical Exam:  Physical Exam  Vitals reviewed.   Constitutional:       General: She is not in acute distress.     Appearance: Normal appearance.   Neck:      Vascular: No carotid bruit.   Cardiovascular:      Rate and Rhythm: Normal rate and regular rhythm.      Heart sounds: Normal heart sounds. No murmur heard.  Pulmonary:      Effort: Pulmonary effort is normal. No respiratory distress.      Breath sounds: Normal breath sounds.   Musculoskeletal:      Right lower leg: No edema.      Left lower leg: No edema.   Skin:     Comments: Tiny white scaly papules on fingers, scalp line    Neurological:      Mental Status: She is alert.   Psychiatric:         Mood and Affect: Mood is anxious.         Behavior: Behavior normal.         Result Review      The following data was reviewed by: AGUSTIN Jimenez on 10/03/2022:    Results for orders placed or performed in visit on 06/09/22   POCT SARS-CoV-2 Antigen BRAYDEN + Flu    Specimen: Swab   Result Value Ref Range    SARS Antigen Detected (A) Not Detected, Presumptive Negative    Influenza A Antigen BRAYDEN Not Detected Not Detected    Influenza B Antigen BRAYDEN Not Detected Not Detected    Internal Control Passed Passed    Lot Number [id number]     Expiration Date 12/22/23                Assessment and Plan:        "   Diagnoses and all orders for this visit:    1. Skin lesions (Primary)  Assessment & Plan:  Refer back to derm for skin evaluation     Orders:  -     Ambulatory Referral to Dermatology    2. Coronary artery disease involving native coronary artery of native heart without angina pectoris  Assessment & Plan:  Reviewed cardiology note/ vital signs from last week, continue current rx and follow up with him   Advised flu vaccine, declines       3. Esophageal spasm  Assessment & Plan:  Continue to follow up with surgeon who has evaluated her, reviewed EGD, take pepcid as directed, discussed constipation and OTC treatment, fluids, fiber to be increased      4. Stress  Assessment & Plan:  Discussed her stress issues/will observe for now, follow up if symptoms persist           Follow Up   Return if symptoms worsen or fail to improve.  Patient was given instructions and counseling regarding her condition or for health maintenance advice. Please see specific information pulled into the AVS if appropriate.

## 2022-10-03 NOTE — ASSESSMENT & PLAN NOTE
Reviewed cardiology note/ vital signs from last week, continue current rx and follow up with him   Advised flu vaccine, declines

## 2022-10-03 NOTE — ASSESSMENT & PLAN NOTE
Continue to follow up with surgeon who has evaluated her, reviewed EGD, take pepcid as directed, discussed constipation and OTC treatment, fluids, fiber to be increased

## 2022-12-08 ENCOUNTER — OFFICE VISIT (OUTPATIENT)
Dept: FAMILY MEDICINE CLINIC | Age: 87
End: 2022-12-08

## 2022-12-08 VITALS
BODY MASS INDEX: 20.84 KG/M2 | HEART RATE: 62 BPM | RESPIRATION RATE: 16 BRPM | HEIGHT: 63 IN | DIASTOLIC BLOOD PRESSURE: 57 MMHG | SYSTOLIC BLOOD PRESSURE: 171 MMHG | WEIGHT: 117.6 LBS | OXYGEN SATURATION: 97 %

## 2022-12-08 DIAGNOSIS — F43.9 STRESS: ICD-10-CM

## 2022-12-08 DIAGNOSIS — I48.91 ATRIAL FIBRILLATION, UNSPECIFIED TYPE: ICD-10-CM

## 2022-12-08 DIAGNOSIS — K22.4 ESOPHAGEAL SPASM: ICD-10-CM

## 2022-12-08 DIAGNOSIS — G44.89 OTHER HEADACHE SYNDROME: Primary | ICD-10-CM

## 2022-12-08 PROBLEM — R93.89 ABNORMAL CHEST X-RAY: Status: RESOLVED | Noted: 2021-10-18 | Resolved: 2022-12-08

## 2022-12-08 PROCEDURE — 99214 OFFICE O/P EST MOD 30 MIN: CPT | Performed by: NURSE PRACTITIONER

## 2022-12-08 NOTE — ASSESSMENT & PLAN NOTE
Take her rx as directed, she reports that her cardiologist told her to take some of her rx prn, she has not been doing, follow with them as directed, reviewed hospital ER records/ labs CXR

## 2022-12-08 NOTE — PROGRESS NOTES
Danyell Osborne presents to [facility].    Chief Complaint:  Headache (Right sided headache, intermittently over the few months. Reports the pain is located more in right temple and forehead region. /) and ER follow up ([facility] 12/7/2022 for Afib treated by Dr. Hopper)         History of Present Illness:  Headache   Symptoms started: few months ago, intermittently, not daily  Description/location of symptoms: right temple and forehead   Associated symptoms: none   Treatment tried:  tylneol pm helps        Went for palpitations to ER yesterday   In ER had labs / look fine, cbc, cmp and cardiac labs TSH normal / neg covid and flu screen  CXR: Coarse interstitial changes.   Otherwise no acute process.      12-7-22  seen in ER for AF Sees Can Yanes ( last visit 9-29--22 and then has an appt 3-2023)  is on plavik and beta blocker and was given CCB in ER no palpatitaitons today     PAST MEDICAL HISTORY changes since :       covid 6-2022    Atrial Fibrillation: dx'd in 3-2020;     Carotid Artery Stenosis: seegill madera;     Coronary Artery Disease: dx'd in 3-23-20;     Myocardial Infarction: due to occlusion of the LAD, RCA, and stened again 3-25-20; heart cath/ stenting for NSTEMI;     AAA         Hospitalizations:     A fib 9-2022    Coronary Artery Disease last admit date 3-23-20 anemia, admitted once on 4-9-20         CURRENT MEDICAL PROVIDERS:    Cardiologist: Fozia         PREVENTIVE HEALTH MAINTENANCE             BONE DENSITY: was last done 06/25/15 with the following abnormality noted-- Osteoporosis     COLORECTAL CANCER SCREENING: Up to date (colonoscopy q10y; sigmoidoscopy q5y; Cologuard q3y) was last done 3/2017, Results are in chart; colonoscopy with normal results     MAMMOGRAM: Done within last 2 years and results in are chart was last done 7-2022 stable         Surgical History:         Biopsy of breast; benign    Coronary Artery Stent Placement: 2011 2;      Hysterectomy: at age 30; ovaries intact;     left foot neuroma;     Procedures:    Colonoscopy ( 11/normal/Ronna )    EGD (  normal ) &  esophageal spasms         Family History:     Father:  at age 80's; Cause of death was TB;  Parkinson's Disease     Mother:  at age 30's; Cause of death was renal issues;  goiter     Sister(s): 3 sister(s) total; 1 ; Neurological/Genetic Cerebrovascular Accident; Endocrine Type 2 Diabetes         Social History:     Occupation: quit working 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/brown [employer];     Marital Status:      Children: 6        Review of Systems:  Review of Systems   Constitutional: Negative for fatigue and fever.   Respiratory: Negative for cough and shortness of breath.    Cardiovascular: Negative for chest pain, palpitations and leg swelling.   Gastrointestinal:        Having GI issues, frequent BM's and issues with her esophagus, seeing Dr Singh, has a follow up appt in a few weeks    Neurological: Negative for numbness.   Psychiatric/Behavioral: Positive for stress (related her daughter and her family issues related to the murder of her son, grand daughter and the [employer] investiation ).          Current Outpatient Medications:   •  aspirin 81 MG EC tablet, Take 81 mg by mouth Daily., Disp: , Rfl:   •  Cholecalciferol 50 MCG (2000 UT) capsule, Take 1 capsule by mouth Daily., Disp: , Rfl:   •  clopidogrel (PLAVIX) 75 MG tablet, Take 1 tablet by mouth Daily., Disp: , Rfl:   •  famotidine (PEPCID) 40 MG tablet, Take  by mouth Daily., Disp: , Rfl:   •  metoprolol succinate XL (TOPROL-XL) 50 MG 24 hr tablet, Take 1.5 tablets by mouth Daily., Disp: , Rfl:   •  multivitamin with minerals tablet tablet, Take 1 tablet by mouth Daily., Disp: , Rfl:   •  Polyethylene Glycol 3350 (MIRALAX PO), Take  by mouth., Disp: , Rfl:   •  promethazine-dextromethorphan (PROMETHAZINE-DM) 6.25-15 MG/5ML syrup, Take 5 mL by mouth 4 (Four) Times a Day As Needed for Cough.,  "Disp: 120 mL, Rfl: 0  •  Simethicone (GAS-X PO), Take  by mouth., Disp: , Rfl:     Vital Signs:   Vitals:    12/08/22 1108 12/08/22 1116 12/08/22 1131   BP: 174/76 171/73 171/57   BP Location: Right arm Right arm Right arm   Patient Position: Sitting Sitting Sitting   Cuff Size: Adult Adult Adult   Pulse: 62  62   Resp: 16     SpO2: 97%  Comment: room air     Weight: 53.3 kg (117 lb 9.6 oz)     Height: 160 cm (62.99\")     PainSc: 0-No pain     PainLoc: Head           Physical Exam:  Physical Exam  Vitals reviewed.   Constitutional:       General: She is not in acute distress.     Appearance: Normal appearance.   Eyes:      Extraocular Movements: Extraocular movements intact.      Pupils: Pupils are equal, round, and reactive to light.   Neck:      Vascular: No carotid bruit.   Cardiovascular:      Rate and Rhythm: Normal rate and regular rhythm.      Heart sounds: Normal heart sounds. No murmur heard.  Pulmonary:      Effort: Pulmonary effort is normal. No respiratory distress.      Breath sounds: Normal breath sounds.   Musculoskeletal:         General: No tenderness (no temporal tenderness ).      Right lower leg: No edema.      Left lower leg: No edema.   Neurological:      General: No focal deficit present.      Mental Status: She is alert.   Psychiatric:         Mood and Affect: Mood normal.         Behavior: Behavior normal.         Result Review      The following data was reviewed by: AGUSTIN Jimenez on 12/08/2022:    Results for orders placed or performed in visit on 06/09/22   POCT SARS-CoV-2 Antigen BRAYDEN + Flu    Specimen: Swab   Result Value Ref Range    SARS Antigen Detected (A) Not Detected, Presumptive Negative    Influenza A Antigen BRAYDEN Not Detected Not Detected    Influenza B Antigen BRAYDEN Not Detected Not Detected    Internal Control Passed Passed    Lot Number [id number]     Expiration Date 12/22/23                Assessment and Plan:          Diagnoses and all orders for this visit:    1. " Other headache syndrome (Primary)  Assessment & Plan:  To take tylenol pm nightly if that is helping         2. Atrial fibrillation, unspecified type (HCC)  Assessment & Plan:  Take her rx as directed, she reports that her cardiologist told her to take some of her rx prn, she has not been doing, follow with them as directed, reviewed hospital ER records/ labs CXR      3. Esophageal spasm  Assessment & Plan:  Follow up with Dr Singh as directed       4. Stress  Assessment & Plan:  Discussed her stress           Follow Up   Return if symptoms worsen or fail to improve.  Patient was given instructions and counseling regarding her condition or for health maintenance advice. Please see specific information pulled into the AVS if appropriate.

## 2022-12-14 DIAGNOSIS — U07.1 COVID-19: ICD-10-CM

## 2022-12-14 RX ORDER — AZITHROMYCIN 250 MG/1
TABLET, FILM COATED ORAL
Qty: 6 TABLET | Refills: 0 | OUTPATIENT
Start: 2022-12-14

## 2023-01-04 ENCOUNTER — OFFICE VISIT (OUTPATIENT)
Dept: FAMILY MEDICINE CLINIC | Age: 88
End: 2023-01-04
Payer: MEDICARE

## 2023-01-04 VITALS
BODY MASS INDEX: 20.94 KG/M2 | WEIGHT: 118.2 LBS | SYSTOLIC BLOOD PRESSURE: 183 MMHG | HEIGHT: 63 IN | OXYGEN SATURATION: 99 % | DIASTOLIC BLOOD PRESSURE: 73 MMHG | TEMPERATURE: 98.9 F | HEART RATE: 66 BPM

## 2023-01-04 DIAGNOSIS — J40 BRONCHITIS: Primary | ICD-10-CM

## 2023-01-04 DIAGNOSIS — I10 HYPERTENSION, UNSPECIFIED TYPE: Chronic | ICD-10-CM

## 2023-01-04 DIAGNOSIS — H10.32 ACUTE CONJUNCTIVITIS OF LEFT EYE, UNSPECIFIED ACUTE CONJUNCTIVITIS TYPE: ICD-10-CM

## 2023-01-04 LAB
EXPIRATION DATE: NORMAL
FLUAV AG UPPER RESP QL IA.RAPID: NOT DETECTED
FLUBV AG UPPER RESP QL IA.RAPID: NOT DETECTED
INTERNAL CONTROL: NORMAL
Lab: NORMAL
SARS-COV-2 AG UPPER RESP QL IA.RAPID: NOT DETECTED

## 2023-01-04 PROCEDURE — 1159F MED LIST DOCD IN RCRD: CPT | Performed by: NURSE PRACTITIONER

## 2023-01-04 PROCEDURE — 87428 SARSCOV & INF VIR A&B AG IA: CPT | Performed by: NURSE PRACTITIONER

## 2023-01-04 PROCEDURE — 99214 OFFICE O/P EST MOD 30 MIN: CPT | Performed by: NURSE PRACTITIONER

## 2023-01-04 PROCEDURE — 1160F RVW MEDS BY RX/DR IN RCRD: CPT | Performed by: NURSE PRACTITIONER

## 2023-01-04 RX ORDER — METOPROLOL SUCCINATE 50 MG/1
100 TABLET, EXTENDED RELEASE ORAL 2 TIMES DAILY
Qty: 60 TABLET | Refills: 0 | Status: SHIPPED | OUTPATIENT
Start: 2023-01-04

## 2023-01-04 RX ORDER — AZITHROMYCIN 250 MG/1
TABLET, FILM COATED ORAL
Qty: 6 TABLET | Refills: 0 | Status: SHIPPED | OUTPATIENT
Start: 2023-01-04 | End: 2023-01-09

## 2023-01-04 RX ORDER — DM/ACETAMINOPHEN/DOXYLAMINE 10-325/15
20 LIQUID (ML) ORAL 3 TIMES DAILY PRN
Qty: 355 ML | Refills: 0 | Status: SHIPPED | OUTPATIENT
Start: 2023-01-04 | End: 2023-01-09

## 2023-01-04 RX ORDER — POLYMYXIN B SULFATE AND TRIMETHOPRIM 1; 10000 MG/ML; [USP'U]/ML
1 SOLUTION OPHTHALMIC EVERY 6 HOURS
Qty: 10 ML | Refills: 0 | Status: SHIPPED | OUTPATIENT
Start: 2023-01-04

## 2023-01-04 NOTE — PROGRESS NOTES
Chief Complaint  Danyell Osborne presents to [facility] for Cough (X 2-3 days. ), Headache, and Eye Drainage ((L) x last night. Pt c/o yellow discharge, swelling, and itching. )    Subjective          History of Present Illness  Here for acute cough, x3 days, has been around family who has been sick, denies fever, BP has been elevated, has Headache, also has left eye itch burn feeling with yellow drainage. Daughter has pink eye    Review of Systems   Constitutional: Negative for fatigue, fever, unexpected weight gain and unexpected weight loss.   HENT: Negative.  Negative for sinus pressure and sore throat.    Eyes: Positive for discharge (left).   Respiratory: Positive for cough. Negative for shortness of breath and wheezing.    Cardiovascular: Negative for chest pain, palpitations and leg swelling.        BP has been elevated for a few days , thinks its causing her headache   Gastrointestinal: Negative for abdominal pain.   Endocrine: Negative.    Genitourinary: Negative for breast lump, breast pain, dysuria, frequency and urgency.   Musculoskeletal: Negative for gait problem.   Skin: Negative.    Neurological: Positive for headache. Negative for dizziness, tremors, seizures, weakness and memory problem.   Psychiatric/Behavioral: Negative for behavioral problems and suicidal ideas.         Allergies   Allergen Reactions   • Contrast Dye Unknown - High Severity   • Iodinated Contrast Media Unknown - High Severity   • Penicillins Rash      No past medical history on file.  Current Outpatient Medications   Medication Sig Dispense Refill   • aspirin 81 MG EC tablet Take 81 mg by mouth Daily.     • Cholecalciferol 50 MCG (2000 UT) capsule Take 1 capsule by mouth Daily.     • clopidogrel (PLAVIX) 75 MG tablet Take 1 tablet by mouth Daily.     • famotidine (PEPCID) 40 MG tablet Take  by mouth Daily.     • metoprolol succinate XL (TOPROL-XL) 50 MG 24 hr tablet Take 2 tablets by mouth 2  (Two) Times a Day. 60 tablet 0   • multivitamin with minerals tablet tablet Take 1 tablet by mouth Daily.     • Polyethylene Glycol 3350 (MIRALAX PO) Take  by mouth.     • promethazine-dextromethorphan (PROMETHAZINE-DM) 6.25-15 MG/5ML syrup Take 5 mL by mouth 4 (Four) Times a Day As Needed for Cough. 120 mL 0   • Simethicone (GAS-X PO) Take  by mouth.     • azithromycin (ZITHROMAX) 250 MG tablet Take 2 tablets po the first day, then 1 tablet po daily for 4 days. 6 tablet 0   • DM-Doxylamine-Acetaminophen (Coricidin HBP Night Cld/Flu MS) 10-6. MG/15ML liquid Take 20 mL by mouth 3 (Three) Times a Day As Needed (cough). 355 mL 0   • trimethoprim-polymyxin b (Polytrim) 39620-7.1 UNIT/ML-% ophthalmic solution Administer 1 drop into the left eye Every 6 (Six) Hours. 10 mL 0     No current facility-administered medications for this visit.     Past Surgical History:   Procedure Laterality Date   • HYSTERECTOMY        Social History     Tobacco Use   • Smoking status: Never   • Smokeless tobacco: Never   Vaping Use   • Vaping Use: Never used   Substance Use Topics   • Alcohol use: Never   • Drug use: Never     No family history on file.  Health Maintenance Due   Topic Date Due   • DXA SCAN  Never done   • TDAP/TD VACCINES (1 - Tdap) Never done   • ZOSTER VACCINE (1 of 2) Never done   • Pneumococcal Vaccine 65+ (1 - PCV) Never done   • ANNUAL WELLNESS VISIT  10/12/2021   • COVID-19 Vaccine (4 - Booster for Pfizer series) 02/28/2022   • INFLUENZA VACCINE  Never done      Immunization History   Administered Date(s) Administered   • COVID-19 (PFIZER) PURPLE CAP 02/17/2021, 03/10/2021, 01/03/2022        Objective     Vitals:    01/04/23 1134 01/04/23 1158   BP: (!) 188/63 (!) 183/73   BP Location: Left arm Right arm   Patient Position: Sitting Sitting   Cuff Size: Adult    Pulse: 63 66   Temp: 98.9 °F (37.2 °C)    TempSrc: Oral    SpO2: 99%    Weight: 53.6 kg (118 lb 3.2 oz)    Height: 160 cm (62.99\")      Body mass index  is 20.94 kg/m².     Physical Exam  Vitals and nursing note reviewed.   Constitutional:       Appearance: Normal appearance.   Eyes:      General:         Left eye: Discharge present.  Neck:      Vascular: No carotid bruit.   Cardiovascular:      Rate and Rhythm: Normal rate and regular rhythm.      Heart sounds: Normal heart sounds.   Pulmonary:      Effort: Pulmonary effort is normal.      Comments: Breath sounds diminshed. Slight congestion bilaterally  Musculoskeletal:         General: Normal range of motion.   Skin:     General: Skin is warm and dry.   Neurological:      General: No focal deficit present.      Mental Status: She is alert.   Psychiatric:         Mood and Affect: Mood normal.         Behavior: Behavior normal.           Result Review :    Office Visit on 01/04/2023   Component Date Value Ref Range Status   • SARS Antigen 01/04/2023 Not Detected  Not Detected, Presumptive Negative Final   • Influenza A Antigen BRAYDEN 01/04/2023 Not Detected  Not Detected Final   • Influenza B Antigen BRAYDEN 01/04/2023 Not Detected  Not Detected Final   • Internal Control 01/04/2023 Passed  Passed Final   • Lot Number 01/04/2023 [id number]   Final   • Expiration Date 01/04/2023 11/9/23   Final                        Assessment and Plan      Diagnoses and all orders for this visit:    1. Bronchitis (Primary)  -     POCT SARS-CoV-2 Antigen BRAYDEN + Flu  -     DM-Doxylamine-Acetaminophen (Coricidin HBP Night Cld/Flu MS) 10-6. MG/15ML liquid; Take 20 mL by mouth 3 (Three) Times a Day As Needed (cough).  Dispense: 355 mL; Refill: 0    2. Hypertension, unspecified type  Comments:  Monitor BP resting twice a day, bring readings into office   Orders:  -     metoprolol succinate XL (TOPROL-XL) 50 MG 24 hr tablet; Take 2 tablets by mouth 2 (Two) Times a Day.  Dispense: 60 tablet; Refill: 0    3. Acute conjunctivitis of left eye, unspecified acute conjunctivitis type  -     trimethoprim-polymyxin b (Polytrim) 61142-2.1 UNIT/ML-%  ophthalmic solution; Administer 1 drop into the left eye Every 6 (Six) Hours.  Dispense: 10 mL; Refill: 0    Other orders  -     azithromycin (ZITHROMAX) 250 MG tablet; Take 2 tablets po the first day, then 1 tablet po daily for 4 days.  Dispense: 6 tablet; Refill: 0            Follow Up     Return in about 2 weeks (around 1/18/2023) for monitor Blood pressure.

## 2023-01-04 NOTE — Clinical Note
Increased toprol to 75 q am and 25 q hs. , she was already taking 75 daily , pulse was 60s , told her to fu 2 weeks on HTN

## 2023-01-09 ENCOUNTER — OFFICE VISIT (OUTPATIENT)
Dept: FAMILY MEDICINE CLINIC | Age: 88
End: 2023-01-09
Payer: MEDICARE

## 2023-01-09 VITALS
HEIGHT: 63 IN | SYSTOLIC BLOOD PRESSURE: 185 MMHG | OXYGEN SATURATION: 98 % | HEART RATE: 57 BPM | DIASTOLIC BLOOD PRESSURE: 75 MMHG | BODY MASS INDEX: 20.94 KG/M2 | WEIGHT: 118.2 LBS | TEMPERATURE: 98.9 F

## 2023-01-09 DIAGNOSIS — I10 ESSENTIAL HYPERTENSION: ICD-10-CM

## 2023-01-09 DIAGNOSIS — H10.32 ACUTE CONJUNCTIVITIS OF LEFT EYE, UNSPECIFIED ACUTE CONJUNCTIVITIS TYPE: ICD-10-CM

## 2023-01-09 DIAGNOSIS — I25.10 CORONARY ARTERY DISEASE INVOLVING NATIVE CORONARY ARTERY OF NATIVE HEART WITHOUT ANGINA PECTORIS: ICD-10-CM

## 2023-01-09 DIAGNOSIS — Z78.0 POSTMENOPAUSAL: ICD-10-CM

## 2023-01-09 DIAGNOSIS — K59.09 OTHER CONSTIPATION: ICD-10-CM

## 2023-01-09 DIAGNOSIS — Z00.00 ROUTINE GENERAL MEDICAL EXAMINATION AT A HEALTH CARE FACILITY: Primary | ICD-10-CM

## 2023-01-09 DIAGNOSIS — I48.91 ATRIAL FIBRILLATION, UNSPECIFIED TYPE: ICD-10-CM

## 2023-01-09 DIAGNOSIS — Z12.31 SCREENING MAMMOGRAM FOR BREAST CANCER: ICD-10-CM

## 2023-01-09 DIAGNOSIS — R05.9 COUGH: ICD-10-CM

## 2023-01-09 PROCEDURE — 1170F FXNL STATUS ASSESSED: CPT | Performed by: NURSE PRACTITIONER

## 2023-01-09 PROCEDURE — 1159F MED LIST DOCD IN RCRD: CPT | Performed by: NURSE PRACTITIONER

## 2023-01-09 PROCEDURE — 1125F AMNT PAIN NOTED PAIN PRSNT: CPT | Performed by: NURSE PRACTITIONER

## 2023-01-09 PROCEDURE — 1126F AMNT PAIN NOTED NONE PRSNT: CPT | Performed by: NURSE PRACTITIONER

## 2023-01-09 PROCEDURE — G0439 PPPS, SUBSEQ VISIT: HCPCS | Performed by: NURSE PRACTITIONER

## 2023-01-09 PROCEDURE — 1160F RVW MEDS BY RX/DR IN RCRD: CPT | Performed by: NURSE PRACTITIONER

## 2023-01-09 RX ORDER — PANTOPRAZOLE SODIUM 40 MG/1
40 TABLET, DELAYED RELEASE ORAL DAILY
COMMUNITY

## 2023-01-09 RX ORDER — DEXTROMETHORPHAN HYDROBROMIDE AND PROMETHAZINE HYDROCHLORIDE 15; 6.25 MG/5ML; MG/5ML
5 SYRUP ORAL 4 TIMES DAILY PRN
Qty: 120 ML | Refills: 0 | Status: SHIPPED | OUTPATIENT
Start: 2023-01-09

## 2023-01-09 NOTE — ASSESSMENT & PLAN NOTE
Reviewed her bp readings she brought with her today, gave her a log sheet to keep a record this week of her bp, let me know how it is running later this week, may need to get her back in with cardiology sooner and to continue current dose of beta blocker   Reviewed labs from 12-7-22 at [facility]

## 2023-01-09 NOTE — ASSESSMENT & PLAN NOTE
Advise regular exercise, healthy eating, always wear seat belts. Living will discussed, booklet given, fall prevention discussed.  Immunizations discussed. She is considering but wants to get fully over her current URI symptoms   To continue yearly optometry and dental exams.

## 2023-01-09 NOTE — PROGRESS NOTES
The ABCs of the Annual Wellness Visit  Subsequent Medicare Wellness Visit    Subjective    Danyell Osborne is a 87 y.o. female who presents for a Subsequent Medicare Wellness Visit.    Medicare wellness HPI  Exercises regularly:: active but no   Eats healthy : tries   Last mammogram:6- benign   Last DEXA: 2015 osteoporosis   Last pap smear: n/a  BSE: no   Wears seatbelts: yes   Living will:no   Optometrist: Dr Thompson due to make appt   Dentist: ALEXIS Estes   Tobacco: none   Alcohol intake: none   Drugs: none   Falls: none   Colonoscopy: 2017, getting a colonoscopy per Dr Singh in the future, has constipation   Immunizations: 3 covid vaccines     The following portions of the patient's history were reviewed and   updated as appropriate: allergies, current medications, past family history, past medical history, past social history, past surgical history and problem list.    Compared to one year ago, the patient feels her physical   health is the same.    Compared to one year ago, the patient feels her mental   health is the same.    Recent Hospitalizations:  She was not admitted to the hospital during the last year.       Current Medical Providers:  Patient Care Team:  Shanae Osborne APRN as PCP - General (Family Medicine)    Outpatient Medications Prior to Visit   Medication Sig Dispense Refill   • aspirin 81 MG EC tablet Take 81 mg by mouth Daily.     • Cholecalciferol 50 MCG (2000 UT) capsule Take 1 capsule by mouth Daily.     • clopidogrel (PLAVIX) 75 MG tablet Take 1 tablet by mouth Daily.     • famotidine (PEPCID) 40 MG tablet Take  by mouth Daily.     • metoprolol succinate XL (TOPROL-XL) 50 MG 24 hr tablet Take 2 tablets by mouth 2 (Two) Times a Day. 60 tablet 0   • multivitamin with minerals tablet tablet Take 1 tablet by mouth Daily.     • pantoprazole (PROTONIX) 40 MG EC tablet Take 40 mg by mouth Daily.     • Polyethylene Glycol 3350 (MIRALAX PO) Take  by mouth.     • Simethicone (GAS-X  PO) Take  by mouth.     • trimethoprim-polymyxin b (Polytrim) 25752-8.1 UNIT/ML-% ophthalmic solution Administer 1 drop into the left eye Every 6 (Six) Hours. 10 mL 0   • vitamin E 100 UNIT capsule Take 100 Units by mouth Daily.     • azithromycin (ZITHROMAX) 250 MG tablet Take 2 tablets po the first day, then 1 tablet po daily for 4 days. 6 tablet 0   • DM-Doxylamine-Acetaminophen (Coricidin HBP Night Cld/Flu MS) 10-6. MG/15ML liquid Take 20 mL by mouth 3 (Three) Times a Day As Needed (cough). 355 mL 0   • promethazine-dextromethorphan (PROMETHAZINE-DM) 6.25-15 MG/5ML syrup Take 5 mL by mouth 4 (Four) Times a Day As Needed for Cough. 120 mL 0     No facility-administered medications prior to visit.       No opioid medication identified on active medication list. I have reviewed chart for other potential  high risk medication/s and harmful drug interactions in the elderly.          Aspirin is on active medication list. Aspirin use is indicated based on review of current medical condition/s. Pros and cons of this therapy have been discussed today. Benefits of this medication outweigh potential harm.  Patient has been encouraged to continue taking this medication.  .      Patient Active Problem List   Diagnosis   • Essential hypertension   • Coronary artery disease involving native coronary artery of native heart without angina pectoris   • Skin lesions   • Esophageal spasm   • Stress   • Other headache syndrome   • Atrial fibrillation (HCC)   • Routine general medical examination at a health care facility   • Cough   • Screening mammogram for breast cancer   • Postmenopausal   • Acute conjunctivitis of left eye   • Other constipation     Advance Care Planning  Advance Directive is not on file.  ACP discussion was held with the patient during this visit. Patient does not have an advance directive, information provided.     Objective    Vitals:    01/09/23 1247 01/09/23 1323   BP: 162/74 (!) 185/75   BP Location:  Right arm Right arm   Patient Position: Sitting Sitting   Cuff Size: Adult Adult   Pulse: 57    Temp: 98.9 °F (37.2 °C)    TempSrc: Oral    SpO2: 98%  Comment: room air    Weight: 53.6 kg (118 lb 3.2 oz)    Height: 160 cm (62.99\")      Estimated body mass index is 20.94 kg/m² as calculated from the following:    Height as of this encounter: 160 cm (62.99\").    Weight as of this encounter: 53.6 kg (118 lb 3.2 oz).    BMI is within normal parameters. No other follow-up for BMI required.      Does the patient have evidence of cognitive impairment? No          HEALTH RISK ASSESSMENT    Smoking Status:  Social History     Tobacco Use   Smoking Status Never   Smokeless Tobacco Never     Alcohol Consumption:  Social History     Substance and Sexual Activity   Alcohol Use Never     Fall Risk Screen:    DESTINI Fall Risk Assessment was completed, and patient is at LOW risk for falls.Assessment completed on:1/9/2023    Depression Screening:  PHQ-2/PHQ-9 Depression Screening 1/9/2023   Little Interest or Pleasure in Doing Things 0-->not at all   Feeling Down, Depressed or Hopeless 0-->not at all   PHQ-9: Brief Depression Severity Measure Score 0       Health Habits and Functional and Cognitive Screening:  Functional & Cognitive Status 1/9/2023   Do you have difficulty preparing food and eating? No   Do you have difficulty bathing yourself, getting dressed or grooming yourself? No   Do you have difficulty using the toilet? No   Do you have difficulty moving around from place to place? No   Do you have trouble with steps or getting out of a bed or a chair? No   Current Diet Well Balanced Diet   Dental Exam Up to date   Eye Exam Up to date   Exercise (times per week) 2 times per week   Current Exercises Include House Cleaning   Do you need help using the phone?  No   Are you deaf or do you have serious difficulty hearing?  No   Do you need help with transportation? No   Do you need help shopping? No   Do you need help preparing  meals?  No   Do you need help with housework?  No   Do you need help with laundry? No   Do you need help taking your medications? No   Do you need help managing money? No   Do you ever drive or ride in a car without wearing a seat belt? No   Have you felt unusual stress, anger or loneliness in the last month? No   Who do you live with? Spouse   If you need help, do you have trouble finding someone available to you? No   Have you been bothered in the last four weeks by sexual problems? No   Do you have difficulty concentrating, remembering or making decisions? (No Data)       Age-appropriate Screening Schedule:  Refer to the list below for future screening recommendations based on patient's age, sex and/or medical conditions. Orders for these recommended tests are listed in the plan section. The patient has been provided with a written plan.    Health Maintenance   Topic Date Due   • DXA SCAN  Never done   • TDAP/TD VACCINES (1 - Tdap) Never done   • ZOSTER VACCINE (1 of 2) Never done   • INFLUENZA VACCINE  Never done                CMS Preventative Services Quick Reference  Risk Factors Identified During Encounter  Immunizations Discussed/Encouraged: Tdap, Influenza, Prevnar 20 (Pneumococcal 20-valent conjugate), Shingrix and COVID19  The above risks/problems have been discussed with the patient.  Pertinent information has been shared with the patient in the After Visit Summary.  An After Visit Summary and PPPS were made available to the patient.    Follow Up:   Next Medicare Wellness visit to be scheduled in 1 year.       Additional E&M Note during same encounter follows:  Patient has multiple medical problems which are significant and separately identifiable that require additional work above and beyond the Medicare Wellness Visit.      Chief Complaint  Medicare Wellness-subsequent and Conjunctivitis (Pt had Pink eye in (B) eyes, ongoing since 1/4. )    Subjective        Hypertension:  Current medication: toprol  XL   Tolerating Medication: Yes  Checking BP at home and it is: 126-179 over 60-88  Was seen by another provider here last week and she had her double her dose of rx     Labs:  Lab Results       Component                Value               Date                       GLUCOSE                  91                  03/23/2021                 BUN                      17                  03/23/2021                 CREATININE               0.76                03/23/2021                 BCR                      22 (H)              03/23/2021                 K                        5.0                 03/23/2021                 CO2                      26                  03/23/2021                 CALCIUM                  9.7                 03/23/2021                 ALBUMIN                  4.3                 03/23/2021                 LABIL2                   1.4                 03/23/2021                 AST                      25                  03/23/2021                 ALT                      13                  03/23/2021                Danyell Osborne is also being seen today for medicare wellness and HTN     PAST MEDICAL HISTORY changes since :       covid 6-2022    Atrial Fibrillation: dx'd in 3-2020;     Carotid Artery Stenosis: sees santy;     Coronary Artery Disease: dx'd in 3-23-20;     Myocardial Infarction: due to occlusion of the LAD, RCA, and stened again 3-25-20; heart cath/ stenting for NSTEMI;     AAA         Hospitalizations:     A fib 9-2022    Coronary Artery Disease last admit date 3-23-20 anemia, admitted once on 4-9-20         CURRENT MEDICAL PROVIDERS:    Cardiologist: [facility]         PREVENTIVE HEALTH MAINTENANCE             BONE DENSITY: was last done 06/25/15 with the following abnormality noted-- Osteoporosis     COLORECTAL CANCER SCREENING: Up to date (colonoscopy q10y; sigmoidoscopy q5y; Cologuard q3y) was last done 3/2017, Results are in chart; colonoscopy with normal  results     MAMMOGRAM: Done within last 2 years and results in are chart was last done  stable         Surgical History:         Biopsy of breast; benign    Coronary Artery Stent Placement: 2011;     Hysterectomy: at age 30; ovaries intact;     left foot neuroma;     Procedures:    Colonoscopy ( 11/normal/[facility] )    EGD (  normal ) &  esophageal spasms         Family History:     Father:  at age 80's; Cause of death was TB;  Parkinson's Disease     Mother:  at age 30's; Cause of death was renal issues;  goiter     Sister(s): 3 sister(s) total; 1 ; Neurological/Genetic Cerebrovascular Accident; Endocrine Type 2 Diabetes         Social History:     Occupation: quit working 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/[employer];     Marital Status:      Children: 6       Review of Systems   Constitutional: Negative for fatigue and fever.   HENT: Negative for sore throat (slight ).    Eyes: Positive for discharge (treated with ATB drops earlier this month , better but not clear ). Negative for visual disturbance.   Respiratory: Positive for cough (better, would like a rx of phen cough rx she has taken in past to have ). Negative for shortness of breath.         Treated with ATB earlier this month for bronchitis    Cardiovascular: Negative for chest pain, palpitations and leg swelling.   Gastrointestinal: Positive for constipation. Negative for abdominal pain.   Genitourinary: Negative for difficulty urinating.   Musculoskeletal: Negative for arthralgias.   Skin: Negative for rash.   Hematological: Negative for adenopathy.   Psychiatric/Behavioral: Negative for sleep disturbance.       Objective   Vital Signs:  BP (!) 185/75 (BP Location: Right arm, Patient Position: Sitting, Cuff Size: Adult)   Pulse 57   Temp 98.9 °F (37.2 °C) (Oral)   Ht 160 cm (62.99\")   Wt 53.6 kg (118 lb 3.2 oz)   SpO2 98% Comment: room air  BMI 20.94 kg/m²     Physical Exam  Vitals reviewed.   Constitutional:        Appearance: Normal appearance. She is well-developed.   HENT:      Head: Normocephalic and atraumatic.      Right Ear: External ear normal.      Left Ear: External ear normal.      Mouth/Throat:      Pharynx: No oropharyngeal exudate.   Eyes:      General:         Left eye: Discharge (slight crusing in corner of left medial eye) present.     Conjunctiva/sclera: Conjunctivae normal.      Pupils: Pupils are equal, round, and reactive to light.   Cardiovascular:      Rate and Rhythm: Normal rate and regular rhythm.      Heart sounds: No murmur heard.    No friction rub. No gallop.   Pulmonary:      Effort: Pulmonary effort is normal.      Breath sounds: Normal breath sounds. No wheezing or rhonchi.   Chest:   Breasts:     Right: Normal. No mass, nipple discharge or skin change.      Left: Normal. No mass, nipple discharge or skin change.   Abdominal:      General: Bowel sounds are normal. There is no distension.      Palpations: Abdomen is soft.      Tenderness: There is no abdominal tenderness.   Lymphadenopathy:      Upper Body:      Right upper body: No axillary adenopathy.      Left upper body: No axillary adenopathy.   Skin:     General: Skin is warm and dry.   Neurological:      Mental Status: She is alert and oriented to person, place, and time.      Cranial Nerves: No cranial nerve deficit.   Psychiatric:         Mood and Affect: Mood and affect normal.         Behavior: Behavior normal.         Thought Content: Thought content normal.         Judgment: Judgment normal.                         Assessment and Plan   Diagnoses and all orders for this visit:    1. Routine general medical examination at a health care facility (Primary)  Assessment & Plan:  Advise regular exercise, healthy eating, always wear seat belts. Living will discussed, booklet given, fall prevention discussed.  Immunizations discussed. She is considering but wants to get fully over her current URI symptoms   To continue yearly optometry and  dental exams.          2. Atrial fibrillation, unspecified type (HCC)  Assessment & Plan:  Has upcoming appt with cardiology       3. Coronary artery disease involving native coronary artery of native heart without angina pectoris  Assessment & Plan:  She sees cardiology and has an upcoming appt in 3-2023      4. Cough  Assessment & Plan:  refilled her cough rx she prefers, to take prn, to take OTC antihistamines      Orders:  -     promethazine-dextromethorphan (PROMETHAZINE-DM) 6.25-15 MG/5ML syrup; Take 5 mL by mouth 4 (Four) Times a Day As Needed for Cough.  Dispense: 120 mL; Refill: 0    5. Essential hypertension  Assessment & Plan:  Reviewed her bp readings she brought with her today, gave her a log sheet to keep a record this week of her bp, let me know how it is running later this week, may need to get her back in with cardiology sooner and to continue current dose of beta blocker   Reviewed labs from 12-7-22 at [facility]       6. Screening mammogram for breast cancer  Assessment & Plan:  Advise monthly BSE, set up mammogram for after 6-27-23    Orders:  -     Mammo Screening Digital Tomosynthesis Bilateral With CAD; Future    7. Postmenopausal  Assessment & Plan:  Reviewed EMD chart, advised dexa, will schedule with her mammogram     Orders:  -     DEXA Bone Density Axial; Future    8. Acute conjunctivitis of left eye, unspecified acute conjunctivitis type  Assessment & Plan:  Continue rx eye drops, do warm compresses, schedule a follow up with her optometrist       9. Other constipation  Assessment & Plan:  Follow up with surgeon as directed           Follow Up   Return if symptoms worsen or fail to improve.  Patient was given instructions and counseling regarding her condition or for health maintenance advice. Please see specific information pulled into the AVS if appropriate.

## 2023-04-26 ENCOUNTER — OFFICE VISIT (OUTPATIENT)
Dept: FAMILY MEDICINE CLINIC | Age: 88
End: 2023-04-26
Payer: MEDICARE

## 2023-04-26 VITALS
WEIGHT: 119.6 LBS | HEIGHT: 63 IN | SYSTOLIC BLOOD PRESSURE: 155 MMHG | OXYGEN SATURATION: 99 % | BODY MASS INDEX: 21.19 KG/M2 | HEART RATE: 62 BPM | DIASTOLIC BLOOD PRESSURE: 59 MMHG | TEMPERATURE: 98.4 F

## 2023-04-26 DIAGNOSIS — R11.0 NAUSEA: ICD-10-CM

## 2023-04-26 DIAGNOSIS — R04.0 EPISTAXIS: ICD-10-CM

## 2023-04-26 DIAGNOSIS — R05.9 COUGH: Primary | ICD-10-CM

## 2023-04-26 DIAGNOSIS — H92.03 EAR PAIN, BILATERAL: ICD-10-CM

## 2023-04-26 DIAGNOSIS — J30.2 SEASONAL ALLERGIC RHINITIS, UNSPECIFIED TRIGGER: ICD-10-CM

## 2023-04-26 PROCEDURE — 99213 OFFICE O/P EST LOW 20 MIN: CPT | Performed by: NURSE PRACTITIONER

## 2023-04-26 PROCEDURE — 1159F MED LIST DOCD IN RCRD: CPT | Performed by: NURSE PRACTITIONER

## 2023-04-26 PROCEDURE — 1160F RVW MEDS BY RX/DR IN RCRD: CPT | Performed by: NURSE PRACTITIONER

## 2023-04-26 PROCEDURE — 87428 SARSCOV & INF VIR A&B AG IA: CPT | Performed by: NURSE PRACTITIONER

## 2023-04-26 RX ORDER — ONDANSETRON 4 MG/1
4 TABLET, ORALLY DISINTEGRATING ORAL EVERY 8 HOURS PRN
Qty: 15 TABLET | Refills: 0 | Status: SHIPPED | OUTPATIENT
Start: 2023-04-26

## 2023-04-26 RX ORDER — LISINOPRIL 5 MG/1
5 TABLET ORAL DAILY
Qty: 30 TABLET | Refills: 11 | COMMUNITY
Start: 2023-04-17 | End: 2024-04-16

## 2023-04-26 RX ORDER — DEXTROMETHORPHAN HYDROBROMIDE AND PROMETHAZINE HYDROCHLORIDE 15; 6.25 MG/5ML; MG/5ML
5 SYRUP ORAL 4 TIMES DAILY PRN
Qty: 180 ML | Refills: 0 | Status: SHIPPED | OUTPATIENT
Start: 2023-04-26

## 2023-04-26 NOTE — PROGRESS NOTES
"Chief Complaint  Cough (Sx started 3 days ago ) and Earache    Subjective        Danyell Osborne presents to [facility]  Cough  This is a new problem. The current episode started in the past 7 days. The problem has been gradually improving. Episode frequency: intermittent. The cough is non-productive. Associated symptoms include ear pain, nasal congestion, rhinorrhea, a sore throat and shortness of breath. Pertinent negatives include no chest pain, chills, ear congestion, fever, headaches or wheezing. Associated symptoms comments: Reports clear nasal drainage. Denies illness contact. Also reports itchy, watery eyes. The symptoms are aggravated by pollens. Treatments tried: prescription cough syrup  The treatment provided moderate relief. Her past medical history is significant for environmental allergies. There is no history of asthma, bronchiectasis, bronchitis, COPD or emphysema.   Earache   There is pain in both ears. This is a new problem. The current episode started in the past 7 days (started this past Monday). Episode frequency: intermittent. The problem has been gradually improving. There has been no fever. Pain severity now: sharp, shooting pain intermittently. Associated symptoms include coughing, rhinorrhea and a sore throat. Pertinent negatives include no abdominal pain, diarrhea, ear discharge, headaches, hearing loss or vomiting. She has tried acetaminophen for the symptoms. The treatment provided mild relief. There is no history of a chronic ear infection, hearing loss or a tympanostomy tube.   Arm Pain   The incident occurred more than 1 week ago. The incident occurred at home (States \"aching pain\" in both arms. It hurts more in the elbow on the right but all down her arm in the left). There was no injury mechanism. The quality of the pain is described as aching. The pain has been intermittent since the incident. Pertinent negatives include no chest pain, muscle " "weakness, numbness or tingling. Exacerbated by: states has been sitting more at home. She has tried acetaminophen for the symptoms. The treatment provided mild relief.       Objective   Vital Signs:  /59 (BP Location: Right arm, Patient Position: Sitting, Cuff Size: Adult)   Pulse 62   Temp 98.4 °F (36.9 °C) (Oral)   Ht 160 cm (62.99\")   Wt 54.3 kg (119 lb 9.6 oz)   SpO2 99% Comment: room air  BMI 21.19 kg/m²   Estimated body mass index is 21.19 kg/m² as calculated from the following:    Height as of this encounter: 160 cm (62.99\").    Weight as of this encounter: 54.3 kg (119 lb 9.6 oz).       BMI is within normal parameters. No other follow-up for BMI required.      Physical Exam  Constitutional:       General: She is not in acute distress.     Appearance: Normal appearance. She is normal weight. She is not ill-appearing or toxic-appearing.   HENT:      Head: Normocephalic and atraumatic.      Right Ear: Tympanic membrane and external ear normal.      Left Ear: Tympanic membrane and external ear normal.      Ears:      Comments: Moderate cerumen bilateral ear canals.     Nose:      Comments: Mild nose bleed in office relieved after a couple minutes of holding pressure.  Eyes:      General:         Right eye: No discharge.         Left eye: No discharge.   Cardiovascular:      Rate and Rhythm: Normal rate and regular rhythm.      Heart sounds: Normal heart sounds.   Pulmonary:      Effort: Pulmonary effort is normal. No respiratory distress.      Breath sounds: Normal breath sounds. No stridor. No wheezing, rhonchi or rales.   Skin:     General: Skin is warm and dry.   Neurological:      Mental Status: She is alert and oriented to person, place, and time.   Psychiatric:         Mood and Affect: Mood normal.         Behavior: Behavior normal.        Result Review :          Results for orders placed or performed in visit on 04/26/23   POCT SARS-CoV-2 Antigen BRAYDEN + Flu    Specimen: Swab   Result Value Ref " Range    SARS Antigen Not Detected Not Detected, Presumptive Negative    Influenza A Antigen BRAYDEN Not Detected Not Detected    Influenza B Antigen BRAYDEN Not Detected Not Detected    Internal Control Passed Passed    Lot Number [id number]     Expiration Date 12/6/23               Assessment and Plan   Diagnoses and all orders for this visit:    1. Cough (Primary)  Comments:  Encouraged to use mucinex as needed for congestion with plenty of fluids. Use flonase 1 spray to each nostril daily and zyrtec 10 mg daily for allergies.  Orders:  -     POCT SARS-CoV-2 Antigen BRAYDEN + Flu  -     promethazine-dextromethorphan (PROMETHAZINE-DM) 6.25-15 MG/5ML syrup; Take 5 mL by mouth 4 (Four) Times a Day As Needed for Cough.  Dispense: 180 mL; Refill: 0    2. Nausea  Comments:  Will prescribe zofran as needed per patient request in case of nasuea from phlegm.  Orders:  -     ondansetron ODT (ZOFRAN-ODT) 4 MG disintegrating tablet; Place 1 tablet on the tongue Every 8 (Eight) Hours As Needed for Nausea.  Dispense: 15 tablet; Refill: 0    3. Seasonal allergic rhinitis, unspecified trigger  Comments:  See above.    4. Epistaxis  Comments:  Encouraged to continue humidifier at home and Ayr nasal gel. Encouraged use of OTC saline nasal rinse for moisture.     5. Ear pain, bilateral  Comments:  Discussed no signs of ear infection today and start Flonase to help reduce inflammation and fluid in the ear.     Follow-up for any worsening symptoms such as SOA, fever, or purulent productive sputum.         Follow Up   Return if symptoms worsen or fail to improve.  Patient was given instructions and counseling regarding her condition or for health maintenance advice. Please see specific information pulled into the AVS if appropriate.     Seen with NP student Lou Cuello.

## 2023-05-10 ENCOUNTER — HOSPITAL ENCOUNTER (OUTPATIENT)
Dept: GENERAL RADIOLOGY | Facility: HOSPITAL | Age: 88
Discharge: HOME | End: 2023-05-10
Payer: MEDICARE

## 2023-05-10 ENCOUNTER — OFFICE VISIT (OUTPATIENT)
Dept: FAMILY MEDICINE CLINIC | Age: 88
End: 2023-05-10
Payer: MEDICARE

## 2023-05-10 DIAGNOSIS — R07.81 RIB PAIN ON RIGHT SIDE: Primary | ICD-10-CM

## 2023-05-10 DIAGNOSIS — R07.81 RIB PAIN ON RIGHT SIDE: ICD-10-CM

## 2023-05-10 DIAGNOSIS — I10 ESSENTIAL HYPERTENSION: ICD-10-CM

## 2023-05-10 PROCEDURE — 71100 X-RAY EXAM RIBS UNI 2 VIEWS: CPT

## 2023-05-10 PROCEDURE — 71046 X-RAY EXAM CHEST 2 VIEWS: CPT

## 2023-05-10 NOTE — PROGRESS NOTES
Subjective     CHIEF COMPLAINT    Chief Complaint   Patient presents with   • Rib Pain     Right side rib pain x 2 days     History of Present Illness  Patient is an 87 year old female who presents to the clinic today with complaints of right sided rib pain present for two days. She denies any known injury, trauma or falls. She states the pain feels like a bruise. Denies pain when touching the area. She denies any fever, chills, shortness of breath, wheezing or chest pain. She was recently seen in office on 4/26/23 for a cough. She states this has improved. Denies any rashes or wounds to the area. She has tried a muscle relaxer at home for the pain which seems to help.     Notably, her blood pressure is elevated in office today. She states she has stopped taking her lisinopril as she was worried this may have caused her cough recently. Her blood pressure is managed by cardiology (AGUSTIN Lundy).     Review of Systems   Constitutional: Negative for chills and fever.   Respiratory: Negative for shortness of breath and wheezing.    Cardiovascular: Negative for chest pain.   Gastrointestinal: Negative for nausea and vomiting.   Musculoskeletal: Positive for arthralgias and myalgias.   Skin: Negative for color change, rash and wound.     Allergies   Allergen Reactions   • Contrast Dye (Echo Or Unknown Ct/Mr) Unknown - High Severity   • Iodinated Contrast Media Unknown - High Severity   • Penicillins Rash        Current Outpatient Medications on File Prior to Visit   Medication Sig Dispense Refill   • aspirin 81 MG EC tablet Take 1 tablet by mouth Daily.     • Cholecalciferol 50 MCG (2000 UT) capsule Take 1 capsule by mouth Daily.     • clopidogrel (PLAVIX) 75 MG tablet Take 1 tablet by mouth Daily.     • famotidine (PEPCID) 40 MG tablet Take  by mouth Daily.     • lisinopril (PRINIVIL,ZESTRIL) 5 MG tablet Take 1 tablet by mouth Daily. 30 tablet 11   • metoprolol succinate XL (TOPROL-XL) 50 MG 24 hr tablet Take 2  "tablets by mouth 2 (Two) Times a Day. 60 tablet 0   • multivitamin with minerals tablet tablet Take 1 tablet by mouth Daily.     • ondansetron ODT (ZOFRAN-ODT) 4 MG disintegrating tablet Place 1 tablet on the tongue Every 8 (Eight) Hours As Needed for Nausea. 15 tablet 0   • pantoprazole (PROTONIX) 40 MG EC tablet Take 1 tablet by mouth Daily.     • Polyethylene Glycol 3350 (MIRALAX PO) Take  by mouth.     • promethazine-dextromethorphan (PROMETHAZINE-DM) 6.25-15 MG/5ML syrup Take 5 mL by mouth 4 (Four) Times a Day As Needed for Cough. 180 mL 0   • Simethicone (GAS-X PO) Take  by mouth.     • vitamin E 100 UNIT capsule Take 1 capsule by mouth Daily.     • [DISCONTINUED] trimethoprim-polymyxin b (Polytrim) 03595-7.1 UNIT/ML-% ophthalmic solution Administer 1 drop into the left eye Every 6 (Six) Hours. (Patient not taking: Reported on 5/10/2023) 10 mL 0     No current facility-administered medications on file prior to visit.     /78   Pulse 61   Temp 98.4 °F (36.9 °C) (Oral)   Ht 160 cm (62.99\")   Wt 54 kg (119 lb)   SpO2 98%   BMI 21.09 kg/m²     Objective     Physical Exam  Vitals and nursing note reviewed.   Constitutional:       General: She is not in acute distress.     Appearance: Normal appearance. She is not ill-appearing.   HENT:      Mouth/Throat:      Lips: Pink.   Cardiovascular:      Rate and Rhythm: Normal rate and regular rhythm.      Heart sounds: Normal heart sounds. No murmur heard.  Pulmonary:      Effort: Pulmonary effort is normal. No accessory muscle usage or respiratory distress.      Breath sounds: Normal breath sounds. No wheezing or rhonchi.   Chest:          Comments: Area noted above is approximately area of pain noted per patient. Nontender to palpation, no rashes noted. Small round area of resolving ecchymosis noted.   Skin:     General: Skin is warm and dry.   Neurological:      General: No focal deficit present.      Mental Status: She is alert and oriented to person, place, " and time.   Psychiatric:         Mood and Affect: Mood and affect normal.         Behavior: Behavior normal.          Diagnoses and all orders for this visit:    1. Rib pain on right side (Primary)  -     XR Ribs 2 View Right; Future  -     XR Chest PA & Lateral; Future    2. Essential hypertension      No concerning findings on physical exam today. There is a small area of ecchymosis over the right ribs in the area of pain. Patient does not recall a specific injury, however she says she may have injured it without knowing. She is on aspirin and Plavix so she states she bruises easily. We will proceed with an x ray of the right ribs for further evaluation. Given her recent cough, we will also check a chest x ray to rule out any pneumonia. For now, patient to continue Tylenol, ice. She states she has a muscle relaxer at home that has helped, advised ok to use but cautioned her on side effects of muscle relaxers. She is to monitor for increased bruising, pain, shortness of breath or chest pain and proceed to ER if present.     Regarding her blood pressure, cardiology manges. She has stopped her lisinopril. I advised her to notify [facility] Lundy office of her elevated BP and that she has chosen to stop the lisinopril, in case they want to switch her to a different medication. She states she will call their office.     Patient voiced understanding of all instructions and had no further questions at this time.        Follow up:  Return if symptoms worsen or fail to improve.  Patient was given instructions and counseling regarding her condition or for health maintenance advice. Please see specific information pulled into the AVS if appropriate.

## 2023-05-12 VITALS
BODY MASS INDEX: 21.09 KG/M2 | SYSTOLIC BLOOD PRESSURE: 168 MMHG | OXYGEN SATURATION: 98 % | WEIGHT: 119 LBS | TEMPERATURE: 98.4 F | DIASTOLIC BLOOD PRESSURE: 78 MMHG | HEIGHT: 63 IN | HEART RATE: 61 BPM

## 2023-07-10 PROBLEM — R41.3 MEMORY LOSS: Status: ACTIVE | Noted: 2023-07-10

## 2023-07-10 PROBLEM — M67.449 GANGLION CYST OF FINGER: Status: ACTIVE | Noted: 2023-07-10

## 2023-07-28 ENCOUNTER — HOSPITAL ENCOUNTER (OUTPATIENT)
Dept: MAMMOGRAPHY | Facility: HOSPITAL | Age: 88
Discharge: HOME | End: 2023-07-28
Admitting: NURSE PRACTITIONER
Payer: MEDICARE

## 2023-07-28 ENCOUNTER — APPOINTMENT (OUTPATIENT)
Dept: BONE DENSITY | Facility: HOSPITAL | Age: 88
End: 2023-07-28
Payer: MEDICARE

## 2023-07-28 DIAGNOSIS — Z12.31 SCREENING MAMMOGRAM FOR BREAST CANCER: ICD-10-CM

## 2023-07-28 PROCEDURE — 77067 SCR MAMMO BI INCL CAD: CPT

## 2023-07-28 PROCEDURE — 77063 BREAST TOMOSYNTHESIS BI: CPT

## 2023-07-31 DIAGNOSIS — R92.8 ABNORMAL MAMMOGRAM OF LEFT BREAST: Primary | ICD-10-CM

## 2023-08-02 ENCOUNTER — TELEPHONE (OUTPATIENT)
Dept: FAMILY MEDICINE CLINIC | Age: 88
End: 2023-08-02

## 2023-08-02 NOTE — TELEPHONE ENCOUNTER
Have her follow up, her bp was up, ARB's don't cause cough, so It may be something else, may want her to see cardiology re the crestor / legs ache and alternative treatment

## 2023-08-02 NOTE — TELEPHONE ENCOUNTER
HUB WAS UNABLE TO WARM TRANSFER     Caller: Danyell Osborne    Relationship: Self    Best call back number: [phone]     What is the best time to reach you: ANYTIME     Who are you requesting to speak with (clinical staff, provider,  specific staff member): CLINICAL     What was the call regarding: PATIENT IS CALLING REQUESTING TO SPEAK WITH NURSE VITALIY IN REGARDS TO A ULTRASOUND AND STATUS. PATIENT IS ALSO STATING CONCERNED ABOUT A MEDICATION, THAT IS MAKING LEGS ACHE.

## 2023-08-02 NOTE — TELEPHONE ENCOUNTER
Pt informed.  Offered appt for tomorrow with A Osborne, she said she had another appt and that wouldn't work.  A Osborne is off on Friday. She scheduled appt for Monday 08/07/23.

## 2023-08-02 NOTE — TELEPHONE ENCOUNTER
1)  STEPHNE Osborne has placed a referral for a breast u/s.  Referrals will be intouch once the have the appt made.     2) She has been taking the crestor every other day and her legs still ache.  Worse at night and she can't sleep.     3) She said the lorsartan is making her cough.

## 2023-08-07 ENCOUNTER — OFFICE VISIT (OUTPATIENT)
Dept: FAMILY MEDICINE CLINIC | Age: 88
End: 2023-08-07
Payer: MEDICARE

## 2023-08-07 VITALS
HEIGHT: 63 IN | WEIGHT: 120 LBS | SYSTOLIC BLOOD PRESSURE: 157 MMHG | BODY MASS INDEX: 21.26 KG/M2 | OXYGEN SATURATION: 98 % | DIASTOLIC BLOOD PRESSURE: 56 MMHG | HEART RATE: 63 BPM

## 2023-08-07 DIAGNOSIS — I10 ESSENTIAL HYPERTENSION: ICD-10-CM

## 2023-08-07 DIAGNOSIS — E78.00 HYPERCHOLESTEREMIA: Primary | ICD-10-CM

## 2023-08-07 PROCEDURE — 99213 OFFICE O/P EST LOW 20 MIN: CPT | Performed by: NURSE PRACTITIONER

## 2023-08-07 NOTE — ASSESSMENT & PLAN NOTE
Reviewed last labs, she is willing to try the Crestor 3 days a week and see how she does.  Recheck lab in one month.

## 2023-08-07 NOTE — ASSESSMENT & PLAN NOTE
continue toprol XL 50 mg that she is taking BID, hold off on ARB, may consider HCTZ, continue to monitor bp, follow up with cardiology as directed

## 2023-08-07 NOTE — PROGRESS NOTES
Danyell Osborne presents to [facility].    Chief Complaint:  Hypertension and Hyperlipidemia         History of Present Illness:  Hypertension:  Current medication: losaran and toprol XL 50 mg BID   Tolerating Medication: no, has a cough still at HS, stopped losartan   Stressed at home with her daughter   Checking BP at home and it is: 134 / 75   Needs refills:No  Labs:  Lab Results       Component                Value               Date                       GLUCOSE                  104 (H)             07/10/2023                 BUN                      14                  07/10/2023                 CREATININE               0.78                07/10/2023                 BCR                      17.9                07/10/2023                 K                        5.2                 07/10/2023                 CO2                      26.3                07/10/2023                 CALCIUM                  9.9                 07/10/2023                 ALBUMIN                  4.2                 07/10/2023                 LABIL2                   1.4                 03/23/2021                 AST                      23                  07/10/2023                 ALT                      11                  07/10/2023        Hyperlipidemia  Sees Can Yanes  Current medication: was on lipitor and had leg pain, I  her to crestor to take QOD  Tolerating medication: no, legs ached so stopped a few days ago   Needs Refill: no     Lab Results       Component                Value               Date                       CHOL                     275 (H)             07/10/2023                 CHLPL                    290 (H)             03/23/2021                 TRIG                     129                 07/10/2023                 HDL                      57                  07/10/2023                 LDL                      195 (H)             07/10/2023              PAST  MEDICAL HISTORY changes since 7-:       covid     Atrial Fibrillation: dx'd in 3-2020;     Carotid Artery Stenosis: yuridia madera;     Coronary Artery Disease: dx'd in 3-23-20;     Myocardial Infarction: due to occlusion of the LAD, RCA, and stened again 3-25-20; heart cath/ stenting for NSTEMI;     AAA         Hospitalizations:     A fib     Coronary Artery Disease last admit date 3-23-20 anemia, admitted once on 20         CURRENT MEDICAL PROVIDERS:    Cardiologist: [facility]         PREVENTIVE HEALTH MAINTENANCE             BONE DENSITY: was last done 06/25/15 with the following abnormality noted-- Osteoporosis     COLORECTAL CANCER SCREENING: Up to date (colonoscopy q10y; sigmoidoscopy q5y; Cologuard q3y) was last done  3-2023 no colitits   Also CLN done on 3/2017, Results are in chart; colonoscopy with normal results     MAMMOGRAM: Done within last 2 years and results in are chart was last done  stable         Surgical History:       EGD and CLN 3-2023    Biopsy of breast; benign    Coronary Artery Stent Placement:  2;     Hysterectomy: at age 30; ovaries intact;     left foot neuroma;     Procedures:    Colonoscopy ( 11/normal/Ronna )    EGD (  normal ) &  esophageal spasms         Family History:     Father:  at age 80's; Cause of death was TB;  Parkinson's Disease     Mother:  at age 30's; Cause of death was renal issues;  goiter     Sister(s): 3 sister(s) total; 1 ; Neurological/Genetic Cerebrovascular Accident; Endocrine Type 2 Diabetes         Social History:     Occupation: quit working 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/[employer];     Marital Status:      Children: 6                               Review of Systems:  Review of Systems   Constitutional:  Negative for fatigue and fever.   Respiratory:  Positive for cough (cough at HS, intermittently/ not as bad since she stopped the ARB).    Cardiovascular:  Negative for chest pain, palpitations  "and leg swelling.   Gastrointestinal:         Increased gas pains    Musculoskeletal:         Finger cyst on fourth right / saw specialist    Psychiatric/Behavioral:          Sad, yesterday was the anniversary of her daughter's death         Current Outpatient Medications:     aspirin 81 MG EC tablet, Take 1 tablet by mouth Daily., Disp: , Rfl:     Cholecalciferol 50 MCG (2000 UT) capsule, Take 1 capsule by mouth Daily., Disp: , Rfl:     clopidogrel (PLAVIX) 75 MG tablet, Take 1 tablet by mouth Daily., Disp: , Rfl:     metoprolol succinate XL (TOPROL-XL) 50 MG 24 hr tablet, Take 2 tablets by mouth 2 (Two) Times a Day. (Patient taking differently: Take 1 tablet by mouth 2 (Two) Times a Day.), Disp: 60 tablet, Rfl: 0    multivitamin with minerals tablet tablet, Take 1 tablet by mouth Daily., Disp: , Rfl:     ondansetron ODT (ZOFRAN-ODT) 4 MG disintegrating tablet, Place 1 tablet on the tongue Every 8 (Eight) Hours As Needed for Nausea., Disp: 15 tablet, Rfl: 0    Polyethylene Glycol 3350 (MIRALAX PO), Take  by mouth., Disp: , Rfl:     rosuvastatin (Crestor) 5 MG tablet, Take 1 tablet by mouth Every Other Day., Disp: 30 tablet, Rfl: 0    Simethicone (GAS-X PO), Take  by mouth., Disp: , Rfl:     Vital Signs:   Vitals:    08/07/23 1134 08/07/23 1200   BP: 164/64 157/56   BP Location: Left arm Left arm   Patient Position: Sitting Sitting   Cuff Size: Adult Adult   Pulse: 63 63   SpO2: 98%    Weight: 54.4 kg (120 lb)    Height: 160 cm (62.99\")               Physical Exam:  Physical Exam  Vitals reviewed.   Constitutional:       General: She is not in acute distress.     Appearance: Normal appearance.   Neck:      Vascular: No carotid bruit.   Cardiovascular:      Rate and Rhythm: Normal rate and regular rhythm.      Heart sounds: Normal heart sounds. No murmur heard.  Pulmonary:      Effort: Pulmonary effort is normal. No respiratory distress.      Breath sounds: Normal breath sounds.   Musculoskeletal:      Right lower " leg: No edema.      Left lower leg: No edema.   Neurological:      Mental Status: She is alert.   Psychiatric:         Mood and Affect: Mood normal.         Behavior: Behavior normal.       Result Review      The following data was reviewed by: AGUSTIN Jimenez on 08/07/2023:    Results for orders placed or performed in visit on 07/10/23   CBC Auto Differential    Specimen: Blood   Result Value Ref Range    WBC 7.17 3.40 - 10.80 10*3/mm3    RBC 4.28 3.77 - 5.28 10*6/mm3    Hemoglobin 12.7 12.0 - 15.9 g/dL    Hematocrit 39.1 34.0 - 46.6 %    MCV 91.4 79.0 - 97.0 fL    MCH 29.7 26.6 - 33.0 pg    MCHC 32.5 31.5 - 35.7 g/dL    RDW 14.4 12.3 - 15.4 %    RDW-SD 48.6 37.0 - 54.0 fl    MPV 10.1 6.0 - 12.0 fL    Platelets 386 140 - 450 10*3/mm3    Neutrophil % 62.2 42.7 - 76.0 %    Lymphocyte % 26.2 19.6 - 45.3 %    Monocyte % 8.4 5.0 - 12.0 %    Eosinophil % 2.4 0.3 - 6.2 %    Basophil % 0.7 0.0 - 1.5 %    Immature Grans % 0.1 0.0 - 0.5 %    Neutrophils, Absolute 4.46 1.70 - 7.00 10*3/mm3    Lymphocytes, Absolute 1.88 0.70 - 3.10 10*3/mm3    Monocytes, Absolute 0.60 0.10 - 0.90 10*3/mm3    Eosinophils, Absolute 0.17 0.00 - 0.40 10*3/mm3    Basophils, Absolute 0.05 0.00 - 0.20 10*3/mm3    Immature Grans, Absolute 0.01 0.00 - 0.05 10*3/mm3               Assessment and Plan:          Diagnoses and all orders for this visit:    1. Hypercholesteremia (Primary)  Assessment & Plan:  Reviewed last labs, she is willing to try the Crestor 3 days a week and see how she does.  Recheck lab in one month.    Orders:  -     Lipid panel; Future    2. Essential hypertension  Assessment & Plan:  continue toprol XL 50 mg that she is taking BID, hold off on ARB, may consider HCTZ, continue to monitor bp, follow up with cardiology as directed           BMI is within normal parameters. No other follow-up for BMI required.         Follow Up   Return for fasting for labs.  Patient was given instructions and counseling regarding her  condition or for health maintenance advice. Please see specific information pulled into the AVS if appropriate.

## 2023-08-09 ENCOUNTER — HOSPITAL ENCOUNTER (OUTPATIENT)
Dept: ULTRASOUND IMAGING | Facility: HOSPITAL | Age: 88
Discharge: HOME | End: 2023-08-09
Admitting: NURSE PRACTITIONER
Payer: MEDICARE

## 2023-08-09 DIAGNOSIS — R92.8 ABNORMAL MAMMOGRAM OF LEFT BREAST: ICD-10-CM

## 2023-08-09 PROCEDURE — 76642 ULTRASOUND BREAST LIMITED: CPT

## 2023-09-13 ENCOUNTER — TELEPHONE (OUTPATIENT)
Dept: FAMILY MEDICINE CLINIC | Age: 88
End: 2023-09-13
Payer: MEDICARE

## 2023-10-09 ENCOUNTER — CLINICAL SUPPORT (OUTPATIENT)
Dept: FAMILY MEDICINE CLINIC | Age: 88
End: 2023-10-09
Payer: MEDICARE

## 2023-10-09 ENCOUNTER — TELEPHONE (OUTPATIENT)
Dept: FAMILY MEDICINE CLINIC | Age: 88
End: 2023-10-09

## 2023-10-09 ENCOUNTER — HOSPITAL ENCOUNTER (INPATIENT)
Facility: HOSPITAL | Age: 88
LOS: 2 days | Discharge: HOME HEALTH | DRG: 065 | End: 2023-10-11
Attending: INTERNAL MEDICINE | Admitting: HOSPITALIST
Payer: MEDICARE

## 2023-10-09 DIAGNOSIS — Z09 FOLLOW-UP EXAM: Primary | ICD-10-CM

## 2023-10-09 DIAGNOSIS — I10 HYPERTENSION, UNSPECIFIED TYPE: Chronic | ICD-10-CM

## 2023-10-09 PROBLEM — I63.9 ACUTE CVA (CEREBROVASCULAR ACCIDENT): Status: ACTIVE | Noted: 2023-10-09

## 2023-10-09 RX ORDER — SODIUM CHLORIDE 0.9 % (FLUSH) 0.9 %
10 SYRINGE (ML) INJECTION EVERY 12 HOURS SCHEDULED
Status: DISCONTINUED | OUTPATIENT
Start: 2023-10-09 | End: 2023-10-11 | Stop reason: HOSPADM

## 2023-10-09 RX ORDER — SODIUM CHLORIDE 0.9 % (FLUSH) 0.9 %
10 SYRINGE (ML) INJECTION AS NEEDED
Status: DISCONTINUED | OUTPATIENT
Start: 2023-10-09 | End: 2023-10-11 | Stop reason: HOSPADM

## 2023-10-09 RX ORDER — ATORVASTATIN CALCIUM 80 MG/1
80 TABLET, FILM COATED ORAL NIGHTLY
Status: DISCONTINUED | OUTPATIENT
Start: 2023-10-09 | End: 2023-10-11 | Stop reason: HOSPADM

## 2023-10-09 RX ORDER — SODIUM CHLORIDE 9 MG/ML
40 INJECTION, SOLUTION INTRAVENOUS AS NEEDED
Status: DISCONTINUED | OUTPATIENT
Start: 2023-10-09 | End: 2023-10-11 | Stop reason: HOSPADM

## 2023-10-09 RX ADMIN — Medication 10 ML: at 23:40

## 2023-10-09 RX ADMIN — ATORVASTATIN CALCIUM 80 MG: 80 TABLET, FILM COATED ORAL at 23:39

## 2023-10-09 NOTE — TELEPHONE ENCOUNTER
Pt came into the office today and said yesterday she had a heaviness in her left arm and leg, it lasted a couple hours.  No chest pain or soa.  Today her left foot is still numb feeling.   B/p- 186/72, hr- 60.  She said she has been having garfield horses in her legs too.  No office appts open this am.   Denies chest pain or soa.  Does not appear to be in any distress.  Talked to A Osborne and she advised her to go to the ER for evaluation.  She has a history of A Fib and has been under a lot of stress lately.  She said her  is out in the parking lot and he can take her.   Walked with her out and helped her in the truck with her , he will take her to the ER.

## 2023-10-09 NOTE — TELEPHONE ENCOUNTER
Caller: Danyell Osborne    Relationship: Self    Best call back number: [phone]     What is the best time to reach you: ANY    Who are you requesting to speak with (clinical staff, provider,  specific staff member): CLINICAL STAFF    What was the call regarding: PATIENT CALLED STATING THAT SHE WOULD LIKE TO SPEAK TO VITALIY ABOUT HER HEALTH. SHE DID NOT DISCLOSE ANY FURTHER INFORMATION

## 2023-10-10 ENCOUNTER — APPOINTMENT (OUTPATIENT)
Dept: OTHER | Facility: HOSPITAL | Age: 88
DRG: 065 | End: 2023-10-10
Payer: MEDICARE

## 2023-10-10 ENCOUNTER — APPOINTMENT (OUTPATIENT)
Dept: CARDIOLOGY | Facility: HOSPITAL | Age: 88
DRG: 065 | End: 2023-10-10
Payer: MEDICARE

## 2023-10-10 ENCOUNTER — APPOINTMENT (OUTPATIENT)
Dept: MRI IMAGING | Facility: HOSPITAL | Age: 88
DRG: 065 | End: 2023-10-10
Payer: MEDICARE

## 2023-10-10 LAB
BH CV XLRA MEAS LEFT DIST CCA EDV: -15.3 CM/SEC
BH CV XLRA MEAS LEFT DIST CCA PSV: -64.3 CM/SEC
BH CV XLRA MEAS LEFT DIST ICA EDV: -29.8 CM/SEC
BH CV XLRA MEAS LEFT DIST ICA PSV: -93.2 CM/SEC
BH CV XLRA MEAS LEFT ICA/CCA RATIO: 1.98
BH CV XLRA MEAS LEFT MID ICA EDV: -29.8 CM/SEC
BH CV XLRA MEAS LEFT MID ICA PSV: -111.3 CM/SEC
BH CV XLRA MEAS LEFT PROX CCA EDV: 15.5 CM/SEC
BH CV XLRA MEAS LEFT PROX CCA PSV: 92.6 CM/SEC
BH CV XLRA MEAS LEFT PROX ECA EDV: 7.4 CM/SEC
BH CV XLRA MEAS LEFT PROX ECA PSV: 85 CM/SEC
BH CV XLRA MEAS LEFT PROX ICA EDV: 29.8 CM/SEC
BH CV XLRA MEAS LEFT PROX ICA PSV: 127 CM/SEC
BH CV XLRA MEAS LEFT PROX SCLA PSV: 96.7 CM/SEC
BH CV XLRA MEAS LEFT VERTEBRAL A EDV: -10.9 CM/SEC
BH CV XLRA MEAS LEFT VERTEBRAL A PSV: -58.1 CM/SEC
BH CV XLRA MEAS RIGHT DIST CCA EDV: 12.3 CM/SEC
BH CV XLRA MEAS RIGHT DIST CCA PSV: 62.3 CM/SEC
BH CV XLRA MEAS RIGHT DIST ICA EDV: -17.7 CM/SEC
BH CV XLRA MEAS RIGHT DIST ICA PSV: -78.6 CM/SEC
BH CV XLRA MEAS RIGHT ICA/CCA RATIO: 1.97
BH CV XLRA MEAS RIGHT MID ICA EDV: -19.6 CM/SEC
BH CV XLRA MEAS RIGHT MID ICA PSV: -105.1 CM/SEC
BH CV XLRA MEAS RIGHT PROX CCA EDV: 9.9 CM/SEC
BH CV XLRA MEAS RIGHT PROX CCA PSV: 84.5 CM/SEC
BH CV XLRA MEAS RIGHT PROX ECA EDV: -7.5 CM/SEC
BH CV XLRA MEAS RIGHT PROX ECA PSV: -76 CM/SEC
BH CV XLRA MEAS RIGHT PROX ICA EDV: -25.9 CM/SEC
BH CV XLRA MEAS RIGHT PROX ICA PSV: -122.6 CM/SEC
BH CV XLRA MEAS RIGHT PROX SCLA PSV: 104.3 CM/SEC
BH CV XLRA MEAS RIGHT VERTEBRAL A EDV: 17.2 CM/SEC
BH CV XLRA MEAS RIGHT VERTEBRAL A PSV: 76.2 CM/SEC
CHOLEST SERPL-MCNC: 264 MG/DL (ref 0–200)
GLUCOSE BLDC GLUCOMTR-MCNC: 110 MG/DL (ref 70–130)
GLUCOSE BLDC GLUCOMTR-MCNC: 129 MG/DL (ref 70–130)
GLUCOSE BLDC GLUCOMTR-MCNC: 95 MG/DL (ref 70–130)
HBA1C MFR BLD: 5.9 % (ref 4.8–5.6)
HDLC SERPL-MCNC: 50 MG/DL (ref 40–60)
LDLC SERPL CALC-MCNC: 190 MG/DL (ref 0–100)
LDLC/HDLC SERPL: 3.74 {RATIO}
TRIGL SERPL-MCNC: 134 MG/DL (ref 0–150)
VLDLC SERPL-MCNC: 24 MG/DL (ref 5–40)

## 2023-10-10 PROCEDURE — 93880 EXTRACRANIAL BILAT STUDY: CPT

## 2023-10-10 PROCEDURE — 82948 REAGENT STRIP/BLOOD GLUCOSE: CPT

## 2023-10-10 PROCEDURE — 97161 PT EVAL LOW COMPLEX 20 MIN: CPT | Performed by: PHYSICAL THERAPIST

## 2023-10-10 PROCEDURE — 92610 EVALUATE SWALLOWING FUNCTION: CPT

## 2023-10-10 PROCEDURE — 70544 MR ANGIOGRAPHY HEAD W/O DYE: CPT

## 2023-10-10 PROCEDURE — 97110 THERAPEUTIC EXERCISES: CPT | Performed by: PHYSICAL THERAPIST

## 2023-10-10 PROCEDURE — 80061 LIPID PANEL: CPT | Performed by: NURSE PRACTITIONER

## 2023-10-10 PROCEDURE — 83036 HEMOGLOBIN GLYCOSYLATED A1C: CPT | Performed by: NURSE PRACTITIONER

## 2023-10-10 PROCEDURE — 99222 1ST HOSP IP/OBS MODERATE 55: CPT | Performed by: NURSE PRACTITIONER

## 2023-10-10 RX ORDER — ASPIRIN 81 MG/1
81 TABLET, CHEWABLE ORAL DAILY
Status: DISCONTINUED | OUTPATIENT
Start: 2023-10-10 | End: 2023-10-10

## 2023-10-10 RX ORDER — CLOPIDOGREL BISULFATE 75 MG/1
75 TABLET ORAL DAILY
Status: DISCONTINUED | OUTPATIENT
Start: 2023-10-10 | End: 2023-10-11 | Stop reason: HOSPADM

## 2023-10-10 RX ADMIN — CLOPIDOGREL BISULFATE 75 MG: 75 TABLET, FILM COATED ORAL at 13:16

## 2023-10-10 RX ADMIN — ATORVASTATIN CALCIUM 80 MG: 80 TABLET, FILM COATED ORAL at 20:15

## 2023-10-10 RX ADMIN — ASPIRIN 81 MG: 81 TABLET, CHEWABLE ORAL at 13:16

## 2023-10-10 RX ADMIN — Medication 10 ML: at 13:17

## 2023-10-10 RX ADMIN — APIXABAN 2.5 MG: 2.5 TABLET, FILM COATED ORAL at 20:15

## 2023-10-10 RX ADMIN — Medication 10 ML: at 20:18

## 2023-10-10 NOTE — THERAPY EVALUATION
Acute Care - Speech Language Pathology   Swallow Initial Evaluation [facility]     Patient Name: Danyell Osborne  : 1935  MRN: [id number]  Today's Date: 10/10/2023               Admit Date: 10/9/2023    Visit Dx:     ICD-10-CM ICD-9-CM   1. Follow-up exam  Z09 V67.9     Patient Active Problem List   Diagnosis    Essential hypertension    Coronary artery disease involving native coronary artery of native heart without angina pectoris    Skin lesions    Esophageal spasm    Stress    Other headache syndrome    Atrial fibrillation    Routine general medical examination at a health care facility    Cough    Screening mammogram for breast cancer    Postmenopausal    Acute conjunctivitis of left eye    Other constipation    Memory loss    Ganglion cyst of finger    Hypercholesteremia    Acute CVA (cerebrovascular accident)     Past Medical History:   Diagnosis Date    Coronary artery disease     Hypertension     Stroke      Past Surgical History:   Procedure Laterality Date    HYSTERECTOMY         SLP Recommendation and Plan  SLP Swallowing Diagnosis: R/O pharyngeal dysphagia (10/10/23 1345)  SLP Diet Recommendation: regular textures, no mixed consistencies, thin liquids (10/10/23 1345)  Recommended Precautions and Strategies: upright posture during/after eating, small bites of food and sips of liquid (10/10/23 1345)  SLP Rec. for Method of Medication Administration: as tolerated (10/10/23 1345)     Monitor for Signs of Aspiration: yes, notify SLP if any concerns (10/10/23 1345)  Recommended Diagnostics: reassess via clinical swallow evaluation, SLE/Cog/Motor Speech Evaluation (10/10/23 1345)  Swallow Criteria for Skilled Therapeutic Interventions Met: demonstrates skilled criteria (10/10/23 1345)  Anticipated Discharge Disposition (SLP): unknown (10/10/23 1345)  Rehab Potential/Prognosis, Swallowing: good, to achieve stated therapy goals (10/10/23 1345)  Therapy Frequency (Swallow): PRN (10/10/23  1345)  Predicted Duration Therapy Intervention (Days): until discharge (10/10/23 1345)  Oral Care Recommendations: Oral Care BID/PRN (10/10/23 1345)                                      Oral Care Recommendations: Oral Care BID/PRN (10/10/23 1345)    Outcome Evaluation: Clinical swallow assessment completed. Patient seated in bedside chair upon arrival. Family at bedside. Patient reported hx of globus sensation, but no significant dysphagia hx. Agreeable to p.o. trials. Tolerated ice chips, thin via spoon, and puree with no overt s/sx of aspiration or penetration. Immediate cough with initial trial of thin via cup. No further overt s/sx of aspiration noted with thin by cup or straw. Mastication fxnal/timely with soft and regular solids. Immediate cough with mixed consistencies. Patient reported that she has globus sensation at times with meals but notes improvement with slow rate/small bites. Recommend regular (no mixed) with thin liquids. Medication as tolerated. Precautions: upright, small bites/sips, slow rate. ST to follow for tolerance of diet and need for speech/cognitive evaluation.      SWALLOW EVALUATION (last 72 hours)       SLP Adult Swallow Evaluation       Row Name 10/10/23 1345       Rehab Evaluation    Document Type evaluation  -SR    Subjective Information no complaints  -SR    Patient Observations alert;cooperative;agree to therapy  -SR    Patient Effort good  -SR    Symptoms Noted During/After Treatment none  -SR       General Information    Patient Profile Reviewed yes  -SR    Pertinent History Of Current Problem 87 y.o. female; presented to [facility] with L sided weakness; admitted with R basal ganglia CVA  -SR    Current Method of Nutrition regular textures;thin liquids  -SR    Precautions/Limitations, Vision WFL;for purposes of eval  -SR    Precautions/Limitations, Hearing WFL;for purposes of eval  -SR    Prior Level of Function-Communication unknown  -SR    Prior Level of Function-Swallowing no diet  consistency restrictions  -SR    Plans/Goals Discussed with patient;agreed upon  -SR    Barriers to Rehab none identified  -SR       Pain Scale: Numbers Pre/Post-Treatment    Pretreatment Pain Rating 0/10 - no pain  -SR    Posttreatment Pain Rating 0/10 - no pain  -SR       Oral Motor Structure and Function    Dentition Assessment upper dentures/partial in place  -SR    Secretion Management WNL/WFL  -SR    Mucosal Quality moist, healthy  -SR    Volitional Swallow WFL  -SR    Volitional Cough WFL  -SR       Oral Musculature and Cranial Nerve Assessment    Oral Motor General Assessment WFL  -SR       General Eating/Swallowing Observations    Respiratory Support Currently in Use room air  -SR    Eating/Swallowing Skills self-fed;fed by SLP  -SR    Positioning During Eating upright 90 degree;upright in chair  -SR    Utensils Used spoon;cup;straw  -SR    Consistencies Trialed ice chips;thin liquids;pureed;mechanical ground textures;mixed consistency;regular textures  -SR       Clinical Swallow Eval    Clinical Swallow Evaluation Summary Clinical swallow assessment completed. Patient seated in bedside chair upon arrival. Family at bedside. Patient reported hx of globus sensation, but no significant dysphagia hx. Agreeable to p.o. trials. Tolerated ice chips, thin via spoon, and puree with no overt s/sx of aspiration or penetration. Immediate cough with initial trial of thin via cup. No further overt s/sx of aspiration noted with thin by cup or straw. Mastication fxnal/timely with soft and regular solids. Immediate cough with mixed consistencies. Patient reported that she has globus sensation at times with meals but notes improvement with slow rate/small bites. Recommend regular (no mixed) with thin liquids. Medication as tolerated. Precautions: upright, small bites/sips, slow rate. ST to follow for tolerance of diet and need for speech/cognitive evaluation.  -SR       SLP Evaluation Clinical Impression    SLP Swallowing  Diagnosis R/O pharyngeal dysphagia  -SR    Functional Impact risk of aspiration/pneumonia  -SR    Rehab Potential/Prognosis, Swallowing good, to achieve stated therapy goals  -SR    Swallow Criteria for Skilled Therapeutic Interventions Met demonstrates skilled criteria  -SR       Recommendations    Therapy Frequency (Swallow) PRN  -SR    Predicted Duration Therapy Intervention (Days) until discharge  -SR    SLP Diet Recommendation regular textures;no mixed consistencies;thin liquids  -SR    Recommended Diagnostics reassess via clinical swallow evaluation;SLE/Cog/Motor Speech Evaluation  -SR    Recommended Precautions and Strategies upright posture during/after eating;small bites of food and sips of liquid  -SR    Oral Care Recommendations Oral Care BID/PRN  -SR    SLP Rec. for Method of Medication Administration as tolerated  -SR    Monitor for Signs of Aspiration yes;notify SLP if any concerns  -SR    Anticipated Discharge Disposition (SLP) unknown  -SR       Swallow Goals (SLP)    Swallow LTGs Patient will demonstrate functional swallow for  -SR       (LTG) Patient will demonstrate functional swallow for    Diet Texture (Demonstrate functional swallow) regular textures  -SR    Liquid viscosity (Demonstrate functional swallow) thin liquids  -SR    [place] (Demonstrate functional swallow) independently (over 90% accuracy)  -SR    Time Frame (Demonstrate functional swallow) 1 week  -SR    Progress/Outcomes (Demonstrate functional swallow) new goal  -SR              User Key  (r) = Recorded By, (t) = Taken By, (c) = Cosigned By      Initials Name Effective Dates    SR Andreina Yin CCC-SLP 11/10/22 -                     EDUCATION  The patient has been educated in the following areas:   Dysphagia (Swallowing Impairment) Oral Care/Hydration.        SLP GOALS       Row Name 10/10/23 1345             (LTG) Patient will demonstrate functional swallow for    Diet Texture (Demonstrate functional swallow) regular  textures  -SR      Liquid viscosity (Demonstrate functional swallow) thin liquids  -SR      [place] (Demonstrate functional swallow) independently (over 90% accuracy)  -SR      Time Frame (Demonstrate functional swallow) 1 week  -SR      Progress/Outcomes (Demonstrate functional swallow) new goal  -SR                User Key  (r) = Recorded By, (t) = Taken By, (c) = Cosigned By      Initials Name Provider Type    SR Andreina Yin CCC-SLP Speech and Language Pathologist                       Time Calculation:    Time Calculation- SLP       Row Name 10/10/23 1538             Time Calculation- SLP    SLP Start Time 1345  -SR      SLP Received On 10/10/23  -SR         Untimed Charges    59571-PY Eval Oral Pharyng Swallow Minutes 45  -SR         Total Minutes    Untimed Charges Total Minutes 45  -SR       Total Minutes 45  -SR                User Key  (r) = Recorded By, (t) = Taken By, (c) = Cosigned By      Initials Name Provider Type    SR Andreina Yin CCC-SLP Speech and Language Pathologist                    Therapy Charges for Today       Code Description Service Date Service Provider Modifiers Qty    [id number]  ST EVAL ORAL PHARYNG SWALLOW 3 10/10/2023 Andreina Yin CCC-SLP GN 1                 [place]  10/10/2023

## 2023-10-10 NOTE — PLAN OF CARE
Goal Outcome Evaluation:  Plan of Care Reviewed With: patient, spouse, daughter        Progress: no change

## 2023-10-10 NOTE — SIGNIFICANT NOTE
10/10/23 1520   OTHER   Discipline occupational therapist   Rehab Time/Intention   Session Not Performed patient unavailable for evaluation  (pt off the floor. Will follow-up if warranted)   Recommendation   OT - Next Appointment 10/11/23

## 2023-10-10 NOTE — PROGRESS NOTES
[facility] Acute Inpt Rehab Note     Referral received via stroke order set.  Please note this is a screening only, rehab admissions will not actively be evaluating this patient.  If felt patient is appropriate for our services once therapies begin, please call our office at [phone], to initiate a full referral.    Thank you,   Priscilla Florian RN   Rehab Admission Nurse

## 2023-10-10 NOTE — THERAPY EVALUATION
Patient Name: Danyell Osborne  : 1935    MRN: [id number]                              Today's Date: 10/10/2023       Admit Date: 10/9/2023    Visit Dx:     ICD-10-CM ICD-9-CM   1. Follow-up exam  Z09 V67.9     Patient Active Problem List   Diagnosis    Essential hypertension    Coronary artery disease involving native coronary artery of native heart without angina pectoris    Skin lesions    Esophageal spasm    Stress    Other headache syndrome    Atrial fibrillation    Routine general medical examination at a health care facility    Cough    Screening mammogram for breast cancer    Postmenopausal    Acute conjunctivitis of left eye    Other constipation    Memory loss    Ganglion cyst of finger    Hypercholesteremia    Acute CVA (cerebrovascular accident)     Past Medical History:   Diagnosis Date    Coronary artery disease     Hypertension     Stroke      Past Surgical History:   Procedure Laterality Date    HYSTERECTOMY        General Information       Row Name 10/10/23 1445          Physical Therapy Time and Intention    Document Type evaluation  -     Mode of Treatment individual therapy  -       Row Name 10/10/23 1445          General Information    Patient Profile Reviewed yes  -     Prior Level of Function independent:  -     Barriers to Rehab none identified  -       Row Name 10/10/23 1445          Living Environment    People in Home child(javier), adult;spouse  -       Row Name 10/10/23 1445          Home Main Entrance    Number of Stairs, Main Entrance three  -       Row Name 10/10/23 1445          Cognition    Orientation Status (Cognition) oriented x 4  -               User Key  (r) = Recorded By, (t) = Taken By, (c) = Cosigned By      Initials Name Provider Type    Katerin Harrington, PT Physical Therapist                   Mobility       Row Name 10/10/23 1446          Bed Mobility    Comment, (Bed Mobility) up in chair  -       Row Name 10/10/23 1447           Sit-Stand Transfer    Sit-Stand [place] (Transfers) independent  -       Row Name 10/10/23 1446          Gait/Stairs (Locomotion)    [place] Level (Gait) standby assist  -     Distance in Feet (Gait) 400 ft  -     [place] Level (Stairs) contact guard  -     Number of Steps (Stairs) 4  -KH     Ascending Technique (Stairs) step-to-step  -KH     Descending Technique (Stairs) step-to-step  -KH               User Key  (r) = Recorded By, (t) = Taken By, (c) = Cosigned By      Initials Name Provider Type    Katerin Harrington PT Physical Therapist                   Obj/Interventions       Row Name 10/10/23 1447          Range of Motion Comprehensive    Comment, General Range of Motion WFL  -       Row Name 10/10/23 1447          Strength Comprehensive (MMT)    Comment, General Manual Muscle Testing (MMT) Assessment 5/5 except LLE 4+/5  -       Row Name 10/10/23 1447          Balance    Balance Assessment sitting static balance;sitting dynamic balance;standing static balance;standing dynamic balance  -KH     Static Sitting Balance independent  -KH     Dynamic Sitting Balance independent  -KH     Static Standing Balance independent  -KH     Dynamic Standing Balance standby assist;contact guard  -               User Key  (r) = Recorded By, (t) = Taken By, (c) = Cosigned By      Initials Name Provider Type    Katerin Harrington PT Physical Therapist                   Goals/Plan    No documentation.                  Clinical Impression       Row Name 10/10/23 1447          Pain    Pretreatment Pain Rating 0/10 - no pain  -     Posttreatment Pain Rating 0/10 - no pain  -       Row Name 10/10/23 1447          Plan of Care Review    Plan of Care Reviewed With patient;spouse;daughter  -     Outcome Evaluation Pt was admitted with LLE weakness and acute CVA. Pt presents with slight weakness of LLE, but is ambulating with SBA and no gait impairments noted. Pt safely navigated 4  stairs and is safe to d/c home when medically stable. Pt will sign off.  -       Row Name 10/10/23 1447 10/10/23 1320       Therapy Assessment/Plan (PT)    Patient/Family Therapy Goals Statement (PT) return home to PL  - --    Criteria for Skilled Interventions Met (PT) -- no problems identified which require skilled intervention  -    Therapy Frequency (PT) -- evaluation only  -      Row Name 10/10/23 1447          Positioning and Restraints    Pre-Treatment Position sitting in chair/recliner  no alarm  -     Post Treatment Position chair  -     In Chair sitting;call light within reach;encouraged to call for assist;with family/caregiver  -               User Key  (r) = Recorded By, (t) = Taken By, (c) = Cosigned By      Initials Name Provider Type    Katerin Harrington, PT Physical Therapist                   Outcome Measures       Row Name 10/10/23 1529 10/10/23 0927       How much help from another person do you currently need...    Turning from your back to your side while in flat bed without using bedrails? 4  -KH 4 (P)   -JR    Moving from lying on back to sitting on the side of a flat bed without bedrails? 4  -KH 4 (P)   -JR    Moving to and from a bed to a chair (including a wheelchair)? 4  -KH 4 (P)   -JR    Standing up from a chair using your arms (e.g., wheelchair, bedside chair)? 4  -KH 4 (P)   -JR    Climbing 3-5 steps with a railing? 4  -KH 4 (P)   -JR    To walk in hospital room? 4  -KH 4 (P)   -JR    AM-PAC 6 Clicks Score (PT) 24  - 24 (P)   -JR    Highest level of mobility 8 --> Walked 250 feet or more  - 8 --> Walked 250 feet or more (P)   -JR      Row Name 10/10/23 1529          Functional Assessment    Outcome Measure Options AM-PAC 6 Clicks Basic Mobility (PT)  -               User Key  (r) = Recorded By, (t) = Taken By, (c) = Cosigned By      Initials Name Provider Type    Katerin Harrington, PT Physical Therapist    Orly Ching, Nursing Student  Nursing Student                                   PT Recommendation and Plan     Plan of Care Reviewed With: patient, spouse, daughter  Outcome Evaluation: Pt was admitted with LLE weakness and acute CVA. Pt presents with slight weakness of LLE, but is ambulating with SBA and no gait impairments noted. Pt safely navigated 4 stairs and is safe to d/c home when medically stable. Pt will sign off.     Time Calculation:         PT Charges       Row Name 10/10/23 1529             Time Calculation    Start Time 1253  -KH      Stop Time 1309  -KH      Time Calculation (min) 16 min  -KH      PT Received On 10/10/23  -KH         Time Calculation- PT    Total Timed Code Minutes- PT 8 minute(s)  -KH         Timed Charges    26738 - PT Therapeutic Exercise Minutes 8  -KH         Untimed Charges    PT Eval/Re-eval Minutes 8  -KH         Total Minutes    Timed Charges Total Minutes 8  -KH      Untimed Charges Total Minutes 8  -KH       Total Minutes 16  -KH                User Key  (r) = Recorded By, (t) = Taken By, (c) = Cosigned By      Initials Name Provider Type    Katerin Harrington, PT Physical Therapist                  Therapy Charges for Today       Code Description Service Date Service Provider Modifiers Qty    58990908288 HC PT THER PROC EA 15 MIN 10/10/2023 Katerin Hidalgo, PT GP 1    78420979461 HC PT EVAL LOW COMPLEXITY 1 10/10/2023 Katerin Hidalgo, PT GP 1            PT G-Codes  Outcome Measure Options: AM-PAC 6 Clicks Basic Mobility (PT)  AM-PAC 6 Clicks Score (PT): 24  PT Discharge Summary  Anticipated Discharge Disposition (PT): home with assist    Katerin Hidalgo PT  10/10/2023

## 2023-10-10 NOTE — PLAN OF CARE
Goal Outcome Evaluation:              Outcome Evaluation: Clinical swallow assessment completed. Patient seated in bedside chair upon arrival. Family at bedside. Patient reported hx of globus sensation, but no significant dysphagia hx. Agreeable to p.o. trials. Tolerated ice chips, thin via spoon, and puree with no overt s/sx of aspiration or penetration. Immediate cough with initial trial of thin via cup. No further overt s/sx of aspiration noted with thin by cup or straw. Mastication fxnal/timely with soft and regular solids. Immediate cough with mixed consistencies. Patient reported that she has globus sensation at times with meals but notes improvement with slow rate/small bites. Recommend regular (no mixed) with thin liquids. Medication as tolerated. Precautions: upright, small bites/sips, slow rate. ST to follow for tolerance of diet and need for speech/cognitive evaluation.

## 2023-10-10 NOTE — CONSULTS
Neurology Consult Note    Consult Date: 10/10/2023    Referring MD: Karena Hopper DO    Reason for Consult I have been asked to see the patient in neurological consultation to render advice and opinion regarding stroke      Danyell Osborne is a 87 y.o. female with hypertension, hyperlipidemia intolerant to statins, PAF not anticoagulated, carotid disease and CAD status post stent (approximately 3 years prior, on DAPT with low-dose aspirin and Plavix) who was admitted with stroke.    The patient and her peripherally family provided the history.  The patient states last Wednesday she had an episode of left leg and arm and hand heaviness which resolved.  Symptoms recurred on Sunday.  She denies any associated speech difficulty, vision change, or headache.  She reports she is back to baseline.  She went to [facility] yesterday and CT head which showed no acute findings but MRI brain showed right basal ganglia stroke per report so she was transferred here for further evaluation.  She does have a reported contrast allergy and notes previous rash following contrast administration.    She is unsure if she has ever been on anticoagulation for PAF.  She reports after her cardiac stent approximately 3 years ago she was on a medication and she became anemic requiring transfusion of 4 units of blood so she was switched to Plavix.  She is currently prescribed Crestor 5 mg but states she was on able to tolerate that and multiple other statins due to leg pain.    She reports history of carotid stenosis, approximately 60% bilaterally, for which she is followed by Dr. Mosqueda as outpatient.    Past Medical/Surgical Hx:  Past Medical History:   Diagnosis Date    Coronary artery disease     Hypertension     Stroke      Past Surgical History:   Procedure Laterality Date    HYSTERECTOMY         Medications On Admission  Medications Prior to Admission   Medication Sig Dispense Refill Last Dose    aspirin 81 MG EC tablet Take 1  tablet by mouth Daily.   10/9/2023 at 0800    Cholecalciferol 50 MCG (2000 UT) capsule Take 1 capsule by mouth Daily.   10/9/2023 at 0800    clopidogrel (PLAVIX) 75 MG tablet Take 1 tablet by mouth Daily.   10/9/2023 at 0800    metoprolol succinate XL (TOPROL-XL) 50 MG 24 hr tablet Take 2 tablets by mouth 2 (Two) Times a Day. (Patient taking differently: Take 1 tablet by mouth 2 (Two) Times a Day.) 60 tablet 0 10/9/2023 at 0800    multivitamin with minerals tablet tablet Take 1 tablet by mouth Daily.   10/9/2023 at 0800    ondansetron ODT (ZOFRAN-ODT) 4 MG disintegrating tablet Place 1 tablet on the tongue Every 8 (Eight) Hours As Needed for Nausea. 15 tablet 0 Unknown    Polyethylene Glycol 3350 (MIRALAX PO) Take  by mouth.   Unknown    rosuvastatin (Crestor) 5 MG tablet Take 1 tablet by mouth Every Other Day. 30 tablet 0 More than a month    Simethicone (GAS-X PO) Take  by mouth.   More than a month       Allergies:  Allergies   Allergen Reactions    Contrast Dye (Echo Or Unknown Ct/Mr) Unknown - High Severity    Iodinated Contrast Media Unknown - High Severity    Penicillins Rash       Social Hx:  Social History     Socioeconomic History    Marital status:    Tobacco Use    Smoking status: Never     Passive exposure: Never    Smokeless tobacco: Never   Vaping Use    Vaping Use: Never used   Substance and Sexual Activity    Alcohol use: Never    Drug use: Never    Sexual activity: Defer       Family Hx:  History reviewed. No pertinent family history.    REVIEW OF SYSTEMS:  Constitutional: [No fevers, chills, sweats or weight loss/gain]   Eye: [No change in vision, double vision, or loss of vision]   HEENT: [No headaches, tenderness, dizziness, or tinnitus. Normal smell and taste. Normal speech and swallowing]   Respiratory: [No shortness of breath, coughing, wheezing]   Cardiovascular: [No Chest pain, palpitations, syncope, KEBEDE]   Gastrointestinal: [Normal bowel function. No nausea, vomiting, diarrhea]  "  Genitourinary: [Normal bladder function]   Musculoskeletal: [No trauma, joint or neck pain, myalgias, cramping]   Skin: [No itching, burning, pain, rashes, or birthmarks]   Endocrinology: [No heat or cold intolerance]   Psychiatric: [No sleep disturbance. No anxiety or depression]   Neurologic: [See HPI, above]       Exam    /65 (BP Location: Left arm, Patient Position: Lying)   Pulse 68   Temp 97.9 øF (36.6 øC) (Oral)   Resp 17   Ht 162.6 cm (64\")   Wt 53.7 kg (118 lb 6.2 oz)   SpO2 98%   BMI 20.32 kg/mý   General appearance: Well developed, well nourished, well groomed, alert and cooperative.   HEENT: Normocephalic.   Neck; Supple.  Cardiac: Regular rate and rhythm.    Peripheral Vasculature: Radial pulses are equal and symmetric.  Chest Exam: Clear to auscultation bilaterally, no wheezes, no rhonchi.  Extremities: Normal, no edema.   Skin: No rashes or birthmarks.     Higher integrative function: Oriented to time, place, person, intact recent and remote memory, attention span, concentration and language. Spontaneous speech, fund of vocabulary are normal.   CN II: Normal visual fields.   CN III IV VI: Extraocular movements are full without nystagmus. Pupils are equal, round, and reactive to light.   CN V: Decreased left facial sensation.  CN VII: Facial movements are symmetric, no weakness.   CN VIII: Auditory acuity is normal.   CN IX & X: Symmetric palatal movement.   CN XI: Sternocleidomastoid and trapezius are normal. No weakness.   CN XII: The tongue is midline. No atrophy or fasciculations.   Motor: Normal muscle strength, bulk, and tone in upper extremities, right lower extremity 5 out of 5, left lower extremity with mild weakness, 4+ out of 5.. No fasciculations, rigidity, spasticity or abnormal movements.   Sensation: Intact light touch in all extremities.  Station and gait: Normal gait and station.   Muscle stretch reflexes: Reflexes are normal and symmetric in the upper and lower " extremities.   Plantar reflexes are flexor bilaterally.   Coordination: Finger to nose test showed no dysmetria. Rapid alternating movements were normal. Heel to shin normal.         Pre-stroke MRS: 0      DATA:    Lab Results   Component Value Date    GLUCOSE 104 (H) 07/10/2023    CALCIUM 9.9 07/10/2023     07/10/2023    K 5.2 07/10/2023    CO2 26.3 07/10/2023     07/10/2023    BUN 14 07/10/2023    CREATININE 0.78 07/10/2023    BCR 17.9 07/10/2023    ANIONGAP 10.7 07/10/2023     Lab Results   Component Value Date    WBC 7.17 07/10/2023    HGB 12.7 07/10/2023    HCT 39.1 07/10/2023    MCV 91.4 07/10/2023     07/10/2023     Lab Results   Component Value Date    CHOL 264 (H) 10/10/2023    CHOL 275 (H) 07/10/2023     Lab Results   Component Value Date    HDL 50 10/10/2023    HDL 57 07/10/2023    HDL 61 (H) 03/23/2021     Lab Results   Component Value Date     (H) 10/10/2023     (H) 07/10/2023     (H) 03/23/2021     Lab Results   Component Value Date    TRIG 134 10/10/2023    TRIG 129 07/10/2023    TRIG 134 03/23/2021     Lab Results   Component Value Date    HGBA1C 5.90 (H) 10/10/2023     Lab Results   Component Value Date    INR 0.98 10/09/2023    PROTIME 12.8 10/09/2023       Imaging review: No images to review    Impression/Plan:  1) right basal ganglia stroke, nursing to upload MRI images so this can be reviewed. Likely will start AC once MRI brain images viewed. Decrease to single antiplatelet once AC started.  Check MRI head and carotid u/s.   Check 2D echo  Cardiology consulted to weigh in on anticoagulation for history of PAF  Statin started, , need to monitor for myalgias she has a history of intolerance  Restart home antiplatelets for now    2) PAF  3) CAD status post remote stent  4) history of carotid stenosis followed by Dr. Mosqueda    D/W Dr Soto today. Will follow.

## 2023-10-10 NOTE — PLAN OF CARE
Goal Outcome Evaluation:  Plan of Care Reviewed With: patient, spouse, daughter           Outcome Evaluation: Pt was admitted with LLE weakness and acute CVA. Pt presents with slight weakness of LLE, but is ambulating with SBA and no gait impairments noted. Pt safely navigated 4 stairs and is safe to d/c home when medically stable. Pt will sign off.      Anticipated Discharge Disposition (PT): home with assist

## 2023-10-10 NOTE — CASE MANAGEMENT/SOCIAL WORK
Discharge Planning Assessment  [facility]     Patient Name: Danyell Osborne  MRN: [id number]  Today's Date: 10/10/2023    Admit Date: 10/9/2023    Plan: home with spouse   Discharge Needs Assessment       Row Name 10/10/23 1130       Living Environment    People in Home child(javier), adult;spouse    Current Living Arrangements home    Potentially Unsafe Housing Conditions none    Primary Care Provided by self    Provides Primary Care For no one    Family Caregiver if Needed spouse;child(javier), adult    Quality of Family Relationships helpful;involved    Able to Return to Prior Arrangements yes       Transition Planning    Patient/Family Anticipates Transition to home with family    Patient/Family Anticipated Services at Transition none    Transportation Anticipated family or friend will provide       Discharge Needs Assessment    Readmission Within the Last 30 Days no previous admission in last 30 days    Equipment Currently Used at Home none    Concerns to be Addressed discharge planning    Anticipated Changes Related to Illness none    Equipment Needed After Discharge none    Provided Post Acute Provider List? Refused    Refused Provider List Comment declined HH need                   Discharge Plan       Row Name 10/10/23 1130       Plan    Plan home with spouse    Plan Comments Spoke with pt and pt's spouse bedside. Confirmed facesheet correct. Explained role of CCP. Pt reports she lives with her spouse and daughter. She is IADLs no DME used. Verified PCP and pharmacy. Pt reports she was told she will d/c on Eliquis and she is concerned about cost. Agreeable to meds to beds so CCP can check price at d/c. Pt has no HH or SNF history. She reports good family support and plans home at d/c. CCP to follow for needs.                  Continued Care and Services - Admitted Since 10/9/2023    Coordination has not been started for this encounter.          Demographic Summary    No documentation.                   Functional Status    No documentation.                  Psychosocial    No documentation.                  Abuse/Neglect    No documentation.                  Legal    No documentation.                  Substance Abuse    No documentation.                  Patient Forms    No documentation.                     MARLIN Nation

## 2023-10-10 NOTE — NURSING NOTE
Pt recd to unit via ems/stretcher.  Family at bs.  NIH +0.  Admitted to cardiac monitor is sinus sukh 58-60bpm.  Oriented pt and fam to staff/unit/callbell and bed controls.  Pt comforrtable in bed, rails x 2, cbir, nad.

## 2023-10-10 NOTE — H&P
"    Patient Name:  Danyell Osborne  YOB: 1935  MRN:  [id number]  Admit Date:  10/9/2023  Patient Care Team:  Shanae Osborne APRN as PCP - General (Family Medicine)      Subjective   History Present Illness     Chief complaint:  left side weakness    History of Present Illness  Mrs. Osborne is a 87-year-old female with history of A-fib on Plavix, previous MI, hypertension, CAD, hypercholesterolemia, memory loss who presents to an outside emergency room with left-sided weakness.  Patient is transferred here to [facility] for further evaluation and neuro evaluation.  Patient states that she had some left-sided \"heaviness\" for the last 2 to 3 days, she has had a few episodes of difficulty walking, yesterday her  states he had a hard time helping her from the truck to the house due to her left leg feeling heavy.  She denies having any chest pain or shortness of breath.  She denies having any headache.  She has been taking her Plavix and 81 mg aspirin.  Patient is seen by cardiology in [place], Dr. Yanes.  She states about 3 years ago she had a MI and was placed on anticoagulant but then developed some bleeding and required 4 units of blood so they stopped the anticoagulant and put her on Plavix although she does not remember the name of the anticoagulant.  She denies having any difficulty talking or eating.  Although she does states she has had some difficulty with her esophagus and feeling full, but is seeing a surgeon in [place] for that.  Those symptoms have not gotten worse or changed recently.  At [facility] she had a CT scan of her head that was negative, but they did perform an MRI that showed no acute hemorrhage but an acute multifocal infarct in her right basal ganglia.  Her urinalysis was negative, white blood cell count 7.1, hemoglobin 14.0, platelets 407, sodium 136, potassium 4.0, creatinine 0.7, BUN 14.  She denies any recent illnesses.  Patient was " "given a full dose of aspirin at outside facility and they did speak with stroke neurologist on-call who recommended transfer here to [facility].  Patient does have a allergy to contrast dye.    Review of Systems   Constitutional:  Negative for appetite change and fever.   HENT:  Negative for nosebleeds and trouble swallowing.    Eyes:  Negative for photophobia, redness and visual disturbance.   Respiratory:  Negative for cough, chest tightness, shortness of breath and wheezing.    Cardiovascular:  Negative for chest pain, palpitations and leg swelling.   Gastrointestinal:  Negative for abdominal distention, abdominal pain, nausea and vomiting.   Endocrine: Negative.    Genitourinary: Negative.    Musculoskeletal:  Positive for gait problem. Negative for joint swelling.   Skin: Negative.    Neurological:  Positive for weakness (left leg and arm \"heaviness\" for a few days). Negative for dizziness, seizures, speech difficulty, light-headedness and headaches.   Hematological: Negative.    Psychiatric/Behavioral:  Negative for behavioral problems and confusion.         Personal History     No past medical history on file.  Past Surgical History:   Procedure Laterality Date    HYSTERECTOMY       No family history on file.  Social History     Tobacco Use    Smoking status: Never    Smokeless tobacco: Never   Vaping Use    Vaping Use: Never used   Substance Use Topics    Alcohol use: Never    Drug use: Never     No current facility-administered medications on file prior to encounter.     Current Outpatient Medications on File Prior to Encounter   Medication Sig Dispense Refill    aspirin 81 MG EC tablet Take 1 tablet by mouth Daily.      Cholecalciferol 50 MCG (2000 UT) capsule Take 1 capsule by mouth Daily.      clopidogrel (PLAVIX) 75 MG tablet Take 1 tablet by mouth Daily.      metoprolol succinate XL (TOPROL-XL) 50 MG 24 hr tablet Take 2 tablets by mouth 2 (Two) Times a Day. (Patient taking differently: " Take 1 tablet by mouth 2 (Two) Times a Day.) 60 tablet 0    multivitamin with minerals tablet tablet Take 1 tablet by mouth Daily.      ondansetron ODT (ZOFRAN-ODT) 4 MG disintegrating tablet Place 1 tablet on the tongue Every 8 (Eight) Hours As Needed for Nausea. 15 tablet 0    Polyethylene Glycol 3350 (MIRALAX PO) Take  by mouth.      rosuvastatin (Crestor) 5 MG tablet Take 1 tablet by mouth Every Other Day. 30 tablet 0    Simethicone (GAS-X PO) Take  by mouth.       Allergies   Allergen Reactions    Contrast Dye (Echo Or Unknown Ct/Mr) Unknown - High Severity    Iodinated Contrast Media Unknown - High Severity    Penicillins Rash       Objective    Objective     Vital Signs  Temp:  [97.9 øF (36.6 øC)] 97.9 øF (36.6 øC)  Heart Rate:  [56] 56  Resp:  [18] 18  BP: (162)/(59) 162/59  SpO2:  [96 %] 96 %  on   ;   Device (Oxygen Therapy): room air  There is no height or weight on file to calculate BMI.    Physical Exam  Vitals and nursing note reviewed.   Constitutional:       General: She is not in acute distress.     Appearance: She is well-developed.   HENT:      Head: Normocephalic.   Neck:      Vascular: No JVD.   Cardiovascular:      Rate and Rhythm: Normal rate and regular rhythm.      Comments: Normal sinus rhythm on the monitor at this time with heart rate 68, she denies any chest pain or shortness of breath  Pulmonary:      Effort: Pulmonary effort is normal.      Breath sounds: Normal breath sounds.      Comments: Lung sounds clear, sats 97% on room air  Abdominal:      General: There is no distension.      Palpations: Abdomen is soft.      Tenderness: There is no abdominal tenderness.   Musculoskeletal:         General: Normal range of motion.      Cervical back: Normal range of motion.   Skin:     General: Skin is warm and dry.      Capillary Refill: Capillary refill takes less than 2 seconds.   Neurological:      Mental Status: She is alert and oriented to person, place, and time.      Comments: [facility]  stroke scale of 1, some mild left leg drift   Psychiatric:         Mood and Affect: Mood normal.         Speech: Speech normal.         Behavior: Behavior normal.         Cognition and Memory: Cognition normal.         Judgment: Judgment normal.         Results Review:  I reviewed the patient's new clinical results.  I reviewed the patient's new imaging results and agree with the interpretation.  I reviewed the patient's other test results and agree with the interpretation  I personally viewed and interpreted the patient's EKG/Telemetry data  Discussed with ED provider.    Lab Results (last 24 hours)       Procedure Component Value Units Date/Time    PROTIME-INR [[id number]]  (Normal) Collected: 10/09/23 1250    Specimen: Blood Updated: 10/09/23 1351     Protime 12.8 seconds      INR 0.98     Comment: Recommended therapeutic ranges using International Normalized Ratio (INR) are:    INR RANGE   2.0 - 3.0       Routine oral anticoagulant therapy    2.5 - 3.5       Oral anticoagulant therapy for patients with thromboembolic events on standard doses of Coumadin and those with mechanical heart valves.        APTT [[id number]]  (Normal) Collected: 10/09/23 1250    Specimen: Blood Updated: 10/09/23 1351     PTT 30.4 seconds     High Sensitivity Troponin I [[id number]]  (Normal) Collected: 10/09/23 1250     Updated: 10/09/23 1351    COMPREHENSIVE METABOLIC PANEL [[id number]]  (Abnormal) Collected: 10/09/23 1250     Updated: 10/09/23 1351    CBC with Auto Diff [[id number]]  (Abnormal) Collected: 10/09/23 1250     Updated: 10/09/23 1351    Urinalysis, Reflex Microscopic and Culture If Indicated [[id number]] Collected: 10/09/23 1251     Updated: 10/09/23 1351            Imaging Results (Last 24 Hours)       ** No results found for the last 24 hours. **                No orders to display        Assessment/Plan     Active Hospital Problems    Diagnosis  POA    **Acute CVA (cerebrovascular accident) [I63.9]  Yes    Hypercholesteremia  [E78.00]  Yes    Memory loss [R41.3]  Yes    Atrial fibrillation [I48.91]  Yes    Essential hypertension [I10]  Yes    Coronary artery disease involving native coronary artery of native heart without angina pectoris [I25.10]  Yes     Mrs. Osborne is a 87-year-old female with history of A-fib on Plavix, previous MI, hypertension, CAD, hypercholesterolemia, memory loss who presents to an outside emergency room with left-sided weakness.    Acute CVA  -Stroke order set initiated  -Consult neurology  -Neurochecks every 4 hours  -2D echo in a.m.  -Consult cardiology for recommendations of anticoagulation  -PT/OT to eval and treat  -Speech therapy to eval and treat  -Telemetry unit for monitoring    A-fib/hypertension/CAD  -Cardiology consulted for anticoagulation recommendation, patient does see Dr. Yanes in [place]  -Patient is currently on Plavix, will continue that for now awaiting further recommendations  -2D echo in a.m.  -Telemetry unit for monitoring  -Continue other home medications when med rec is completed with parameters  -Allow for some mild hypertension given acute CVA    I discussed the patient's findings and my recommendations with patient.    VTE Prophylaxis - SCDs.  Code Status - Full code.       AGUSTIN Woodruff  [facility]  10/09/23  22:28 EDT

## 2023-10-10 NOTE — PROGRESS NOTES
[facility] Hospital [facility]    [phone]   LOS: 1 day     Name: Danyell Osborne  Age/Sex: 87 y.o. female  :  1935        PCP: Shanae Osborne APRN  No chief complaint on file.     Subjective   No new neurologic symptoms today.  General: No Fever or Chills, Cardiac: No Chest Pain or Palpitations, Resp: No Cough or SOA, GI: No Nausea, Vomiting, or Diarrhea, and Other: No bleeding    apixaban, 2.5 mg, Oral, Q12H  atorvastatin, 80 mg, Oral, Nightly  clopidogrel, 75 mg, Oral, Daily  sodium chloride, 10 mL, Intravenous, Q12H           Objective   Vital Signs  Temp:  [97.9 øF (36.6 øC)] 97.9 øF (36.6 øC)  Heart Rate:  [56-68] 65  Resp:  [17-18] 18  BP: (126-162)/(58-65) 126/58  Body mass index is 20.32 kg/mý.    Intake/Output Summary (Last 24 hours) at 10/10/2023 1536  Last data filed at 10/9/2023 2304  Gross per 24 hour   Intake 240 ml   Output --   Net 240 ml       Physical Exam  Vitals and nursing note reviewed.   Constitutional:       Appearance: Normal appearance.   Cardiovascular:      Rate and Rhythm: Normal rate and regular rhythm.   Pulmonary:      Effort: No respiratory distress.      Breath sounds: Normal breath sounds.   Neurological:      General: No focal deficit present.      Mental Status: She is alert. Mental status is at baseline.           Results Review:       I reviewed the patient's new clinical results.      CrCl cannot be calculated (Patient's most recent lab result is older than the maximum 30 days allowed.).      Assessment & Plan   Active Hospital Problems    Diagnosis  POA    **Acute CVA (cerebrovascular accident) [I63.9]  Yes    Hypercholesteremia [E78.00]  Yes    Memory loss [R41.3]  Yes    Atrial fibrillation [I48.91]  Yes    Essential hypertension [I10]  Yes    Coronary artery disease involving native coronary artery of native heart without angina pectoris [I25.10]  Yes      Resolved Hospital Problems   No resolved problems to display.       PLAN  She is being admitted  for a stroke/TIA workup.  We'll plan to repeat  MRI.  We'll also check an echocardiogram with bubble study to complete a cardioembolic workup.  We will assess secondary risk factors by checking an A1c and following blood pressure.  We'll monitor on telemetry.  Neuro checks throughout the night tonight.  Neurology has been consulted to evaluate.    Disposition  Expected discharge date/ time has not been documented.       Delmar Burleson MD  [facility]  10/10/23  15:36 EDT

## 2023-10-10 NOTE — CONSULTS
Danyell Osborne   87 y.o.  female    LOS: 1 day   Patient Care Team:  Shanae Osborne APRN as PCP - General (Family Medicine)      Subjective     Chief Complaint:    History of Present Illness:  Ms Osborne is an 87 y.o. female who follows with otilia spears in our [place] office with hypertension, hyperlipidemia intolerant to statins, PAF not anticoagulated, carotid disease and CAD status post stent on DAPT with low-dose aspirin and Plavix who was admitted with stroke. She went to [facility] yesterday,  CT head was done  which showed no acute findings but MRI brain showed right basal ganglia stroke per report so she was transferred to [facility] for further evaluation. She reports that after her stenting in 2020 she had gib, her dapt was changed to asa/plavix, she denies and endoscopy being done at that time and no further bleeding. She also reports a a c/scope last month, she is not aware of any problems with scope and is not aware of any polypectomy. She does have a HH and difficulty swallowing at times with recommendation for procedure but she has not had it as she doesn't want to go [facility] for it.     [facility]  CAD/NSTEMI- stent LAD/RCA 3/25/2020, stent 2011  PAF dx 3/2020  HTN  HLD  GIB      Past Medical History:   Diagnosis Date    Coronary artery disease     Hypertension     Stroke      Past Surgical History:   Procedure Laterality Date    HYSTERECTOMY       Medications Prior to Admission   Medication Sig Dispense Refill Last Dose    aspirin 81 MG EC tablet Take 1 tablet by mouth Daily.   10/9/2023 at 0800    Cholecalciferol 50 MCG (2000 UT) capsule Take 1 capsule by mouth Daily.   10/9/2023 at 0800    clopidogrel (PLAVIX) 75 MG tablet Take 1 tablet by mouth Daily.   10/9/2023 at 0800    metoprolol succinate XL (TOPROL-XL) 50 MG 24 hr tablet Take 2 tablets by mouth 2 (Two) Times a Day. (Patient taking differently: Take 1 tablet by mouth 2 (Two) Times a Day.) 60 tablet 0 10/9/2023 at 0800     multivitamin with minerals tablet tablet Take 1 tablet by mouth Daily.   10/9/2023 at 0800    ondansetron ODT (ZOFRAN-ODT) 4 MG disintegrating tablet Place 1 tablet on the tongue Every 8 (Eight) Hours As Needed for Nausea. 15 tablet 0 Unknown    Polyethylene Glycol 3350 (MIRALAX PO) Take  by mouth.   Unknown    rosuvastatin (Crestor) 5 MG tablet Take 1 tablet by mouth Every Other Day. 30 tablet 0 More than a month    Simethicone (GAS-X PO) Take  by mouth.   More than a month       History reviewed. No pertinent family history.  Social History     Socioeconomic History    Marital status:    Tobacco Use    Smoking status: Never     Passive exposure: Never    Smokeless tobacco: Never   Vaping Use    Vaping Use: Never used   Substance and Sexual Activity    Alcohol use: Never    Drug use: Never    Sexual activity: Defer     Objective       Review of Systems:   Constitutional: Negative for diaphoresis, fatigue, fever and unexpected weight change.   HENT: Negative.    Eyes: Negative.    Respiratory: Negative for cough, shortness of breath and wheezing.    Cardiovascular: Negative for chest pain, palpitations and leg swelling.   Gastrointestinal: Negative for abdominal pain, blood in stool, constipation, diarrhea, nausea and vomiting.   Endocrine: Negative.    Genitourinary: Negative for difficulty urinating, dysuria and frequency.   Musculoskeletal: Negative.    Skin: Negative.    Allergic/Immunologic: Negative for environmental allergies and food allergies.   Neurological: Negative.    Hematological: Negative.    Psychiatric/Behavioral: Negative.        Current Facility-Administered Medications:     aspirin chewable tablet 81 mg, 81 mg, Oral, Daily, Marietta Brown APRN    atorvastatin (LIPITOR) tablet 80 mg, 80 mg, Oral, Nightly, Beth Feliz APRN, 80 mg at 10/09/23 1659    clopidogrel (PLAVIX) tablet 75 mg, 75 mg, Oral, Daily, Marietta Brown APRN    sodium chloride 0.9 % flush 10 mL,  "10 mL, Intravenous, Q12H, Beth Feliz APRN, 10 mL at 10/09/23 2340    sodium chloride 0.9 % flush 10 mL, 10 mL, Intravenous, PRN, Beth Feliz APRN    sodium chloride 0.9 % infusion 40 mL, 40 mL, Intravenous, PRN, Beth Feliz APRN      Physical Exam:   Vital Sign Min/Max for last 24 hours  Temp  Min: 97.9 øF (36.6 øC)  Max: 97.9 øF (36.6 øC)   BP  Min: 142/65  Max: 162/59    Pulse  Min: 56  Max: 68     Wt Readings from Last 3 Encounters:   10/10/23 53.7 kg (118 lb 6.2 oz)   08/07/23 54.4 kg (120 lb)   07/10/23 54.5 kg (120 lb 3.2 oz)       General Appearance:  Awake,  Alert, cooperative, elderly female sitting in chair in no acute distress   Head:  Normocephalic, without obvious abnormality, atraumatic   Eyes:          Conjunctivae normal, anicteric, eom intact    Neck: No adenopathy, supple, trachea midline, no thyromegaly, no   carotid bruit, no JVD, no elevated cvp   Lungs:   Clear to auscultation,respirations regular, even and                  unlabored    Heart:  Regular rhythm and normal rate, normal S1 and S2,            No murmur, no gallop, no rub, no click    Chest Wall:  No abnormalities observed   Abdomen:   Normal bowel sounds, no masses, soft nontender, nondistended                    Rectal:   Deferred   Extremities: No edema. Moves all extremities well, no cyanosis, no erythema   Pulses: Pulses palpable and equal bilaterally   Skin: No bleeding, bruising or rash   Neurologic: Speech clear and appropriate, no facial drooping     : voids      MONITOR: nsr    Results Review:     Sodium No results found for: \"NA\"   Potassium No results found for: \"K\"   Chloride No results found for: \"CL\"   Bicarbonate No results found for: \"PLASMABICARB\"   BUN No results found for: \"BUN\"   Creatinine No results found for: \"CREATININE\"   Calcium No results found for: \"CALCIUM\"   Magnesium No results found for: \"MG\"         No results found for: \"TROPONINT\"  Lab Results   Component " "Value Date    CHOL 264 (H) 10/10/2023    CHOL 275 (H) 07/10/2023     Lab Results   Component Value Date    HDL 50 10/10/2023    HDL 57 07/10/2023    HDL 61 (H) 03/23/2021     Lab Results   Component Value Date     (H) 10/10/2023     (H) 07/10/2023     (H) 03/23/2021     Lab Results   Component Value Date    TRIG 134 10/10/2023    TRIG 129 07/10/2023    TRIG 134 03/23/2021     No components found for: \"CHOLHDL\"  PTT   Date Value Ref Range Status   10/09/2023 30.4 24.9 - 32.6 seconds Final     No components found for: \"PT/INR\"  Lab Results   Component Value Date    HGBA1C 5.90 (H) 10/10/2023      Lab Results   Component Value Date    TSH 2.040 07/10/2023        Echo EF Estimated  )No results found for: \"ECHOEFEST\"      Assessment/ Plan    Active Hospital Problems    Diagnosis  POA    **Acute CVA (cerebrovascular accident) [I63.9]  Yes    Hypercholesteremia [E78.00]  Yes    Memory loss [R41.3]  Yes    Atrial fibrillation [I48.91]  Yes    Essential hypertension [I10]  Yes    Coronary artery disease involving native coronary artery of native heart without angina pectoris [I25.10]  Yes     Acute cva- right basal ganglia   Neuro foll    2. PAF    3 CAD   A. stent LAD/RCA 3/25/2020  B. Stent 2011    4 history of carotid stenosis followed by Dr. Mosqueda    Plan  Currently on asa/plavix/statin  Chadsvasc score 7, annual stroke risk is 11.2%, h/o gib with dapt 2020, reports c/scope 1 month ago without issues  Recommend ac and consider watchman if bleeding returns, dr gonzales to follow    Yvette Dotson, APRN  10/10/23  12:04 EDT    Discussed with dr gonzales  Time:  55mins    Pt seen and examined  Will start eliquis  DC ASA    "

## 2023-10-11 ENCOUNTER — APPOINTMENT (OUTPATIENT)
Dept: CARDIOLOGY | Facility: HOSPITAL | Age: 88
DRG: 065 | End: 2023-10-11
Payer: MEDICARE

## 2023-10-11 ENCOUNTER — READMISSION MANAGEMENT (OUTPATIENT)
Dept: CALL CENTER | Facility: HOSPITAL | Age: 88
End: 2023-10-11
Payer: MEDICARE

## 2023-10-11 VITALS
RESPIRATION RATE: 18 BRPM | DIASTOLIC BLOOD PRESSURE: 56 MMHG | HEIGHT: 64 IN | TEMPERATURE: 98.3 F | BODY MASS INDEX: 20.14 KG/M2 | OXYGEN SATURATION: 95 % | HEART RATE: 91 BPM | SYSTOLIC BLOOD PRESSURE: 120 MMHG | WEIGHT: 118 LBS

## 2023-10-11 LAB
BH CV ECHO MEAS - AO MAX PG: 5.1 MMHG
BH CV ECHO MEAS - AO MEAN PG: 2.7 MMHG
BH CV ECHO MEAS - AO V2 MAX: 112.5 CM/SEC
BH CV ECHO MEAS - AO V2 VTI: 21.9 CM
BH CV ECHO MEAS - AVA(I,D): 2.35 CM2
BH CV ECHO MEAS - EDV(CUBED): 65.8 ML
BH CV ECHO MEAS - EDV(MOD-SP2): 40 ML
BH CV ECHO MEAS - EDV(MOD-SP4): 55 ML
BH CV ECHO MEAS - EF(MOD-BP): 57.9 %
BH CV ECHO MEAS - EF(MOD-SP2): 62.5 %
BH CV ECHO MEAS - EF(MOD-SP4): 56.4 %
BH CV ECHO MEAS - ESV(CUBED): 12.8 ML
BH CV ECHO MEAS - ESV(MOD-SP2): 15 ML
BH CV ECHO MEAS - ESV(MOD-SP4): 24 ML
BH CV ECHO MEAS - FS: 42.1 %
BH CV ECHO MEAS - IVS/LVPW: 1.07 CM
BH CV ECHO MEAS - IVSD: 1.13 CM
BH CV ECHO MEAS - LAT PEAK E' VEL: 7.2 CM/SEC
BH CV ECHO MEAS - LV DIASTOLIC VOL/BSA (35-75): 35.2 CM2
BH CV ECHO MEAS - LV MASS(C)D: 145.7 GRAMS
BH CV ECHO MEAS - LV MAX PG: 3.1 MMHG
BH CV ECHO MEAS - LV MEAN PG: 1.55 MMHG
BH CV ECHO MEAS - LV SYSTOLIC VOL/BSA (12-30): 15.4 CM2
BH CV ECHO MEAS - LV V1 MAX: 87.4 CM/SEC
BH CV ECHO MEAS - LV V1 VTI: 16.3 CM
BH CV ECHO MEAS - LVIDD: 4 CM
BH CV ECHO MEAS - LVIDS: 2.34 CM
BH CV ECHO MEAS - LVOT AREA: 3.2 CM2
BH CV ECHO MEAS - LVOT DIAM: 2.01 CM
BH CV ECHO MEAS - LVPWD: 1.05 CM
BH CV ECHO MEAS - MED PEAK E' VEL: 4.2 CM/SEC
BH CV ECHO MEAS - MV A DUR: 0.13 SEC
BH CV ECHO MEAS - MV A MAX VEL: 87.4 CM/SEC
BH CV ECHO MEAS - MV DEC SLOPE: 345.8 CM/SEC2
BH CV ECHO MEAS - MV DEC TIME: 0.22 SEC
BH CV ECHO MEAS - MV E MAX VEL: 57 CM/SEC
BH CV ECHO MEAS - MV E/A: 0.65
BH CV ECHO MEAS - MV MAX PG: 3.6 MMHG
BH CV ECHO MEAS - MV MEAN PG: 1.34 MMHG
BH CV ECHO MEAS - MV P1/2T: 61.8 MSEC
BH CV ECHO MEAS - MV V2 VTI: 21.2 CM
BH CV ECHO MEAS - MVA(P1/2T): 3.6 CM2
BH CV ECHO MEAS - MVA(VTI): 2.43 CM2
BH CV ECHO MEAS - PULM A REVS DUR: 0.11 SEC
BH CV ECHO MEAS - PULM A REVS VEL: 27.4 CM/SEC
BH CV ECHO MEAS - PULM DIAS VEL: 30.8 CM/SEC
BH CV ECHO MEAS - PULM S/D: 1.74
BH CV ECHO MEAS - PULM SYS VEL: 53.6 CM/SEC
BH CV ECHO MEAS - SI(MOD-SP2): 16 ML/M2
BH CV ECHO MEAS - SI(MOD-SP4): 19.8 ML/M2
BH CV ECHO MEAS - SV(LVOT): 51.5 ML
BH CV ECHO MEAS - SV(MOD-SP2): 25 ML
BH CV ECHO MEAS - SV(MOD-SP4): 31 ML
BH CV ECHO MEAS - TAPSE (>1.6): 1.56 CM
BH CV ECHO MEASUREMENTS AVERAGE E/E' RATIO: 10
BH CV ECHO SHUNT ASSESSMENT PERFORMED (HIDDEN SCRIPTING): 1
BH CV XLRA - RV BASE: 3.2 CM
BH CV XLRA - RV LENGTH: 5.5 CM
BH CV XLRA - RV MID: 2.27 CM
BH CV XLRA - TDI S': 8.9 CM/SEC
LEFT ATRIUM VOLUME INDEX: 24.8 ML/M2
SINUS: 2.7 CM
STJ: 2.6 CM

## 2023-10-11 PROCEDURE — 93306 TTE W/DOPPLER COMPLETE: CPT

## 2023-10-11 PROCEDURE — 99232 SBSQ HOSP IP/OBS MODERATE 35: CPT | Performed by: NURSE PRACTITIONER

## 2023-10-11 RX ORDER — ATORVASTATIN CALCIUM 80 MG/1
80 TABLET, FILM COATED ORAL NIGHTLY
Qty: 90 TABLET | Refills: 0 | Status: SHIPPED | OUTPATIENT
Start: 2023-10-11

## 2023-10-11 RX ORDER — METOPROLOL SUCCINATE 50 MG/1
50 TABLET, EXTENDED RELEASE ORAL 2 TIMES DAILY
Start: 2023-10-11

## 2023-10-11 RX ORDER — METOPROLOL SUCCINATE 50 MG/1
50 TABLET, EXTENDED RELEASE ORAL 2 TIMES DAILY
Status: DISCONTINUED | OUTPATIENT
Start: 2023-10-11 | End: 2023-10-11 | Stop reason: HOSPADM

## 2023-10-11 RX ADMIN — Medication 10 ML: at 09:36

## 2023-10-11 RX ADMIN — METOPROLOL SUCCINATE 50 MG: 50 TABLET, EXTENDED RELEASE ORAL at 16:16

## 2023-10-11 RX ADMIN — CLOPIDOGREL BISULFATE 75 MG: 75 TABLET, FILM COATED ORAL at 09:35

## 2023-10-11 RX ADMIN — APIXABAN 2.5 MG: 2.5 TABLET, FILM COATED ORAL at 09:35

## 2023-10-11 NOTE — PROGRESS NOTES
[facility] Acute Rehab    Noted both PT and OT have signed off. No need for Acute Rehab at this time. Will also sign off    Estee Kahn RN  Acute Rehab Admission Nurse

## 2023-10-11 NOTE — PROGRESS NOTES
Danyell Osborne   87 y.o.  female    LOS: 2 days   Patient Care Team:  Shanae Osborne APRN as PCP - General (Family Medicine)      Subjective   No dyspnea  Interval History:     Patient Complaints:     Review of Systems:       Medication Review:   Current Facility-Administered Medications:     apixaban (ELIQUIS) tablet 2.5 mg, 2.5 mg, Oral, Q12H, Jacqueline Yates MD, 2.5 mg at 10/11/23 0935    atorvastatin (LIPITOR) tablet 80 mg, 80 mg, Oral, Nightly, Beth Feliz APRN, 80 mg at 10/10/23 2015    clopidogrel (PLAVIX) tablet 75 mg, 75 mg, Oral, Daily, Marietta Brown APRN, 75 mg at 10/11/23 0935    metoprolol succinate XL (TOPROL-XL) 24 hr tablet 50 mg, 50 mg, Oral, BID, Delmar Burleson MD    sodium chloride 0.9 % flush 10 mL, 10 mL, Intravenous, Q12H, Beth Feliz APRN, 10 mL at 10/11/23 0936    sodium chloride 0.9 % flush 10 mL, 10 mL, Intravenous, PRN, Beth Feliz APRN    sodium chloride 0.9 % infusion 40 mL, 40 mL, Intravenous, PRN, Beth Feliz APRN      Objective   Vital Sign Min/Max for last 24 hours  Temp  Min: 97.9 øF (36.6 øC)  Max: 98.1 øF (36.7 øC)   BP  Min: 126/58  Max: 178/66    Pulse  Min: 65  Max: 87     Wt Readings from Last 3 Encounters:   10/10/23 53.7 kg (118 lb 6.2 oz)   08/07/23 54.4 kg (120 lb)   07/10/23 54.5 kg (120 lb 3.2 oz)      No intake or output data in the 24 hours ending 10/11/23 1057  Physical Exam:      General Appearance:    Well developed and well nourished in no acute distress   Head:    Normocephalic, atraumatic   Eyes:            Conjunctivae normal, anicteric, no xanthelasma   Neck:   supple, trachea midline, no thyromegaly, no carotid bruit, no JVD, no elevated CVP   Lungs:     Clear to auscultation,respirations regular, even and                  unlabored    Heart:    Regular rhythm and normal rate, normal S1 and S2,            No murmur, no gallop, no rub, no click   Chest Wall:    No abnormalities  "observed   Abdomen:     Normal bowel sounds, no masses, no organomegaly, soft        nontender, nondistended, no guarding, no rebound                tenderness   Rectal:     Deferred   Extremities:   No edema. Moves all extremities well, no cyanosis, no erythema   Pulses:   Pulses palpable and equal bilaterally   Skin:   No bleeding, bruising or rash   Neurologic:   awake alert and oriented x3, speech clear and approp, no facial drooping     :    Monitor:      Results Review:         Sodium No results found for: \"NA\"   Potassium No results found for: \"K\"   Chloride No results found for: \"CL\"   Bicarbonate No results found for: \"PLASMABICARB\"   BUN No results found for: \"BUN\"   Creatinine No results found for: \"CREATININE\"   Calcium No results found for: \"CALCIUM\"   Magnesium No results found for: \"MG\"         No results found for: \"TROPONINT\"         Echo EF Estimated  No results found for: \"ECHOEFEST\"      Assessment/ Plan  Assessment & Plan   Active Hospital Problems    Diagnosis  POA    **Acute CVA (cerebrovascular accident) [I63.9]  Yes    Hypercholesteremia [E78.00]  Yes    Memory loss [R41.3]  Yes    Atrial fibrillation [I48.91]  Yes    Essential hypertension [I10]  Yes    Coronary artery disease involving native coronary artery of native heart without angina pectoris [I25.10]  Yes        Acute cva- right basal ganglia   Neuro foll     2. PAF     3 CAD   A. stent LAD/RCA 3/25/2020  B. Stent 2011     4 history of carotid stenosis followed by Dr. Mosqueda     Plan  Currently on asa/plavix/statin  Chadsvasc score 7, annual stroke risk is 11.2%, h/o gib with dapt 2020, reports c/scope 1 month ago without issues  Recommend ac and consider watchman if bleeding returns  Review echo  On eliquis and plavix        Jacqueline Yates MD  10/11/23  10:57 EDT        Left ventricular systolic function is normal. Left ventricular ejection fraction appears to be 56 - 60%.    Left ventricular diastolic function was " indeterminate.    Saline test results are positive for right to left atrial level shunt.

## 2023-10-11 NOTE — PLAN OF CARE
Goal Outcome Evaluation:  Plan of Care Reviewed With: patient        Progress: no change   NIH (1) Pt able to sleep most of the hs. No pain or discomfort voiced. Family at bedside.

## 2023-10-11 NOTE — DISCHARGE SUMMARY
Patient Name: Danyell Osborne  : 1935  MRN: [id number]    Date of Admission: 10/9/2023  Date of Discharge:  10/11/2023  Primary Care Physician: Shanae Osborne APRN      Chief Complaint:   Stroke found at outside facility with left-sided weakness    Discharge Diagnoses     Active Hospital Problems    Diagnosis  POA    **Acute CVA (cerebrovascular accident) [I63.9]  Yes    Hypercholesteremia [E78.00]  Yes    Memory loss [R41.3]  Yes    Atrial fibrillation [I48.91]  Yes    Essential hypertension [I10]  Yes    Coronary artery disease involving native coronary artery of native heart without angina pectoris [I25.10]  Yes      Resolved Hospital Problems   No resolved problems to display.        Hospital Course     Ms. Osborne is a 87 y.o. female with a history of peripheral vascular disease, atrial fibrillation, hypertension and coronary artery disease who presented to [facility] initially complaining of left-sided weakness.  Please see the admitting history and physical for further details.  She was found to have acute stroke and was admitted to the hospital for further evaluation and treatment.  She initially presented to an outside emergency room where she was seen and evaluated for left-sided weakness.  There she was found to have an acute stroke and transferred to our facility for neuro work-up and evaluation.  She was seen by cardiology and neurology during her evaluation.  Neurology is recommended to continue Plavix and discontinue aspirin with the addition of Eliquis at discharge.  This allows for better treatment of her underlying atrial fibrillation.  Cardiology evaluated and did do an echocardiogram that showed no concerning findings they recommended follow-up in their office in 2 to 3 weeks.  Neurology evaluated the patient and recommended continuing the antiplatelet and anticoagulant with the addition of statin therapy.  The patient's been intolerant to statins in the past  but has tolerated high-dose atorvastatin here in the hospital.  I discussed with her extensively on the day of discharge and she is willing to try to continue atorvastatin at this time.  If she develops myalgias or cramping she can follow-up with her primary care physician to discuss discontinuation of this medication.  Otherwise her labs were reviewed from the outside hospital and within normal limits is recommend that she get a CBC done in the next few weeks to evaluate after initiation of anticoagulation.  The plan was discussed with patient at the bedside all questions addressed and answered she stable to discharge home today          Day of Discharge     Subjective:  She is feeling better no new neurologic symptoms eager to go home.  Walked over 300 feet yesterday with physical therapy.    Physical Exam:  Temp:  [97.9 øF (36.6 øC)-98.3 øF (36.8 øC)] 98.3 øF (36.8 øC)  Heart Rate:  [71-91] 91  Resp:  [18] 18  BP: (120-178)/(47-66) 120/56  Body mass index is 20.25 kg/mý.  Physical Exam  Vitals reviewed.   Constitutional:       General: She is not in acute distress.     Appearance: She is ill-appearing.   Cardiovascular:      Rate and Rhythm: Normal rate and regular rhythm.   Pulmonary:      Effort: No respiratory distress.      Breath sounds: Normal breath sounds.   Abdominal:      General: Bowel sounds are normal.      Palpations: Abdomen is soft.   Neurological:      General: No focal deficit present.      Mental Status: She is alert. Mental status is at baseline.         Consultants     Consult Orders (all) (From admission, onward)       Start     Ordered    10/09/23 2317  Inpatient Cardiology Consult  Once        Specialty:  Cardiology  Provider:  Aliiro Roth MD    10/09/23 2316    10/09/23 2200  Notify Stroke Coordinator  Once        Provider:  (Not yet assigned)    10/09/23 2200    10/09/23 2200  Inpatient Rehab Admission Consult  Once        Provider:  (Not yet assigned)    10/09/23 2200     10/09/23 2200  Inpatient Case Management  Consult  Once        Provider:  (Not yet assigned)    10/09/23 2200    10/09/23 2200  Inpatient Diabetes Educator Consult  Once,   Status:  Canceled        Provider:  (Not yet assigned)    10/09/23 2200    10/09/23 2200  Inpatient Neurology Consult Stroke  Once        Specialty:  Neurology  Provider:  Ja Hudson MD    10/09/23 2200                  Procedures     * Surgery not found *    Imaging Results (All)       Procedure Component Value Units Date/Time    MRI Angiogram Head Without Contrast [[id number]] Collected: 10/10/23 1720     Updated: 10/11/23 0732    Narrative:      MRA OF THE HEAD WITHOUT CONTRAST     HISTORY: Left arm and leg weakness.     COMPARISON: MRI brain 10/09/2023.     FINDINGS: The diffusion sequence again demonstrates acute infarction  involving the posterior aspect of the putamen on the right measuring  approximately 1.5 cm in AP dimension. A smaller separate acute infarct  is identified involving the periventricular white matter of the right  frontal lobe posteriorly measuring approximately 7 mm. This appears  similar to 10/09/2023. Moderate small vessel ischemic disease is noted.     There is signal present within the distal aspect of the internal carotid  arteries which are of normal caliber. There is a focal protuberance  appreciated involving the left internal carotid artery at the ophthalmic  segment. This appears to be an infundibulum at the origin of the  ophthalmic artery. The proximal aspects of the anterior and middle  cerebral arteries appear unremarkable.     There is signal present within the distal aspect of the vertebral  arteries bilaterally. The right vertebral artery is larger than that of  the left. The basilar artery and the proximal aspects of the left  posterior cerebral artery appear unremarkable. There is moderate to  severe stenosis and irregularity involving the right P2 segment.        Impression:      Again identified are areas of acute infarction involving the  posterior aspect of the putamen on the right and to a lesser extent  periventricular white matter of the right frontal lobe posteriorly  similar to 10/09/2023. There is moderate to severe irregularity and  stenosis involving the right P2 segment.     This report was finalized on 10/11/2023 7:29 AM by Dr. Delmar Valadez M.D on Workstation: [id number]       MRI Outside Films [[id number]] Resulted: 10/10/23 1245     Updated: 10/10/23 1245    Narrative:      This procedure was auto-finalized with no dictation required.    CT Outside Films [[id number]] Resulted: 10/10/23 1243     Updated: 10/10/23 1243    Narrative:      This procedure was auto-finalized with no dictation required.    XR Outside Films [[id number]] Resulted: 10/10/23 1241     Updated: 10/10/23 1241    Narrative:      This procedure was auto-finalized with no dictation required.          Results for orders placed during the hospital encounter of 10/09/23    Duplex Carotid Ultrasound CAR    Interpretation Summary    Right internal carotid artery demonstrates a less than 50% stenosis.    Left internal carotid artery demonstrates a 50-69% stenosis.    Results for orders placed during the hospital encounter of 10/09/23    Adult Transthoracic Echo Complete W/ Cont if Necessary Per Protocol (With Agitated Saline)    Interpretation Summary    Left ventricular systolic function is normal. Left ventricular ejection fraction appears to be 56 - 60%.    Left ventricular diastolic function was indeterminate.    Saline test results are positive for right to left atrial level shunt.    Pertinent Labs                     Results from last 7 days   Lab Units 10/10/23  0502   CHOLESTEROL mg/dL 264*   TRIGLYCERIDES mg/dL 134   HDL CHOL mg/dL 50   LDL CHOL mg/dL 190*             Test Results Pending at Discharge       Discharge Details        Discharge Medications        New Medications         Instructions Start Date   apixaban 2.5 MG tablet tablet  Commonly known as: ELIQUIS   2.5 mg, Oral, Every 12 Hours Scheduled      atorvastatin 80 MG tablet  Commonly known as: LIPITOR   80 mg, Oral, Nightly             Continue These Medications        Instructions Start Date   Cholecalciferol 50 MCG (2000 UT) capsule   1 capsule, Oral, Daily      clopidogrel 75 MG tablet  Commonly known as: PLAVIX   1 tablet, Oral, Daily      GAS-X PO   Oral      metoprolol succinate XL 50 MG 24 hr tablet  Commonly known as: TOPROL-XL   50 mg, Oral, 2 Times Daily      MIRALAX PO   Oral      multivitamin with minerals tablet tablet   1 tablet, Oral, Daily      ondansetron ODT 4 MG disintegrating tablet  Commonly known as: ZOFRAN-ODT   4 mg, Translingual, Every 8 Hours PRN             Stop These Medications      aspirin 81 MG EC tablet     rosuvastatin 5 MG tablet  Commonly known as: Crestor              Allergies   Allergen Reactions    Contrast Dye (Echo Or Unknown Ct/Mr) Unknown - High Severity    Iodinated Contrast Media Unknown - High Severity    Penicillins Rash       Discharge Disposition:  Home-Health Care [facility]      Discharge Diet:  Diet Order   Procedures    Diet: Regular/House Diet; No Mixed Consistencies; Texture: Regular Texture (IDDSI 7); Fluid Consistency: Thin (IDDSI 0)       Discharge Activity:   Activity Instructions       Activity as Tolerated              CODE STATUS:    Code Status and Medical Interventions:   Ordered at: 10/09/23 2200     Code Status (Patient has no pulse and is not breathing):    CPR (Attempt to Resuscitate)     Medical Interventions (Patient has pulse or is breathing):    Full Support       Future Appointments   Date Time Provider Department Center   1/10/2024  1:00 PM Shanae Osborne APRN [facility]     Additional Instructions for the Follow-ups that You Need to Schedule       Discharge Follow-up with PCP   As directed       Currently Documented PCP:    Shanae Osborne APRN     PCP Phone Number:    [phone]     Follow Up Details: 1-2 weeks with CBC after initiation of eliquis        Discharge Follow-up with Specified Provider: cardiology; 2 Weeks   As directed      To: cardiology   Follow Up: 2 Weeks        Discharge Follow-up with Specified Provider: neurology; 3 Months   As directed      To: neurology   Follow Up: 3 Months               Follow-up Information       Shanae Osborne APRN .    Specialty: Family Medicine  Why: 1-2 weeks with CBC after initiation of eliquis  Contact information:  [address] [facility] [address]  [facility] [address]  [phone]                             Additional Instructions for the Follow-ups that You Need to Schedule       Discharge Follow-up with PCP   As directed       Currently Documented PCP:    Shanae Osborne APRN    PCP Phone Number:    [phone]     Follow Up Details: 1-2 weeks with CBC after initiation of eliquis        Discharge Follow-up with Specified Provider: cardiology; 2 Weeks   As directed      To: cardiology   Follow Up: 2 Weeks        Discharge Follow-up with Specified Provider: neurology; 3 Months   As directed      To: neurology   Follow Up: 3 Months            Time Spent on Discharge:  Greater than 30 minutes      Delmar Burleson MD  [facility]  10/11/23  15:10 EDT

## 2023-10-11 NOTE — SIGNIFICANT NOTE
10/11/23 1430   OTHER   Discipline occupational therapist   Rehab Time/Intention   Session Not Performed other (see comments)  (up independently, completing ADLs w/o assist. Plans to d/c home today. OT will sign off)   Therapy Assessment/Plan (PT)   Criteria for Skilled Interventions Met (PT) no problems identified which require skilled intervention

## 2023-10-11 NOTE — OUTREACH NOTE
Prep Survey      Flowsheet Row Responses   [facility] patient discharged from? [place]   Is LACE score < 7 ? No   Eligibility [facility]   Date of Admission 10/09/23   Date of Discharge 10/11/23   Discharge Disposition Home or Self Care   Discharge diagnosis Acute CVA (cerebrovascular accident)   Does the patient have one of the following disease processes/diagnoses(primary or secondary)? Stroke   Does the patient have Home health ordered? No   Is there a DME ordered? No   Prep survey completed? Yes            Ml MCKEON - Registered Nurse

## 2023-10-11 NOTE — PROGRESS NOTES
"DOS: 10/11/2023  NAME: Danyell Osborne   : 1935  PCP: Shanae Osborne APRN  Chief complaint: Stroke      Stroke    Subjective: She had episode yesterday afternoon of her left arm feeling heavy briefly but is currently back to baseline.   and daughter at the bedside.    Objective:  Vital signs: /56 (BP Location: Left arm, Patient Position: Lying)   Pulse 91   Temp 98.3 øF (36.8 øC) (Oral)   Resp 18   Ht 162.6 cm (64\")   Wt 53.5 kg (118 lb)   SpO2 95%   BMI 20.25 kg/mý       General appearance: Well developed, well nourished, well groomed, alert and cooperative.   HEENT: Normocephalic.   Neck; Supple.  Cardiac: Regular rate and rhythm.    Chest Exam: Clear to auscultation bilaterally, no wheezes, no rhonchi.  Extremities: Normal, no edema.   Skin: No rashes or birthmarks.      Higher integrative function: Oriented to time, place, person, intact recent and remote memory, attention span, concentration and language. Spontaneous speech, fund of vocabulary are normal.   CN II: Normal visual fields.   CN III IV VI: Extraocular movements are full without nystagmus. Pupils are equal, round, and reactive to light.   CN V: Symmetric to light touch bilaterally.  CN VII: Facial movements are symmetric, no weakness.   CN VIII: Auditory acuity is normal.   CN IX & X: Symmetric palatal movement.   CN XI: Sternocleidomastoid and trapezius are normal. No weakness.   CN XII: The tongue is midline. No atrophy or fasciculations.   Motor: Strength 5/5 x4.  No fasciculations, rigidity, spasticity or abnormal movements.   Sensation: Intact light touch in all extremities.  Station and gait: Deferred.  Coordination: Finger to nose test showed no dysmetria.   Patient reexamined, changes noted.    Scheduled Meds:apixaban, 2.5 mg, Oral, Q12H  atorvastatin, 80 mg, Oral, Nightly  clopidogrel, 75 mg, Oral, Daily  metoprolol succinate XL, 50 mg, Oral, BID  sodium chloride, 10 mL, Intravenous, Q12H      Continuous " Infusions:   PRN Meds:.  sodium chloride    sodium chloride    Laboratory results:  Lab Results   Component Value Date    GLUCOSE 104 (H) 07/10/2023    CALCIUM 9.9 07/10/2023     07/10/2023    K 5.2 07/10/2023    CO2 26.3 07/10/2023     07/10/2023    BUN 14 07/10/2023    CREATININE 0.78 07/10/2023    BCR 17.9 07/10/2023    ANIONGAP 10.7 07/10/2023     Lab Results   Component Value Date    WBC 7.17 07/10/2023    HGB 12.7 07/10/2023    HCT 39.1 07/10/2023    MCV 91.4 07/10/2023     07/10/2023     Lab Results   Component Value Date    CHOL 264 (H) 10/10/2023    CHOL 275 (H) 07/10/2023     Lab Results   Component Value Date    HDL 50 10/10/2023    HDL 57 07/10/2023    HDL 61 (H) 03/23/2021     Lab Results   Component Value Date     (H) 10/10/2023     (H) 07/10/2023     (H) 03/23/2021     Lab Results   Component Value Date    TRIG 134 10/10/2023    TRIG 129 07/10/2023    TRIG 134 03/23/2021         Lab 10/10/23  0502   HEMOGLOBIN A1C 5.90*      Review and interpretation of imaging: MRI brain images viewed by me, shows few small strokes in the right basal ganglia  Adult Transthoracic Echo Complete W/ Cont if Necessary Per Protocol (With Agitated Saline)    Result Date: 10/11/2023    Left ventricular systolic function is normal. Left ventricular ejection fraction appears to be 56 - 60%.   Left ventricular diastolic function was indeterminate.   Saline test results are positive for right to left atrial level shunt.     MRI Angiogram Head Without Contrast    Result Date: 10/11/2023  MRA OF THE HEAD WITHOUT CONTRAST  HISTORY: Left arm and leg weakness.  COMPARISON: MRI brain 10/09/2023.  FINDINGS: The diffusion sequence again demonstrates acute infarction involving the posterior aspect of the putamen on the right measuring approximately 1.5 cm in AP dimension. A smaller separate acute infarct is identified involving the periventricular white matter of the right frontal lobe posteriorly  measuring approximately 7 mm. This appears similar to 10/09/2023. Moderate small vessel ischemic disease is noted.  There is signal present within the distal aspect of the internal carotid arteries which are of normal caliber. There is a focal protuberance appreciated involving the left internal carotid artery at the ophthalmic segment. 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 The proximal aspects of the anterior and middle cerebral arteries appear unremarkable.  There is signal present within the distal aspect of the vertebral arteries bilaterally. The right vertebral artery is larger than that of the left. The basilar artery and the proximal aspects of the left posterior cerebral artery appear unremarkable. There is moderate to severe stenosis and irregularity involving the right P2 segment.      Again identified are areas of acute infarction involving the posterior aspect of the putamen on the right and to a lesser extent periventricular white matter of the right frontal lobe posteriorly similar to 10/09/2023. There is moderate to severe irregularity and stenosis involving the right P2 segment.  This report was finalized on 10/11/2023 7:29 AM by Dr. Delmar Valadez M.D on Workstation: [id number]      Duplex Carotid Ultrasound CAR    Result Date: 10/10/2023    Right internal carotid artery demonstrates a less than 50% stenosis.   Left internal carotid artery demonstrates a 50-69% stenosis.     MRI Outside Films    Result Date: 10/10/2023  This procedure was auto-finalized with no dictation required.    CT Outside Films    Result Date: 10/10/2023  This procedure was auto-finalized with no dictation required.    XR Outside Films    Result Date: 10/10/2023  This procedure was auto-finalized with no dictation required.    Mri brain wo  mra head wo    Result Date: 10/9/2023  MRI BRAIN HISTORY: Left arm and leg heaviness. PROCEDURE: Multiplanar MR imaging of the brain was performed in  multiple MR sequences. FINDINGS: Limited images of  the proximal cord are unremarkable. The ventricles are  enlarged. There is diffuse atrophy. There is moderate periventricular and deep white matter change likely related to small vessel disease. There is no extra-axial fluid or midline shift. There is no evidence of acute hemorrhage. Flow-voids are appropriate. Diffusion weighted images demonstrate an acute multifocal infarct in the right basal ganglia. Limited images of the paranasal sinuses are unremarkable.    Acute right basal ganglia infarct. Dr. Karena Hopper was phoned this report. Images reviewed, interpreted, and dictated by Dr. BAILEE Alexis. Transcribed by Ricardo Quispe PA-C.    XR Chest 1 View    Result Date: 10/9/2023  PORTABLE CHEST    10/9/2023 12:46 PM HISTORY: Left arm and left leg numbness. COMPARISON: December 2022. FINDINGS: Normal heart size. Coronary artery stents. Partially calcified scarring in the left apex. No consolidation or edema. No pleural  effusion or pneumothorax. No acute osseous findings.    No acute cardiopulmonary process . Images reviewed, interpreted, and dictated by Anders Chauhan DO    CT Head Without Contrast    Result Date: 10/9/2023  HEAD CT     10/9/2023 12:46 PM HISTORY: Left arm and leg numbness. COMPARISON: December 2014. TECHNIQUE: Multiple axial CT images were performed from the foramen magnum to the vertex. Individualized dose reduction techniques using automated exposure control or adjustment of mA and/or kV according to the patient size were employed. FINDINGS: No evidence of infarction, hemorrhage, or mass. Mild chronic small vessel ischemic changes. No encephalomalacia. Generalized atrophy without hydrocephalus. Paranasal sinuses and mastoid air cells are clear.     No acute intracranial process. Images reviewed, interpreted, and dictated by Anders Chauhan DO     Impression:  Danyell Osborne is a 87 y.o. female with hypertension, hyperlipidemia intolerant to  statins, PAF not anticoagulated, carotid disease and CAD status post stent (approximately 3 years prior, on DAPT with low-dose aspirin and Plavix) who was admitted with stroke.     Last Wednesday she had an episode of left leg and arm and hand heaviness which resolved.  Symptoms recurred on Sunday.  She denies any associated speech difficulty, vision change, or headache.  She reports she is back to baseline.  She went to [facility] yesterday and CT head which showed no acute findings but MRI brain showed right basal ganglia stroke per report so she was transferred here for further evaluation.  She does have a reported contrast allergy and notes previous rash following contrast administration.  She had previous GI bleed on DAPT following cardiac stenting in 2020 and her DAPT was changed to aspirin and Plavix.  Denies any recent bleeding issues.  She follows with Dr. Mosqueda as an outpatient for carotid stenosis.    Work-up:  MRI brain: Right basal ganglia stroke  Carotid ultrasound: Right ICA less than 50% stenosis, left ICA 50 to 69% stenosis.  MRA head: Moderate to severe irregularity and stenosis in the right P2 segment.  2D echo: EF 56 to 60%, normal left atrial cavity size, saline test results positive for right to left atrial level shunt.  Trace aortic valve regurgitation.    Diagnosis:  Right basal ganglia stroke  Known carotid stenosis  PAF  CAD status post remote stent  Right PCA stenosis incidental    Plan:  Cardiology started Eliquis 2.5 mg twice daily, continue single antiplatelet, Plavix 75 mg daily  Lipitor 80 mg started  Continue to follow with Dr. Mosqueda for known carotid disease  Neurochecks  BP control  Stroke Education  JEWEL/SCDs  PT/OT/ST  D/W Dr Soto today. Ok for d/c from neurology standpoint. Will sign off.   Will arrange f/u with me in 3 mo.

## 2023-10-12 ENCOUNTER — TRANSITIONAL CARE MANAGEMENT TELEPHONE ENCOUNTER (OUTPATIENT)
Dept: CALL CENTER | Facility: HOSPITAL | Age: 88
End: 2023-10-12
Payer: MEDICARE

## 2023-10-12 NOTE — OUTREACH NOTE
Call Center TCM Note      Flowsheet Row Responses   [facility] patient discharged from? [facility]   Does the patient have one of the following disease processes/diagnoses(primary or secondary)? Stroke   TCM attempt successful? Yes  [ VR, however per CM notes  involved in care.]   Call start time [phone]   Call end time [phone]   Discharge diagnosis Acute CVA (cerebrovascular accident)   Meds reviewed with patient/caregiver? Yes   Is the patient having any side effects they believe may be caused by any medication additions or changes? No   Does the patient have all medications ordered at discharge? Yes   Is the patient taking all medications as directed (includes completed medication regime)? Yes   Comments HOSP DC FU appt 10/18/23 230 pm.   Does the patient have an appointment with their PCP within 7-14 days of discharge? Yes   Has home health visited the patient within 72 hours of discharge? N/A   Psychosocial issues? No   Does the patient require any assistance with activities of daily living such as eating, bathing, dressing, walking, etc.? No   Does the patient have any residual symptoms from stroke/TIA? No   Did the patient receive a copy of their discharge instructions? Yes   Nursing interventions Reviewed instructions with patient   What is the patient's perception of their health status since discharge? Improving   Nursing interventions Nurse provided patient education   Is the patient/caregiver able to teach back the risk factors for a stroke? High blood pressure-goal below 120/80, High Cholesterol, Carotid or other artery disease, History of TIAs, History of Afib   Is the patient/caregiver able to teach back signs and symptoms related to disease process for when to call PCP? Yes   Is the patient/caregiver able to teach back signs and symptoms related to disease process for when to call 911? Yes   Is the patient/caregiver able to teach back the hierarchy of who to call/visit for  symptoms/problems? PCP, Specialist, Home health nurse, Urgent Care, ED, 911 Yes   Is the patient able to teach back FAST for Stroke? B alance: Watch for sudden loss of balance, E yes: Check for vision loss, F ace: Look for an uneven smile, A rm: Check if one arm is weak, S peech: Listen for slurred speech, T yuli: Call 9-1-1 right away   TCM call completed? Yes   Wrap up additional comments Sweet lady reports she is doing ok. No needs at this time. Reviewed [facility].   Call end time [phone]            Jamaica Hernandez RN    10/12/2023, 08:43 EDT

## 2023-10-13 NOTE — CASE MANAGEMENT/SOCIAL WORK
Case Management Discharge Note      Final Note: Home no additional dc orders noted for CCP. Javier RN/CCP    Provided Post Acute Provider List?: Refused  Refused Provider List Comment: declined HH need    Selected Continued Care - Discharged on 10/11/2023 Admission date: 10/9/2023 - Discharge disposition: Home-Health Care Svc      Destination    No services have been selected for the patient.                Durable Medical Equipment    No services have been selected for the patient.                Dialysis/Infusion    No services have been selected for the patient.                Home Medical Care    No services have been selected for the patient.                Therapy    No services have been selected for the patient.                Community Resources    No services have been selected for the patient.                Community & DME    No services have been selected for the patient.                    Transportation Services  Private: Car    Final Discharge Disposition Code: 01 - home or self-care

## 2023-10-18 ENCOUNTER — LAB (OUTPATIENT)
Dept: LAB | Facility: HOSPITAL | Age: 88
End: 2023-10-18
Payer: MEDICARE

## 2023-10-18 ENCOUNTER — OFFICE VISIT (OUTPATIENT)
Dept: FAMILY MEDICINE CLINIC | Age: 88
End: 2023-10-18
Payer: MEDICARE

## 2023-10-18 VITALS
OXYGEN SATURATION: 98 % | BODY MASS INDEX: 20.32 KG/M2 | DIASTOLIC BLOOD PRESSURE: 74 MMHG | TEMPERATURE: 98.4 F | WEIGHT: 119 LBS | SYSTOLIC BLOOD PRESSURE: 138 MMHG | HEIGHT: 64 IN

## 2023-10-18 DIAGNOSIS — M79.10 MYALGIA: ICD-10-CM

## 2023-10-18 DIAGNOSIS — R53.1 LEFT-SIDED WEAKNESS: ICD-10-CM

## 2023-10-18 DIAGNOSIS — I10 ESSENTIAL HYPERTENSION: ICD-10-CM

## 2023-10-18 DIAGNOSIS — I63.9 ACUTE CVA (CEREBROVASCULAR ACCIDENT): Primary | ICD-10-CM

## 2023-10-18 DIAGNOSIS — M25.561 ARTHRALGIA OF BOTH KNEES: ICD-10-CM

## 2023-10-18 DIAGNOSIS — R09.82 PND (POST-NASAL DRIP): ICD-10-CM

## 2023-10-18 DIAGNOSIS — E78.00 HYPERCHOLESTEREMIA: ICD-10-CM

## 2023-10-18 DIAGNOSIS — M25.562 ARTHRALGIA OF BOTH KNEES: ICD-10-CM

## 2023-10-18 DIAGNOSIS — I48.91 ATRIAL FIBRILLATION, UNSPECIFIED TYPE: ICD-10-CM

## 2023-10-18 LAB
ALBUMIN SERPL-MCNC: 4.3 G/DL (ref 3.5–5.2)
ALBUMIN/GLOB SERPL: 1.3 G/DL
ALP SERPL-CCNC: 74 U/L (ref 39–117)
ALT SERPL W P-5'-P-CCNC: 24 U/L (ref 1–33)
ANION GAP SERPL CALCULATED.3IONS-SCNC: 10 MMOL/L (ref 5–15)
AST SERPL-CCNC: 30 U/L (ref 1–32)
BASOPHILS # BLD AUTO: 0.03 10*3/MM3 (ref 0–0.2)
BASOPHILS NFR BLD AUTO: 0.4 % (ref 0–1.5)
BILIRUB SERPL-MCNC: 0.4 MG/DL (ref 0–1.2)
BUN SERPL-MCNC: 18 MG/DL (ref 8–23)
BUN/CREAT SERPL: 24 (ref 7–25)
CALCIUM SPEC-SCNC: 9.8 MG/DL (ref 8.6–10.5)
CHLORIDE SERPL-SCNC: 98 MMOL/L (ref 98–107)
CK SERPL-CCNC: 54 U/L (ref 20–180)
CO2 SERPL-SCNC: 27 MMOL/L (ref 22–29)
CREAT SERPL-MCNC: 0.75 MG/DL (ref 0.57–1)
DEPRECATED RDW RBC AUTO: 45.6 FL (ref 37–54)
EGFRCR SERPLBLD CKD-EPI 2021: 77.6 ML/MIN/1.73
EOSINOPHIL # BLD AUTO: 0.06 10*3/MM3 (ref 0–0.4)
EOSINOPHIL NFR BLD AUTO: 0.9 % (ref 0.3–6.2)
ERYTHROCYTE [DISTWIDTH] IN BLOOD BY AUTOMATED COUNT: 13.5 % (ref 12.3–15.4)
GLOBULIN UR ELPH-MCNC: 3.4 GM/DL
GLUCOSE SERPL-MCNC: 97 MG/DL (ref 65–99)
HCT VFR BLD AUTO: 44 % (ref 34–46.6)
HGB BLD-MCNC: 13.9 G/DL (ref 12–15.9)
IMM GRANULOCYTES # BLD AUTO: 0.01 10*3/MM3 (ref 0–0.05)
IMM GRANULOCYTES NFR BLD AUTO: 0.1 % (ref 0–0.5)
LYMPHOCYTES # BLD AUTO: 1.42 10*3/MM3 (ref 0.7–3.1)
LYMPHOCYTES NFR BLD AUTO: 20.5 % (ref 19.6–45.3)
MCH RBC QN AUTO: 28.6 PG (ref 26.6–33)
MCHC RBC AUTO-ENTMCNC: 31.6 G/DL (ref 31.5–35.7)
MCV RBC AUTO: 90.5 FL (ref 79–97)
MONOCYTES # BLD AUTO: 0.68 10*3/MM3 (ref 0.1–0.9)
MONOCYTES NFR BLD AUTO: 9.8 % (ref 5–12)
NEUTROPHILS NFR BLD AUTO: 4.71 10*3/MM3 (ref 1.7–7)
NEUTROPHILS NFR BLD AUTO: 68.3 % (ref 42.7–76)
PLATELET # BLD AUTO: 436 10*3/MM3 (ref 140–450)
PMV BLD AUTO: 9.6 FL (ref 6–12)
POTASSIUM SERPL-SCNC: 4.4 MMOL/L (ref 3.5–5.2)
PROT SERPL-MCNC: 7.7 G/DL (ref 6–8.5)
RBC # BLD AUTO: 4.86 10*6/MM3 (ref 3.77–5.28)
SODIUM SERPL-SCNC: 135 MMOL/L (ref 136–145)
WBC NRBC COR # BLD: 6.91 10*3/MM3 (ref 3.4–10.8)

## 2023-10-18 PROCEDURE — 85025 COMPLETE CBC W/AUTO DIFF WBC: CPT

## 2023-10-18 PROCEDURE — 36415 COLL VENOUS BLD VENIPUNCTURE: CPT

## 2023-10-18 PROCEDURE — 82550 ASSAY OF CK (CPK): CPT

## 2023-10-18 PROCEDURE — 80053 COMPREHEN METABOLIC PANEL: CPT

## 2023-10-18 NOTE — ASSESSMENT & PLAN NOTE
Reviewed hospital records, She was on ASA, and now on plavik and eliquis , rechecking CBC today   Neurology follow up is 3 months from hospitalization   (She saw Marietta VELEZ in [facility] )

## 2023-10-18 NOTE — PROGRESS NOTES
Transitional Care Follow Up Visit  Subjective     Danyell GRACIA Dylon is a 87 y.o. female who presents for a transitional care management visit.    Here with her  today, Eze Osborne    Within 48 business hours after discharge our office contacted her via telephone to coordinate her care and needs.      I reviewed and discussed the details of that call along with the discharge summary, hospital problems, inpatient lab results, inpatient diagnostic studies, and consultation reports with Danyell.     Current outpatient and discharge medications have been reconciled for the patient.  Reviewed by: Shanae Osborne, AGUSTIN          10/11/2023     6:51 PM   Date of TCM Phone Call   [facility]   Date of Admission 10/9/2023   Date of Discharge 10/11/2023   Discharge Disposition Home or Self Care       Risk for Readmission (LACE) Score: 6 (10/11/2023  6:00 AM)      History of Present Illness  [facility] appt  Admitted: 10-9-23  Discharged: 10-11-23   Condition: improved     Came to our office on 10-9-23 and then was sent to [facility] ER and then was transferred to [facility]:   Discharge summary:  Ms. Osborne is a 87 y.o. female with a history of peripheral vascular disease, atrial fibrillation, hypertension and coronary artery disease who presented to [facility] initially complaining of left-sided weakness.  Please see the admitting history and physical for further details.  She was found to have acute stroke and was admitted to the hospital for further evaluation and treatment.  She initially presented to an outside emergency room where she was seen and evaluated for left-sided weakness.  There she was found to have an acute stroke and transferred to our facility for neuro work-up and evaluation.  She was seen by cardiology and neurology during her evaluation.  Neurology is recommended to continue Plavix and discontinue aspirin with the addition of Eliquis at discharge.  This  allows for better treatment of her underlying atrial fibrillation.  Cardiology evaluated and did do an echocardiogram that showed no concerning findings they recommended follow-up in their office in 2 to 3 weeks.  Neurology evaluated the patient and recommended continuing the antiplatelet and anticoagulant with the addition of statin therapy.  The patient's been intolerant to statins in the past but has tolerated high-dose atorvastatin here in the hospital.  I discussed with her extensively on the day of discharge and she is willing to try to continue atorvastatin at this time.  If she develops myalgias or cramping she can follow-up with her primary care physician to discuss discontinuation of this medication.  Otherwise her labs were reviewed from the outside hospital and within normal limits is recommend that she get a CBC done in the next few weeks to evaluate after initiation of anticoagulation.  The plan was discussed with patient at the bedside all questions addressed and answered she stable to discharge home today     Hypertension:  Current medication: metoprolol BID  Tolerating Medication: Yes  Checking BP at home and it is: not checking but has a bp monitor  Needs refills: Yes  Labs:  Lab Results       Component                Value               Date                       GLUCOSE                  104 (H)             07/10/2023                 BUN                      14                  07/10/2023                 CREATININE               0.78                07/10/2023                 BCR                      17.9                07/10/2023                 K                        5.2                 07/10/2023                 CO2                      26.3                07/10/2023                 CALCIUM                  9.9                 07/10/2023                 ALBUMIN                  4.2                 07/10/2023                 LABIL2                   1.4                 03/23/2021                  AST                      23                  07/10/2023                 ALT                      11                  07/10/2023                Hyperlipidemia  Current medication: high dose lipitor   Tolerating medication: she is not sure, some leg and arm pains and knee pains, worse at HS   Needs Refill: no    Lab Results       Component                Value               Date                       CHOL                     264 (H)             10/10/2023                 CHLPL                    290 (H)             2021                 TRIG                     134                 10/10/2023                 HDL                      50                  10/10/2023                 LDL                      190 (H)             10/10/2023                  PAST MEDICAL HISTORY changes since 2023:      Hospitalized acute stroke 10-9-23   covid     Atrial Fibrillation: dx'd in 3-2020;     Carotid Artery Stenosis: sees santy;     Coronary Artery Disease: dx'd in 3-23-20;     Myocardial Infarction: due to occlusion of the LAD, RCA, and stened again 3-25-20; heart cath/ stenting for NSTEMI;     AAA         Hospitalizations:     A fib     Coronary Artery Disease last admit date 3-23-20 anemia, admitted once on 20         CURRENT MEDICAL PROVIDERS:    Cardiologist: Fozia         PREVENTIVE HEALTH MAINTENANCE                 COLORECTAL CANCER SCREENING: Up to date (colonoscopy q10y; sigmoidoscopy q5y; Cologuard q3y) was last done  3-2023 no colitits   Also CLN done on 3/2017, Results are in chart; colonoscopy with normal results           Surgical History:       EGD and CLN 3-2023    Biopsy of breast; benign    Coronary Artery Stent Placement: 2011;     Hysterectomy: at age 30; ovaries intact;     left foot neuroma;     Procedures:    Colonoscopy ( 11/normal/Ronna )    EGD (  normal ) &  esophageal spasms         Family History:     Father:  at age 80's; Cause of death was  "TB;  Parkinson's Disease     Mother:  at age 30's; Cause of death was renal issues;  goiter     Sister(s): 3 sister(s) total; 1 ; Neurological/Genetic Cerebrovascular Accident; Endocrine Type 2 Diabetes         Social History:     Occupation: quit working 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/[employer];     Marital Status:      Children: 6           Course During Hospital Stay(Copied from D/C Summary and reviewed during encounter on 10/18/2023 by AGUSTIN Jimenez):      The following portions of the patient's history were reviewed and updated as appropriate: allergies, current medications, past family history, past medical history, past social history, past surgical history, and problem list.      Current Outpatient Medications:     apixaban (ELIQUIS) 2.5 MG tablet tablet, Take 1 tablet by mouth Every 12 (Twelve) Hours. Indications: Atrial Fibrillation, Disp: 60 tablet, Rfl: 0    atorvastatin (LIPITOR) 80 MG tablet, Take 1 tablet by mouth Every Night., Disp: 90 tablet, Rfl: 0    Cholecalciferol 50 MCG (2000 UT) capsule, Take 1 capsule by mouth Daily., Disp: , Rfl:     clopidogrel (PLAVIX) 75 MG tablet, Take 1 tablet by mouth Daily., Disp: , Rfl:     metoprolol succinate XL (TOPROL-XL) 50 MG 24 hr tablet, Take 1 tablet by mouth 2 (Two) Times a Day., Disp: , Rfl:     multivitamin with minerals tablet tablet, Take 1 tablet by mouth Daily., Disp: , Rfl:     ondansetron ODT (ZOFRAN-ODT) 4 MG disintegrating tablet, Place 1 tablet on the tongue Every 8 (Eight) Hours As Needed for Nausea., Disp: 15 tablet, Rfl: 0    Polyethylene Glycol 3350 (MIRALAX PO), Take  by mouth., Disp: , Rfl:     Simethicone (GAS-X PO), Take  by mouth., Disp: , Rfl:      Vitals:    10/18/23 1419 10/18/23 1448   BP: (!) 183/61 138/74   Temp: 98.4 °F (36.9 °C)    TempSrc: Oral    SpO2: 98%  Comment: room air    Weight: 54 kg (119 lb)    Height: 162.6 cm (64.02\")        Advance Care Planning     Review of Systems   Constitutional:  Negative " for fever.   HENT:  Positive for postnasal drip.    Respiratory:  Negative for cough and shortness of breath.    Cardiovascular:  Negative for chest pain, palpitations and leg swelling.   Musculoskeletal:  Positive for arthralgias (knee pain) and myalgias (legs).   Neurological:  Positive for weakness (mild to her left side, using wheelchair here today and has a walker at home).   Hematological:         Occ nose bleeds, not now    Psychiatric/Behavioral:          She and her family have been under great stress recently, starting to get a little better.          Objective   Physical Exam  Vitals reviewed.   Constitutional:       General: She is not in acute distress.     Appearance: Normal appearance.   Neck:      Vascular: No carotid bruit.   Cardiovascular:      Rate and Rhythm: Normal rate and regular rhythm.      Heart sounds: Normal heart sounds. No murmur heard.  Pulmonary:      Effort: Pulmonary effort is normal. No respiratory distress.      Breath sounds: Normal breath sounds.   Musculoskeletal:      Right lower leg: No edema.      Left lower leg: No edema.   Neurological:      Mental Status: She is alert.   Psychiatric:         Mood and Affect: Mood normal.         Behavior: Behavior normal.         DATA REVIEWED:    Data Reviewed:    MRI Angiogram Head Without Contrast    Result Date: 10/11/2023  Impression: Again identified are areas of acute infarction involving the posterior aspect of the putamen on the right and to a lesser extent periventricular white matter of the right frontal lobe posteriorly similar to 10/09/2023. There is moderate to severe irregularity and stenosis involving the right P2 segment.  This report was finalized on 10/11/2023 7:29 AM by Dr. Delmar Valadez M.D on Workstation: [id number]      Mri brain wo  mra head wo    Result Date: 10/9/2023  Impression: Acute right basal ganglia infarct. Dr. Karena Hopper was phoned this report. Images reviewed, interpreted, and dictated by Dr. MOROCHO  Can Alexis. Transcribed by Ricardo Quispe PA-C.    XR Chest 1 View    Result Date: 10/9/2023  Impression: No acute cardiopulmonary process . Images reviewed, interpreted, and dictated by Anders Chauhan DO    CT Head Without Contrast    Result Date: 10/9/2023  Impression: No acute intracranial process. Images reviewed, interpreted, and dictated by Anders Chauhan DO     Assessment & Plan     Diagnoses and all orders for this visit:    1. Acute CVA (cerebrovascular accident) (Primary)  Assessment & Plan:  Reviewed hospital records, She is on ASA, plavik and eliquis , rechecking CBC today   Neurology follow up is 3 months from hospitalization   (She saw Marietta VELEZ in [facility] )     Orders:  -     Ambulatory Referral to Physical Therapy Evaluate and treat (history of CVA, left sided weakness); Left    2. Essential hypertension  Assessment & Plan:  Repeat bp improved, log sheet given, to monitor BP at home. Continue current med. Continue to modify diet and lifestyle.     Orders:  -     Comprehensive metabolic panel; Future    3. Atrial fibrillation, unspecified type  Assessment & Plan:  Keep upcoming cardiology appt, tomorrow with Joaquín and continue current rx's, recheck CBC today     Orders:  -     CBC w AUTO Differential; Future    4. Hypercholesteremia  Assessment & Plan:  continue lipitor high dose, take co Q 10, checking CK today, discuss with cardiology tomorrow, reviewed last lipid at hospital     Orders:  -     CK; Future    5. Left-sided weakness  Assessment & Plan:  Will send to PT/OT for therapy     Orders:  -     Ambulatory Referral to Physical Therapy Evaluate and treat (history of CVA, left sided weakness); Left    6. Arthralgia of both knees  Assessment & Plan:  Discussed OA and knee pain and muscle pain and her statin     Orders:  -     CK; Future    7. Myalgia  Assessment & Plan:  Checking a CK today     Orders:  -     CK; Future    8. PND (post-nasal drip)  Assessment & Plan:  She asked about  flonase, okay to use, stop coricidin, try zyrtec           Follow Up      Return if symptoms worsen or fail to improve, for followup pending lab results.

## 2023-10-18 NOTE — ASSESSMENT & PLAN NOTE
Keep upcoming cardiology appt, tomorrow with Joaquín and continue current rx's, recheck CBC today

## 2023-10-18 NOTE — ASSESSMENT & PLAN NOTE
Repeat bp improved, log sheet given, to monitor BP at home. Continue current med. Continue to modify diet and lifestyle.

## 2023-10-18 NOTE — ASSESSMENT & PLAN NOTE
continue lipitor high dose, take co Q 10, checking CK today, discuss with cardiology tomorrow, reviewed last lipid at hospital

## 2023-12-27 ENCOUNTER — TELEPHONE (OUTPATIENT)
Dept: NEUROLOGY | Facility: CLINIC | Age: 88
End: 2023-12-27
Payer: MEDICARE

## 2023-12-27 NOTE — TELEPHONE ENCOUNTER
Spoke w/ Pt regarding reschedule due to provider being out of the office & mailed reminder sent per pt.

## 2024-01-10 ENCOUNTER — OFFICE VISIT (OUTPATIENT)
Dept: FAMILY MEDICINE CLINIC | Age: 89
End: 2024-01-10
Payer: MEDICARE

## 2024-01-10 VITALS
TEMPERATURE: 97.7 F | SYSTOLIC BLOOD PRESSURE: 163 MMHG | BODY MASS INDEX: 20.62 KG/M2 | WEIGHT: 120.8 LBS | HEART RATE: 69 BPM | DIASTOLIC BLOOD PRESSURE: 73 MMHG | HEIGHT: 64 IN

## 2024-01-10 DIAGNOSIS — I10 ESSENTIAL HYPERTENSION: ICD-10-CM

## 2024-01-10 DIAGNOSIS — Z00.00 ROUTINE GENERAL MEDICAL EXAMINATION AT A HEALTH CARE FACILITY: Primary | ICD-10-CM

## 2024-01-10 DIAGNOSIS — I25.10 CORONARY ARTERY DISEASE INVOLVING NATIVE CORONARY ARTERY OF NATIVE HEART WITHOUT ANGINA PECTORIS: ICD-10-CM

## 2024-01-10 DIAGNOSIS — I63.9 ACUTE CVA (CEREBROVASCULAR ACCIDENT): ICD-10-CM

## 2024-01-10 RX ORDER — NITROGLYCERIN 0.4 MG/1
TABLET SUBLINGUAL
COMMUNITY
Start: 2023-10-16

## 2024-01-10 NOTE — ASSESSMENT & PLAN NOTE
Advise regular exercise, healthy eating, always wear seat belts. Living will discussed, to complete and bring in a copy, fall prevention discussed.  Immunizations discussed. Declines vaccines today.  Discussed DEXA, declines today, to do monthly BSE   To continue yearly optometry and dental exams.

## 2024-01-10 NOTE — PROGRESS NOTES
The ABCs of the Annual Wellness Visit  Subsequent Medicare Wellness Visit    Subjective    Danyell Osborne is a 88 y.o. female who presents for a Subsequent Medicare Wellness Visit.    Medicare wellness HPI  Exercises regularly:tries   Eats healthy:tries   Last mammogram:7-2023, did ultrasound 8-9-23, resume annual screening   Last DEXA:declines   Last pap smear:n/a  BSE:no   Wears seatbelts:yes   Living will:no, has a booklet   Optometrist:Jay   Dentist:Dr Kt ESPINOZA  Tobacco:none   Alcohol intake:none  Drugs:none   Falls:none   Colonoscopy: 3-2023  Immunizations: declines today     The following portions of the patient's history were reviewed and   updated as appropriate: allergies, current medications, past family history, past medical history, past social history, past surgical history, and problem list.    Compared to one year ago, the patient feels her physical   health is the same.    Compared to one year ago, the patient feels her mental   health is the same.    Recent Hospitalizations:  This patient has had a [facility] admission record on file within the last 365 days.    Current Medical Providers:  Patient Care Team:  Shanae Osborne APRN as PCP - General (Family Medicine)    Outpatient Medications Prior to Visit   Medication Sig Dispense Refill    apixaban (ELIQUIS) 2.5 MG tablet tablet Take 1 tablet by mouth Every 12 (Twelve) Hours. Indications: Atrial Fibrillation 60 tablet 0    atorvastatin (LIPITOR) 80 MG tablet Take 1 tablet by mouth Every Night. 90 tablet 0    Cholecalciferol 50 MCG (2000 UT) capsule Take 1 capsule by mouth Daily.      clopidogrel (PLAVIX) 75 MG tablet Take 1 tablet by mouth Daily.      metoprolol succinate XL (TOPROL-XL) 50 MG 24 hr tablet Take 1 tablet by mouth 2 (Two) Times a Day.      multivitamin with minerals tablet tablet Take 1 tablet by mouth Daily.      nitroglycerin (NITROSTAT) 0.4 MG SL tablet DISSOLVE 1 TABLET UNDER THE TONGUE EVERY 5 MINUTES AS NEEDED  "FOR CHEST PAIN. DO NOT EXCEED A TOTAL OF 3 DOSES IN 15 MINUTES. IF NO RELIEF, CALL 911      Polyethylene Glycol 3350 (MIRALAX PO) Take  by mouth.      Simethicone (GAS-X PO) Take  by mouth.      ondansetron ODT (ZOFRAN-ODT) 4 MG disintegrating tablet Place 1 tablet on the tongue Every 8 (Eight) Hours As Needed for Nausea. (Patient not taking: Reported on 1/10/2024) 15 tablet 0     No facility-administered medications prior to visit.       No opioid medication identified on active medication list. I have reviewed chart for other potential  high risk medication/s and harmful drug interactions in the elderly.        Aspirin is not on active medication list.  Aspirin use is not indicated based on review of current medical condition/s. Risk of harm outweighs potential benefits.  .    Patient Active Problem List   Diagnosis    Essential hypertension    Coronary artery disease involving native coronary artery of native heart without angina pectoris    Skin lesions    Esophageal spasm    Stress    Other headache syndrome    Atrial fibrillation    Routine general medical examination at a health care facility    Cough    Screening mammogram for breast cancer    Postmenopausal    Acute conjunctivitis of left eye    Other constipation    Memory loss    Ganglion cyst of finger    Hypercholesteremia    Acute CVA (cerebrovascular accident)    Left-sided weakness    Arthralgia of both knees    Myalgia    PND (post-nasal drip)     Advance Care Planning   Advance Care Planning     Advance Directive is not on file.  ACP discussion was held with the patient during this visit. Patient has a booklet, advised to complete and return      Objective    Vitals:    01/10/24 1252 01/10/24 1319   BP: 150/55 163/73   BP Location: Right arm Right arm   Patient Position: Sitting Sitting   Cuff Size: Adult Adult   Pulse: 66 69   Temp: 97.7 °F (36.5 °C)    TempSrc: Oral    Weight: 54.8 kg (120 lb 12.8 oz)    Height: 162.6 cm (64.02\")      Estimated " "body mass index is 20.72 kg/m² as calculated from the following:    Height as of this encounter: 162.6 cm (64.02\").    Weight as of this encounter: 54.8 kg (120 lb 12.8 oz).    BMI is within normal parameters. No other follow-up for BMI required.      Does the patient have evidence of cognitive impairment? No          HEALTH RISK ASSESSMENT    Smoking Status:  Social History     Tobacco Use   Smoking Status Never    Passive exposure: Never   Smokeless Tobacco Never     Alcohol Consumption:  Social History     Substance and Sexual Activity   Alcohol Use Never     Fall Risk Screen:    STEADI Fall Risk Assessment was completed, and patient is at LOW risk for falls.Assessment completed on:1/10/2024    Depression Screenin/10/2024    12:53 PM   PHQ-2/PHQ-9 Depression Screening   Little Interest or Pleasure in Doing Things 0-->not at all   Feeling Down, Depressed or Hopeless 0-->not at all   PHQ-9: Brief Depression Severity Measure Score 0       Health Habits and Functional and Cognitive Screenin/10/2024    12:54 PM   Functional & Cognitive Status   Do you have difficulty preparing food and eating? No   Do you have difficulty bathing yourself, getting dressed or grooming yourself? No   Do you have difficulty using the toilet? No   Do you have difficulty moving around from place to place? No   Do you have trouble with steps or getting out of a bed or a chair? No   Current Diet Well Balanced Diet   Dental Exam Up to date   Eye Exam Up to date   Exercise (times per week) 0 times per week   Current Exercises Include No Regular Exercise   Do you need help using the phone?  No   Are you deaf or do you have serious difficulty hearing?  No   Do you need help to go to places out of walking distance? No   Do you need help shopping? No   Do you need help preparing meals?  No   Do you need help with housework?  No   Do you need help with laundry? No   Do you need help taking your medications? No   Do you need help " managing money? No   Do you ever drive or ride in a car without wearing a seat belt? No   Who do you live with? Spouse   If you need help, do you have trouble finding someone available to you? No   Have you been bothered in the last four weeks by sexual problems? No   Do you have difficulty concentrating, remembering or making decisions? Yes       Age-appropriate Screening Schedule:  Refer to the list below for future screening recommendations based on patient's age, sex and/or medical conditions. Orders for these recommended tests are listed in the plan section. The patient has been provided with a written plan.    Health Maintenance   Topic Date Due    DXA SCAN  Never done    ZOSTER VACCINE (1 of 2) Never done    INFLUENZA VACCINE  Never done    COVID-19 Vaccine (4 - 2023-24 season) 09/01/2023    Pneumococcal Vaccine 65+ (1 of 1 - PCV) 07/10/2024 (Originally 12/26/2001)    TDAP/TD VACCINES (1 - Tdap) 07/10/2024 (Originally 12/26/1954)    LIPID PANEL  10/10/2024    ANNUAL WELLNESS VISIT  01/10/2025                  CMS Preventative Services Quick Reference  Risk Factors Identified During Encounter  Immunizations Discussed/Encouraged: Tdap, Influenza, Prevnar 20 (Pneumococcal 20-valent conjugate), Shingrix, COVID19, and RSV (Respiratory Syncytial Virus)  The above risks/problems have been discussed with the patient.  Pertinent information has been shared with the patient in the After Visit Summary.  An After Visit Summary and PPPS were made available to the patient.    Follow Up:   Next Medicare Wellness visit to be scheduled in 1 year.       Additional E&M Note during same encounter follows:  Patient has multiple medical problems which are significant and separately identifiable that require additional work above and beyond the Medicare Wellness Visit.      Chief Complaint  Medicare Wellness-subsequent    Subjective        PAST MEDICAL HISTORY changes since :      Hospitalized acute stroke 10-9-23   covid      Atrial Fibrillation: dx'd in 3-2020;     Carotid Artery Stenosis: yuridia madera;     Coronary Artery Disease: dx'd in 3-23-20;     Myocardial Infarction: due to occlusion of the LAD, RCA, and stened again 3-25-20; heart cath/ stenting for NSTEMI;     AAA         Hospitalizations:     [facility]     Coronary Artery Disease last admit date 3-23-20 anemia, admitted once on 20         CURRENT MEDICAL PROVIDERS:    Cardiologist: [facility]         PREVENTIVE HEALTH MAINTENANCE                 COLORECTAL CANCER SCREENING: Up to date (colonoscopy q10y; sigmoidoscopy q5y; Cologuard q3y) was last done  3-2023 no colitits   Also CLN done on 3/2017, Results are in chart; colonoscopy with normal results           Surgical History:       EGD and CLN 3-2023    Biopsy of breast; benign    Coronary Artery Stent Placement: 2011;     Hysterectomy: at age 30; ovaries intact;     left foot neuroma;     Procedures:    Colonoscopy ( 11/normal/Ronna )    EGD (  normal ) &  esophageal spasms         Family History:     Father:  at age 80's; Cause of death was TB;  Parkinson's Disease     Mother:  at age 30's; Cause of death was renal issues;  goiter     Sister(s): 3 sister(s) total; 1 ; Neurological/Genetic Cerebrovascular Accident; Endocrine Type 2 Diabetes         Social History:     Occupation: quit working 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/[employer];     Marital Status:      Children: 6                Danyell Osborne is also being seen today for wellness exam     Review of Systems   Constitutional:  Negative for fatigue and fever.   HENT:  Positive for ear pain (about a month, intermittently). Negative for sore throat.    Eyes:  Negative for visual disturbance.   Respiratory:  Negative for cough (occ) and shortness of breath.    Cardiovascular:  Negative for chest pain, palpitations and leg swelling.   Gastrointestinal:  Negative for abdominal pain.   Genitourinary:  Negative for difficulty  "urinating.   Musculoskeletal:  Negative for arthralgias.   Skin:  Negative for rash.   Hematological:  Negative for adenopathy.   Psychiatric/Behavioral:  Negative for sleep disturbance.        Objective   Vital Signs:  /73 (BP Location: Right arm, Patient Position: Sitting, Cuff Size: Adult)   Pulse 69   Temp 97.7 °F (36.5 °C) (Oral)   Ht 162.6 cm (64.02\")   Wt 54.8 kg (120 lb 12.8 oz)   BMI 20.72 kg/m²     Physical Exam  Vitals reviewed.   Constitutional:       Appearance: Normal appearance. She is well-developed.   HENT:      Head: Normocephalic and atraumatic.      Right Ear: External ear normal.      Left Ear: External ear normal.      Mouth/Throat:      Pharynx: No oropharyngeal exudate.   Eyes:      Conjunctiva/sclera: Conjunctivae normal.      Pupils: Pupils are equal, round, and reactive to light.   Cardiovascular:      Rate and Rhythm: Normal rate and regular rhythm.      Heart sounds: No murmur heard.     No friction rub. No gallop.   Pulmonary:      Effort: Pulmonary effort is normal.      Breath sounds: Normal breath sounds. No wheezing or rhonchi.   Abdominal:      General: Bowel sounds are normal. There is no distension.      Palpations: Abdomen is soft.      Tenderness: There is no abdominal tenderness.      Comments:       Skin:     General: Skin is warm and dry.   Neurological:      Mental Status: She is alert and oriented to person, place, and time.      Cranial Nerves: No cranial nerve deficit.   Psychiatric:         Mood and Affect: Mood and affect normal.         Behavior: Behavior normal.         Thought Content: Thought content normal.         Judgment: Judgment normal.                         Assessment and Plan   Diagnoses and all orders for this visit:    1. Routine general medical examination at a health care facility (Primary)  Assessment & Plan:  Advise regular exercise, healthy eating, always wear seat belts. Living will discussed, to complete and bring in a copy, fall " prevention discussed.  Immunizations discussed. Declines vaccines today.  Discussed DEXA, declines today, to do monthly BSE   To continue yearly optometry and dental exams.          2. Coronary artery disease involving native coronary artery of native heart without angina pectoris  Assessment & Plan:  Follow up with cardiology as directed.      3. Acute CVA (cerebrovascular accident)  Assessment & Plan:  History of CVA, follow up with neurology as directed       4. Essential hypertension  Assessment & Plan:  Repeated BP, she reports it is elevated in our office, to continue rx and monitor bp at home                Follow Up   Return if symptoms worsen or fail to improve, for Next scheduled follow up.  Patient was given instructions and counseling regarding her condition or for health maintenance advice. Please see specific information pulled into the AVS if appropriate.

## 2024-01-30 ENCOUNTER — TELEPHONE (OUTPATIENT)
Dept: FAMILY MEDICINE CLINIC | Age: 89
End: 2024-01-30
Payer: MEDICARE

## 2024-01-30 NOTE — TELEPHONE ENCOUNTER
Pt said she has a cough, she thinks its her sinuses draining causing the cough. She had chills yesterday and she thinks she ran a temp but she doesn't have a thermometer to check it.  The right side of her nose bled some yesterday, but she said it does that sometimes.  Taking chloricedin OTC.  Said she felt worse yesterday, some better today.  Offered an appt with one of our acute care providers. Pt declined.  Said she would call back if she doesn't improve.   Appt with [facility] cardiology 02/01/24 to f/u on A fib from ER visit 01/29/24.

## 2024-01-31 NOTE — TELEPHONE ENCOUNTER
Called pt to check on her. She said around lunch time today she had a sharp pain shoot up through her jaw and into her teeth.  No chest pain and no more pains in her jaw.  She said she is feeling better today, her nose is not dripping like it was.   She has an appt with the cardiiologist tomorrow.

## 2024-02-02 ENCOUNTER — TELEPHONE (OUTPATIENT)
Dept: FAMILY MEDICINE CLINIC | Age: 89
End: 2024-02-02
Payer: MEDICARE

## 2024-02-02 NOTE — TELEPHONE ENCOUNTER
Daughter Lauren said her mother is complaining of having stomach cramping and diarrhea.  She said her and her father had it a couple days ago.   Ask her if she needs to be seen and she said she doesn't feel like coming into the office.   Recommended OTC anti diarrhea,  eat bland foods, crackers, dry toast, drink Pedialyte or gatorade. Stay well hydrated.   Come in if she doesn't improve or go to ER.

## 2024-02-05 ENCOUNTER — OFFICE VISIT (OUTPATIENT)
Dept: FAMILY MEDICINE CLINIC | Age: 89
End: 2024-02-05
Payer: MEDICARE

## 2024-02-05 VITALS
HEIGHT: 64 IN | HEART RATE: 59 BPM | OXYGEN SATURATION: 96 % | SYSTOLIC BLOOD PRESSURE: 132 MMHG | TEMPERATURE: 99.1 F | WEIGHT: 119.2 LBS | BODY MASS INDEX: 20.35 KG/M2 | DIASTOLIC BLOOD PRESSURE: 68 MMHG

## 2024-02-05 DIAGNOSIS — I48.91 ATRIAL FIBRILLATION, UNSPECIFIED TYPE: ICD-10-CM

## 2024-02-05 DIAGNOSIS — K59.09 OTHER CONSTIPATION: ICD-10-CM

## 2024-02-05 DIAGNOSIS — R09.81 CONGESTION OF NASAL SINUS: Primary | ICD-10-CM

## 2024-02-05 PROCEDURE — 1160F RVW MEDS BY RX/DR IN RCRD: CPT | Performed by: NURSE PRACTITIONER

## 2024-02-05 PROCEDURE — 99213 OFFICE O/P EST LOW 20 MIN: CPT | Performed by: NURSE PRACTITIONER

## 2024-02-05 PROCEDURE — 1159F MED LIST DOCD IN RCRD: CPT | Performed by: NURSE PRACTITIONER

## 2024-02-05 PROCEDURE — 87428 SARSCOV & INF VIR A&B AG IA: CPT | Performed by: NURSE PRACTITIONER

## 2024-02-05 RX ORDER — AZITHROMYCIN 250 MG/1
TABLET, FILM COATED ORAL
Qty: 6 TABLET | Refills: 0 | Status: SHIPPED | OUTPATIENT
Start: 2024-02-05

## 2024-02-05 NOTE — PROGRESS NOTES
Chief Complaint  Cough (Cough, congestion, stomach ache x 8 days. )    Subjective          Danyell Osborne presents to [facility]    History of Present Illness  URI  When did symptoms started > 1 week ago  Any exposures: and daughter have had URI's illnesses   Symptoms:cough, bloody to yellow nasal congestion, yesterday some GI issues, upper abd pain, is constipated, taking miralax    She has court later this week for a family related and does not want to miss.      Had to reschedule her cardiology appt due to her illness    PAST MEDICAL HISTORY changes since 1-:      Hospitalized acute stroke 10-9-23   covid     Atrial Fibrillation: dx'd in 3-2020;     Carotid Artery Stenosis: yuridia madera;     Coronary Artery Disease: dx'd in 3-23-20;     Myocardial Infarction: due to occlusion of the LAD, RCA, and stened again 3-25-20; heart cath/ stenting for NSTEMI;     AAA         Hospitalizations:     A fib     Coronary Artery Disease last admit date 3-23-20 anemia, admitted once on 20         CURRENT MEDICAL PROVIDERS:    Cardiologist: [facility]         PREVENTIVE HEALTH MAINTENANCE                 COLORECTAL CANCER SCREENING: Up to date (colonoscopy q10y; sigmoidoscopy q5y; Cologuard q3y) was last done  3-2023 no colitits   Also CLN done on 3/2017, Results are in chart; colonoscopy with normal results           Surgical History:       EGD and CLN 3-2023    Biopsy of breast; benign    Coronary Artery Stent Placement:  2;     Hysterectomy: at age 30; ovaries intact;     left foot neuroma;     Procedures:    Colonoscopy ( 11/normal/Ronna )    EGD (  normal ) &  esophageal spasms         Family History:     Father:  at age 80's; Cause of death was TB;  Parkinson's Disease     Mother:  at age 30's; Cause of death was renal issues;  goiter     Sister(s): 3 sister(s) total; 1 ; Neurological/Genetic Cerebrovascular Accident;  Endocrine Type 2 Diabetes         Social History:     Occupation: quit working 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/[employer];     Marital Status:      Children: 6                        Past Medical History:   Diagnosis Date    Coronary artery disease     Hypertension     Stroke        Allergies   Allergen Reactions    Contrast Dye (Echo Or Unknown Ct/Mr) Unknown - High Severity    Iodinated Contrast Media Unknown - High Severity    Penicillins Rash        Past Surgical History:   Procedure Laterality Date    HYSTERECTOMY          Social History     Tobacco Use    Smoking status: Never     Passive exposure: Never    Smokeless tobacco: Never   Substance Use Topics    Alcohol use: Never       History reviewed. No pertinent family history.     Health Maintenance Due   Topic Date Due    DXA SCAN  Never done    ZOSTER VACCINE (1 of 2) Never done    INFLUENZA VACCINE  Never done    COVID-19 Vaccine (4 - 2023-24 season) 09/01/2023        Current Outpatient Medications on File Prior to Visit   Medication Sig    apixaban (ELIQUIS) 2.5 MG tablet tablet Take 1 tablet by mouth Every 12 (Twelve) Hours. Indications: Atrial Fibrillation    atorvastatin (LIPITOR) 80 MG tablet Take 1 tablet by mouth Every Night.    Cholecalciferol 50 MCG (2000 UT) capsule Take 1 capsule by mouth Daily.    clopidogrel (PLAVIX) 75 MG tablet Take 1 tablet by mouth Daily.    metoprolol succinate XL (TOPROL-XL) 50 MG 24 hr tablet Take 1 tablet by mouth 2 (Two) Times a Day.    multivitamin with minerals tablet tablet Take 1 tablet by mouth Daily.    nitroglycerin (NITROSTAT) 0.4 MG SL tablet DISSOLVE 1 TABLET UNDER THE TONGUE EVERY 5 MINUTES AS NEEDED FOR CHEST PAIN. DO NOT EXCEED A TOTAL OF 3 DOSES IN 15 MINUTES. IF NO RELIEF, CALL 911    Polyethylene Glycol 3350 (MIRALAX PO) Take  by mouth.    Simethicone (GAS-X PO) Take  by mouth.     No current facility-administered medications on file prior to visit.       Immunization History   Administered Date(s)  "Administered    COVID-19 (PFIZER) Purple Cap Monovalent 02/17/2021, 03/10/2021, 01/03/2022       Review of Systems   Constitutional:  Negative for fever.   HENT:  Positive for congestion.    Respiratory:  Positive for cough (rarely to occ). Negative for shortness of breath.    Cardiovascular:  Negative for chest pain.   Gastrointestinal:  Positive for constipation.        Objective     Vitals:    02/05/24 1052   BP: 132/68   BP Location: Left arm   Patient Position: Sitting   Pulse: 59   Temp: 99.1 °F (37.3 °C)   TempSrc: Oral   SpO2: 96%   Weight: 54.1 kg (119 lb 3.2 oz)   Height: 162.6 cm (64.02\")            Physical Exam  Constitutional:       General: She is not in acute distress.     Appearance: Normal appearance.   HENT:      Right Ear: Tympanic membrane, ear canal and external ear normal.      Left Ear: Tympanic membrane, ear canal and external ear normal.      Nose: Nose normal.      Mouth/Throat:      Pharynx: Oropharynx is clear. No posterior oropharyngeal erythema.   Cardiovascular:      Rate and Rhythm: Normal rate and regular rhythm.      Heart sounds: No murmur heard.  Pulmonary:      Effort: Pulmonary effort is normal.      Breath sounds: Normal breath sounds.   Abdominal:      Palpations: Abdomen is soft.      Tenderness: There is no abdominal tenderness.   Lymphadenopathy:      Cervical: No cervical adenopathy.   Neurological:      Mental Status: She is alert.   Psychiatric:         Mood and Affect: Mood normal.         Behavior: Behavior normal.         Result Review :     The following data was reviewed by: AGUSTIN Jimenez on 02/05/2024:                       Assessment and Plan      Diagnoses and all orders for this visit:    1. Congestion of nasal sinus (Primary)  Assessment & Plan:  Rest, increase fluids, follow up if symptoms progress or change   Will cover her with Zpack  Covid and flu screen negative     Orders:  -     POCT SARS-CoV-2 Antigen BRAYDEN + Flu  -     azithromycin " (Zithromax Z-Jose Miguel) 250 MG tablet; Take 2 tablets by mouth on day 1, then 1 tablet daily on days 2-5  Dispense: 6 tablet; Refill: 0    2. Atrial fibrillation, unspecified type  Assessment & Plan:  Keep her upcoming appt that was rescheduled with cardiology       3. Other constipation  Assessment & Plan:  Discussed constipation, increased fluids, can take stool softner and miralax, only occ use lax           BMI is within normal parameters. No other follow-up for BMI required.         Follow Up     Return if symptoms worsen or fail to improve.    Patient was given instructions and counseling regarding her condition or for health maintenance advice. Please see specific information pulled into the AVS if appropriate.

## 2024-02-05 NOTE — ASSESSMENT & PLAN NOTE
Rest, increase fluids, follow up if symptoms progress or change   Will cover her with Zpack  Covid and flu screen negative

## 2024-02-07 ENCOUNTER — TELEPHONE (OUTPATIENT)
Dept: FAMILY MEDICINE CLINIC | Age: 89
End: 2024-02-07
Payer: MEDICARE

## 2024-02-07 NOTE — TELEPHONE ENCOUNTER
Pt said her bowels are not moving good. Said she has always had issues with constipation.  Yesterday she put miralx in her coffee, also took ducolax and milk of magnesa.  She said she usually puts miralax in her coffee about every other day.  Her bowels just moved a little this morning.  Ask her when they moved last and she said 2-3 days ago. No c/o belly discomfort.  She said she was having some yellowish slimy discharge from her rectum. Advised her to increase her fluid intake.   Any recommendations?

## 2024-02-14 ENCOUNTER — OFFICE VISIT (OUTPATIENT)
Dept: NEUROLOGY | Facility: CLINIC | Age: 89
End: 2024-02-14
Payer: MEDICARE

## 2024-02-14 VITALS
WEIGHT: 120.4 LBS | HEART RATE: 78 BPM | BODY MASS INDEX: 20.55 KG/M2 | DIASTOLIC BLOOD PRESSURE: 82 MMHG | SYSTOLIC BLOOD PRESSURE: 130 MMHG | HEIGHT: 64 IN | OXYGEN SATURATION: 98 %

## 2024-02-14 DIAGNOSIS — Z86.73 HISTORY OF STROKE: Primary | ICD-10-CM

## 2024-02-14 DIAGNOSIS — I48.91 ATRIAL FIBRILLATION, UNSPECIFIED TYPE: ICD-10-CM

## 2024-02-14 DIAGNOSIS — I65.22 LEFT CAROTID ARTERY STENOSIS: ICD-10-CM

## 2024-02-14 PROBLEM — I25.10 CORONARY ARTERY DISEASE: Status: ACTIVE | Noted: 2023-03-24

## 2024-02-14 PROBLEM — Z87.19 PERSONAL HISTORY OF OTHER DISEASES OF THE DIGESTIVE SYSTEM: Status: ACTIVE | Noted: 2020-04-20

## 2024-02-14 PROBLEM — I63.9 ACUTE CVA (CEREBROVASCULAR ACCIDENT): Status: RESOLVED | Noted: 2023-10-09 | Resolved: 2024-02-14

## 2024-02-14 PROBLEM — K58.9 IBS (IRRITABLE BOWEL SYNDROME): Status: ACTIVE | Noted: 2023-03-24

## 2024-02-14 PROBLEM — Z95.5 PRESENCE OF STENT IN RIGHT CORONARY ARTERY: Status: ACTIVE | Noted: 2023-03-24

## 2024-02-14 PROBLEM — K21.9 GASTROESOPHAGEAL REFLUX DISEASE: Status: ACTIVE | Noted: 2020-04-20

## 2024-02-14 PROBLEM — I73.9 PAD (PERIPHERAL ARTERY DISEASE): Chronic | Status: ACTIVE | Noted: 2020-06-26

## 2024-02-14 RX ORDER — UBIDECARENONE 200 MG
200 CAPSULE ORAL DAILY
COMMUNITY

## 2024-03-06 ENCOUNTER — TELEPHONE (OUTPATIENT)
Dept: FAMILY MEDICINE CLINIC | Age: 89
End: 2024-03-06
Payer: MEDICARE

## 2024-03-06 NOTE — TELEPHONE ENCOUNTER
Pt called and said she has been having a discharge from her rectum after her bowels move.  Ask her what type of discharge is it and she said its a pink to red she has noticed on her tissue after she wipes from a bm.  Ask her if it colors the water red or pink and she said no.  Only notices it after a bm.  She said her rectum does itch some and she does have a hx of hemorrhoids.  Dr Singh did her last colonoscopy and she said its probably been a year.

## 2024-03-06 NOTE — TELEPHONE ENCOUNTER
She may need a CBC, so she may need to see him again, she can try OTC hemorrhoid treatment and I can see her in the near future and do the CBC

## 2024-06-28 ENCOUNTER — TELEPHONE (OUTPATIENT)
Dept: FAMILY MEDICINE CLINIC | Age: 89
End: 2024-06-28
Payer: MEDICARE

## 2024-06-28 NOTE — TELEPHONE ENCOUNTER
call and check on patient/ her  is Mack Browning (Til )ard,  She had a fall, went to [place] and was transferred to [facility]:  3-6 ribs fracture and a /small pneumo

## 2024-06-28 NOTE — TELEPHONE ENCOUNTER
Per pt daughter Maida, pt is not doing well, not sleeping due to the pain, states the pain medication doesn't seem to be helping, not eating so the medications are making her sick, states pt stomach is pretty swollen, they are waiting on xray results from the doctor and to see about increasing pain medications

## 2024-07-02 ENCOUNTER — TELEPHONE (OUTPATIENT)
Dept: FAMILY MEDICINE CLINIC | Age: 89
End: 2024-07-02
Payer: MEDICARE

## 2024-07-02 NOTE — TELEPHONE ENCOUNTER
Patient  states patient will be discharged from [facility] in  few days and needs rehab, [facility] at the [facility]. Patient has requested To be discharged home with home health to come in with nursing care and PT.  wanted to let PCP Shanae Osborne know what was going on and what she thought about home health. I advise , if [facility] and [facility] feel it is ok and spouse is ok with taking care of patient at home with home health then that should be fine. Please advise and thank you

## 2024-07-03 ENCOUNTER — TELEPHONE (OUTPATIENT)
Dept: FAMILY MEDICINE CLINIC | Age: 89
End: 2024-07-03
Payer: MEDICARE

## 2024-07-03 ENCOUNTER — READMISSION MANAGEMENT (OUTPATIENT)
Dept: CALL CENTER | Facility: HOSPITAL | Age: 89
End: 2024-07-03
Payer: MEDICARE

## 2024-07-03 RX ORDER — METAXALONE 800 MG/1
800 TABLET ORAL 3 TIMES DAILY
Qty: 30 TABLET | Refills: 0 | Status: ON HOLD | OUTPATIENT
Start: 2024-07-03

## 2024-07-03 NOTE — TELEPHONE ENCOUNTER
call her and check on her.  I spoke to Eze and Lauren,  she was resting, taking tramadol 50  instead of 25, did better on skelaxin in the hospital, so I sent that to [facility]

## 2024-07-03 NOTE — OUTREACH NOTE
Prep Survey      Flowsheet Row Responses   [facility] facility patient discharged from? Non-BH   Is LACE score < 7 ? Non-BH Discharge   Eligibility [facility]   Date of Discharge 07/02/24   Discharge Disposition Home or Self Care   Discharge diagnosis 3-6 ribs fracture and a /small pneumo, s/p fall   Does the patient have one of the following disease processes/diagnoses(primary or secondary)? Other   Prep survey completed? Yes            Estee Martínez Registered Nurse

## 2024-07-03 NOTE — TELEPHONE ENCOUNTER
Caller: CHIDI [facility]- [facility]- RN    Relationship:     Best call back number: [phone]     What is the best time to reach you: ANYTIME     Who are you requesting to speak with (clinical staff, provider,  specific staff member): CLINICAL     What was the call regarding: [facility] CALLING WITH REQUEST FOR NURSING HOME HEALTH, PT AND OT.

## 2024-07-04 ENCOUNTER — APPOINTMENT (OUTPATIENT)
Dept: OTHER | Facility: HOSPITAL | Age: 89
End: 2024-07-04
Payer: MEDICARE

## 2024-07-05 ENCOUNTER — HOSPITAL ENCOUNTER (INPATIENT)
Facility: HOSPITAL | Age: 89
LOS: 8 days | Discharge: HOME HEALTH | End: 2024-07-13
Attending: INTERNAL MEDICINE | Admitting: INTERNAL MEDICINE
Payer: MEDICARE

## 2024-07-05 ENCOUNTER — ANESTHESIA EVENT (OUTPATIENT)
Dept: PERIOP | Facility: HOSPITAL | Age: 89
End: 2024-07-05
Payer: MEDICARE

## 2024-07-05 ENCOUNTER — TRANSITIONAL CARE MANAGEMENT TELEPHONE ENCOUNTER (OUTPATIENT)
Dept: CALL CENTER | Facility: HOSPITAL | Age: 89
End: 2024-07-05
Payer: MEDICARE

## 2024-07-05 ENCOUNTER — APPOINTMENT (OUTPATIENT)
Dept: GENERAL RADIOLOGY | Facility: HOSPITAL | Age: 89
End: 2024-07-05
Payer: MEDICARE

## 2024-07-05 ENCOUNTER — ANESTHESIA (OUTPATIENT)
Dept: PERIOP | Facility: HOSPITAL | Age: 89
End: 2024-07-05
Payer: MEDICARE

## 2024-07-05 DIAGNOSIS — K81.0 ACUTE CHOLECYSTITIS: Primary | ICD-10-CM

## 2024-07-05 DIAGNOSIS — R93.3 ABNORMAL ESOPHAGRAM: ICD-10-CM

## 2024-07-05 PROBLEM — Q21.12 PFO (PATENT FORAMEN OVALE): Status: ACTIVE | Noted: 2024-07-05

## 2024-07-05 PROBLEM — E87.1 HYPONATREMIA: Status: ACTIVE | Noted: 2024-07-05

## 2024-07-05 PROBLEM — S22.41XA CLOSED FRACTURE OF MULTIPLE RIBS OF RIGHT SIDE: Status: ACTIVE | Noted: 2024-07-05

## 2024-07-05 PROBLEM — I48.0 PAROXYSMAL ATRIAL FIBRILLATION: Status: ACTIVE | Noted: 2022-12-08

## 2024-07-05 LAB
ALBUMIN SERPL-MCNC: 3.3 G/DL (ref 3.5–5.2)
ALBUMIN/GLOB SERPL: 1.1 G/DL
ALP SERPL-CCNC: 84 U/L (ref 39–117)
ALT SERPL W P-5'-P-CCNC: 52 U/L (ref 1–33)
ANION GAP SERPL CALCULATED.3IONS-SCNC: 11.2 MMOL/L (ref 5–15)
APTT PPP: 25.5 SECONDS (ref 22.7–35.4)
AST SERPL-CCNC: 46 U/L (ref 1–32)
BASOPHILS # BLD AUTO: 0.05 10*3/MM3 (ref 0–0.2)
BASOPHILS NFR BLD AUTO: 0.5 % (ref 0–1.5)
BILIRUB SERPL-MCNC: 0.6 MG/DL (ref 0–1.2)
BUN SERPL-MCNC: 11 MG/DL (ref 8–23)
BUN/CREAT SERPL: 22.4 (ref 7–25)
CALCIUM SPEC-SCNC: 8.2 MG/DL (ref 8.6–10.5)
CHLORIDE SERPL-SCNC: 92 MMOL/L (ref 98–107)
CO2 SERPL-SCNC: 21.8 MMOL/L (ref 22–29)
CREAT SERPL-MCNC: 0.49 MG/DL (ref 0.57–1)
D-LACTATE SERPL-SCNC: 1 MMOL/L (ref 0.5–2)
DEPRECATED RDW RBC AUTO: 42 FL (ref 37–54)
EGFRCR SERPLBLD CKD-EPI 2021: 91.3 ML/MIN/1.73
EOSINOPHIL # BLD AUTO: 0.08 10*3/MM3 (ref 0–0.4)
EOSINOPHIL NFR BLD AUTO: 0.7 % (ref 0.3–6.2)
ERYTHROCYTE [DISTWIDTH] IN BLOOD BY AUTOMATED COUNT: 13 % (ref 12.3–15.4)
GLOBULIN UR ELPH-MCNC: 3 GM/DL
GLUCOSE SERPL-MCNC: 110 MG/DL (ref 65–99)
HCT VFR BLD AUTO: 27.3 % (ref 34–46.6)
HGB BLD-MCNC: 8.9 G/DL (ref 12–15.9)
IMM GRANULOCYTES # BLD AUTO: 0.14 10*3/MM3 (ref 0–0.05)
IMM GRANULOCYTES NFR BLD AUTO: 1.3 % (ref 0–0.5)
INR PPP: 1.08 (ref 0.9–1.1)
LIPASE SERPL-CCNC: 21 U/L (ref 13–60)
LYMPHOCYTES # BLD AUTO: 0.98 10*3/MM3 (ref 0.7–3.1)
LYMPHOCYTES NFR BLD AUTO: 9 % (ref 19.6–45.3)
MCH RBC QN AUTO: 29.3 PG (ref 26.6–33)
MCHC RBC AUTO-ENTMCNC: 32.6 G/DL (ref 31.5–35.7)
MCV RBC AUTO: 89.8 FL (ref 79–97)
MONOCYTES # BLD AUTO: 0.91 10*3/MM3 (ref 0.1–0.9)
MONOCYTES NFR BLD AUTO: 8.4 % (ref 5–12)
NEUTROPHILS NFR BLD AUTO: 8.68 10*3/MM3 (ref 1.7–7)
NEUTROPHILS NFR BLD AUTO: 80.1 % (ref 42.7–76)
NRBC BLD AUTO-RTO: 0.3 /100 WBC (ref 0–0.2)
PLATELET # BLD AUTO: 586 10*3/MM3 (ref 140–450)
PMV BLD AUTO: 8.9 FL (ref 6–12)
POTASSIUM SERPL-SCNC: 4.4 MMOL/L (ref 3.5–5.2)
PROT SERPL-MCNC: 6.3 G/DL (ref 6–8.5)
PROTHROMBIN TIME: 14.2 SECONDS (ref 11.7–14.2)
RBC # BLD AUTO: 3.04 10*6/MM3 (ref 3.77–5.28)
SODIUM SERPL-SCNC: 125 MMOL/L (ref 136–145)
URATE SERPL-MCNC: 2 MG/DL (ref 2.4–5.7)
WBC NRBC COR # BLD AUTO: 10.84 10*3/MM3 (ref 3.4–10.8)

## 2024-07-05 PROCEDURE — 25010000002 PROTHROMBIN COMPLEX CONC HUMAN 1000 UNITS KIT: Performed by: SURGERY

## 2024-07-05 PROCEDURE — 25010000002 FENTANYL CITRATE (PF) 50 MCG/ML SOLUTION

## 2024-07-05 PROCEDURE — 84550 ASSAY OF BLOOD/URIC ACID: CPT | Performed by: INTERNAL MEDICINE

## 2024-07-05 PROCEDURE — 83605 ASSAY OF LACTIC ACID: CPT | Performed by: INTERNAL MEDICINE

## 2024-07-05 PROCEDURE — 25010000002 METRONIDAZOLE 500 MG/100ML SOLUTION: Performed by: INTERNAL MEDICINE

## 2024-07-05 PROCEDURE — 25810000003 LACTATED RINGERS PER 1000 ML

## 2024-07-05 PROCEDURE — 85730 THROMBOPLASTIN TIME PARTIAL: CPT | Performed by: SURGERY

## 2024-07-05 PROCEDURE — 25810000003 SODIUM CHLORIDE PER 500 ML: Performed by: SURGERY

## 2024-07-05 PROCEDURE — 0FT44ZZ RESECTION OF GALLBLADDER, PERCUTANEOUS ENDOSCOPIC APPROACH: ICD-10-PCS | Performed by: SURGERY

## 2024-07-05 PROCEDURE — 87040 BLOOD CULTURE FOR BACTERIA: CPT | Performed by: INTERNAL MEDICINE

## 2024-07-05 PROCEDURE — 25810000003 LACTATED RINGERS PER 1000 ML: Performed by: ANESTHESIOLOGY

## 2024-07-05 PROCEDURE — 47563 LAPARO CHOLECYSTECTOMY/GRAPH: CPT | Performed by: PHYSICIAN ASSISTANT

## 2024-07-05 PROCEDURE — 74300 X-RAY BILE DUCTS/PANCREAS: CPT

## 2024-07-05 PROCEDURE — 25010000002 SUGAMMADEX 200 MG/2ML SOLUTION

## 2024-07-05 PROCEDURE — 25010000002 HYDROMORPHONE PER 4 MG

## 2024-07-05 PROCEDURE — 71045 X-RAY EXAM CHEST 1 VIEW: CPT

## 2024-07-05 PROCEDURE — 25010000002 DEXAMETHASONE PER 1 MG

## 2024-07-05 PROCEDURE — 25010000002 LABETALOL 5 MG/ML SOLUTION

## 2024-07-05 PROCEDURE — 88304 TISSUE EXAM BY PATHOLOGIST: CPT | Performed by: SURGERY

## 2024-07-05 PROCEDURE — 47563 LAPARO CHOLECYSTECTOMY/GRAPH: CPT | Performed by: SURGERY

## 2024-07-05 PROCEDURE — 71046 X-RAY EXAM CHEST 2 VIEWS: CPT

## 2024-07-05 PROCEDURE — 0 IOTHALAMATE 60 % SOLUTION: Performed by: SURGERY

## 2024-07-05 PROCEDURE — 25810000003 SODIUM CHLORIDE 0.9 % SOLUTION: Performed by: SURGERY

## 2024-07-05 PROCEDURE — 85025 COMPLETE CBC W/AUTO DIFF WBC: CPT | Performed by: SURGERY

## 2024-07-05 PROCEDURE — 85610 PROTHROMBIN TIME: CPT | Performed by: SURGERY

## 2024-07-05 PROCEDURE — 25010000002 PROPOFOL 200 MG/20ML EMULSION

## 2024-07-05 PROCEDURE — 25010000002 CEFTRIAXONE PER 250 MG: Performed by: INTERNAL MEDICINE

## 2024-07-05 PROCEDURE — 25010000002 HYDROMORPHONE PER 4 MG: Performed by: STUDENT IN AN ORGANIZED HEALTH CARE EDUCATION/TRAINING PROGRAM

## 2024-07-05 PROCEDURE — 25010000002 HYDROMORPHONE PER 4 MG: Performed by: INTERNAL MEDICINE

## 2024-07-05 PROCEDURE — 25010000002 ONDANSETRON PER 1 MG

## 2024-07-05 PROCEDURE — 80053 COMPREHEN METABOLIC PANEL: CPT | Performed by: SURGERY

## 2024-07-05 PROCEDURE — BF131ZZ FLUOROSCOPY OF GALLBLADDER AND BILE DUCTS USING LOW OSMOLAR CONTRAST: ICD-10-PCS | Performed by: SURGERY

## 2024-07-05 PROCEDURE — 83690 ASSAY OF LIPASE: CPT | Performed by: SURGERY

## 2024-07-05 PROCEDURE — 30283B1 TRANSFUSION OF NONAUTOLOGOUS 4-FACTOR PROTHROMBIN COMPLEX CONCENTRATE INTO VEIN, PERCUTANEOUS APPROACH: ICD-10-PCS | Performed by: SURGERY

## 2024-07-05 PROCEDURE — 99222 1ST HOSP IP/OBS MODERATE 55: CPT

## 2024-07-05 DEVICE — HORIZON TI ML 6 CLIPS/CART
Type: IMPLANTABLE DEVICE | Site: ABDOMEN | Status: FUNCTIONAL
Brand: WECK

## 2024-07-05 RX ORDER — BUPIVACAINE HYDROCHLORIDE AND EPINEPHRINE 5; 5 MG/ML; UG/ML
INJECTION, SOLUTION EPIDURAL; INTRACAUDAL; PERINEURAL AS NEEDED
Status: DISCONTINUED | OUTPATIENT
Start: 2024-07-05 | End: 2024-07-05 | Stop reason: HOSPADM

## 2024-07-05 RX ORDER — SODIUM CHLORIDE, SODIUM LACTATE, POTASSIUM CHLORIDE, CALCIUM CHLORIDE 600; 310; 30; 20 MG/100ML; MG/100ML; MG/100ML; MG/100ML
INJECTION, SOLUTION INTRAVENOUS CONTINUOUS PRN
Status: DISCONTINUED | OUTPATIENT
Start: 2024-07-05 | End: 2024-07-05 | Stop reason: SURG

## 2024-07-05 RX ORDER — ONDANSETRON 4 MG/1
4 TABLET, ORALLY DISINTEGRATING ORAL EVERY 6 HOURS PRN
Status: DISCONTINUED | OUTPATIENT
Start: 2024-07-05 | End: 2024-07-13 | Stop reason: HOSPADM

## 2024-07-05 RX ORDER — FAMOTIDINE 40 MG/1
40 TABLET, FILM COATED ORAL DAILY
COMMUNITY
End: 2024-07-13 | Stop reason: HOSPADM

## 2024-07-05 RX ORDER — LIDOCAINE HYDROCHLORIDE 20 MG/ML
INJECTION, SOLUTION INFILTRATION; PERINEURAL AS NEEDED
Status: DISCONTINUED | OUTPATIENT
Start: 2024-07-05 | End: 2024-07-05 | Stop reason: SURG

## 2024-07-05 RX ORDER — AMOXICILLIN 250 MG
2 CAPSULE ORAL 2 TIMES DAILY PRN
Status: DISCONTINUED | OUTPATIENT
Start: 2024-07-05 | End: 2024-07-07

## 2024-07-05 RX ORDER — SODIUM CHLORIDE 9 MG/ML
50 INJECTION, SOLUTION INTRAVENOUS CONTINUOUS
Status: DISCONTINUED | OUTPATIENT
Start: 2024-07-05 | End: 2024-07-10

## 2024-07-05 RX ORDER — PROPOFOL 10 MG/ML
INJECTION, EMULSION INTRAVENOUS AS NEEDED
Status: DISCONTINUED | OUTPATIENT
Start: 2024-07-05 | End: 2024-07-05 | Stop reason: SURG

## 2024-07-05 RX ORDER — ONDANSETRON 2 MG/ML
4 INJECTION INTRAMUSCULAR; INTRAVENOUS EVERY 6 HOURS PRN
Status: DISCONTINUED | OUTPATIENT
Start: 2024-07-05 | End: 2024-07-13 | Stop reason: HOSPADM

## 2024-07-05 RX ORDER — FENTANYL CITRATE 50 UG/ML
25 INJECTION, SOLUTION INTRAMUSCULAR; INTRAVENOUS
Status: DISCONTINUED | OUTPATIENT
Start: 2024-07-05 | End: 2024-07-05 | Stop reason: HOSPADM

## 2024-07-05 RX ORDER — FENTANYL CITRATE 50 UG/ML
50 INJECTION, SOLUTION INTRAMUSCULAR; INTRAVENOUS ONCE AS NEEDED
Status: DISCONTINUED | OUTPATIENT
Start: 2024-07-05 | End: 2024-07-05 | Stop reason: HOSPADM

## 2024-07-05 RX ORDER — BISACODYL 5 MG/1
5 TABLET, DELAYED RELEASE ORAL DAILY PRN
Status: DISCONTINUED | OUTPATIENT
Start: 2024-07-05 | End: 2024-07-07

## 2024-07-05 RX ORDER — HYDROMORPHONE HYDROCHLORIDE 1 MG/ML
0.5 INJECTION, SOLUTION INTRAMUSCULAR; INTRAVENOUS; SUBCUTANEOUS ONCE
Status: COMPLETED | OUTPATIENT
Start: 2024-07-05 | End: 2024-07-05

## 2024-07-05 RX ORDER — HYDRALAZINE HYDROCHLORIDE 20 MG/ML
5 INJECTION INTRAMUSCULAR; INTRAVENOUS
Status: DISCONTINUED | OUTPATIENT
Start: 2024-07-05 | End: 2024-07-05 | Stop reason: HOSPADM

## 2024-07-05 RX ORDER — HYDROMORPHONE HYDROCHLORIDE 1 MG/ML
0.25 INJECTION, SOLUTION INTRAMUSCULAR; INTRAVENOUS; SUBCUTANEOUS
Status: CANCELLED | OUTPATIENT
Start: 2024-07-05 | End: 2024-07-10

## 2024-07-05 RX ORDER — SODIUM CHLORIDE 9 MG/ML
125 INJECTION, SOLUTION INTRAVENOUS CONTINUOUS
Status: DISCONTINUED | OUTPATIENT
Start: 2024-07-05 | End: 2024-07-05

## 2024-07-05 RX ORDER — PROMETHAZINE HYDROCHLORIDE 25 MG/1
25 SUPPOSITORY RECTAL ONCE AS NEEDED
Status: DISCONTINUED | OUTPATIENT
Start: 2024-07-05 | End: 2024-07-05 | Stop reason: HOSPADM

## 2024-07-05 RX ORDER — FAMOTIDINE 10 MG/ML
20 INJECTION, SOLUTION INTRAVENOUS EVERY 12 HOURS SCHEDULED
Status: DISCONTINUED | OUTPATIENT
Start: 2024-07-05 | End: 2024-07-08

## 2024-07-05 RX ORDER — POLYETHYLENE GLYCOL 3350 17 G/17G
17 POWDER, FOR SOLUTION ORAL DAILY PRN
Status: DISCONTINUED | OUTPATIENT
Start: 2024-07-05 | End: 2024-07-07

## 2024-07-05 RX ORDER — HYDROCODONE BITARTRATE AND ACETAMINOPHEN 5; 325 MG/1; MG/1
1 TABLET ORAL ONCE AS NEEDED
Status: COMPLETED | OUTPATIENT
Start: 2024-07-05 | End: 2024-07-05

## 2024-07-05 RX ORDER — FENTANYL CITRATE 50 UG/ML
INJECTION, SOLUTION INTRAMUSCULAR; INTRAVENOUS AS NEEDED
Status: DISCONTINUED | OUTPATIENT
Start: 2024-07-05 | End: 2024-07-05 | Stop reason: SURG

## 2024-07-05 RX ORDER — HYDROMORPHONE HYDROCHLORIDE 1 MG/ML
0.25 INJECTION, SOLUTION INTRAMUSCULAR; INTRAVENOUS; SUBCUTANEOUS
Status: DISCONTINUED | OUTPATIENT
Start: 2024-07-05 | End: 2024-07-05 | Stop reason: HOSPADM

## 2024-07-05 RX ORDER — EPHEDRINE SULFATE 50 MG/ML
5 INJECTION, SOLUTION INTRAVENOUS ONCE AS NEEDED
Status: DISCONTINUED | OUTPATIENT
Start: 2024-07-05 | End: 2024-07-05 | Stop reason: HOSPADM

## 2024-07-05 RX ORDER — NITROGLYCERIN 0.4 MG/1
0.4 TABLET SUBLINGUAL
Status: DISCONTINUED | OUTPATIENT
Start: 2024-07-05 | End: 2024-07-13 | Stop reason: HOSPADM

## 2024-07-05 RX ORDER — METRONIDAZOLE 500 MG/100ML
500 INJECTION, SOLUTION INTRAVENOUS EVERY 8 HOURS
Status: DISPENSED | OUTPATIENT
Start: 2024-07-05 | End: 2024-07-12

## 2024-07-05 RX ORDER — ACETAMINOPHEN 160 MG/5ML
650 SOLUTION ORAL EVERY 4 HOURS PRN
Status: DISCONTINUED | OUTPATIENT
Start: 2024-07-05 | End: 2024-07-13 | Stop reason: HOSPADM

## 2024-07-05 RX ORDER — OXYCODONE HYDROCHLORIDE AND ACETAMINOPHEN 5; 325 MG/1; MG/1
1 TABLET ORAL EVERY 4 HOURS PRN
Status: DISCONTINUED | OUTPATIENT
Start: 2024-07-05 | End: 2024-07-09

## 2024-07-05 RX ORDER — SODIUM CHLORIDE, SODIUM LACTATE, POTASSIUM CHLORIDE, CALCIUM CHLORIDE 600; 310; 30; 20 MG/100ML; MG/100ML; MG/100ML; MG/100ML
9 INJECTION, SOLUTION INTRAVENOUS CONTINUOUS
Status: DISCONTINUED | OUTPATIENT
Start: 2024-07-05 | End: 2024-07-05

## 2024-07-05 RX ORDER — NALOXONE HCL 0.4 MG/ML
0.4 VIAL (ML) INJECTION
Status: DISCONTINUED | OUTPATIENT
Start: 2024-07-05 | End: 2024-07-10

## 2024-07-05 RX ORDER — DEXAMETHASONE SODIUM PHOSPHATE 4 MG/ML
INJECTION, SOLUTION INTRA-ARTICULAR; INTRALESIONAL; INTRAMUSCULAR; INTRAVENOUS; SOFT TISSUE AS NEEDED
Status: DISCONTINUED | OUTPATIENT
Start: 2024-07-05 | End: 2024-07-05 | Stop reason: SURG

## 2024-07-05 RX ORDER — SODIUM CHLORIDE 9 MG/ML
INJECTION, SOLUTION INTRAVENOUS AS NEEDED
Status: DISCONTINUED | OUTPATIENT
Start: 2024-07-05 | End: 2024-07-05 | Stop reason: HOSPADM

## 2024-07-05 RX ORDER — ACETAMINOPHEN 325 MG/1
650 TABLET ORAL EVERY 4 HOURS PRN
Status: DISCONTINUED | OUTPATIENT
Start: 2024-07-05 | End: 2024-07-13 | Stop reason: HOSPADM

## 2024-07-05 RX ORDER — SODIUM CHLORIDE 0.9 % (FLUSH) 0.9 %
10 SYRINGE (ML) INJECTION AS NEEDED
Status: DISCONTINUED | OUTPATIENT
Start: 2024-07-05 | End: 2024-07-13 | Stop reason: HOSPADM

## 2024-07-05 RX ORDER — SODIUM CHLORIDE 0.9 % (FLUSH) 0.9 %
10 SYRINGE (ML) INJECTION EVERY 12 HOURS SCHEDULED
Status: DISCONTINUED | OUTPATIENT
Start: 2024-07-05 | End: 2024-07-13 | Stop reason: HOSPADM

## 2024-07-05 RX ORDER — PROMETHAZINE HYDROCHLORIDE 25 MG/1
25 TABLET ORAL ONCE AS NEEDED
Status: DISCONTINUED | OUTPATIENT
Start: 2024-07-05 | End: 2024-07-05 | Stop reason: HOSPADM

## 2024-07-05 RX ORDER — FLUMAZENIL 0.1 MG/ML
0.2 INJECTION INTRAVENOUS AS NEEDED
Status: DISCONTINUED | OUTPATIENT
Start: 2024-07-05 | End: 2024-07-05 | Stop reason: HOSPADM

## 2024-07-05 RX ORDER — ASPIRIN 81 MG/1
81 TABLET, CHEWABLE ORAL DAILY
COMMUNITY
End: 2024-07-13 | Stop reason: HOSPADM

## 2024-07-05 RX ORDER — LIDOCAINE HYDROCHLORIDE 10 MG/ML
0.5 INJECTION, SOLUTION INFILTRATION; PERINEURAL ONCE AS NEEDED
Status: DISCONTINUED | OUTPATIENT
Start: 2024-07-05 | End: 2024-07-05 | Stop reason: HOSPADM

## 2024-07-05 RX ORDER — MAGNESIUM HYDROXIDE 1200 MG/15ML
LIQUID ORAL AS NEEDED
Status: DISCONTINUED | OUTPATIENT
Start: 2024-07-05 | End: 2024-07-05 | Stop reason: HOSPADM

## 2024-07-05 RX ORDER — ONDANSETRON 2 MG/ML
4 INJECTION INTRAMUSCULAR; INTRAVENOUS ONCE AS NEEDED
Status: DISCONTINUED | OUTPATIENT
Start: 2024-07-05 | End: 2024-07-05 | Stop reason: HOSPADM

## 2024-07-05 RX ORDER — SODIUM CHLORIDE 0.9 % (FLUSH) 0.9 %
3 SYRINGE (ML) INJECTION EVERY 12 HOURS SCHEDULED
Status: DISCONTINUED | OUTPATIENT
Start: 2024-07-05 | End: 2024-07-05 | Stop reason: HOSPADM

## 2024-07-05 RX ORDER — METOPROLOL SUCCINATE 50 MG/1
50 TABLET, EXTENDED RELEASE ORAL 2 TIMES DAILY
Status: DISCONTINUED | OUTPATIENT
Start: 2024-07-05 | End: 2024-07-13 | Stop reason: HOSPADM

## 2024-07-05 RX ORDER — HYDROCODONE BITARTRATE AND ACETAMINOPHEN 5; 325 MG/1; MG/1
1 TABLET ORAL EVERY 6 HOURS PRN
Status: DISCONTINUED | OUTPATIENT
Start: 2024-07-05 | End: 2024-07-09

## 2024-07-05 RX ORDER — IPRATROPIUM BROMIDE AND ALBUTEROL SULFATE 2.5; .5 MG/3ML; MG/3ML
3 SOLUTION RESPIRATORY (INHALATION) ONCE AS NEEDED
Status: DISCONTINUED | OUTPATIENT
Start: 2024-07-05 | End: 2024-07-05 | Stop reason: HOSPADM

## 2024-07-05 RX ORDER — DIPHENHYDRAMINE HYDROCHLORIDE 50 MG/ML
12.5 INJECTION INTRAMUSCULAR; INTRAVENOUS
Status: DISCONTINUED | OUTPATIENT
Start: 2024-07-05 | End: 2024-07-05 | Stop reason: HOSPADM

## 2024-07-05 RX ORDER — HYDROMORPHONE HYDROCHLORIDE 1 MG/ML
0.5 INJECTION, SOLUTION INTRAMUSCULAR; INTRAVENOUS; SUBCUTANEOUS
Status: DISCONTINUED | OUTPATIENT
Start: 2024-07-05 | End: 2024-07-09

## 2024-07-05 RX ORDER — HYDROCODONE BITARTRATE AND ACETAMINOPHEN 7.5; 325 MG/1; MG/1
1 TABLET ORAL EVERY 4 HOURS PRN
Status: DISCONTINUED | OUTPATIENT
Start: 2024-07-05 | End: 2024-07-05

## 2024-07-05 RX ORDER — SODIUM CHLORIDE 9 MG/ML
40 INJECTION, SOLUTION INTRAVENOUS AS NEEDED
Status: DISCONTINUED | OUTPATIENT
Start: 2024-07-05 | End: 2024-07-13 | Stop reason: HOSPADM

## 2024-07-05 RX ORDER — LABETALOL HYDROCHLORIDE 5 MG/ML
5 INJECTION, SOLUTION INTRAVENOUS
Status: DISCONTINUED | OUTPATIENT
Start: 2024-07-05 | End: 2024-07-05 | Stop reason: HOSPADM

## 2024-07-05 RX ORDER — NALOXONE HCL 0.4 MG/ML
0.2 VIAL (ML) INJECTION AS NEEDED
Status: DISCONTINUED | OUTPATIENT
Start: 2024-07-05 | End: 2024-07-05 | Stop reason: HOSPADM

## 2024-07-05 RX ORDER — ROCURONIUM BROMIDE 10 MG/ML
INJECTION, SOLUTION INTRAVENOUS AS NEEDED
Status: DISCONTINUED | OUTPATIENT
Start: 2024-07-05 | End: 2024-07-05 | Stop reason: SURG

## 2024-07-05 RX ORDER — BISACODYL 10 MG
10 SUPPOSITORY, RECTAL RECTAL DAILY PRN
Status: DISCONTINUED | OUTPATIENT
Start: 2024-07-05 | End: 2024-07-07

## 2024-07-05 RX ORDER — SODIUM CHLORIDE 0.9 % (FLUSH) 0.9 %
3-10 SYRINGE (ML) INJECTION AS NEEDED
Status: DISCONTINUED | OUTPATIENT
Start: 2024-07-05 | End: 2024-07-05 | Stop reason: HOSPADM

## 2024-07-05 RX ORDER — ACETAMINOPHEN 650 MG/1
650 SUPPOSITORY RECTAL EVERY 4 HOURS PRN
Status: DISCONTINUED | OUTPATIENT
Start: 2024-07-05 | End: 2024-07-13 | Stop reason: HOSPADM

## 2024-07-05 RX ORDER — DROPERIDOL 2.5 MG/ML
0.62 INJECTION, SOLUTION INTRAMUSCULAR; INTRAVENOUS
Status: DISCONTINUED | OUTPATIENT
Start: 2024-07-05 | End: 2024-07-05 | Stop reason: HOSPADM

## 2024-07-05 RX ORDER — LABETALOL HYDROCHLORIDE 5 MG/ML
INJECTION, SOLUTION INTRAVENOUS AS NEEDED
Status: DISCONTINUED | OUTPATIENT
Start: 2024-07-05 | End: 2024-07-05 | Stop reason: SURG

## 2024-07-05 RX ORDER — ONDANSETRON 2 MG/ML
INJECTION INTRAMUSCULAR; INTRAVENOUS AS NEEDED
Status: DISCONTINUED | OUTPATIENT
Start: 2024-07-05 | End: 2024-07-05 | Stop reason: SURG

## 2024-07-05 RX ORDER — CALCIUM CARBONATE 500 MG/1
2 TABLET, CHEWABLE ORAL 3 TIMES DAILY PRN
Status: DISCONTINUED | OUTPATIENT
Start: 2024-07-05 | End: 2024-07-13 | Stop reason: HOSPADM

## 2024-07-05 RX ORDER — HYDROMORPHONE HYDROCHLORIDE 1 MG/ML
0.5 INJECTION, SOLUTION INTRAMUSCULAR; INTRAVENOUS; SUBCUTANEOUS
Status: DISCONTINUED | OUTPATIENT
Start: 2024-07-05 | End: 2024-07-05

## 2024-07-05 RX ORDER — LISINOPRIL 5 MG/1
5 TABLET ORAL DAILY
COMMUNITY

## 2024-07-05 RX ADMIN — CEFTRIAXONE 2000 MG: 2 INJECTION, POWDER, FOR SOLUTION INTRAMUSCULAR; INTRAVENOUS at 10:27

## 2024-07-05 RX ADMIN — FENTANYL CITRATE 25 MCG: 50 INJECTION, SOLUTION INTRAMUSCULAR; INTRAVENOUS at 12:04

## 2024-07-05 RX ADMIN — METOPROLOL SUCCINATE 50 MG: 50 TABLET, FILM COATED, EXTENDED RELEASE ORAL at 10:25

## 2024-07-05 RX ADMIN — SODIUM CHLORIDE, POTASSIUM CHLORIDE, SODIUM LACTATE AND CALCIUM CHLORIDE 9 ML/HR: 600; 310; 30; 20 INJECTION, SOLUTION INTRAVENOUS at 11:46

## 2024-07-05 RX ADMIN — HYDROMORPHONE HYDROCHLORIDE 0.5 MG: 1 INJECTION, SOLUTION INTRAMUSCULAR; INTRAVENOUS; SUBCUTANEOUS at 15:09

## 2024-07-05 RX ADMIN — FAMOTIDINE 20 MG: 10 INJECTION INTRAVENOUS at 10:26

## 2024-07-05 RX ADMIN — LABETALOL HYDROCHLORIDE 5 MG: 5 INJECTION, SOLUTION INTRAVENOUS at 13:37

## 2024-07-05 RX ADMIN — SODIUM CHLORIDE, SODIUM LACTATE, POTASSIUM CHLORIDE, AND CALCIUM CHLORIDE: 600; 310; 30; 20 INJECTION, SOLUTION INTRAVENOUS at 12:02

## 2024-07-05 RX ADMIN — HYDROMORPHONE HYDROCHLORIDE 0.25 MG: 1 INJECTION, SOLUTION INTRAMUSCULAR; INTRAVENOUS; SUBCUTANEOUS at 13:32

## 2024-07-05 RX ADMIN — PROPOFOL 40 MG: 10 INJECTION, EMULSION INTRAVENOUS at 12:13

## 2024-07-05 RX ADMIN — Medication 2025 UNITS: at 08:41

## 2024-07-05 RX ADMIN — ROCURONIUM BROMIDE 50 MG: 10 INJECTION, SOLUTION INTRAVENOUS at 12:12

## 2024-07-05 RX ADMIN — Medication 10 ML: at 20:36

## 2024-07-05 RX ADMIN — SODIUM CHLORIDE 125 ML/HR: 9 INJECTION, SOLUTION INTRAVENOUS at 10:32

## 2024-07-05 RX ADMIN — FENTANYL CITRATE 25 MCG: 50 INJECTION, SOLUTION INTRAMUSCULAR; INTRAVENOUS at 14:14

## 2024-07-05 RX ADMIN — METRONIDAZOLE 500 MG: 500 INJECTION, SOLUTION INTRAVENOUS at 10:31

## 2024-07-05 RX ADMIN — SUGAMMADEX 200 MG: 100 INJECTION, SOLUTION INTRAVENOUS at 12:56

## 2024-07-05 RX ADMIN — HYDROMORPHONE HYDROCHLORIDE 0.5 MG: 1 INJECTION, SOLUTION INTRAMUSCULAR; INTRAVENOUS; SUBCUTANEOUS at 06:58

## 2024-07-05 RX ADMIN — HYDROMORPHONE HYDROCHLORIDE 0.5 MG: 1 INJECTION, SOLUTION INTRAMUSCULAR; INTRAVENOUS; SUBCUTANEOUS at 16:56

## 2024-07-05 RX ADMIN — DEXAMETHASONE SODIUM PHOSPHATE 4 MG: 4 INJECTION, SOLUTION INTRA-ARTICULAR; INTRALESIONAL; INTRAMUSCULAR; INTRAVENOUS; SOFT TISSUE at 12:15

## 2024-07-05 RX ADMIN — FAMOTIDINE 20 MG: 10 INJECTION INTRAVENOUS at 20:34

## 2024-07-05 RX ADMIN — METOPROLOL SUCCINATE 50 MG: 50 TABLET, FILM COATED, EXTENDED RELEASE ORAL at 20:25

## 2024-07-05 RX ADMIN — FENTANYL CITRATE 25 MCG: 50 INJECTION, SOLUTION INTRAMUSCULAR; INTRAVENOUS at 12:29

## 2024-07-05 RX ADMIN — LABETALOL HYDROCHLORIDE 5 MG: 5 INJECTION, SOLUTION INTRAVENOUS at 12:34

## 2024-07-05 RX ADMIN — ONDANSETRON 4 MG: 2 INJECTION INTRAMUSCULAR; INTRAVENOUS at 12:12

## 2024-07-05 RX ADMIN — FENTANYL CITRATE 25 MCG: 50 INJECTION, SOLUTION INTRAMUSCULAR; INTRAVENOUS at 12:35

## 2024-07-05 RX ADMIN — HYDROMORPHONE HYDROCHLORIDE 0.25 MG: 1 INJECTION, SOLUTION INTRAMUSCULAR; INTRAVENOUS; SUBCUTANEOUS at 13:50

## 2024-07-05 RX ADMIN — LIDOCAINE HYDROCHLORIDE 60 MG: 20 INJECTION, SOLUTION INFILTRATION; PERINEURAL at 12:11

## 2024-07-05 RX ADMIN — PROPOFOL 40 MG: 10 INJECTION, EMULSION INTRAVENOUS at 12:30

## 2024-07-05 RX ADMIN — Medication 3 ML: at 11:46

## 2024-07-05 RX ADMIN — FENTANYL CITRATE 25 MCG: 50 INJECTION, SOLUTION INTRAMUSCULAR; INTRAVENOUS at 12:11

## 2024-07-05 RX ADMIN — HYDROCODONE BITARTRATE AND ACETAMINOPHEN 1 TABLET: 5; 325 TABLET ORAL at 14:12

## 2024-07-05 RX ADMIN — PROPOFOL 80 MG: 10 INJECTION, EMULSION INTRAVENOUS at 12:11

## 2024-07-05 RX ADMIN — Medication 10 ML: at 11:46

## 2024-07-05 RX ADMIN — SODIUM CHLORIDE 75 ML/HR: 9 INJECTION, SOLUTION INTRAVENOUS at 20:34

## 2024-07-05 NOTE — H&P
Patient Name:  Danyell Osborne  YOB: 1935  MRN:  [id number]  Admit Date:  7/5/2024  Patient Care Team:  Shanae Osborne APRN as PCP - General (Family Medicine)      Subjective   History Present Illness         Ms. Osborne is a 88 y.o. non-smoker with a history of HTN, HLD, CAD s/p LAD stent and PAD on plavix, PAF on eliquis, PFO, GERD, anxiety disorder, and recent mechanical fall with right sided rib fractures that presents to [facility] complaining of generalized weakness and right upper quadrant abdominal pain. She was admitted to the trauma service at [facility] following a fall sown some stairs on 6/26/24 and was discharged on 7/3/24. She was treated conservatively for multiple right-sided rib fractures and a small right-sided pneumothorax. Since then she has had some generalized weakness, abdominal pain, and bloating. She presented to [facility] with these symptoms and was found to have acute cholecystitis. She received antibiotics and was transferred to this facility for further evaluation and treatment.       Review of Systems   All other systems reviewed and are negative.       Personal History     Past Medical History:   Diagnosis Date    Coronary artery disease     Hypertension     Stroke      Past Surgical History:   Procedure Laterality Date    HYSTERECTOMY       History reviewed. No pertinent family history.  Social History     Tobacco Use    Smoking status: Never     Passive exposure: Never    Smokeless tobacco: Never   Vaping Use    Vaping status: Never Used   Substance Use Topics    Alcohol use: Never    Drug use: Never     No current facility-administered medications on file prior to encounter.     Current Outpatient Medications on File Prior to Encounter   Medication Sig Dispense Refill    apixaban (ELIQUIS) 2.5 MG tablet tablet Take 1 tablet by mouth Every 12 (Twelve) Hours. Indications: Atrial Fibrillation 60 tablet 0    aspirin 81 MG chewable tablet Chew 1 tablet  Daily.      atorvastatin (LIPITOR) 80 MG tablet Take 1 tablet by mouth Every Night. 90 tablet 0    Cholecalciferol 50 MCG (2000 UT) capsule Take 1 capsule by mouth Daily.      clopidogrel (PLAVIX) 75 MG tablet Take 1 tablet by mouth Daily.      Coenzyme Q10 200 MG capsule Take 200 mg by mouth Daily.      famotidine (PEPCID) 40 MG tablet Take 1 tablet by mouth Daily.      lisinopril (PRINIVIL,ZESTRIL) 5 MG tablet Take 1 tablet by mouth Daily.      metoprolol succinate XL (TOPROL-XL) 50 MG 24 hr tablet Take 1 tablet by mouth 2 (Two) Times a Day.      multivitamin with minerals tablet tablet Take 1 tablet by mouth Daily.      Simethicone (GAS-X PO) Take  by mouth.      azithromycin (Zithromax Z-Jose Miguel) 250 MG tablet Take 2 tablets by mouth on day 1, then 1 tablet daily on days 2-5 (Patient not taking: Reported on 2/14/2024) 6 tablet 0    metaxalone (Skelaxin) 800 MG tablet Take 1 tablet by mouth 3 (Three) Times a Day. 1/2 TID prn 30 tablet 0    nitroglycerin (NITROSTAT) 0.4 MG SL tablet DISSOLVE 1 TABLET UNDER THE TONGUE EVERY 5 MINUTES AS NEEDED FOR CHEST PAIN. DO NOT EXCEED A TOTAL OF 3 DOSES IN 15 MINUTES. IF NO RELIEF, CALL 911      Polyethylene Glycol 3350 (MIRALAX PO) Take  by mouth.       Allergies   Allergen Reactions    Contrast Dye (Echo Or Unknown Ct/Mr) Hives and Unknown - High Severity     hives    Iodinated Contrast Media Hives and Unknown - High Severity    Penicillins Rash       Objective    Objective     Vital Signs  Temp:  [98.2 °F (36.8 °C)-98.8 °F (37.1 °C)] 98.2 °F (36.8 °C)  Heart Rate:  [88-94] 88  Resp:  [17-18] 17  BP: (134-149)/(48-53) 134/48  SpO2:  [91 %-92 %] 92 %  on   ;   Device (Oxygen Therapy): room air  Body mass index is 22.71 kg/m².    Physical Exam  Vitals and nursing note reviewed.   Constitutional:       General: She is not in acute distress.     Appearance: She is ill-appearing. She is not toxic-appearing or diaphoretic.   HENT:      Head: Normocephalic.      Comments: Bruise on the  right forehead     Nose: Nose normal.      Mouth/Throat:      Mouth: Mucous membranes are moist.      Pharynx: Oropharynx is clear.   Eyes:      Conjunctiva/sclera: Conjunctivae normal.      Pupils: Pupils are equal, round, and reactive to light.   Cardiovascular:      Rate and Rhythm: Normal rate and regular rhythm.      Pulses: Normal pulses.   Pulmonary:      Effort: Pulmonary effort is normal.      Breath sounds: Examination of the right-lower field reveals decreased breath sounds. Examination of the left-lower field reveals decreased breath sounds.   Chest:      Chest wall: Tenderness (right chest wall) present.   Abdominal:      General: Bowel sounds are normal.      Palpations: Abdomen is soft.      Tenderness: There is abdominal tenderness (RUQ). There is no guarding or rebound.   Musculoskeletal:         General: No swelling or tenderness.      Cervical back: Neck supple.   Skin:     General: Skin is warm and dry.      Capillary Refill: Capillary refill takes less than 2 seconds.   Neurological:      Mental Status: She is alert.   Psychiatric:         Mood and Affect: Mood normal.         Behavior: Behavior normal.       Results Review:  I reviewed the patient's new clinical results.  I reviewed the patient's new imaging results and agree with the interpretation.  I reviewed the patient's other test results and agree with the interpretation  I personally viewed and interpreted the patient's EKG/Telemetry data      Lab Results (last 24 hours)       Procedure Component Value Units Date/Time    LIPASE [[id number]]  (Normal) Collected: 07/04/24 1946    Specimen: Blood Updated: 07/05/24 0533     Lipase 35 U/L     COMPREHENSIVE METABOLIC PANEL [[id number]]  (Abnormal) Collected: 07/04/24 1946     Updated: 07/05/24 0533    CBC with Auto Diff [[id number]]  (Abnormal) Collected: 07/04/24 1946     Updated: 07/05/24 0533    Urinalysis With Microscopic - [[id number]]  (Abnormal) Collected: 07/04/24 2037     Updated:  07/05/24 0533    CBC & Differential [[id number]]  (Abnormal) Collected: 07/05/24 0810    Specimen: Blood Updated: 07/05/24 0819    Narrative:      The following orders were created for panel order CBC & Differential.  Procedure                               Abnormality         Status                     ---------                               -----------         ------                     CBC Auto Differential[[id number]]        Abnormal            Final result                 Please view results for these tests on the individual orders.    Comprehensive Metabolic Panel [[id number]]  (Abnormal) Collected: 07/05/24 0810    Specimen: Blood Updated: 07/05/24 0839     Glucose 110 mg/dL      BUN 11 mg/dL      Creatinine 0.49 mg/dL      Sodium 125 mmol/L      Potassium 4.4 mmol/L      Chloride 92 mmol/L      CO2 21.8 mmol/L      Calcium 8.2 mg/dL      Total Protein 6.3 g/dL      Albumin 3.3 g/dL      ALT (SGPT) 52 U/L      AST (SGOT) 46 U/L      Alkaline Phosphatase 84 U/L      Total Bilirubin 0.6 mg/dL      Globulin 3.0 gm/dL      A/G Ratio 1.1 g/dL      BUN/Creatinine Ratio 22.4     Anion Gap 11.2 mmol/L      eGFR 91.3 mL/min/1.73     Narrative:      GFR Normal >60  Chronic Kidney Disease <60  Kidney Failure <15    The GFR formula is only valid for adults with stable renal function between ages 18 and 70.    Lipase [[id number]]  (Normal) Collected: 07/05/24 0810    Specimen: Blood Updated: 07/05/24 0839     Lipase 21 U/L     Protime-INR [[id number]]  (Normal) Collected: 07/05/24 0810    Specimen: Blood from Arm, Left Updated: 07/05/24 0914     Protime 14.2 Seconds      INR 1.08    aPTT [[id number]]  (Normal) Collected: 07/05/24 0810    Specimen: Blood from Arm, Left Updated: 07/05/24 0914     PTT 25.5 seconds     CBC Auto Differential [[id number]]  (Abnormal) Collected: 07/05/24 0810    Specimen: Blood Updated: 07/05/24 0819     WBC 10.84 10*3/mm3      RBC 3.04 10*6/mm3      Hemoglobin 8.9 g/dL      Hematocrit 27.3 %      MCV  89.8 fL      MCH 29.3 pg      MCHC 32.6 g/dL      RDW 13.0 %      RDW-SD 42.0 fl      MPV 8.9 fL      Platelets 586 10*3/mm3      Neutrophil % 80.1 %      Lymphocyte % 9.0 %      Monocyte % 8.4 %      Eosinophil % 0.7 %      Basophil % 0.5 %      Immature Grans % 1.3 %      Neutrophils, Absolute 8.68 10*3/mm3      Lymphocytes, Absolute 0.98 10*3/mm3      Monocytes, Absolute 0.91 10*3/mm3      Eosinophils, Absolute 0.08 10*3/mm3      Basophils, Absolute 0.05 10*3/mm3      Immature Grans, Absolute 0.14 10*3/mm3      nRBC 0.3 /100 WBC             Imaging Results (Last 24 Hours)       Procedure Component Value Units Date/Time    XR Chest PA & Lateral [[id number]] Collected: 07/05/24 0838     Updated: 07/05/24 0850    Narrative:      XR CHEST PA AND LATERAL-     DATE OF EXAM: 7/5/2024 8:22 AM     INDICATION: Recent pneumothorax.     COMPARISON: CT abdomen pelvis 7/4/2024. Chest radiograph 5/10/2023 and  10/18/2021.     TECHNIQUE: Frontal and lateral views of the chest were obtained.     FINDINGS:  Consolidation of the right lung base obscuring the right hemidiaphragm  and right costophrenic angle. Ill-defined left basilar opacities.  Partially calcified biapical pleural-parenchymal scarring with a  suspected small right apical pneumothorax. Cardiomediastinal contours  within normal limits given technique. Coronary artery calcifications  and/or stents. Calcified atherosclerotic disease in the thoracic aorta.  Severe thoracic dextroscoliosis and age-indeterminate but possibly acute  anterior wedge compression fracture of the T11 superior endplate.  Partially visualized acute anterior right rib deformities.       Impression:         1. Consolidation of the right lung base, likely a small pleural effusion  with underlying pneumonia and/or atelectasis, with a suspected small  right apical pneumothorax. Compared to more recent prior outside imaging  if available.  2. Ill-defined left basilar subsegmental atelectasis and/or  scarring.  3. Partially calcified biapical pleural-parenchymal scarring.  3. Partially visualized acute anterior right rib fracture deformities  and similar-appearing possibly acute mild anterior wedge compression  fracture of the T11 superior endplate.     This report was finalized on 7/5/2024 8:47 AM by Praful Gutierrez MD on  Workstation: [id number]       CT Outside Films [[id number]] Resulted: 07/05/24 0718     Updated: 07/05/24 0718    Narrative:      This procedure was auto-finalized with no dictation required.            Results for orders placed during the hospital encounter of 10/09/23    Adult Transthoracic Echo Complete W/ Cont if Necessary Per Protocol (With Agitated Saline)    Interpretation Summary    Left ventricular systolic function is normal. Left ventricular ejection fraction appears to be 56 - 60%.    Left ventricular diastolic function was indeterminate.    Saline test results are positive for right to left atrial level shunt.      Telemetry Scan   Final Result           Assessment/Plan     Active Hospital Problems    Diagnosis  POA    **Acute cholecystitis [K81.0]  Yes    PFO (patent foramen ovale) [Q21.12]  Not Applicable    Closed fracture of multiple ribs of right side [S22.41XA]  Yes    Hyponatremia [E87.1]  Yes    Hypercholesteremia [E78.00]  Yes    Paroxysmal atrial fibrillation [I48.0]  Yes    Essential hypertension [I10]  Yes    Coronary artery disease involving native coronary artery of native heart without angina pectoris [I25.10]  Yes    PAD (peripheral artery disease) [I73.9]  Yes    Gastroesophageal reflux disease [K21.9]  Yes    Anxiety disorder [F41.9]  Yes      Resolved Hospital Problems   No resolved problems to display.   Acute Cholecystitis  - will cover with ceftriaxone and flagyl, send blood cultures  - provide pain control, IVF, keep NPO  - hold eliquis and plavix  - general surgery consulted, planning cholecystectomy    HTN/PAF/PAD/PFO  - BP and HR acceptable  - hold  lisinopril, continue metoprolol  - hold eliquis and plavix as above    Hyponatremia  - noted on hospitalization at [facility]  - will give normal saline, check uric acid to start-patient does look a bit dry  - monitor BMP    GERD  - will start on IV famotidine    Multiple Right-Sided Rib Fractures  - from mechanical fall down some stairs on 6/26/24  - had pneumothorax which resolved with conservative treatment  - continue pain control, encourage pulmonary toilet    I discussed the patient's findings and my recommendations with patient, family, and nursing staff.    VTE Prophylaxis - SCDs.  Code Status - Full code.       Prince Meade MD  [facility]  07/05/24  09:36 EDT

## 2024-07-05 NOTE — PROGRESS NOTES
"Nutrition Services    Patient Name:  Danyell Osborne  YOB: 1935  MRN: [id number]  Admit Date:  7/5/2024  Assessment Date:  07/05/24    Summary: Nutrition Screen per Rn admission screen  Pt s/p Laparoscopic cholecystectomy with cholangiogram  for acute cholecystitis.  Pt NPO  Labs: Na 125, glu 110, platelets 586, ALT 52,  Meds:Nacl at 125, abx, pepcid    Plan/Recommendation  Will follow for start of po diet when medical conditions allow   monitor labs and replace electrolytes   RD to follow      CLINICAL NUTRITION ASSESSMENT      Reason for Assessment MST score 2+     Diagnosis/Problem   acute cholecystitis.   Medical/Surgical History Past Medical History:   Diagnosis Date    A-fib     Anemia     Coronary artery disease     HLD (hyperlipidemia)     Hypertension     Stroke        Past Surgical History:   Procedure Laterality Date    HYSTERECTOMY          Anthropometrics        Current Height  Current Weight  BMI kg/m2 Height: 162.6 cm (64\")  Weight: 60 kg (132 lb 4.4 oz) (07/05/24 0620)  Body mass index is 22.71 kg/m².   Adjusted BMI (if applicable)    BMI Category Normal/Healthy (18.4 - 24.9)   Ideal Body Weight (IBW) 120lb   Usual Body Weight (UBW) 120lb   Weight Trend Stable   Weight History Wt Readings from Last 30 Encounters:   07/05/24 0620 60 kg (132 lb 4.4 oz)   07/05/24 0600 61 kg (134 lb 7.7 oz)   02/14/24 1522 54.6 kg (120 lb 6.4 oz)   02/05/24 1052 54.1 kg (119 lb 3.2 oz)   01/10/24 1252 54.8 kg (120 lb 12.8 oz)   10/18/23 1419 54 kg (119 lb)   10/11/23 1059 53.5 kg (118 lb)   10/10/23 0504 53.7 kg (118 lb 6.2 oz)   10/10/23 0321 54 kg (118 lb 15.7 oz)   08/07/23 1134 54.4 kg (120 lb)   07/10/23 1122 54.5 kg (120 lb 3.2 oz)   05/10/23 1132 54 kg (119 lb)   04/26/23 1415 54.3 kg (119 lb 9.6 oz)   01/09/23 1247 53.6 kg (118 lb 3.2 oz)   01/04/23 1134 53.6 kg (118 lb 3.2 oz)   12/08/22 1108 53.3 kg (117 lb 9.6 oz)   10/03/22 1135 54.1 kg (119 lb 6 oz)   06/09/22 1411 54.3 kg (119 lb 12.8 oz) " "  06/06/22 1434 54.9 kg (121 lb)   02/16/22 1035 55.8 kg (123 lb)   10/18/21 1444 55.4 kg (122 lb 3.2 oz)   05/20/21 1142 56.5 kg (124 lb 9.6 oz)   05/11/21 0000 56.8 kg (125 lb 2 oz)   04/27/21 0000 57.3 kg (126 lb 4 oz)   03/23/21 1351 57.3 kg (126 lb 6.4 oz)   09/23/20 1411 56.8 kg (125 lb 3.2 oz)   03/05/20 1217 59.3 kg (130 lb 12.8 oz)   12/09/19 1113 58.8 kg (129 lb 9.6 oz)   11/26/19 1520 60.1 kg (132 lb 6.4 oz)   06/03/19 1402 59.9 kg (132 lb)   05/13/19 1127 60.8 kg (134 lb)   11/12/18 1815 60.2 kg (132 lb 12.8 oz)   10/16/18 1344 59.7 kg (131 lb 9.6 oz)      --  Labs       Pertinent Labs    Results from last 7 days   Lab Units 07/05/24  0810   SODIUM mmol/L 125*   POTASSIUM mmol/L 4.4   CHLORIDE mmol/L 92*   CO2 mmol/L 21.8*   BUN mg/dL 11   CREATININE mg/dL 0.49*   CALCIUM mg/dL 8.2*   BILIRUBIN mg/dL 0.6   ALK PHOS U/L 84   ALT (SGPT) U/L 52*   AST (SGOT) U/L 46*   GLUCOSE mg/dL 110*     Results from last 7 days   Lab Units 07/05/24  0810   HEMOGLOBIN g/dL 8.9*   HEMATOCRIT % 27.3*   WBC 10*3/mm3 10.84*   ALBUMIN g/dL 3.3*     Results from last 7 days   Lab Units 07/05/24  0810   INR  1.08   APTT seconds 25.5   PLATELETS 10*3/mm3 586*     No results found for: \"COVID19\"  Lab Results   Component Value Date    HGBA1C 5.90 (H) 10/10/2023          Medications           Scheduled Medications cefTRIAXone, 2,000 mg, Intravenous, Q24H  famotidine, 20 mg, Intravenous, Q12H  metoprolol succinate XL, 50 mg, Oral, BID  metroNIDAZOLE, 500 mg, Intravenous, Q8H  [Transfer Hold] sodium chloride, 10 mL, Intravenous, Q12H       Infusions lactated ringers, 9 mL/hr, Last Rate: 9 mL/hr (07/05/24 1146)  sodium chloride, 125 mL/hr, Last Rate: 125 mL/hr (07/05/24 1032)       PRN Medications   [Transfer Hold] acetaminophen **OR** [Transfer Hold] acetaminophen **OR** [Transfer Hold] acetaminophen    [Transfer Hold] senna-docusate sodium **AND** [Transfer Hold] polyethylene glycol **AND** [Transfer Hold] bisacodyl **AND** " [Transfer Hold] bisacodyl    [Transfer Hold] calcium carbonate    [Transfer Hold] HYDROcodone-acetaminophen    HYDROmorphone    [Transfer Hold] HYDROmorphone **AND** [Transfer Hold] naloxone    [Transfer Hold] nitroglycerin    [Transfer Hold] ondansetron    [Transfer Hold] ondansetron ODT **OR** [Transfer Hold] ondansetron    oxyCODONE-acetaminophen    [Transfer Hold] sodium chloride    [Transfer Hold] sodium chloride     Physical Findings          General Findings alert, disoriented   Oral/Mouth Cavity tooth or teeth missing   Edema  no edema   Gastrointestinal last bowel movement: 7/3   Skin  Incision abd   Tubes/Drains/Lines none   NFPE Not indicated at this time   --  Current Nutrition Orders & Evaluation of Intake       Oral Nutrition     Food Allergies NKFA   Current PO Diet NPO Diet NPO Type: Strict NPO   Supplement n/a   PO Evaluation     % PO Intake npo    Factors Affecting Intake: altered GI function   --  PES STATEMENT / NUTRITION DIAGNOSIS      Nutrition Dx Problem  Problem: Altered GI Function  Etiology: Medical Diagnosis - acute cholecystitis     Signs/Symptoms: Report/Observation     NUTRITION INTERVENTION / PLAN OF CARE      Intervention Goal(s) Maintain nutrition status, Reduce/improve symptoms, Disease management/therapy, Initiate feeding/diet, Tolerate PO , and Maintain weight         RD Intervention/Action Await initiation/advancement of PO diet, Continue to monitor, and Care plan reviewed   --      Prescription/Orders:       PO Diet       Supplements       Enteral Nutrition       Parenteral Nutrition    New Prescription Ordered? No changes at this time   --      Monitor/Evaluation Per protocol, I&O, PO intake, Pertinent labs, Weight, Skin status, GI status, Symptoms   Discharge Plan/Needs Pending clinical course   --    RD to follow per protocol.      Electronically signed by:  Lesvia Begum RD  07/05/24 15:24 EDT

## 2024-07-05 NOTE — TELEPHONE ENCOUNTER
Spoke with Eze, Danyell is schedule for cholecystectomy this morning. I will call Eze back this afternoon to check in per his request

## 2024-07-05 NOTE — ANESTHESIA PREPROCEDURE EVALUATION
Anesthesia Evaluation     Patient summary reviewed   no history of anesthetic complications:                Airway   Mallampati: II  TM distance: >3 FB  Neck ROM: limited  Possible difficult intubation  Dental      Pulmonary     breath sounds clear to auscultation  (+) pleural effusion,  (-) shortness of breath, recent URI, not a smoker    ROS comment: CXR 1. Consolidation of the right lung base, likely a small pleural effusion  with underlying pneumonia and/or atelectasis, with a suspected small  right apical pneumothorax. Compared to more recent prior outside imaging  if available.    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    Musculoskeletal     (+) neck stiffness  (-) neck pain  Abdominal    Substance History      OB/GYN          Other   blood dyscrasia (Hb 8.9) anemia,                       Anesthesia Plan    ASA 3     general     (Pt brought to OR by nursing staff without pt being seen. Chart prvsly reviewed and confirmed with pt prior to induction.    I have reviewed the patient's history and chart with the patient, including all pertinent laboratory results and imaging. I have explained the risks of anesthesia including but not limited to dental damage, corneal abrasion, nerve injury, MI, stroke, aspiration, and death. Patient has agreed to proceed.  )  intravenous induction       Use of blood products discussed with patient .        CODE STATUS:    Code Status (Patient has no pulse and is not breathing): CPR (Attempt to Resuscitate)  Medical Interventions (Patient has pulse or is  breathing): Full Support

## 2024-07-05 NOTE — OP NOTE
Operative Note :  MD Apurva Vanegashallie Browningard  1935    Procedure Date: 07/05/24    Pre-op Diagnosis:  Acute cholecystitis [K81.0]    Post-op Diagnosis:  Acute cholecystitis [K81.0]    Procedure:   Laparoscopic cholecystectomy with cholangiogram    Surgeon: Tessa Montenegro MD    Assistant: Stas Zamora PA-C (Goldy was responsible for laparoscopic manipulation of the gallbladder during critical portions of the dissection, handling of the laparoscopic camera, closure of all surgical incisions, and application of topical sterile dressings)    Anesthesia:  General (general endotracheal tube)    Estimated Blood Loss: minimal    Specimens:  Gallbladder    Complications: None    Indications: The patient is an 88-year-old lady who was transferred here from [facility] overnight with acute cholecystitis found on CT.  She has had a lot of right upper quadrant abdominal pain and swelling of many days duration.  Her issues are complicated by a recent mechanical fall with rib fractures and pneumothorax sustained 1 week ago requiring inpatient hospitalization at [facility].  This required no surgical intervention and she was eventually discharged home but had worsening abdominal pain.  I recommended proceeding with a laparoscopic versus open cholecystectomy and cholangiogram today.  She has been administered a dose of Kcentra to reverse her Eliquis and preoperative chest x-ray showed only a tiny right apical pneumothorax in addition to the known rib fractures.  She has been counseled on the risks of the procedure which include bleeding, wound infection, possible common bile duct injury, possible postoperative bile leak, and possible damage to other structures around the gallbladder such as the duodenum, liver, or transverse colon.  Despite these risks, she has consented to proceed.    Findings: No distal filling defect of the common bile duct appreciable on cholangiogram, the  intrahepatic ducts would not opacify but there did not appear to be any obstruction of the common hepatic duct    Description of procedure:  The patient was brought to the operating room and placed on the OR table in supine position.  An endotracheal tube was inserted, and general anesthesia was induced.  The anterior abdominal wall was shaved, prepped, and draped in a sterile fashion.  A surgical timeout was then completed.  A curvilinear infraumbilical skin incision was made after instillation of 0.5% Marcaine with epinephrine.  The umbilical stalk was grasped and elevated, and a longitudinal fasciotomy made.  A Ty trocar was inserted and secured with 2 separate 0 Vicryl stay sutures.  The abdomen was then insufflated.  Under direct visualization, 3 additional 5 mm trocars were then placed within the subxiphoid and subcostal space on the right.  The gallbladder was retracted cephalad and infundibulum retracted inferiorly.  There were multiple adhesions to the anterior surface of the gallbladder that were taken down with electrocautery, gently sweeping the transverse colon and duodenum off of the anterior surface of the gallbladder.  The cystic duct and cystic artery were slowly dissected out circumferentially until a firm critical view was obtained.  A single Hemoclip was placed across the cystic duct at its junction with the gallbladder infundibulum and 3 hemoclips were placed across the cystic artery.  A small hole was created on the anterior wall of the cystic duct, and a cholangiogram catheter inserted through a right upper quadrant angiocatheter.  The catheter was secured within the duct with an additional Hemoclip and a cholangiogram was then obtained.  The cholangiogram showed filling of the cystic duct and distal common bile duct as well as spillage into the duodenum.  There was some retrograde flow of the more proximal common bile duct headed towards common hepatic duct but this would not fill despite  multiple attempts at reinjecting contrast.  Most of the contrast flowed antegrade into the duodenum and would not flow retrograde.  There were no clips in that location and there did not appear to be any narrowing or obstruction.  I suspect her ampulla was not providing enough resistance to encourage retrograde flow.  The anatomy was clearly demonstrating that I was in the cystic duct and not the common bile duct, so the catheter and clip were withdrawn.  2 additional hemoclips were placed across the cystic duct stump and cystic duct was divided.  The cystic artery was divided in a similar fashion leaving 2 clips behind on the arterial stump.  The gallbladder was then slowly dissected off of the undersurface of the liver using electrocautery and it was removed from the peritoneal cavity within an Endo Catch bag at the umbilical trocar site.  The gallbladder was passed off to pathology.  The abdomen was then reinsufflated and right upper quadrant inspected. Warm normal saline was irrigated and suctioned dry.  The gallbladder fossa appeared hemostatic and there was no evidence for bleeding.  All trocars were then removed under direct visualization and the abdomen desufflated.  The umbilical fascial incision was closed using a new 0 Vicryl figure-of-eight suture, all skin incisions closed with 4-0 Vicryl subcuticular stitches, and then each was dressed with Exofin glue.  The patient was then extubated and transferred to PACU in stable condition.  All counts were correct per nursing.    Tessa Montenegro MD  General, Robotic, and Endoscopic Surgery  [facility]    [address], [address]  [place] [address]  P: [phone]  F: [phone]

## 2024-07-05 NOTE — PLAN OF CARE
Goal Outcome Evaluation:      Pt is an 87 yo female who was admitted to the unit this AM due to acute cholecystitis.  Pt is A/Ox4, PWD, PMSx4, PERRL, - JVD, trachea midline, = rise and fall of chest wall with respirations, CEBBS will note that the respirations are shallow but no advantageous lung sounds noted upon exam, pt c/o head, neck, back, flank/chest pain 2 to a fall approximately 1 week ago and then abdominal pain and distension due the acute cholecystitis.  Pt placed on 2L O2 via NC due to sats dropping below 88% on RA after pt received IV pain medications.  Pt received CHG bath per protocol prior to being taken down for surgery.  Pt off the floor for a while.  When pt returned to the floor secure chat to provider due to pt having 7/10 post op pain and HTN.  Per provider go ahead and give the dilaudid and monitor vitals, reach out if BP over 190.  Pt remained stable, pain subsides with Q2 0.5mg dilaudid.  Pt received clear liquid diet.  Pt  remained at bedside.

## 2024-07-05 NOTE — PLAN OF CARE
Goal Outcome Evaluation:              Outcome Evaluation: Orders received. Pt NPO for sx this date. ST will f/u next date.

## 2024-07-05 NOTE — OUTREACH NOTE
Call Center TCM Note      Flowsheet Row Responses   [facility] operated by [facility] patient discharged from? Non-BH   Does the patient have one of the following disease processes/diagnoses(primary or secondary)? Other   TCM attempt successful? No   Unsuccessful attempts Attempt 1   Change in Health Status Readmitted  [Readmitted today.]            Matilda Lemus RN    7/5/2024, 10:33 EDT

## 2024-07-05 NOTE — CONSULTS
General Surgery    Summary:     Danyell Osborne is a 88 y.o. year old with acute cholecystitis. CT from [facility] notes gallbladder wall thickening with surrounding pericholecystic fluid. Abdominal exam reveals RUQ tenderness without rebound or guarding. Labs pending. She is aferbrile with stable vitals. Patient takes Eliquis and Plavis daily.   Plan:   Dr. Montenegro recommended laparoscopic cholecystectomy with intraoperative cholangiogram today, discussed the risks, benefits, and alternatives with patient and family, she wishes to proceed.   OR today for Laparoscopic cholecystectomy. K-centra ordered. Labs ordered stat. Pt recently hospitalized for right rib fractures, stat chest Xray ordered.     Chief Complaint:    Abdominal Pain    History of Present Illness:     Danyell Osborne is a very pleasant 88 y.o. year old who was admitted from [facility] last night 7/4 for acute cholecystitis. Prior to this, patient had been at [facility] for the past week for a recent fall at home. Her injuries included right rib fractures 3-6, mild pneumothorax, and chest contusion. Patient was discharged from there on the 3rd and returned to [facility] on the 4th. Daughter at bedside states that her RUQ pain started immediately following the patient's fall. She continued to complain of RUQ abdominal pain throughout this past week. She is positive for intermittent nausea and vomiting. Denies fever or chills. States she has never had anything like this before.     Past Medical History:    Afib   CAD  MI - s/p cardiac stents  Stroke  Hypertension    Past Surgical History:    Hysterectomy    Family History:    Denies pertinent family history    Social History:    Social History     Socioeconomic History    Marital status:    Tobacco Use    Smoking status: Never     Passive exposure: Never    Smokeless tobacco: Never   Vaping Use    Vaping status: Never Used   Substance and Sexual Activity    Alcohol use: Never    Drug use:  Never    Sexual activity: Defer       Allergies:   Allergies   Allergen Reactions    Contrast Dye (Echo Or Unknown Ct/Mr) Hives and Unknown - High Severity     hives    Iodinated Contrast Media Hives and Unknown - High Severity    Penicillins Rash       Medications:   Eliquis2.5 BID  Aspirin 81mg  Lipitor 80mg  Cholecalciferol  Plavix 75mg  Pepcid  Lisinopril  Skelaxin  Metoprolol  Simethicone    Radiology/Endoscopy:    CT abdomen pelvis without contrast on 7/4/24  FINDINGS:    The study is limited for the detection of disease or injury due to the  lack of contrast.    ABDOMEN: Borderline cardiomegaly. Scattered pleural calcifications.  Bilateral pleural effusions, right greater than left. There is a  medium-sized region of consolidative changes in the right lung base. The  heart is proper size. The liver, spleen appear unremarkable and  demonstrate no non-contrast evidence of a focal lesion.  The pancreas  remains atrophic. Interval development of moderate pericholecystic fluid  and inflammatory change and mild gallbladder wall thickening. Dense  material is seen layering within the gallbladder. No adrenal mass or  nodule is evident. The kidneys are unremarkable. There is no  hydronephrosis, no renal calculi, and no non-contrast evidence of a  solid intrarenal mass.  Stable 3.6 cm infrarenal abdominal aortic  aneurysm. There is an additional focally ectatic portion of the aorta  measuring 2.8 cm which is seen at the level of the renal arteries. There  is no free fluid or adenopathy. There are no dilated loops of bowel.  There is no free fluid or intraperitoneal air. There is no significant  stranding seen in the mesentery. Moderate to severe colonic stool burden  is again noted.    Mild diffuse body wall anasarca which has increased since the prior  study. Interval decreased size of a right posterior subcutaneous  hematoma.    PELVIS: The appendix is not identified but there are no secondary signs  of appendicitis.  The urinary bladder is unremarkable. There is no  significant free fluid or adenopathy. Patient appears to be status post  hysterectomy.    BONES: Unchanged appearance of multiple mildly displaced subacute  right-sided rib fractures.  Exam End: 07/04/24 20:23    Specimen Collected: 07/04/24 20:54 Last Resulted: 07/04/24 21:57   Received From: [facility]        Labs:    WBC 11  Hgb 8.7  Platelets 523  AST 54  ALT 69  T bili 0.7  Alk Phos 81  Na 127  K 4.4  Creat 0.4  Lipase 35          BMI 22.71  Wt 60kg    Vitals   Temp 98.2  HR 88  RR 17  /48  SPO2 92    Review of Systems   Constitutional:  Negative for chills and fever.   HENT:  Negative for sore throat and trouble swallowing.    Respiratory:  Negative for cough and chest tightness.    Cardiovascular:  Negative for chest pain and palpitations.   Gastrointestinal:  Positive for abdominal distention, abdominal pain, constipation, nausea and vomiting.   Genitourinary:  Negative for dysuria and hematuria.   Musculoskeletal:  Positive for arthralgias. Negative for neck pain and neck stiffness.   Skin:  Negative for rash and wound.   Neurological:  Positive for weakness. Negative for dizziness and light-headedness.   Psychiatric/Behavioral:  The patient is not nervous/anxious.         Physical Exam  Constitutional:       General: She is not in acute distress.     Appearance: Normal appearance. She is not ill-appearing.   HENT:      Head: Normocephalic and atraumatic.   Eyes:      Extraocular Movements: Extraocular movements intact.      Pupils: Pupils are equal, round, and reactive to light.   Cardiovascular:      Rate and Rhythm: Normal rate.      Pulses: Normal pulses.   Abdominal:      Palpations: Abdomen is soft. There is no mass.      Tenderness: There is abdominal tenderness. There is no guarding or rebound.      Hernia: No hernia is present.   Musculoskeletal:         General: Normal range of motion.   Skin:     General: Skin is warm and  dry.      Coloration: Skin is not jaundiced.   Neurological:      General: No focal deficit present.      Mental Status: She is alert.   Psychiatric:         Mood and Affect: Mood normal.         Behavior: Behavior normal.                   AGUSTIN Rowley   General and Endoscopic Surgery  [facility] [place]    [address] [place] [address], [address]  [place], [place], [address]  P: [phone]  F: [phone]

## 2024-07-05 NOTE — TELEPHONE ENCOUNTER
Per Til, , patient is doing much better after surgery. Resting and will be in the hospital for few days to recover.

## 2024-07-05 NOTE — ANESTHESIA POSTPROCEDURE EVALUATION
"Patient: Danyell Osborne    Procedure Summary       Date: 07/05/24 Room / Location: [facility] OR  / [facility] MAIN OR    Anesthesia Start: 1202 Anesthesia Stop: 1307    Procedure: Laparoscopic cholecystectomy with cholangiogram (Abdomen) Diagnosis:       Acute cholecystitis      (Acute cholecystitis [K81.0])    Surgeons: Tessa Montenegro MD Provider: Ke Abernathy DO    Anesthesia Type: general ASA Status: 3            Anesthesia Type: No value filed.    Vitals  Vitals Value Taken Time   /58 07/05/24 1430   Temp 36.5 °C (97.7 °F) 07/05/24 1305   Pulse 81 07/05/24 1432   Resp 16 07/05/24 1305   SpO2 97 % 07/05/24 1432   Vitals shown include unfiled device data.        Post Anesthesia Care and Evaluation    Patient location during evaluation: bedside  Patient participation: complete - patient participated  Level of consciousness: awake and alert  Pain management: adequate    Airway patency: patent  Anesthetic complications: No anesthetic complications  PONV Status: controlled  Cardiovascular status: acceptable and hemodynamically stable  Respiratory status: acceptable, spontaneous ventilation and nonlabored ventilation  Hydration status: acceptable    Comments: /58   Pulse 85   Temp 36.5 °C (97.7 °F) (Oral)   Resp 16   Ht 162.6 cm (64\")   Wt 60 kg (132 lb 4.4 oz)   SpO2 97%   BMI 22.71 kg/m²           "

## 2024-07-05 NOTE — ANESTHESIA PROCEDURE NOTES
Airway  Urgency: elective    Date/Time: 7/5/2024 12:14 PM  Airway not difficult    General Information and Staff    Patient location during procedure: OR    Indications and Patient Condition  Indications for airway management: airway protection    Preoxygenated: yes  MILS not maintained throughout  Mask difficulty assessment: 1 - vent by mask    Final Airway Details  Final airway type: endotracheal airway      Successful airway: ETT  Cuffed: yes   Successful intubation technique: direct laryngoscopy  Endotracheal tube insertion site: oral  Blade: Alyssa  Blade size: 3  ETT size (mm): 6.5  Cormack-Lehane Classification: grade I - full view of glottis  Placement verified by: chest auscultation and capnometry   Cuff volume (mL): 8  Measured from: lips  ETT/EBT  to lips (cm): 20  Number of attempts at approach: 1  Assessment: lips, teeth, and gum same as pre-op and atraumatic intubation    Additional Comments  Negative epigastric sounds, Breath sound equal bilaterally with symmetric chest rise and fall

## 2024-07-06 ENCOUNTER — APPOINTMENT (OUTPATIENT)
Dept: GENERAL RADIOLOGY | Facility: HOSPITAL | Age: 89
End: 2024-07-06
Payer: MEDICARE

## 2024-07-06 LAB
ALBUMIN SERPL-MCNC: 3 G/DL (ref 3.5–5.2)
ALBUMIN/GLOB SERPL: 1.1 G/DL
ALP SERPL-CCNC: 105 U/L (ref 39–117)
ALT SERPL W P-5'-P-CCNC: 65 U/L (ref 1–33)
ANION GAP SERPL CALCULATED.3IONS-SCNC: 10 MMOL/L (ref 5–15)
AST SERPL-CCNC: 75 U/L (ref 1–32)
BASOPHILS # BLD AUTO: 0.02 10*3/MM3 (ref 0–0.2)
BASOPHILS NFR BLD AUTO: 0.2 % (ref 0–1.5)
BILIRUB SERPL-MCNC: 0.4 MG/DL (ref 0–1.2)
BUN SERPL-MCNC: 11 MG/DL (ref 8–23)
BUN/CREAT SERPL: 26.2 (ref 7–25)
CALCIUM SPEC-SCNC: 8.5 MG/DL (ref 8.6–10.5)
CHLORIDE SERPL-SCNC: 96 MMOL/L (ref 98–107)
CO2 SERPL-SCNC: 24 MMOL/L (ref 22–29)
CREAT SERPL-MCNC: 0.42 MG/DL (ref 0.57–1)
DEPRECATED RDW RBC AUTO: 44.9 FL (ref 37–54)
EGFRCR SERPLBLD CKD-EPI 2021: 94.8 ML/MIN/1.73
EOSINOPHIL # BLD AUTO: 0.03 10*3/MM3 (ref 0–0.4)
EOSINOPHIL NFR BLD AUTO: 0.3 % (ref 0.3–6.2)
ERYTHROCYTE [DISTWIDTH] IN BLOOD BY AUTOMATED COUNT: 13.7 % (ref 12.3–15.4)
GLOBULIN UR ELPH-MCNC: 2.7 GM/DL
GLUCOSE SERPL-MCNC: 111 MG/DL (ref 65–99)
HCT VFR BLD AUTO: 23 % (ref 34–46.6)
HGB BLD-MCNC: 7.4 G/DL (ref 12–15.9)
IMM GRANULOCYTES # BLD AUTO: 0.1 10*3/MM3 (ref 0–0.05)
IMM GRANULOCYTES NFR BLD AUTO: 0.9 % (ref 0–0.5)
LAB AP CASE REPORT: NORMAL
LYMPHOCYTES # BLD AUTO: 1.06 10*3/MM3 (ref 0.7–3.1)
LYMPHOCYTES NFR BLD AUTO: 9.2 % (ref 19.6–45.3)
MCH RBC QN AUTO: 29.5 PG (ref 26.6–33)
MCHC RBC AUTO-ENTMCNC: 32.2 G/DL (ref 31.5–35.7)
MCV RBC AUTO: 91.6 FL (ref 79–97)
MONOCYTES # BLD AUTO: 1.24 10*3/MM3 (ref 0.1–0.9)
MONOCYTES NFR BLD AUTO: 10.8 % (ref 5–12)
NEUTROPHILS NFR BLD AUTO: 78.6 % (ref 42.7–76)
NEUTROPHILS NFR BLD AUTO: 9.01 10*3/MM3 (ref 1.7–7)
NRBC BLD AUTO-RTO: 0.3 /100 WBC (ref 0–0.2)
PATH REPORT.FINAL DX SPEC: NORMAL
PATH REPORT.GROSS SPEC: NORMAL
PLATELET # BLD AUTO: 544 10*3/MM3 (ref 140–450)
PMV BLD AUTO: 9.1 FL (ref 6–12)
POTASSIUM SERPL-SCNC: 5.3 MMOL/L (ref 3.5–5.2)
PROT SERPL-MCNC: 5.7 G/DL (ref 6–8.5)
RBC # BLD AUTO: 2.51 10*6/MM3 (ref 3.77–5.28)
SODIUM SERPL-SCNC: 130 MMOL/L (ref 136–145)
WBC NRBC COR # BLD AUTO: 11.46 10*3/MM3 (ref 3.4–10.8)

## 2024-07-06 PROCEDURE — 97166 OT EVAL MOD COMPLEX 45 MIN: CPT

## 2024-07-06 PROCEDURE — 85025 COMPLETE CBC W/AUTO DIFF WBC: CPT | Performed by: SURGERY

## 2024-07-06 PROCEDURE — 25010000002 METRONIDAZOLE 500 MG/100ML SOLUTION: Performed by: SURGERY

## 2024-07-06 PROCEDURE — 25010000002 CEFTRIAXONE PER 250 MG: Performed by: SURGERY

## 2024-07-06 PROCEDURE — 25010000002 HYDROMORPHONE PER 4 MG: Performed by: INTERNAL MEDICINE

## 2024-07-06 PROCEDURE — 97530 THERAPEUTIC ACTIVITIES: CPT

## 2024-07-06 PROCEDURE — 99024 POSTOP FOLLOW-UP VISIT: CPT | Performed by: SURGERY

## 2024-07-06 PROCEDURE — 71045 X-RAY EXAM CHEST 1 VIEW: CPT

## 2024-07-06 PROCEDURE — 92610 EVALUATE SWALLOWING FUNCTION: CPT | Performed by: SPEECH-LANGUAGE PATHOLOGIST

## 2024-07-06 PROCEDURE — 80053 COMPREHEN METABOLIC PANEL: CPT | Performed by: SURGERY

## 2024-07-06 PROCEDURE — 99223 1ST HOSP IP/OBS HIGH 75: CPT | Performed by: SURGERY

## 2024-07-06 PROCEDURE — 25810000003 SODIUM CHLORIDE 0.9 % SOLUTION: Performed by: SURGERY

## 2024-07-06 PROCEDURE — 97162 PT EVAL MOD COMPLEX 30 MIN: CPT

## 2024-07-06 RX ADMIN — HYDROCODONE BITARTRATE AND ACETAMINOPHEN 1 TABLET: 5; 325 TABLET ORAL at 16:25

## 2024-07-06 RX ADMIN — OXYCODONE HYDROCHLORIDE AND ACETAMINOPHEN 1 TABLET: 5; 325 TABLET ORAL at 21:48

## 2024-07-06 RX ADMIN — METOPROLOL SUCCINATE 50 MG: 50 TABLET, FILM COATED, EXTENDED RELEASE ORAL at 08:16

## 2024-07-06 RX ADMIN — HYDROMORPHONE HYDROCHLORIDE 0.5 MG: 1 INJECTION, SOLUTION INTRAMUSCULAR; INTRAVENOUS; SUBCUTANEOUS at 12:03

## 2024-07-06 RX ADMIN — FAMOTIDINE 20 MG: 10 INJECTION INTRAVENOUS at 08:17

## 2024-07-06 RX ADMIN — ANTACID TABLETS 2 TABLET: 500 TABLET, CHEWABLE ORAL at 20:07

## 2024-07-06 RX ADMIN — HYDROMORPHONE HYDROCHLORIDE 0.5 MG: 1 INJECTION, SOLUTION INTRAMUSCULAR; INTRAVENOUS; SUBCUTANEOUS at 00:03

## 2024-07-06 RX ADMIN — HYDROMORPHONE HYDROCHLORIDE 0.5 MG: 1 INJECTION, SOLUTION INTRAMUSCULAR; INTRAVENOUS; SUBCUTANEOUS at 08:18

## 2024-07-06 RX ADMIN — Medication 10 ML: at 20:12

## 2024-07-06 RX ADMIN — METRONIDAZOLE 500 MG: 500 INJECTION, SOLUTION INTRAVENOUS at 01:36

## 2024-07-06 RX ADMIN — Medication 10 ML: at 08:19

## 2024-07-06 RX ADMIN — HYDROCODONE BITARTRATE AND ACETAMINOPHEN 1 TABLET: 5; 325 TABLET ORAL at 07:17

## 2024-07-06 RX ADMIN — FAMOTIDINE 20 MG: 10 INJECTION INTRAVENOUS at 20:08

## 2024-07-06 RX ADMIN — CEFTRIAXONE 2000 MG: 2 INJECTION, POWDER, FOR SOLUTION INTRAMUSCULAR; INTRAVENOUS at 08:17

## 2024-07-06 RX ADMIN — METRONIDAZOLE 500 MG: 500 INJECTION, SOLUTION INTRAVENOUS at 08:18

## 2024-07-06 RX ADMIN — METRONIDAZOLE 500 MG: 500 INJECTION, SOLUTION INTRAVENOUS at 16:26

## 2024-07-06 RX ADMIN — METOPROLOL SUCCINATE 50 MG: 50 TABLET, FILM COATED, EXTENDED RELEASE ORAL at 20:08

## 2024-07-06 RX ADMIN — SODIUM CHLORIDE 75 ML/HR: 9 INJECTION, SOLUTION INTRAVENOUS at 20:15

## 2024-07-06 NOTE — PLAN OF CARE
Goal Outcome Evaluation:         Pt is an 87 yo female who was admitted to the unit 07/05/2024 due to acute cholecystitis. Pt is A/Ox4, PWD, PMSx4, PERRL, - JVD, trachea midline, = rise and fall of chest wall with respirations, CEBBS will note that the respirations are shallow but no advantageous lung sounds noted upon exam, pt c/o head, neck, back, flank/chest pain 2 to a fall approximately 1 week ago and then abdominal pain and distension due the acute cholecystitis. Pt on 2L O2 via NC due to sats dropping below 88% on RA after pt received IV pain medications. Pt had Lap Kathleen 07/05/2024, surgical sites look good, minor bruising noted, some swelling noted, pt offered Ice for pain control.  Pt received medications per MAR for the pain.   Pt remained stable, pain subsides with Q2 0.5mg dilaudid. Pt diet advanced, SLP saw pt due to pt c/o issues swallowing. Pt  remained at bedside.

## 2024-07-06 NOTE — PROGRESS NOTES
"General Surgery  Progress Note    CC: Follow-up acute on chronic cholecystitis    POD#1 laparoscopic cholecystectomy with cholangiogram    S: She is in good spirits this morning and says she feels much better.  Her abdominal pain is mild and only located at her incisions.  She has just worked with speech therapy and been cleared for regular diet.  She worked with PT today but was only able to stand at the bedside.    O:/78 (BP Location: Right arm, Patient Position: Sitting)   Pulse 87   Temp 98 °F (36.7 °C) (Oral)   Resp 18   Ht 162.6 cm (64\")   Wt 58 kg (127 lb 13.9 oz)   SpO2 100%   BMI 21.95 kg/m²     GENERAL: alert, well appearing, and in no distress  HEENT: normocephalic, atraumatic, moist mucous membranes, clear sclerae   CHEST: clear to auscultation, no wheezes, rales or rhonchi, symmetric air entry  CARDIAC: regular rate and rhythm    ABDOMEN: Soft, nontender, nondistended, incisions are in good order with glue intact  EXTREMITIES: no cyanosis, clubbing, or edema   SKIN: Warm and moist, no rashes    LABS  Results from last 7 days   Lab Units 07/06/24  0929 07/05/24  0810   WBC 10*3/mm3 11.46* 10.84*   HEMOGLOBIN g/dL 7.4* 8.9*   HEMATOCRIT % 23.0* 27.3*   PLATELETS 10*3/mm3 544* 586*     Results from last 7 days   Lab Units 07/06/24  0441 07/05/24  0810   SODIUM mmol/L 130* 125*   POTASSIUM mmol/L 5.3* 4.4   CHLORIDE mmol/L 96* 92*   CO2 mmol/L 24.0 21.8*   BUN mg/dL 11 11   CREATININE mg/dL 0.42* 0.49*   CALCIUM mg/dL 8.5* 8.2*   BILIRUBIN mg/dL 0.4 0.6   ALK PHOS U/L 105 84   ALT (SGPT) U/L 65* 52*   AST (SGOT) U/L 75* 46*   GLUCOSE mg/dL 111* 110*     Results from last 7 days   Lab Units 07/05/24  0810   INR  1.08   APTT seconds 25.5         A/P: 88 y.o. female POD#1 laparoscopic cholecystectomy with cholangiogram    Advance to regular diet and continue IV fluid resuscitation for her hyponatremia and hypochloridemia.  Continue working with PT, as she may need rehab versus home PT at " discharge.    Tessa Montenegro MD  General, Robotic, and Endoscopic Surgery  [facility]    [address], [address]  [place] [address]  P: [phone]  F: [phone]

## 2024-07-06 NOTE — PLAN OF CARE
Goal Outcome Evaluation:  Plan of Care Reviewed With: patient, spouse        Progress: no change  Outcome Evaluation: Pt is an 89 yo female seen POD1 Aniceto nieto. She reports recent mechanical fall down stairs in june resulting in R rib fractures. Hx CVA in Oct 2023. She reports she lives at home with her spouse, (I) with ADLs and no AD used at baseline. She is on 4L this date. She presents below her baseline with increased pain, decreased activity tolerance, strength and balance. She requires min-mod A for bed mobility, max A for LBD due to pain and decreased func reach, s/up for g/h, STS with min A and able to demo lateral steps to HOB. She is quick to fatigue however motivated to work with therapy. She will continue to benefit from skilled OT to address deficits and maximize (I) prior to d/c. SNF vs HHOT pending progress during inpatient stay. She remains a falls risk at this time.      Anticipated Discharge Disposition (OT): home with home health, home with assist, skilled nursing facility

## 2024-07-06 NOTE — SIGNIFICANT NOTE
Inna RN did not document this.  I thought I scanned badge and was the RN but previous RN was still logged in .

## 2024-07-06 NOTE — PROGRESS NOTES
Name: Danyell Osborne ADMIT: 2024   : 1935  PCP: Shanae Osborne APRN    MRN: [id number] LOS: 1 days   AGE/SEX: 88 y.o. female  ROOM: [facility]     Subjective   Subjective   Patient went to OR yesterday and tolerated well. She had some diplopia this AM after some pain meds but this has since resolved, NIHSS was 0. Pain is reasonably controlled. Tolerating clears. She states she had some trouble swallowing solids and that outpatient workup was underway prior to her current issues. Her  is at bedside.    Objective   Objective   Vital Signs  Temp:  [97.7 °F (36.5 °C)-98.7 °F (37.1 °C)] 98.4 °F (36.9 °C)  Heart Rate:  [] 87  Resp:  [16-18] 18  BP: (136-196)/() 154/79  SpO2:  [95 %-100 %] 100 %  on  Flow (L/min):  [2-4] 2;   Device (Oxygen Therapy): nasal cannula  Body mass index is 21.95 kg/m².  Physical Exam  Vitals and nursing note reviewed.   Constitutional:       General: She is not in acute distress.     Appearance: She is not toxic-appearing or diaphoretic.   HENT:      Head: Normocephalic and atraumatic.      Nose: Nose normal.      Mouth/Throat:      Mouth: Mucous membranes are moist.      Pharynx: Oropharynx is clear.   Eyes:      Conjunctiva/sclera: Conjunctivae normal.      Pupils: Pupils are equal, round, and reactive to light.   Cardiovascular:      Rate and Rhythm: Normal rate and regular rhythm.      Pulses: Normal pulses.   Pulmonary:      Effort: Pulmonary effort is normal.      Breath sounds: Examination of the right-lower field reveals decreased breath sounds. Examination of the left-lower field reveals decreased breath sounds. Decreased breath sounds present.   Abdominal:      General: Bowel sounds are normal.      Palpations: Abdomen is soft.      Tenderness: There is abdominal tenderness (appropriate). There is no guarding or rebound.   Musculoskeletal:         General: No swelling or tenderness.   Skin:     General: Skin is warm and dry.      Capillary  "Refill: Capillary refill takes less than 2 seconds.   Neurological:      General: No focal deficit present.      Mental Status: She is alert and oriented to person, place, and time.   Psychiatric:         Mood and Affect: Mood normal.         Behavior: Behavior normal.       Results Review     I reviewed the patient's new clinical results.  Results from last 7 days   Lab Units 07/06/24  0929 07/05/24  0810   WBC 10*3/mm3 11.46* 10.84*   HEMOGLOBIN g/dL 7.4* 8.9*   PLATELETS 10*3/mm3 544* 586*     Results from last 7 days   Lab Units 07/06/24  0441 07/05/24  0810   SODIUM mmol/L 130* 125*   POTASSIUM mmol/L 5.3* 4.4   CHLORIDE mmol/L 96* 92*   CO2 mmol/L 24.0 21.8*   BUN mg/dL 11 11   CREATININE mg/dL 0.42* 0.49*   GLUCOSE mg/dL 111* 110*   EGFR mL/min/1.73 94.8 91.3     Results from last 7 days   Lab Units 07/06/24  0441 07/05/24  0810   ALBUMIN g/dL 3.0* 3.3*   BILIRUBIN mg/dL 0.4 0.6   ALK PHOS U/L 105 84   AST (SGOT) U/L 75* 46*   ALT (SGPT) U/L 65* 52*     Results from last 7 days   Lab Units 07/06/24  0441 07/05/24  0810   CALCIUM mg/dL 8.5* 8.2*   ALBUMIN g/dL 3.0* 3.3*     Results from last 7 days   Lab Units 07/05/24  1033   LACTATE mmol/L 1.0     No results found for: \"HGBA1C\", \"POCGLU\"    XR Chest 1 View    Result Date: 7/6/2024  No evidence for significant change when compared to yesterday's exam.  This report was finalized on 7/6/2024 7:35 AM by Dr. Jorge Alberto Hawkins M.D on Workstation: [id number]      XR Chest 1 View    Result Date: 7/5/2024   1. Similar-appearing lung opacities and right pleural effusion with no definite residual right pneumothorax. 2. Small volume free air under the right hemidiaphragm, likely postoperative.  This report was finalized on 7/5/2024 2:28 PM by Praful Gutierrez MD on Workstation: [id number]      FL Cholangiogram Operative    Result Date: 7/5/2024  Impression: Intraoperative cholangiogram, as described. See the procedure note for details.   This report was finalized on " 7/5/2024 2:17 PM by Praful Gutierrez MD on Workstation: [id number]      XR Chest PA & Lateral    Result Date: 7/5/2024   1. Consolidation of the right lung base, likely a small pleural effusion with underlying pneumonia and/or atelectasis, with a suspected small right apical pneumothorax. Compared to more recent prior outside imaging if available. 2. Ill-defined left basilar subsegmental atelectasis and/or scarring. 3. Partially calcified biapical pleural-parenchymal scarring. 3. Partially visualized acute anterior right rib fracture deformities and similar-appearing possibly acute mild anterior wedge compression fracture of the T11 superior endplate.  This report was finalized on 7/5/2024 8:47 AM by Praful Gutierrez MD on Workstation: [id number]      CT Abdomen Pelvis Without Contrast    Result Date: 7/4/2024  Findings suggestive of acute cholecystitis. Please correlate. Right basilar consolidative changes (most likely reflecting atelectasis) and moderate size right pleural effusion. Images reviewed, interpreted and report dictated by SOSA Whitman M.D.     I have personally reviewed all medications:  Scheduled Medications  cefTRIAXone, 2,000 mg, Intravenous, Q24H  famotidine, 20 mg, Intravenous, Q12H  metoprolol succinate XL, 50 mg, Oral, BID  metroNIDAZOLE, 500 mg, Intravenous, Q8H  sodium chloride, 10 mL, Intravenous, Q12H    Infusions  sodium chloride, 75 mL/hr, Last Rate: 75 mL/hr (07/05/24 2034)    Diet  Diet: Liquid, Renal; Clear Liquid; Low Potassium; Fluid Consistency: Thin (IDDSI 0)    I have personally reviewed:  [x]  Laboratory   [x]  Microbiology   [x]  Radiology   [x]  EKG/Telemetry  []  Cardiology/Vascular   []  Pathology    []  Records    Assessment/Plan     Active Hospital Problems    Diagnosis  POA    **Acute cholecystitis [K81.0]  Yes    PFO (patent foramen ovale) [Q21.12]  Not Applicable    Closed fracture of multiple ribs of right side [S22.41XA]  Yes    Hyponatremia [E87.1]  Yes     Hypercholesteremia [E78.00]  Yes    Paroxysmal atrial fibrillation [I48.0]  Yes    Essential hypertension [I10]  Yes    Coronary artery disease involving native coronary artery of native heart without angina pectoris [I25.10]  Yes    PAD (peripheral artery disease) [I73.9]  Yes    Gastroesophageal reflux disease [K21.9]  Yes    Anxiety disorder [F41.9]  Yes      Resolved Hospital Problems   No resolved problems to display.   Acute Cholecystitis  - s/p laparoscopic cholecystectomy with negative intraoperative cholangiogram 7/5/24 with Dr. Roth  - continue ceftriaxone and flagyl  - hold eliquis and plavix until cleared by surgery  - on CLD, advance per general surgery and consult SLP given her prior swallowing issues  - appreciate general surgery recs     HTN/PAF/PAD/PFO  - BP and HR acceptable  - hold lisinopril, continue metoprolol  - hold eliquis and plavix as above     Hyponatremia  - noted on hospitalization at [facility], improved today  - continue normal saline  - monitor BMP     Hyperkalemia  - mild  - continue normal saline as above  - monitor BMP in AM    GERD  - continue IV famotidine     Multiple Right-Sided Rib Fractures  - from mechanical fall down some stairs on 6/26/24  - had pneumothorax which resolved with conservative treatment  - continue pain control, encourage pulmonary toilet  - thoracic surgery consulted      SCDs for DVT prophylaxis.  Full code.  Discussed with patient, spouse, and nursing staff.  Anticipate discharge home with family in 2-3 days.  Expected Discharge Date: 7/9/2024; Expected Discharge Time:       Prince Meade MD  [facility]  07/06/24  12:20 EDT

## 2024-07-06 NOTE — THERAPY EVALUATION
Patient Name: Danyell Osborne  : 1935    MRN: [id number]                              Today's Date: 2024       Admit Date: 2024    Visit Dx:     ICD-10-CM ICD-9-CM   1. Acute cholecystitis  K81.0 575.0     Patient Active Problem List   Diagnosis    Essential hypertension    Coronary artery disease involving native coronary artery of native heart without angina pectoris    Skin lesions    Gastroesophageal reflux disease    Stress    Other headache syndrome    Paroxysmal atrial fibrillation    Routine general medical examination at a health care facility    Cough    Screening mammogram for breast cancer    Postmenopausal    Acute conjunctivitis of left eye    Other constipation    Memory loss    Ganglion cyst of finger    Hypercholesteremia    Left-sided weakness    Arthralgia of both knees    Myalgia    PND (post-nasal drip)    Congestion of nasal sinus    Coronary artery disease    Presence of stent in right coronary artery    Personal history of other diseases of the digestive system    PAD (peripheral artery disease)    Osteoporosis    IBS (irritable bowel syndrome)    Anxiety disorder    Left carotid artery stenosis    Acute cholecystitis    PFO (patent foramen ovale)    Closed fracture of multiple ribs of right side    Hyponatremia     Past Medical History:   Diagnosis Date    A-fib     Anemia     Coronary artery disease     HLD (hyperlipidemia)     Hypertension     Stroke      Past Surgical History:   Procedure Laterality Date    HYSTERECTOMY        General Information       Row Name 24 1200          OT Time and Intention    Document Type evaluation  -MM     Mode of Treatment occupational therapy;individual therapy  -MM       Row Name 24 1200          General Information    Patient Profile Reviewed yes  -MM     Prior Level of Function independent:  no AD, lives with spouse  -MM     Existing Precautions/Restrictions fall  -MM     Barriers to Rehab none identified  -MM       Row Name  07/06/24 1200          Living Environment    People in Home child(javier), adult;spouse  -MM       Row Name 07/06/24 1200          Cognition    Orientation Status (Cognition) oriented x 4  -MM       Row Name 07/06/24 1200          Safety Issues, Functional Mobility    Impairments Affecting Function (Mobility) balance;endurance/activity tolerance;strength;pain  -MM               User Key  (r) = Recorded By, (t) = Taken By, (c) = Cosigned By      Initials Name Provider Type    MM Kaykay Mallory OT Occupational Therapist                     Mobility/ADL's       Row Name 07/06/24 1200          Bed Mobility    Bed Mobility supine-sit;sit-supine  -MM     Supine-Sit [place] (Bed Mobility) minimum assist (75% patient effort)  -MM     Sit-Supine [place] (Bed Mobility) moderate assist (50% patient effort)  -MM     Assistive Device (Bed Mobility) bed rails;head of bed elevated  -MM     Comment, (Bed Mobility) increased time to come to EOB, once at midline, mostly SBA/CGA  -MM       Row Name 07/06/24 1200          Transfers    Transfers sit-stand transfer  -MM       Row Name 07/06/24 1200          Sit-Stand Transfer    Sit-Stand [place] (Transfers) minimum assist (75% patient effort)  -MM     Assistive Device (Sit-Stand Transfers) other (see comments)  -MM     Comment, (Sit-Stand Transfer) HHAx1- able to demo lateral steps to HOB in progression to BSC, x2 STS completed  -MM       Row Name 07/06/24 1200          Activities of Daily Living    BADL Assessment/Intervention lower body dressing;feeding;grooming;toileting  -MM       Row Name 07/06/24 1200          Lower Body Dressing Assessment/Training    [place] Level (Lower Body Dressing) don;socks;maximum assist (25% patient effort)  -MM     Comment, (Lower Body Dressing) decreased func reach, abd discomfort  -MM       Row Name 07/06/24 1200          Self-Feeding Assessment/Training    [place] Level (Feeding) feeding skills;independent  -MM       Row Name  "07/06/24 1200          Grooming Assessment/Training    [place] Level (Grooming) grooming skills;set up;wash face, hands  -MM     Position (Grooming) edge of bed sitting  -MM       Row Name 07/06/24 1200          Toileting Assessment/Training    [place] Level (Toileting) toileting skills  -MM     Comment, (Toileting) progression to [facility] with lateral steps, purewick donned on arrival  -MM               User Key  (r) = Recorded By, (t) = Taken By, (c) = Cosigned By      Initials Name Provider Type    Kaykay Pérez OT Occupational Therapist                   Obj/Interventions       Row Name 07/06/24 1203          Sensory Assessment (Somatosensory)    Sensory Assessment (Somatosensory) UE sensation intact  -MM       Row Name 07/06/24 1203          Vision Assessment/Intervention    Vision Assessment Comment Rn aware of visual changes verbalized by pt during OT eval, reported \"cross eyed\" however did not get worse  -MM       Row Name 07/06/24 1203          Range of Motion Comprehensive    General Range of Motion bilateral upper extremity ROM WNL  -MM       Row Name 07/06/24 1203          Strength Comprehensive (MMT)    General Manual Muscle Testing (MMT) Assessment upper extremity strength deficits identified  -MM     Comment, General Manual Muscle Testing (MMT) Assessment generalized post op weakness, BUE grossly 3+/5  -MM       Row Name 07/06/24 1203          Motor Skills    Motor Skills functional endurance;coordination  -MM     Coordination WFL;upper extremity;bilateral  -MM     Functional Endurance fair, limited by pain  -MM       Row Name 07/06/24 1203          Balance    Balance Assessment sitting static balance;sitting dynamic balance;sit to stand dynamic balance;standing static balance;standing dynamic balance  -MM     Static Sitting Balance standby assist  -MM     Dynamic Sitting Balance standby assist;contact guard  -MM     Position, Sitting Balance sitting edge of bed;unsupported  -MM     Sit to " Stand Dynamic Balance minimal assist  -MM     Static Standing Balance minimal assist  -MM     Dynamic Standing Balance minimal assist  -MM     Position/Device Used, Standing Balance supported;other (see comments)  HHAx1  -MM     Balance Interventions sitting;standing;sit to stand;supported;static;dynamic;minimal challenge  -MM     Comment, Balance minimal unsteadiness noted  -MM               User Key  (r) = Recorded By, (t) = Taken By, (c) = Cosigned By      Initials Name Provider Type    Kaykay Pérez OT Occupational Therapist                   Goals/Plan       Row Name 07/06/24 1211          Transfer Goal 1 (OT)    Activity/Assistive Device (Transfer Goal 1, OT) transfers, all  -MM     [place] Level/Cues Needed (Transfer Goal 1, OT) standby assist  -MM     Time Frame (Transfer Goal 1, OT) short term goal (STG);2 weeks  -MM     Progress/Outcome (Transfer Goal 1, OT) goal ongoing  -MM       Row Name 07/06/24 1211          Bathing Goal 1 (OT)    Activity/Device (Bathing Goal 1, OT) bathing skills, all  -MM     [place] Level/Cues Needed (Bathing Goal 1, OT) minimum assist (75% or more patient effort)  -MM     Time Frame (Bathing Goal 1, OT) short term goal (STG);2 weeks  -MM     Progress/Outcomes (Bathing Goal 1, OT) goal ongoing  -MM       [facility] Name 07/06/24 1211          Dressing Goal 1 (OT)    Activity/Device (Dressing Goal 1, OT) dressing skills, all  -MM     [place]/Cues Needed (Dressing Goal 1, OT) standby assist  -MM     Time Frame (Dressing Goal 1, OT) short term goal (STG);2 weeks  -MM     Progress/Outcome (Dressing Goal 1, OT) goal ongoing  -MM       Row Name 07/06/24 1211          Toileting Goal 1 (OT)    Activity/Device (Toileting Goal 1, OT) toileting skills, all  -MM     [place] Level/Cues Needed (Toileting Goal 1, OT) standby assist  -MM     Time Frame (Toileting Goal 1, OT) short term goal (STG);2 weeks  -MM     Progress/Outcome (Toileting Goal 1, OT) goal ongoing  -MM       [facility]  Name 07/06/24 1211          Therapy Assessment/Plan (OT)    Planned Therapy Interventions (OT) activity tolerance training;BADL retraining;neuromuscular control/coordination retraining;patient/caregiver education/training;transfer/mobility retraining;strengthening exercise;ROM/therapeutic exercise;occupation/activity based interventions;functional balance retraining  -MM               User Key  (r) = Recorded By, (t) = Taken By, (c) = Cosigned By      Initials Name Provider Type    MM Kaykay Mallory OT Occupational Therapist                   Clinical Impression       Row Name 07/06/24 1207          Pain Assessment    Pretreatment Pain Rating 4/10  -MM     Posttreatment Pain Rating 4/10  -MM     Pain Location - Side/Orientation Right  -MM     Pain Location - abdomen;flank  -MM       Row Name 07/06/24 1207          Plan of Care Review    Plan of Care Reviewed With patient;spouse  -MM     Progress no change  -MM     Outcome Evaluation Pt is an 89 yo female seen POD1 Lap gerson. She reports recent mechanical fall down stairs in june resulting in R rib fractures. Hx CVA in Oct 2023. She reports she lives at home with her spouse, (I) with ADLs and no AD used at baseline. She is on 4L this date. She presents below her baseline with increased pain, decreased activity tolerance, strength and balance. She requires min-mod A for bed mobility, max A for LBD due to pain and decreased func reach, s/up for g/h, STS with min A and able to demo lateral steps to HOB. She is quick to fatigue however motivated to work with therapy. She will continue to benefit from skilled OT to address deficits and maximize (I) prior to d/c. SNF vs HHOT pending progress during inpatient stay. She remains a falls risk at this time.  -MM       Row Name 07/06/24 1207          Therapy Assessment/Plan (OT)    Rehab Potential (OT) good, to achieve stated therapy goals  -MM     Criteria for Skilled Therapeutic Interventions Met (OT) yes;meets  criteria;skilled treatment is necessary  -MM     Therapy Frequency (OT) 5 times/wk  -MM       Row Name 07/06/24 1207          Therapy Plan Review/Discharge Plan (OT)    Anticipated Discharge Disposition (OT) home with home health;home with assist;skilled nursing facility  -MM       Row Name 07/06/24 1207          Vital Signs    O2 Delivery Pre Treatment nasal cannula  4L  -MM       Row Name 07/06/24 1207          Positioning and Restraints    Pre-Treatment Position in bed  -MM     Post Treatment Position bed  -MM     In Bed notified nsg;fowlers;call light within reach;encouraged to call for assist;side rails up x3  -MM               User Key  (r) = Recorded By, (t) = Taken By, (c) = Cosigned By      Initials Name Provider Type    Kaykay Pérez OT Occupational Therapist                   Outcome Measures       Row Name 07/06/24 1211          How much help from another is currently needed...    Putting on and taking off regular lower body clothing? 2  -MM     Bathing (including washing, rinsing, and drying) 2  -MM     Toileting (which includes using toilet bed pan or urinal) 2  -MM     Putting on and taking off regular upper body clothing 3  -MM     Taking care of personal grooming (such as brushing teeth) 3  -MM     Eating meals 4  -MM     AM-PAC 6 Clicks Score (OT) 16  -MM       Row Name 07/06/24 0800          How much help from another person do you currently need...    Turning from your back to your side while in flat bed without using bedrails? 3  -HT     Moving from lying on back to sitting on the side of a flat bed without bedrails? 3  -HT     Moving to and from a bed to a chair (including a wheelchair)? 3  -HT     Standing up from a chair using your arms (e.g., wheelchair, bedside chair)? 3  -HT     Climbing 3-5 steps with a railing? 2  -HT     To walk in hospital room? 3  -HT     AM-PAC 6 Clicks Score (PT) 17  -HT     Highest Level of Mobility Goal 5 --> Static standing  -HT       Row Name 07/06/24 1211           Modified [place] Scale    Modified [place] Scale 4 - Moderately severe disability.  Unable to walk without assistance, and unable to attend to own bodily needs without assistance.  -MM       Row Name 07/06/24 1211          Functional Assessment    Outcome Measure Options Modified [place];AM-PAC 6 Clicks Daily Activity (OT)  -MM               User Key  (r) = Recorded By, (t) = Taken By, (c) = Cosigned By      Initials Name Provider Type    Kaykay Pérez OT Occupational Therapist    Leticia Lundberg RN Registered Nurse                    Occupational Therapy Education       Title: PT OT SLP Therapies (Done)       Topic: Occupational Therapy (Done)       Point: ADL training (Done)       Description:   Instruct learner(s) on proper safety adaptation and remediation techniques during self care or transfers.   Instruct in proper use of assistive devices.                  Learning Progress Summary             Patient Acceptance, E, VU by MM at 7/6/2024 1212    Comment: role of OT, d/c rec, GOC   Family Acceptance, E, VU by MM at 7/6/2024 1212    Comment: role of OT, d/c rec, GOC                         Point: Precautions (Done)       Description:   Instruct learner(s) on prescribed precautions during self-care and functional transfers.                  Learning Progress Summary             Patient Acceptance, E, VU by MM at 7/6/2024 1212    Comment: role of OT, d/c rec, GOC   Family Acceptance, E, VU by MM at 7/6/2024 1212    Comment: role of OT, d/c rec, GOC                         Point: Body mechanics (Done)       Description:   Instruct learner(s) on proper positioning and spine alignment during self-care, functional mobility activities and/or exercises.                  Learning Progress Summary             Patient Acceptance, E, VU by MM at 7/6/2024 1212    Comment: role of OT, d/c rec, GOC   Family Acceptance, E, VU by MM at 7/6/2024 1212    Comment: role of OT, d/c rec, GOC                                          User Key       Initials Effective Dates Name Provider Type Discipline     05/31/24 -  Kaykay Mallory OT Occupational Therapist OT                  OT Recommendation and Plan  Planned Therapy Interventions (OT): activity tolerance training, BADL retraining, neuromuscular control/coordination retraining, patient/caregiver education/training, transfer/mobility retraining, strengthening exercise, ROM/therapeutic exercise, occupation/activity based interventions, functional balance retraining  Therapy Frequency (OT): 5 times/wk  Plan of Care Review  Plan of Care Reviewed With: patient, spouse  Progress: no change  Outcome Evaluation: Pt is an 89 yo female seen POD1 Lap [place]. She reports recent mechanical fall down stairs in june resulting in R rib fractures. Hx CVA in Oct 2023. She reports she lives at home with her spouse, (I) with ADLs and no AD used at baseline. She is on 4L this date. She presents below her baseline with increased pain, decreased activity tolerance, strength and balance. She requires min-mod A for bed mobility, max A for LBD due to pain and decreased func reach, s/up for g/h, STS with min A and able to demo lateral steps to HOB. She is quick to fatigue however motivated to work with therapy. She will continue to benefit from skilled OT to address deficits and maximize (I) prior to d/c. SNF vs HHOT pending progress during inpatient stay. She remains a falls risk at this time.     Time Calculation:   Evaluation Complexity (OT)  Review Occupational Profile/Medical/Therapy History Complexity: expanded/moderate complexity  Assessment, Occupational Performance/Identification of Deficit Complexity: 3-5 performance deficits  Clinical Decision Making Complexity (OT): detailed assessment/moderate complexity  Overall Complexity of Evaluation (OT): moderate complexity     Time Calculation- OT       Row Name 07/06/24 1212             Time Calculation- OT    OT Start Time 0839  -      OT Stop  Time 0858  -MM      OT Time Calculation (min) 19 min  -MM      Total Timed Code Minutes- OT 12 minute(s)  -MM      OT Received On 07/06/24  -MM      OT - Next Appointment 07/08/24  -MM      OT Goal Re-Cert Due Date 07/20/24  -MM         Timed Charges    16589 - OT Therapeutic Activity Minutes 12  -MM         Untimed Charges    OT Eval/Re-eval Minutes 7  -MM         Total Minutes    Timed Charges Total Minutes 12  -MM      Untimed Charges Total Minutes 7  -MM       Total Minutes 19  -MM                User Key  (r) = Recorded By, (t) = Taken By, (c) = Cosigned By      Initials Name Provider Type    MM Kaykay Mallory OT Occupational Therapist                  Therapy Charges for Today       Code Description Service Date Service Provider Modifiers Qty    92358823826 HC OT THERAPEUTIC ACT EA 15 MIN 7/6/2024 Kaykay Mallory OT GO 1    05097193440 HC OT EVAL MOD COMPLEXITY 2 7/6/2024 Kaykay Mallory OT GO 1                 Kaykay Mallory OT  7/6/2024
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
## 2024-07-06 NOTE — CONSULTS
THORACIC SURGERY INPATIENT CONSULT NOTE    REASON FOR CONSULT: Right-sided pneumothorax    Subjective   HISTORY OF PRESENTING ILLNESS:   Danyell Osborne is a 88 y.o. female who is non-smoker with a history of HTN, HLD, CAD s/p LAD stent and PAD on plavix, PAF on eliquis, PFO, GERD, anxiety disorder, and recent mechanical fall with right sided rib fractures that presents to [facility] complaining of generalized weakness and right upper quadrant abdominal pain. She was admitted to the trauma service at [facility] following a fall sown some stairs on 6/26/24 and was discharged on 7/3/24. She was treated conservatively for multiple right-sided rib fractures and a small right-sided pneumothorax.  She was found to have cholecystitis and underwent cholecystectomy.  Thoracic surgery was consulted for small pneumothorax.      Past Medical History:   Diagnosis Date    A-fib     Anemia     Coronary artery disease     HLD (hyperlipidemia)     Hypertension     Stroke        Past Surgical History:   Procedure Laterality Date    HYSTERECTOMY         Family History   Problem Relation Age of Onset    Malig Hyperthermia Neg Hx        Social History     Socioeconomic History    Marital status:    Tobacco Use    Smoking status: Never     Passive exposure: Never    Smokeless tobacco: Never   Vaping Use    Vaping status: Never Used   Substance and Sexual Activity    Alcohol use: Never    Drug use: Never    Sexual activity: Defer         Current Facility-Administered Medications:     acetaminophen (TYLENOL) tablet 650 mg, 650 mg, Oral, Q4H PRN **OR** acetaminophen (TYLENOL) 160 MG/5ML oral solution 650 mg, 650 mg, Oral, Q4H PRN **OR** acetaminophen (TYLENOL) suppository 650 mg, 650 mg, Rectal, Q4H PRN, Tessa Montenegro MD    sennosides-docusate (PERICOLACE) 8.6-50 MG per tablet 2 tablet, 2 tablet, Oral, BID PRN **AND** polyethylene glycol (MIRALAX) packet 17 g, 17 g, Oral, Daily PRN **AND** bisacodyl (DULCOLAX) EC  tablet 5 mg, 5 mg, Oral, Daily PRN **AND** bisacodyl (DULCOLAX) suppository 10 mg, 10 mg, Rectal, Daily PRN, Tessa Montenegro MD    calcium carbonate (TUMS) chewable tablet 500 mg (200 mg elemental), 2 tablet, Oral, TID PRN, Tessa Montenegro MD    cefTRIAXone (ROCEPHIN) 2,000 mg in sodium chloride 0.9 % 100 mL MBP, 2,000 mg, Intravenous, Q24H, Tessa Montenegro MD, Last Rate: 200 mL/hr at 07/06/24 0817, 2,000 mg at 07/06/24 0817    famotidine (PEPCID) injection 20 mg, 20 mg, Intravenous, Q12H, Tessa Montenegro MD, 20 mg at 07/06/24 0817    HYDROcodone-acetaminophen (NORCO) 5-325 MG per tablet 1 tablet, 1 tablet, Oral, Q6H PRN, Tessa Montenegro MD, 1 tablet at 07/06/24 0717    HYDROmorphone (DILAUDID) injection 0.5 mg, 0.5 mg, Intravenous, Q2H PRN, Prince Meade MD, 0.5 mg at 07/06/24 0818    metoprolol succinate XL (TOPROL-XL) 24 hr tablet 50 mg, 50 mg, Oral, BID, Tessa Montenegro MD, 50 mg at 07/06/24 0816    metroNIDAZOLE (FLAGYL) IVPB 500 mg, 500 mg, Intravenous, Q8H, Tessa Montenegro MD, Last Rate: 200 mL/hr at 07/06/24 0818, 500 mg at 07/06/24 0818    [DISCONTINUED] HYDROmorphone (DILAUDID) injection 0.5 mg, 0.5 mg, Intravenous, Q2H PRN **AND** naloxone (NARCAN) injection 0.4 mg, 0.4 mg, Intravenous, Q5 Min PRN, Tessa Montenegro MD    nitroglycerin (NITROSTAT) SL tablet 0.4 mg, 0.4 mg, Sublingual, Q5 Min PRN, Tessa Montenegro MD    ondansetron (ZOFRAN) injection 4 mg, 4 mg, Intravenous, Q6H PRN, Tessa Montenegro MD    ondansetron ODT (ZOFRAN-ODT) disintegrating tablet 4 mg, 4 mg, Oral, Q6H PRN **OR** ondansetron (ZOFRAN) injection 4 mg, 4 mg, Intravenous, Q6H PRN, Tessa Montenegro MD    oxyCODONE-acetaminophen (PERCOCET) 5-325 MG per tablet 1 tablet, 1 tablet, Oral, Q4H PRN, Prince Meade MD    sodium chloride 0.9 % flush 10 mL, 10 mL, Intravenous, Q12H, Tessa Montenegro MD, 10 mL at 07/06/24 0819    sodium chloride 0.9 % flush 10 mL, 10 mL, Intravenous, PRN,  "Tessa Montenegro MD    sodium chloride 0.9 % infusion 40 mL, 40 mL, Intravenous, PRN, Tessa Montenegro MD    sodium chloride 0.9 % infusion, 75 mL/hr, Intravenous, Continuous, Tessa Montenegro MD, Last Rate: 75 mL/hr at 07/05/24 2034, 75 mL/hr at 07/05/24 2034     Allergies   Allergen Reactions    Contrast Dye (Echo Or Unknown Ct/Mr) Hives and Unknown - High Severity     hives    Iodinated Contrast Media Hives and Unknown - High Severity    Penicillins Rash     Beta lactam allergy details    Antibiotic reaction: rash, hives    Age at reaction: adult    Dose to reaction time: unknown    Reason for antibiotic: unknown    Epinephrine required for reaction?: no    Tolerated antibiotics: unknown             Objective    OBJECTIVE:     VITAL SIGNS:  /79 (BP Location: Right arm, Patient Position: Lying)   Pulse 87   Temp 98.4 °F (36.9 °C) (Oral)   Resp 18   Ht 162.6 cm (64\")   Wt 58 kg (127 lb 13.9 oz)   SpO2 100%   BMI 21.95 kg/m²     PHYSICAL EXAM:  Normal appearance.   Head is normocephalic.   Nose appears normal.   No obvious deformity of the mouth and throat.  Conjunctivae normal.   Heart rate and rhythm is normal.  Pulmonary effort is normal.  On supplemental oxygen  Moving all 4 extremities.  Extremities warm.  No focal deficit present.   He is alert and oriented to person, place, and time.     LAB RESULTS:  I have reviewed all the available laboratory results in the chart.    RESULTS REVIEW:  I have reviewed the patient's all relevant laboratory and imaging findings.           ASSESSMENT & PLAN:  Danyell Osborne is a 88 y.o. female with significant medical conditions as mentioned above presented to the hospital with problem mentioned in history of presenting illness.    Right-sided pneumothorax  She had a recent fall with rib fracture and small pneumothorax.  Chest x-ray on 7/6/2024 does not reveal residual pneumothorax.  No further workup required.  She can be discharged from thoracic surgery " standpoint whenever medically stable.    Please call thoracic surgery with questions/concerns.    I discussed the patients findings and my recommendations with the patient. The patient was given adequate time to ask questions and all questions were answered to patient satisfaction.     Niranjan Goss MD  Thoracic Surgeon  [facility] and [place]        Dictated utilizing Dragon dictation    I spent 60 minutes caring for Danyell on this date of service. This time includes time spent by me in the following activities: reviewing tests, obtaining and/or reviewing a separately obtained history, performing a medically appropriate examination and/or evaluation , counseling and educating the patient/family/caregiver, ordering medications, tests, or procedures, referring and communicating with other health care professionals , documenting information in the medical record, independently interpreting results and communicating that information with the patient/family/caregiver, and care coordination and more than half the time was spent in direct face to face evaluation and decision making.

## 2024-07-06 NOTE — PLAN OF CARE
Problem: Adult Inpatient Plan of Care  Goal: Plan of Care Review  Flowsheets (Taken 7/6/2024 6492)  Outcome Evaluation: Clinical swallow evaluation completed. Pt has history of globus sensation and c/o food/liquid feeling stuck in throat. Pt reports she did see a surgeon and had scope recently in [place], neither pt or family able to report the results or recommendations. Recommend mechanical soft ground and thin liquids, meds whole or crushed and small bites and sips and reflux precautions. Consider GI referral due to pt complaints esophageal related.

## 2024-07-06 NOTE — PLAN OF CARE
Goal Outcome Evaluation:  Plan of Care Reviewed With: patient                PT POD1 Aniceto nieto, sites open to air, A&OX4, michael canales, 4LO2.

## 2024-07-06 NOTE — THERAPY EVALUATION
Acute Care - Speech Language Pathology   Swallow Initial Evaluation [facility]     Patient Name: Danyell Osborne  : 1935  MRN: [id number]  Today's Date: 2024               Admit Date: 2024    Visit Dx:     ICD-10-CM ICD-9-CM   1. Acute cholecystitis  K81.0 575.0     Patient Active Problem List   Diagnosis    Essential hypertension    Coronary artery disease involving native coronary artery of native heart without angina pectoris    Skin lesions    Gastroesophageal reflux disease    Stress    Other headache syndrome    Paroxysmal atrial fibrillation    Routine general medical examination at a health care facility    Cough    Screening mammogram for breast cancer    Postmenopausal    Acute conjunctivitis of left eye    Other constipation    Memory loss    Ganglion cyst of finger    Hypercholesteremia    Left-sided weakness    Arthralgia of both knees    Myalgia    PND (post-nasal drip)    Congestion of nasal sinus    Coronary artery disease    Presence of stent in right coronary artery    Personal history of other diseases of the digestive system    PAD (peripheral artery disease)    Osteoporosis    IBS (irritable bowel syndrome)    Anxiety disorder    Left carotid artery stenosis    Acute cholecystitis    PFO (patent foramen ovale)    Closed fracture of multiple ribs of right side    Hyponatremia     Past Medical History:   Diagnosis Date    A-fib     Anemia     Coronary artery disease     HLD (hyperlipidemia)     Hypertension     Stroke      Past Surgical History:   Procedure Laterality Date    HYSTERECTOMY         SLP Recommendation and Plan  SLP Swallowing Diagnosis: swallow WFL/no suspected pharyngeal impairment, esophageal dysphagia (24 1500)  SLP Diet Recommendation: soft to chew textures, ground, thin liquids (24 1500)  Recommended Precautions and Strategies: upright posture during/after eating, small bites of food and sips of liquid, reflux precautions, general aspiration  "precautions (07/06/24 1500)  SLP Rec. for Method of Medication Administration: meds whole, meds crushed, as tolerated (07/06/24 1500)     Monitor for Signs of Aspiration: yes, notify SLP if any concerns (07/06/24 1500)     Swallow Criteria for Skilled Therapeutic Interventions Met: demonstrates skilled criteria (07/06/24 1500)  Anticipated Discharge Disposition (SLP): unknown (07/06/24 1500)  Rehab Potential/Prognosis, Swallowing: good, to achieve stated therapy goals (07/06/24 1500)  Therapy Frequency (Swallow): PRN (07/06/24 1500)  Predicted Duration Therapy Intervention (Days): until discharge (07/06/24 1500)  Oral Care Recommendations: Oral Care BID/PRN (07/06/24 1500)                                        Outcome Evaluation: Clinical swallow evaluation completed. Pt has history of globus sensation and c/o food/liquid feeling stuck in throat. Pt reports she did see a surgeon and had scope recently in [place], neither pt or family able to report the results or recommendations. Recommend mechanical soft ground and thin liquids, meds whole or crushed and small bites and sips and reflux precautions. Consider GI referral due to pt complaints esophageal related.      SWALLOW EVALUATION (Last 72 Hours)       SLP Adult Swallow Evaluation       Row Name 07/06/24 1500                   Rehab Evaluation    Document Type evaluation  -KA        Subjective Information no complaints  -KA        Patient Observations alert;cooperative  -KA        Patient Effort good  -KA        Symptoms Noted During/After Treatment none  -KA           General Information    Patient Profile Reviewed yes  -KA        Pertinent History Of Current Problem SLP consult due to c/o difficulty swallowing. Swallow evaluation completed in 2023 recommended regular solids and thin liquids and pt c/o globus sensation then. \"Acute Cholecystitis  - s/p laparoscopic cholecystectomy with negative intraoperative cholangiogram 7/5/24 with Dr. Roth \"  -KA     "    Current Method of Nutrition clear liquids;other (see comments)  pt cleared for solids per RN  -KA        Precautions/Limitations, Vision WFL;for purposes of eval  -KA        Precautions/Limitations, Hearing WFL;for purposes of eval  -KA        Prior Level of Function-Swallowing esophageal concerns;other (see comments)  see detail below  -KA        Plans/Goals Discussed with patient;family;agreed upon  -KA        Barriers to Rehab none identified  -KA        Patient's Goals for Discharge patient did not state  -KA        Family Goals for Discharge family did not state  -KA           Oral Motor Structure and Function    Dentition Assessment natural, present and adequate  -KA        Secretion Management WNL/WFL  -KA        Mucosal Quality moist, healthy  -KA           Oral Musculature and Cranial Nerve Assessment    Oral Motor General Assessment WFL  -KA           General Eating/Swallowing Observations    Eating/Swallowing Skills self-fed  -KA        Positioning During Eating upright in bed  -KA        Utensils Used spoon;cup;straw  -KA        Consistencies Trialed soft to chew textures;chopped;mixed consistency;pureed;thin liquids  -KA           Clinical Swallow Eval    Clinical Swallow Evaluation Summary Patient c/o globus sensation with liquids and solids (worse with solids including meats and breads), and states discomfort pointing to sternum. pt reports she does have hiatal hernia. Pt also c/o belching with PO. Patient reports she saw a surgeon in [place] due to this complaint including she had an EGD, but neither pt or family are able to provide any detail on results or recommendations. Pt reports her surgeon recommended she come to [place] for treatment, however pt unsure of what treatment. Pt currently on clear liquid diet however RN reports pt has been cleared for solids by surgeon  after SLP consult. Pt accepted trials of thins via cup, puree and mechanical soft chopped without overt s/s of pen/asp.  Pt c/o of some thin liquid and mechanical soft trials feeling stuck in throat. SLP discussed safe swallow and reflux precautions with pt and recommend mechanical soft ground and thin liquids. Pt demonstrated x1 belch end of evaluation.  -KA           SLP Evaluation Clinical Impression    SLP Swallowing Diagnosis swallow WFL/no suspected pharyngeal impairment;esophageal dysphagia  -KA        Functional Impact risk of aspiration/pneumonia  -KA        Rehab Potential/Prognosis, Swallowing good, to achieve stated therapy goals  -KA        Swallow Criteria for Skilled Therapeutic Interventions Met demonstrates skilled criteria  -KA           Recommendations    Therapy Frequency (Swallow) PRN  -KA        Predicted Duration Therapy Intervention (Days) until discharge  -KA        SLP Diet Recommendation soft to chew textures;ground;thin liquids  -KA        Recommended Precautions and Strategies upright posture during/after eating;small bites of food and sips of liquid;reflux precautions;general aspiration precautions  -KA        Oral Care Recommendations Oral Care BID/PRN  -KA        SLP Rec. for Method of Medication Administration meds whole;meds crushed;as tolerated  -KA        Monitor for Signs of Aspiration yes;notify SLP if any concerns  -KA        Anticipated Discharge Disposition (SLP) unknown  -KA           Swallow Goals (SLP)    Swallow STGs diet tolerance goal selection (SLP)  -KA        Diet Tolerance Goal Selection (SLP) Patient will tolerate trials of  -KA           (STG) Patient will tolerate trials of    Consistencies Trialed (Tolerate trials) soft to chew (ground) textures;thin liquids  -KA        Desired Outcome (Tolerate trials) without signs/symptoms of aspiration  -KA        Walker (Tolerate trials) independently (over 90% accuracy)  -KA                  User Key  (r) = Recorded By, (t) = Taken By, (c) = Cosigned By      Initials Name Effective Dates    Ernie Sneed, NIMO 01/05/24 -                      EDUCATION  The patient has been educated in the following areas:   Dysphagia (Swallowing Impairment).        SLP GOALS       Row Name 07/06/24 1500             (STG) Patient will tolerate trials of    Consistencies Trialed (Tolerate trials) soft to chew (ground) textures;thin liquids  -KA      Desired Outcome (Tolerate trials) without signs/symptoms of aspiration  -KA      [place] (Tolerate trials) independently (over 90% accuracy)  -KA                User Key  (r) = Recorded By, (t) = Taken By, (c) = Cosigned By      Initials Name Provider Type    Ernie Sneed SLP Speech and Language Pathologist                         Time Calculation:    Time Calculation- SLP       Row Name 07/06/24 1539             Time Calculation- SLP    SLP Start Time 1415  -KA      SLP Received On 07/06/24  -KA         Untimed Charges    SLP Eval/Re-eval  ST Eval Oral Pharyng Swallow - 34938  -KA      [id number] Eval Oral Pharyng Swallow Minutes 45  -KA         Total Minutes    Untimed Charges Total Minutes 45  -KA       Total Minutes 45  -KA                User Key  (r) = Recorded By, (t) = Taken By, (c) = Cosigned By      Initials Name Provider Type    Ernie Sneed SLP Speech and Language Pathologist                    Therapy Charges for Today       Code Description Service Date Service Provider Modifiers Qty    [id number] HC ST EVAL ORAL PHARYNG SWALLOW 3 7/6/2024 Ernie Mooney SLP GN 1                 NIMO Garcias  7/6/2024

## 2024-07-07 ENCOUNTER — APPOINTMENT (OUTPATIENT)
Dept: GENERAL RADIOLOGY | Facility: HOSPITAL | Age: 89
End: 2024-07-07
Payer: MEDICARE

## 2024-07-07 PROBLEM — R33.8 ACUTE RETENTION OF URINE: Status: ACTIVE | Noted: 2024-07-07

## 2024-07-07 PROBLEM — F05 DELIRIUM DUE TO ANOTHER MEDICAL CONDITION: Status: ACTIVE | Noted: 2024-07-07

## 2024-07-07 LAB
ALBUMIN SERPL-MCNC: 2.9 G/DL (ref 3.5–5.2)
ALBUMIN/GLOB SERPL: 1.1 G/DL
ALP SERPL-CCNC: 101 U/L (ref 39–117)
ALT SERPL W P-5'-P-CCNC: 53 U/L (ref 1–33)
ANION GAP SERPL CALCULATED.3IONS-SCNC: 8 MMOL/L (ref 5–15)
AST SERPL-CCNC: 51 U/L (ref 1–32)
BASOPHILS # BLD AUTO: 0.05 10*3/MM3 (ref 0–0.2)
BASOPHILS NFR BLD AUTO: 0.5 % (ref 0–1.5)
BILIRUB SERPL-MCNC: 0.4 MG/DL (ref 0–1.2)
BILIRUB UR QL STRIP: NEGATIVE
BUN SERPL-MCNC: 8 MG/DL (ref 8–23)
BUN/CREAT SERPL: 15.1 (ref 7–25)
CALCIUM SPEC-SCNC: 8.2 MG/DL (ref 8.6–10.5)
CHLORIDE SERPL-SCNC: 99 MMOL/L (ref 98–107)
CLARITY UR: CLEAR
CO2 SERPL-SCNC: 27 MMOL/L (ref 22–29)
COLOR UR: YELLOW
CREAT SERPL-MCNC: 0.53 MG/DL (ref 0.57–1)
DEPRECATED RDW RBC AUTO: 44 FL (ref 37–54)
EGFRCR SERPLBLD CKD-EPI 2021: 89.6 ML/MIN/1.73
EOSINOPHIL # BLD AUTO: 0.46 10*3/MM3 (ref 0–0.4)
EOSINOPHIL NFR BLD AUTO: 4.3 % (ref 0.3–6.2)
ERYTHROCYTE [DISTWIDTH] IN BLOOD BY AUTOMATED COUNT: 13.7 % (ref 12.3–15.4)
GLOBULIN UR ELPH-MCNC: 2.6 GM/DL
GLUCOSE SERPL-MCNC: 103 MG/DL (ref 65–99)
GLUCOSE UR STRIP-MCNC: NEGATIVE MG/DL
HCT VFR BLD AUTO: 23.2 % (ref 34–46.6)
HGB BLD-MCNC: 7.5 G/DL (ref 12–15.9)
HGB UR QL STRIP.AUTO: NEGATIVE
IMM GRANULOCYTES # BLD AUTO: 0.1 10*3/MM3 (ref 0–0.05)
IMM GRANULOCYTES NFR BLD AUTO: 0.9 % (ref 0–0.5)
KETONES UR QL STRIP: NEGATIVE
LEUKOCYTE ESTERASE UR QL STRIP.AUTO: NEGATIVE
LYMPHOCYTES # BLD AUTO: 1.82 10*3/MM3 (ref 0.7–3.1)
LYMPHOCYTES NFR BLD AUTO: 17.2 % (ref 19.6–45.3)
MCH RBC QN AUTO: 29.2 PG (ref 26.6–33)
MCHC RBC AUTO-ENTMCNC: 32.3 G/DL (ref 31.5–35.7)
MCV RBC AUTO: 90.3 FL (ref 79–97)
MONOCYTES # BLD AUTO: 1.04 10*3/MM3 (ref 0.1–0.9)
MONOCYTES NFR BLD AUTO: 9.8 % (ref 5–12)
NEUTROPHILS NFR BLD AUTO: 67.3 % (ref 42.7–76)
NEUTROPHILS NFR BLD AUTO: 7.12 10*3/MM3 (ref 1.7–7)
NITRITE UR QL STRIP: NEGATIVE
NRBC BLD AUTO-RTO: 0 /100 WBC (ref 0–0.2)
PH UR STRIP.AUTO: 6 [PH] (ref 5–8)
PLATELET # BLD AUTO: 628 10*3/MM3 (ref 140–450)
PMV BLD AUTO: 9.3 FL (ref 6–12)
POTASSIUM SERPL-SCNC: 3.7 MMOL/L (ref 3.5–5.2)
PROT SERPL-MCNC: 5.5 G/DL (ref 6–8.5)
PROT UR QL STRIP: NEGATIVE
RBC # BLD AUTO: 2.57 10*6/MM3 (ref 3.77–5.28)
SODIUM SERPL-SCNC: 134 MMOL/L (ref 136–145)
SP GR UR STRIP: 1.01 (ref 1–1.03)
UROBILINOGEN UR QL STRIP: NORMAL
WBC NRBC COR # BLD AUTO: 10.59 10*3/MM3 (ref 3.4–10.8)

## 2024-07-07 PROCEDURE — 80053 COMPREHEN METABOLIC PANEL: CPT | Performed by: SURGERY

## 2024-07-07 PROCEDURE — 81003 URINALYSIS AUTO W/O SCOPE: CPT | Performed by: NURSE PRACTITIONER

## 2024-07-07 PROCEDURE — 25010000002 CEFTRIAXONE PER 250 MG: Performed by: SURGERY

## 2024-07-07 PROCEDURE — 85025 COMPLETE CBC W/AUTO DIFF WBC: CPT | Performed by: SURGERY

## 2024-07-07 PROCEDURE — 25010000002 HYDROMORPHONE PER 4 MG: Performed by: INTERNAL MEDICINE

## 2024-07-07 PROCEDURE — 0T9B70Z DRAINAGE OF BLADDER WITH DRAINAGE DEVICE, VIA NATURAL OR ARTIFICIAL OPENING: ICD-10-PCS | Performed by: INTERNAL MEDICINE

## 2024-07-07 PROCEDURE — 99024 POSTOP FOLLOW-UP VISIT: CPT | Performed by: SURGERY

## 2024-07-07 PROCEDURE — 71045 X-RAY EXAM CHEST 1 VIEW: CPT

## 2024-07-07 PROCEDURE — 25010000002 METRONIDAZOLE 500 MG/100ML SOLUTION: Performed by: SURGERY

## 2024-07-07 RX ORDER — OLANZAPINE 5 MG/1
5 TABLET, ORALLY DISINTEGRATING ORAL EVERY 8 HOURS PRN
Status: DISCONTINUED | OUTPATIENT
Start: 2024-07-07 | End: 2024-07-09

## 2024-07-07 RX ORDER — POLYETHYLENE GLYCOL 3350 17 G/17G
17 POWDER, FOR SOLUTION ORAL DAILY
Status: DISCONTINUED | OUTPATIENT
Start: 2024-07-07 | End: 2024-07-13 | Stop reason: HOSPADM

## 2024-07-07 RX ORDER — UREA 10 %
5 LOTION (ML) TOPICAL NIGHTLY
Status: DISCONTINUED | OUTPATIENT
Start: 2024-07-07 | End: 2024-07-13 | Stop reason: HOSPADM

## 2024-07-07 RX ORDER — BISACODYL 10 MG
10 SUPPOSITORY, RECTAL RECTAL DAILY PRN
Status: DISCONTINUED | OUTPATIENT
Start: 2024-07-07 | End: 2024-07-13 | Stop reason: HOSPADM

## 2024-07-07 RX ORDER — HYDROXYZINE HYDROCHLORIDE 25 MG/1
25 TABLET, FILM COATED ORAL ONCE
Status: COMPLETED | OUTPATIENT
Start: 2024-07-07 | End: 2024-07-07

## 2024-07-07 RX ORDER — BISACODYL 5 MG/1
5 TABLET, DELAYED RELEASE ORAL DAILY PRN
Status: DISCONTINUED | OUTPATIENT
Start: 2024-07-07 | End: 2024-07-13 | Stop reason: HOSPADM

## 2024-07-07 RX ORDER — AMOXICILLIN 250 MG
2 CAPSULE ORAL 2 TIMES DAILY
Status: DISCONTINUED | OUTPATIENT
Start: 2024-07-07 | End: 2024-07-13 | Stop reason: HOSPADM

## 2024-07-07 RX ADMIN — METOPROLOL SUCCINATE 50 MG: 50 TABLET, FILM COATED, EXTENDED RELEASE ORAL at 20:54

## 2024-07-07 RX ADMIN — SENNOSIDES AND DOCUSATE SODIUM 2 TABLET: 50; 8.6 TABLET ORAL at 20:54

## 2024-07-07 RX ADMIN — FAMOTIDINE 20 MG: 10 INJECTION INTRAVENOUS at 08:22

## 2024-07-07 RX ADMIN — HYDROCODONE BITARTRATE AND ACETAMINOPHEN 1 TABLET: 5; 325 TABLET ORAL at 08:12

## 2024-07-07 RX ADMIN — METRONIDAZOLE 500 MG: 500 INJECTION, SOLUTION INTRAVENOUS at 08:13

## 2024-07-07 RX ADMIN — CEFTRIAXONE 2000 MG: 2 INJECTION, POWDER, FOR SOLUTION INTRAMUSCULAR; INTRAVENOUS at 08:13

## 2024-07-07 RX ADMIN — HYDROXYZINE HYDROCHLORIDE 25 MG: 25 TABLET ORAL at 05:07

## 2024-07-07 RX ADMIN — OXYCODONE HYDROCHLORIDE AND ACETAMINOPHEN 1 TABLET: 5; 325 TABLET ORAL at 20:54

## 2024-07-07 RX ADMIN — HYDROMORPHONE HYDROCHLORIDE 0.5 MG: 1 INJECTION, SOLUTION INTRAMUSCULAR; INTRAVENOUS; SUBCUTANEOUS at 05:01

## 2024-07-07 RX ADMIN — METRONIDAZOLE 500 MG: 500 INJECTION, SOLUTION INTRAVENOUS at 20:54

## 2024-07-07 RX ADMIN — FAMOTIDINE 20 MG: 10 INJECTION INTRAVENOUS at 21:01

## 2024-07-07 RX ADMIN — HYDROMORPHONE HYDROCHLORIDE 0.5 MG: 1 INJECTION, SOLUTION INTRAMUSCULAR; INTRAVENOUS; SUBCUTANEOUS at 13:25

## 2024-07-07 RX ADMIN — HYDROMORPHONE HYDROCHLORIDE 0.5 MG: 1 INJECTION, SOLUTION INTRAMUSCULAR; INTRAVENOUS; SUBCUTANEOUS at 08:14

## 2024-07-07 RX ADMIN — METOPROLOL SUCCINATE 50 MG: 50 TABLET, FILM COATED, EXTENDED RELEASE ORAL at 08:12

## 2024-07-07 RX ADMIN — Medication 5 MG: at 20:54

## 2024-07-07 RX ADMIN — Medication 10 ML: at 09:17

## 2024-07-07 RX ADMIN — METRONIDAZOLE 500 MG: 500 INJECTION, SOLUTION INTRAVENOUS at 00:57

## 2024-07-07 RX ADMIN — HYDROMORPHONE HYDROCHLORIDE 0.5 MG: 1 INJECTION, SOLUTION INTRAMUSCULAR; INTRAVENOUS; SUBCUTANEOUS at 17:55

## 2024-07-07 RX ADMIN — Medication 10 ML: at 20:55

## 2024-07-07 RX ADMIN — HYDROMORPHONE HYDROCHLORIDE 0.5 MG: 1 INJECTION, SOLUTION INTRAMUSCULAR; INTRAVENOUS; SUBCUTANEOUS at 00:55

## 2024-07-07 NOTE — PLAN OF CARE
Goal Outcome Evaluation:     Pain management difficult this shift, patient did not sleep, gave 1x dose Atarax and prn pain meds.  Patient also started complaining of discomfort when voiding, sent UA down.  BP elevated with discomfort. Bed alarm on, will continue to monitor.

## 2024-07-07 NOTE — PROGRESS NOTES
Name: Danyell Osborne ADMIT: 2024   : 1935  PCP: Shanae Osborne APRN    MRN: [id number] LOS: 2 days   AGE/SEX: 88 y.o. female  ROOM: [facility]     Subjective   Subjective   No acute events. Patient did not sleep well last night and has not slept well recently. She has had some hallucinations. Had difficulty urinating and lower abdominal discomfort and was found to be retaining urine and this was relieved with a boland catheter. No BM. Her  is at bedside.    Objective   Objective   Vital Signs  Temp:  [97.9 °F (36.6 °C)-98.2 °F (36.8 °C)] 98.1 °F (36.7 °C)  Heart Rate:  [] 78  Resp:  [18] 18  BP: (121-170)/(52-82) 136/52  SpO2:  [93 %-99 %] 98 %  on  Flow (L/min):  [2] 2;   Device (Oxygen Therapy): nasal cannula  Body mass index is 21.95 kg/m².  Physical Exam  Vitals and nursing note reviewed.   Constitutional:       General: She is not in acute distress.     Appearance: She is not toxic-appearing or diaphoretic.   HENT:      Head: Normocephalic and atraumatic.      Nose: Nose normal.      Mouth/Throat:      Mouth: Mucous membranes are moist.      Pharynx: Oropharynx is clear.   Eyes:      Conjunctiva/sclera: Conjunctivae normal.      Pupils: Pupils are equal, round, and reactive to light.   Cardiovascular:      Rate and Rhythm: Normal rate and regular rhythm.      Pulses: Normal pulses.   Pulmonary:      Effort: Pulmonary effort is normal.      Breath sounds: Examination of the right-lower field reveals decreased breath sounds. Examination of the left-lower field reveals decreased breath sounds. Decreased breath sounds present.   Abdominal:      General: Bowel sounds are normal.      Palpations: Abdomen is soft.      Tenderness: There is abdominal tenderness (appropriate). There is no guarding or rebound.   Genitourinary:     Comments: Boland catheter in place draining yellow  Musculoskeletal:         General: No swelling or tenderness.   Skin:     General: Skin is warm and dry.       "Capillary Refill: Capillary refill takes less than 2 seconds.   Neurological:      General: No focal deficit present.      Mental Status: She is alert.   Psychiatric:         Mood and Affect: Mood normal.         Behavior: Behavior normal.         Cognition and Memory: Cognition is impaired.       Results Review     I reviewed the patient's new clinical results.  Results from last 7 days   Lab Units 07/07/24  0533 07/06/24  0929 07/05/24  0810   WBC 10*3/mm3 10.59 11.46* 10.84*   HEMOGLOBIN g/dL 7.5* 7.4* 8.9*   PLATELETS 10*3/mm3 628* 544* 586*     Results from last 7 days   Lab Units 07/07/24 0533 07/06/24 0441 07/05/24  0810   SODIUM mmol/L 134* 130* 125*   POTASSIUM mmol/L 3.7 5.3* 4.4   CHLORIDE mmol/L 99 96* 92*   CO2 mmol/L 27.0 24.0 21.8*   BUN mg/dL 8 11 11   CREATININE mg/dL 0.53* 0.42* 0.49*   GLUCOSE mg/dL 103* 111* 110*   EGFR mL/min/1.73 89.6 94.8 91.3     Results from last 7 days   Lab Units 07/07/24 0533 07/06/24 0441 07/05/24  0810   ALBUMIN g/dL 2.9* 3.0* 3.3*   BILIRUBIN mg/dL 0.4 0.4 0.6   ALK PHOS U/L 101 105 84   AST (SGOT) U/L 51* 75* 46*   ALT (SGPT) U/L 53* 65* 52*     Results from last 7 days   Lab Units 07/07/24 0533 07/06/24 0441 07/05/24  0810   CALCIUM mg/dL 8.2* 8.5* 8.2*   ALBUMIN g/dL 2.9* 3.0* 3.3*     Results from last 7 days   Lab Units 07/05/24  1033   LACTATE mmol/L 1.0     No results found for: \"HGBA1C\", \"POCGLU\"    XR Chest 1 View    Result Date: 7/7/2024   1. Stable chest. 2. Bibasilar lung opacities. 3. Small layering right and possible left pleural effusions. 4. No pneumothorax seen.  This report was finalized on 7/7/2024 7:59 AM by Dr. Gerry Lopez M.D on Workstation: [id number]      XR Chest 1 View    Result Date: 7/6/2024  No evidence for significant change when compared to yesterday's exam.  This report was finalized on 7/6/2024 7:35 AM by Dr. Jorge Alberto Hawkins M.D on Workstation: [id number]       I have personally reviewed all medications:  Scheduled " Medications  cefTRIAXone, 2,000 mg, Intravenous, Q24H  famotidine, 20 mg, Intravenous, Q12H  melatonin, 5 mg, Oral, Nightly  metoprolol succinate XL, 50 mg, Oral, BID  metroNIDAZOLE, 500 mg, Intravenous, Q8H  sodium chloride, 10 mL, Intravenous, Q12H    Infusions  sodium chloride, 75 mL/hr, Last Rate: 75 mL/hr (07/06/24 2015)    Diet  Diet: Regular/House, Renal; Low Potassium; Texture: Soft to Chew (NDD 3); Soft to Chew: Ground Meat; Fluid Consistency: Thin (IDDSI 0)    I have personally reviewed:  [x]  Laboratory   [x]  Microbiology   [x]  Radiology   [x]  EKG/Telemetry  []  Cardiology/Vascular   []  Pathology    []  Records    Assessment/Plan     Active Hospital Problems    Diagnosis  POA    **Acute cholecystitis [K81.0]  Yes    Acute retention of urine [R33.8]  No    Delirium due to another medical condition [F05]  No    PFO (patent foramen ovale) [Q21.12]  Not Applicable    Closed fracture of multiple ribs of right side [S22.41XA]  Yes    Hyponatremia [E87.1]  Yes    Hypercholesteremia [E78.00]  Yes    Paroxysmal atrial fibrillation [I48.0]  Yes    Essential hypertension [I10]  Yes    Coronary artery disease involving native coronary artery of native heart without angina pectoris [I25.10]  Yes    PAD (peripheral artery disease) [I73.9]  Yes    Gastroesophageal reflux disease [K21.9]  Yes    Anxiety disorder [F41.9]  Yes      Resolved Hospital Problems   No resolved problems to display.   Acute Cholecystitis  - s/p laparoscopic cholecystectomy with negative intraoperative cholangiogram 7/5/24 with Dr. Roth  - continue ceftriaxone and flagyl  - hold eliquis and plavix until cleared by surgery  - on regular diet, consult SLP given her prior swallowing issues  - appreciate general surgery recs     HTN/PAF/PAD/PFO  - BP and HR acceptable  - hold lisinopril, continue metoprolol  - hold eliquis and plavix as above, resume when okay from general surgery standpoint     Hyponatremia  - noted on hospitalization at [facility],  improving  - continue normal saline  - monitor BMP     Hyperkalemia  - mild, resolved    GERD  - continue IV famotidine     Multiple Right-Sided Rib Fractures  - from mechanical fall down some stairs on 6/26/24  - had pneumothorax which resolved with conservative treatment  - continue pain control, encourage pulmonary toilet  - appreciate thoracic surgery recs, they have signed off    Acute Urine Retention  - boland catheter placed 7/7/24  - voiding trial when more mobile and constipation relieved    Constipation  - abdominal examination is benign  - start bowel regimen    Delirium  - will schedule nightly melatonin and add PRN zyprexa  - redirect as needed, observe delirium precautions    SCDs for DVT prophylaxis.  Full code.  Discussed with patient, spouse, and nursing staff.  Anticipate discharge home with family in 2-3 days.  Expected Discharge Date: 7/9/2024; Expected Discharge Time:       Prince Meade MD  [facility]  07/07/24  14:02 EDT    Portions of this text have been copied and I have reviewed them. They are accurate as of 7/7/2024

## 2024-07-07 NOTE — PLAN OF CARE
Goal Outcome Evaluation:      Pt is an 89 yo female who was admitted to the unit 07/05/2024 due to acute cholecystitis. Pt is A/Ox4, PWD, PMSx4, PERRL, - JVD, trachea midline, = rise and fall of chest wall with respirations, CEBBS will note that the respirations are shallow but no advantageous lung sounds noted upon exam, pt c/o head, neck, back, flank/chest pain 2 to a fall approximately 1 week ago and then abdominal pain and distension due the acute cholecystitis. Pt on 2L O2 via NC due to sats dropping below 88% on RA after pt received IV pain medications. Pt had Lap Kathleen 07/05/2024, surgical sites look good, minor bruising noted, some swelling noted, pt offered Ice for pain control.  Pt received medications per MAR for the pain.   Pt remained stable, pain subsides with Q2 0.5mg dilaudid. Pt diet advanced, SLP saw pt due to pt c/o issues swallowing. Pt family remained at bedside.

## 2024-07-07 NOTE — PROGRESS NOTES
"General Surgery  Progress Note    CC: Follow-up acute on chronic cholecystitis    POD#2 laparoscopic cholecystectomy with cholangiogram    S: She had a lot of trouble with dysuria and urinary retention overnight with increased lower abdominal discomfort.  A Jameson catheter was placed and alleviated her symptoms.  She worked with PT yesterday but was only able to stand at the bedside and did not walk.    O:/52 (BP Location: Left arm, Patient Position: Lying)   Pulse 78   Temp 98.1 °F (36.7 °C) (Oral)   Resp 18   Ht 162.6 cm (64\")   Wt 58 kg (127 lb 13.9 oz)   SpO2 98%   BMI 21.95 kg/m²     GENERAL: alert, well appearing, and in no distress  HEENT: normocephalic, atraumatic, moist mucous membranes, clear sclerae   CHEST: clear to auscultation, no wheezes, rales or rhonchi, symmetric air entry  CARDIAC: regular rate and rhythm    ABDOMEN: Soft, nontender, nondistended, incisions are in good order with glue intact  EXTREMITIES: no cyanosis, clubbing, or edema   SKIN: Warm and moist, no rashes    LABS  Results from last 7 days   Lab Units 07/07/24  0533 07/06/24  0929 07/05/24  0810   WBC 10*3/mm3 10.59 11.46* 10.84*   HEMOGLOBIN g/dL 7.5* 7.4* 8.9*   HEMATOCRIT % 23.2* 23.0* 27.3*   PLATELETS 10*3/mm3 628* 544* 586*     Results from last 7 days   Lab Units 07/07/24  0533 07/06/24  0441 07/05/24  0810   SODIUM mmol/L 134* 130* 125*   POTASSIUM mmol/L 3.7 5.3* 4.4   CHLORIDE mmol/L 99 96* 92*   CO2 mmol/L 27.0 24.0 21.8*   BUN mg/dL 8 11 11   CREATININE mg/dL 0.53* 0.42* 0.49*   CALCIUM mg/dL 8.2* 8.5* 8.2*   BILIRUBIN mg/dL 0.4 0.4 0.6   ALK PHOS U/L 101 105 84   ALT (SGPT) U/L 53* 65* 52*   AST (SGOT) U/L 51* 75* 46*   GLUCOSE mg/dL 103* 111* 110*     Results from last 7 days   Lab Units 07/05/24  0810   INR  1.08   APTT seconds 25.5         A/P: 88 y.o. female POD#2 laparoscopic cholecystectomy with cholangiogram    Continue work with PT/OT and begin rehab planning as she appears very deconditioned.  I will " defer further workup and management of her dysuria to the primary team.    Tessa Montenegro MD  General, Robotic, and Endoscopic Surgery  [facility]    [address], [address]  [place] [address]  P: [phone]  F: [phone]

## 2024-07-08 ENCOUNTER — APPOINTMENT (OUTPATIENT)
Dept: GENERAL RADIOLOGY | Facility: HOSPITAL | Age: 89
End: 2024-07-08
Payer: MEDICARE

## 2024-07-08 LAB
ALBUMIN SERPL-MCNC: 2.6 G/DL (ref 3.5–5.2)
ALBUMIN/GLOB SERPL: 1 G/DL
ALP SERPL-CCNC: 102 U/L (ref 39–117)
ALT SERPL W P-5'-P-CCNC: 41 U/L (ref 1–33)
ANION GAP SERPL CALCULATED.3IONS-SCNC: 11.1 MMOL/L (ref 5–15)
AST SERPL-CCNC: 37 U/L (ref 1–32)
BASOPHILS # BLD AUTO: 0.05 10*3/MM3 (ref 0–0.2)
BASOPHILS NFR BLD AUTO: 0.5 % (ref 0–1.5)
BILIRUB SERPL-MCNC: 0.4 MG/DL (ref 0–1.2)
BUN SERPL-MCNC: 6 MG/DL (ref 8–23)
BUN/CREAT SERPL: 14.3 (ref 7–25)
CALCIUM SPEC-SCNC: 7.8 MG/DL (ref 8.6–10.5)
CHLORIDE SERPL-SCNC: 103 MMOL/L (ref 98–107)
CO2 SERPL-SCNC: 23.9 MMOL/L (ref 22–29)
CREAT SERPL-MCNC: 0.42 MG/DL (ref 0.57–1)
DEPRECATED RDW RBC AUTO: 44.9 FL (ref 37–54)
EGFRCR SERPLBLD CKD-EPI 2021: 94.8 ML/MIN/1.73
EOSINOPHIL # BLD AUTO: 0.72 10*3/MM3 (ref 0–0.4)
EOSINOPHIL NFR BLD AUTO: 6.7 % (ref 0.3–6.2)
ERYTHROCYTE [DISTWIDTH] IN BLOOD BY AUTOMATED COUNT: 13.6 % (ref 12.3–15.4)
GLOBULIN UR ELPH-MCNC: 2.7 GM/DL
GLUCOSE SERPL-MCNC: 98 MG/DL (ref 65–99)
HCT VFR BLD AUTO: 26.2 % (ref 34–46.6)
HGB BLD-MCNC: 8.4 G/DL (ref 12–15.9)
IMM GRANULOCYTES # BLD AUTO: 0.1 10*3/MM3 (ref 0–0.05)
IMM GRANULOCYTES NFR BLD AUTO: 0.9 % (ref 0–0.5)
LIPASE SERPL-CCNC: 19 U/L (ref 13–60)
LYMPHOCYTES # BLD AUTO: 1.6 10*3/MM3 (ref 0.7–3.1)
LYMPHOCYTES NFR BLD AUTO: 14.8 % (ref 19.6–45.3)
MCH RBC QN AUTO: 29.2 PG (ref 26.6–33)
MCHC RBC AUTO-ENTMCNC: 32.1 G/DL (ref 31.5–35.7)
MCV RBC AUTO: 91 FL (ref 79–97)
MONOCYTES # BLD AUTO: 1.08 10*3/MM3 (ref 0.1–0.9)
MONOCYTES NFR BLD AUTO: 10 % (ref 5–12)
NEUTROPHILS NFR BLD AUTO: 67.1 % (ref 42.7–76)
NEUTROPHILS NFR BLD AUTO: 7.27 10*3/MM3 (ref 1.7–7)
NRBC BLD AUTO-RTO: 0.3 /100 WBC (ref 0–0.2)
PLATELET # BLD AUTO: 712 10*3/MM3 (ref 140–450)
PMV BLD AUTO: 8.8 FL (ref 6–12)
POTASSIUM SERPL-SCNC: 3.7 MMOL/L (ref 3.5–5.2)
PROT SERPL-MCNC: 5.3 G/DL (ref 6–8.5)
RBC # BLD AUTO: 2.88 10*6/MM3 (ref 3.77–5.28)
SODIUM SERPL-SCNC: 138 MMOL/L (ref 136–145)
WBC NRBC COR # BLD AUTO: 10.82 10*3/MM3 (ref 3.4–10.8)

## 2024-07-08 PROCEDURE — 93005 ELECTROCARDIOGRAM TRACING: CPT | Performed by: NURSE PRACTITIONER

## 2024-07-08 PROCEDURE — 25010000002 ONDANSETRON PER 1 MG: Performed by: SURGERY

## 2024-07-08 PROCEDURE — 25010000002 METRONIDAZOLE 500 MG/100ML SOLUTION: Performed by: SURGERY

## 2024-07-08 PROCEDURE — 99024 POSTOP FOLLOW-UP VISIT: CPT | Performed by: SURGERY

## 2024-07-08 PROCEDURE — 97530 THERAPEUTIC ACTIVITIES: CPT

## 2024-07-08 PROCEDURE — 71045 X-RAY EXAM CHEST 1 VIEW: CPT

## 2024-07-08 PROCEDURE — 80053 COMPREHEN METABOLIC PANEL: CPT | Performed by: SURGERY

## 2024-07-08 PROCEDURE — 25010000002 CEFTRIAXONE PER 250 MG: Performed by: SURGERY

## 2024-07-08 PROCEDURE — 25010000002 DIGOXIN PER 500 MCG: Performed by: INTERNAL MEDICINE

## 2024-07-08 PROCEDURE — 25010000002 PROCHLORPERAZINE 10 MG/2ML SOLUTION: Performed by: INTERNAL MEDICINE

## 2024-07-08 PROCEDURE — 99222 1ST HOSP IP/OBS MODERATE 55: CPT | Performed by: INTERNAL MEDICINE

## 2024-07-08 PROCEDURE — 25810000003 SODIUM CHLORIDE 0.9 % SOLUTION: Performed by: NURSE PRACTITIONER

## 2024-07-08 PROCEDURE — 85025 COMPLETE CBC W/AUTO DIFF WBC: CPT | Performed by: SURGERY

## 2024-07-08 PROCEDURE — 93010 ELECTROCARDIOGRAM REPORT: CPT | Performed by: INTERNAL MEDICINE

## 2024-07-08 PROCEDURE — 83690 ASSAY OF LIPASE: CPT | Performed by: INTERNAL MEDICINE

## 2024-07-08 RX ORDER — CLOPIDOGREL BISULFATE 75 MG/1
75 TABLET ORAL DAILY
Status: DISCONTINUED | OUTPATIENT
Start: 2024-07-08 | End: 2024-07-13 | Stop reason: HOSPADM

## 2024-07-08 RX ORDER — FAMOTIDINE 20 MG/1
20 TABLET, FILM COATED ORAL 2 TIMES DAILY
Status: DISCONTINUED | OUTPATIENT
Start: 2024-07-08 | End: 2024-07-09

## 2024-07-08 RX ORDER — DIGOXIN 0.25 MG/ML
0.5 INJECTION INTRAMUSCULAR; INTRAVENOUS ONCE
Status: COMPLETED | OUTPATIENT
Start: 2024-07-08 | End: 2024-07-08

## 2024-07-08 RX ORDER — PROCHLORPERAZINE EDISYLATE 5 MG/ML
5 INJECTION INTRAMUSCULAR; INTRAVENOUS EVERY 6 HOURS PRN
Status: DISCONTINUED | OUTPATIENT
Start: 2024-07-08 | End: 2024-07-13 | Stop reason: HOSPADM

## 2024-07-08 RX ORDER — PROCHLORPERAZINE MALEATE 5 MG/1
5 TABLET ORAL EVERY 6 HOURS PRN
Status: DISCONTINUED | OUTPATIENT
Start: 2024-07-08 | End: 2024-07-13 | Stop reason: HOSPADM

## 2024-07-08 RX ORDER — PROCHLORPERAZINE 25 MG
25 SUPPOSITORY, RECTAL RECTAL EVERY 12 HOURS PRN
Status: DISCONTINUED | OUTPATIENT
Start: 2024-07-08 | End: 2024-07-13 | Stop reason: HOSPADM

## 2024-07-08 RX ADMIN — METRONIDAZOLE 500 MG: 500 INJECTION, SOLUTION INTRAVENOUS at 05:14

## 2024-07-08 RX ADMIN — CLOPIDOGREL BISULFATE 75 MG: 75 TABLET, FILM COATED ORAL at 10:06

## 2024-07-08 RX ADMIN — METOPROLOL SUCCINATE 50 MG: 50 TABLET, FILM COATED, EXTENDED RELEASE ORAL at 21:51

## 2024-07-08 RX ADMIN — Medication 5 MG: at 21:51

## 2024-07-08 RX ADMIN — OXYCODONE HYDROCHLORIDE AND ACETAMINOPHEN 1 TABLET: 5; 325 TABLET ORAL at 01:21

## 2024-07-08 RX ADMIN — METRONIDAZOLE 500 MG: 500 INJECTION, SOLUTION INTRAVENOUS at 21:49

## 2024-07-08 RX ADMIN — CEFTRIAXONE 2000 MG: 2 INJECTION, POWDER, FOR SOLUTION INTRAMUSCULAR; INTRAVENOUS at 10:06

## 2024-07-08 RX ADMIN — METOPROLOL TARTRATE 2.5 MG: 1 INJECTION, SOLUTION INTRAVENOUS at 03:42

## 2024-07-08 RX ADMIN — DIGOXIN 0.5 MG: 0.25 INJECTION INTRAMUSCULAR; INTRAVENOUS at 07:08

## 2024-07-08 RX ADMIN — FAMOTIDINE 20 MG: 10 INJECTION INTRAVENOUS at 10:06

## 2024-07-08 RX ADMIN — APIXABAN 2.5 MG: 2.5 TABLET, FILM COATED ORAL at 10:06

## 2024-07-08 RX ADMIN — FAMOTIDINE 20 MG: 20 TABLET, FILM COATED ORAL at 17:58

## 2024-07-08 RX ADMIN — METOPROLOL TARTRATE 2.5 MG: 1 INJECTION, SOLUTION INTRAVENOUS at 05:11

## 2024-07-08 RX ADMIN — POLYETHYLENE GLYCOL 3350 17 G: 17 POWDER, FOR SOLUTION ORAL at 08:38

## 2024-07-08 RX ADMIN — SENNOSIDES AND DOCUSATE SODIUM 2 TABLET: 50; 8.6 TABLET ORAL at 08:38

## 2024-07-08 RX ADMIN — OLANZAPINE 5 MG: 5 TABLET, ORALLY DISINTEGRATING ORAL at 01:22

## 2024-07-08 RX ADMIN — Medication 10 ML: at 10:07

## 2024-07-08 RX ADMIN — METRONIDAZOLE 500 MG: 500 INJECTION, SOLUTION INTRAVENOUS at 17:58

## 2024-07-08 RX ADMIN — SODIUM CHLORIDE 500 ML: 9 INJECTION, SOLUTION INTRAVENOUS at 04:33

## 2024-07-08 RX ADMIN — PROCHLORPERAZINE EDISYLATE 5 MG: 5 INJECTION INTRAMUSCULAR; INTRAVENOUS at 21:49

## 2024-07-08 RX ADMIN — ONDANSETRON 4 MG: 2 INJECTION INTRAMUSCULAR; INTRAVENOUS at 10:22

## 2024-07-08 NOTE — CONSULTS
Gastroenterology   Initial Inpatient Consult Note    Referring Provider: Prince Meade     Reason for Consultation: globus /dysphagia    Subjective     History of present illness:    88 y.o. female who we are asked to see for globus sensation/dysphagia.  The patient is in the hospital status post a laparoscopic cholecystectomy with cholangiogram.  She did develop a atrial fibrillation with rapid ventricular response this a.m. and was given digoxin.    She has converted to normal sinus rhythm.  She has been restarted on Eliquis and Plavix.  According to the nurse who administered the medications she vomited soon after the administration of the medications.  Patient has a history of GERD with famotidine and a history of constipation    The patient's  reports that she has had problems with globus and swallowing for approximately 1 year.  They have not gotten any worse recently but she was to be referred for evaluation of this problem when she had her gallbladder issue arise    Patient reports that she does not feel well.  She is quite nauseous at the bedside.  She has not vomited during my visit.  She does not endorse any chest pain or shortness of breath.  She does report that is hard for her to eat and drink because she feels that her mucous membranes are dry    She is anemic with a hemoglobin of 8.4 her white count 10.82 AST 37 ALT 41 these are trending downward    Past Medical History:  Past Medical History:   Diagnosis Date    A-fib     Anemia     Coronary artery disease     HLD (hyperlipidemia)     Hypertension     Stroke      Past Surgical History:  Past Surgical History:   Procedure Laterality Date    CHOLECYSTECTOMY WITH INTRAOPERATIVE CHOLANGIOGRAM N/A 7/5/2024    Procedure: Laparoscopic cholecystectomy with cholangiogram;  Surgeon: Tessa Montenegro MD;  Location: [facility];  Service: General;  Laterality: N/A;    HYSTERECTOMY        Social History:   Social History     Tobacco Use    Smoking  status: Never     Passive exposure: Never    Smokeless tobacco: Never   Substance Use Topics    Alcohol use: Never      Family History:  Family History   Problem Relation Age of Onset    Malig Hyperthermia Neg Hx        Home Meds:  Medications Prior to Admission   Medication Sig Dispense Refill Last Dose    apixaban (ELIQUIS) 2.5 MG tablet tablet Take 1 tablet by mouth Every 12 (Twelve) Hours. Indications: Atrial Fibrillation 60 tablet 0 7/4/2024    aspirin 81 MG chewable tablet Chew 1 tablet Daily.   7/4/2024    atorvastatin (LIPITOR) 80 MG tablet Take 1 tablet by mouth Every Night. 90 tablet 0 Past Week    Cholecalciferol 50 MCG (2000 UT) capsule Take 1 capsule by mouth Daily.   7/4/2024    clopidogrel (PLAVIX) 75 MG tablet Take 1 tablet by mouth Daily.   7/4/2024 at 0900    Coenzyme Q10 200 MG capsule Take 200 mg by mouth Daily.   7/4/2024    famotidine (PEPCID) 40 MG tablet Take 1 tablet by mouth Daily.   7/4/2024    lisinopril (PRINIVIL,ZESTRIL) 5 MG tablet Take 1 tablet by mouth Daily.   7/4/2024    metoprolol succinate XL (TOPROL-XL) 50 MG 24 hr tablet Take 1 tablet by mouth 2 (Two) Times a Day.   7/5/2024 at 1025    multivitamin with minerals tablet tablet Take 1 tablet by mouth Daily.   7/4/2024    Simethicone (GAS-X PO) Take  by mouth.   Past Week    metaxalone (Skelaxin) 800 MG tablet Take 1 tablet by mouth 3 (Three) Times a Day. 1/2 TID prn 30 tablet 0     nitroglycerin (NITROSTAT) 0.4 MG SL tablet DISSOLVE 1 TABLET UNDER THE TONGUE EVERY 5 MINUTES AS NEEDED FOR CHEST PAIN. DO NOT EXCEED A TOTAL OF 3 DOSES IN 15 MINUTES. IF NO RELIEF, CALL 911       Polyethylene Glycol 3350 (MIRALAX PO) Take  by mouth.        Current Meds:   apixaban, 2.5 mg, Oral, Q12H  cefTRIAXone, 2,000 mg, Intravenous, Q24H  clopidogrel, 75 mg, Oral, Daily  famotidine, 20 mg, Oral, BID  melatonin, 5 mg, Oral, Nightly  metoprolol succinate XL, 50 mg, Oral, BID  metroNIDAZOLE, 500 mg, Intravenous, Q8H  senna-docusate sodium, 2 tablet,  Oral, BID   And  polyethylene glycol, 17 g, Oral, Daily  sodium chloride, 10 mL, Intravenous, Q12H      Allergies:  Allergies   Allergen Reactions    Contrast Dye (Echo Or Unknown Ct/Mr) Hives and Unknown - High Severity     hives    Iodinated Contrast Media Hives and Unknown - High Severity    Penicillins Rash     Beta lactam allergy details    Antibiotic reaction: rash, hives    Age at reaction: adult    Dose to reaction time: unknown    Reason for antibiotic: unknown    Epinephrine required for reaction?: no    Tolerated antibiotics: unknown     Review of Systems  Pertinent items are noted in HPI, all other systems reviewed and negative    Objective     Vital Signs  Temp:  [98.1 °F (36.7 °C)-99.1 °F (37.3 °C)] 99.1 °F (37.3 °C)  Heart Rate:  [] 80  Resp:  [16-18] 18  BP: ()/(48-92) 125/48    Physical Exam:  CONSTITUTIONAL:  today's vital signs reviewed, patient looks fatigued and slightly unwell.  She has an emesis bag held to her but has not vomited  EARS NOSE THROAT: trachea midline and no deformity of the nares  EYES: no scleral icterus  GASTROINTESTINAL: abdomen is soft, nontender, nondistended with normal active bowel sounds, no masses are appreciated  PSYCHIATRIC: appropriate mood and affect  RESPIRATORY: normal inspiratory effort with no increased work of breathing  NEUROLOGIC: patient is awake and alert  DERMATOLOGIC: skin is warm with no cyanosis  LYMPHATIC: no periumbilical lymphadenopathy     Results Review:              I reviewed the patient's new clinical results.    Results from last 7 days   Lab Units 07/08/24 0538 07/07/24  0533 07/06/24  0929   WBC 10*3/mm3 10.82* 10.59 11.46*   HEMOGLOBIN g/dL 8.4* 7.5* 7.4*   HEMATOCRIT % 26.2* 23.2* 23.0*   PLATELETS 10*3/mm3 712* 628* 544*     Results from last 7 days   Lab Units 07/08/24  0538 07/07/24  0533 07/06/24  0441   SODIUM mmol/L 138 134* 130*   POTASSIUM mmol/L 3.7 3.7 5.3*   CHLORIDE mmol/L 103 99 96*   CO2 mmol/L 23.9 27.0 24.0    BUN mg/dL 6* 8 11   CREATININE mg/dL 0.42* 0.53* 0.42*   CALCIUM mg/dL 7.8* 8.2* 8.5*   BILIRUBIN mg/dL 0.4 0.4 0.4   ALK PHOS U/L 102 101 105   ALT (SGPT) U/L 41* 53* 65*   AST (SGOT) U/L 37* 51* 75*   GLUCOSE mg/dL 98 103* 111*     Results from last 7 days   Lab Units 07/05/24  0810   INR  1.08     Lab Results   Lab Value Date/Time    LIPASE 21 07/05/2024 0810    LIPASE 35 07/04/2024 1946       Radiology:  XR Chest 1 View   Final Result   Moderate right and small left pleural effusions with   associated basilar opacity. Known right rib fractures not well   demonstrated on this exam. Cardiomegaly. Atherosclerotic disease. No   definite pneumothorax.       This report was finalized on 7/8/2024 7:03 AM by Dr. Jorge Alberto Hawkins M.D on Workstation: [id number]          XR Chest 1 View   Final Result       1. Stable chest.   2. Bibasilar lung opacities.   3. Small layering right and possible left pleural effusions.   4. No pneumothorax seen.       This report was finalized on 7/7/2024 7:59 AM by Dr. Gerry Lopez M.D   on Workstation: [id number]          XR Chest 1 View   Final Result   No evidence for significant change when compared to   yesterday's exam.       This report was finalized on 7/6/2024 7:35 AM by Dr. Jorge Alberto Hawkins M.D on Workstation: [id number]          XR Chest 1 View   Final Result       1. Similar-appearing lung opacities and right pleural effusion with no   definite residual right pneumothorax.   2. Small volume free air under the right hemidiaphragm, likely   postoperative.       This report was finalized on 7/5/2024 2:28 PM by Praful Gutierrez MD on   Workstation: [id number]          FL Cholangiogram Operative   Final Result   Impression: Intraoperative cholangiogram, as described. See the   procedure note for details.           This report was finalized on 7/5/2024 2:17 PM by Praful Gutierrez MD on   Workstation: [id number]          XR Chest PA & Lateral   Final Result       1.  Consolidation of the right lung base, likely a small pleural effusion   with underlying pneumonia and/or atelectasis, with a suspected small   right apical pneumothorax. Compared to more recent prior outside imaging   if available.   2. Ill-defined left basilar subsegmental atelectasis and/or scarring.   3. Partially calcified biapical pleural-parenchymal scarring.   3. Partially visualized acute anterior right rib fracture deformities   and similar-appearing possibly acute mild anterior wedge compression   fracture of the T11 superior endplate.       This report was finalized on 7/5/2024 8:47 AM by Praful Gutierrez MD on   Workstation: [id number]          CT Outside Films   Final Result      XR Chest 1 View    (Results Pending)   XR Chest 1 View    (Results Pending)       Assessment & Plan   Active Hospital Problems    Diagnosis     **Acute cholecystitis     Acute retention of urine     Delirium due to another medical condition     PFO (patent foramen ovale)     Closed fracture of multiple ribs of right side     Hyponatremia     Hypercholesteremia     Paroxysmal atrial fibrillation     Essential hypertension     Coronary artery disease involving native coronary artery of native heart without angina pectoris     PAD (peripheral artery disease)     Gastroesophageal reflux disease     Anxiety disorder        Assessment:  Acute cholecystitis status post laparoscopic cholecystectomy and intraoperative cholangiogram  A-fib with RVR now treated  Antiplatelet therapy given but patient may have thrown some or all of this up  GERD  Globus, dysphagia.  This is not a new problem has been stable for 1 year but planning on getting this worked up prior to her most recent hospitalization  Constipation  Anemia  Acute nausea.  The patient feels quite nauseous at the moment.  Received Zofran with little benefit    Plan:  Discussed with the nursing staff concerned of her appearance  Nurse to discuss with primary service about alternate  options for her nausea  Question if this nausea is related to being given the digoxin this morning or the arrhythmia that she had.  Will defer to cardiology and primary team  Check a lipase as she did have a cholecystectomy and intraoperative cholangiogram  Supportive therapy  Soft mechanical diet  Evaluation for her globus and dysphagia pending improvement of her current nausea and clinical appearance.  If she truly got antiplatelet therapy we may need to wait a day or 2 before any endoscopic evaluation.  Is unclear if she got the medicine as after she received that she had some emesis.  I would however start with an esophagram which could give us a picture of the esophagus due to her complaints.  I do not think she would tolerate the esophagram right now given her nausea and I would get this under control prior to that evaluation.  I will schedule it tentatively for tomorrow morning and we can evaluate those findings.  Primary team to determine if they want to hold the antiplatelets and are able to do so pending that evaluation in anticipation of potentially an EGD this admission.      I discussed the patients findings and my recommendations with patient, family, and nursing staff.           Santos Crabtree M.D.  [facility] [address] [address]  Office: [phone]      02-May-2018

## 2024-07-08 NOTE — CASE MANAGEMENT/SOCIAL WORK
Discharge Planning Assessment  [facility]     Patient Name: Danyell Osborne  MRN: [id number]  Today's Date: 7/8/2024    Admit Date: 7/5/2024    Plan: Home w/ HH (pending), family to transport   Discharge Needs Assessment       Row Name 07/08/24 1306       Living Environment    People in Home child(javier), adult;spouse    Current Living Arrangements home    Primary Care Provided by self    Provides Primary Care For no one    Family Caregiver if Needed child(javier), adult;spouse    Able to Return to Prior Arrangements yes       Resource/Environmental Concerns    Resource/Environmental Concerns home accessibility    Home Accessibility Concerns stairs to enter home       Transition Planning    Patient/Family Anticipates Transition to home with help/services    Patient/Family Anticipated Services at Transition home health care    Transportation Anticipated family or friend will provide       Discharge Needs Assessment    Concerns to be Addressed discharge planning    Discharge Facility/Level of Care Needs home with home health                   Discharge Plan       Row Name 07/08/24 1306       Plan    Plan Home w/ HH (pending), family to transport    Patient/Family in Agreement with Plan yes    Plan Comments CCP spoke with patient and spouse Mack at bedside; explained role, verified facesheet, and discussed dc plan. Patient uses no DME and is independent for mobility at baseline. She lives with her spouse and daughter Lauren in a 3 level home but resides on 1 level. There are 3 steps to enter the home. She has used HH in the past but cannot recall which agency. She has no history of SNF. PT recommends HH. Patient and spouse agreeable to HH and have no preference on agency. Family to transport home. AURORA DANIEL                  Continued Care and Services - Admitted Since 7/5/2024       Home Medical Care       Service Provider Request Status Selected Services Address Phone Fax Patient Preferred    [facility]  [facility] - Request Sent N/A [address] [facility], [address] [id number] [phone] --                  Expected Discharge Date and Time       Expected Discharge Date Expected Discharge Time    Jul 9, 2024            Demographic Summary       Row Name 07/08/24 1306       General Information    Admission Type inpatient    Arrived From home    Referral Source admission list    Reason for Consult discharge planning    Preferred Language English       Contact Information    Permission Granted to Share Info With family/designee                   Functional Status       Row Name 07/08/24 1306       Functional Status    Usual Activity Tolerance moderate    Current Activity Tolerance moderate       Functional Status, IADL    Medications independent    Meal Preparation independent    Housekeeping independent    Laundry independent    Shopping independent       Mental Status    General Appearance WDL WDL                   Psychosocial    No documentation.                  Abuse/Neglect    No documentation.                  Legal    No documentation.                  Substance Abuse    No documentation.                  Patient Forms    No documentation.                     AURORA Aleman

## 2024-07-08 NOTE — THERAPY TREATMENT NOTE
Patient Name: Danyell Osborne  : 1935    MRN: [id number]                              Today's Date: 2024       Admit Date: 2024    Visit Dx:     ICD-10-CM ICD-9-CM   1. Acute cholecystitis  K81.0 575.0     Patient Active Problem List   Diagnosis    Essential hypertension    Coronary artery disease involving native coronary artery of native heart without angina pectoris    Skin lesions    Gastroesophageal reflux disease    Stress    Other headache syndrome    Paroxysmal atrial fibrillation    Routine general medical examination at a health care facility    Cough    Screening mammogram for breast cancer    Postmenopausal    Acute conjunctivitis of left eye    Other constipation    Memory loss    Ganglion cyst of finger    Hypercholesteremia    Left-sided weakness    Arthralgia of both knees    Myalgia    PND (post-nasal drip)    Congestion of nasal sinus    Coronary artery disease    Presence of stent in right coronary artery    Personal history of other diseases of the digestive system    PAD (peripheral artery disease)    Osteoporosis    IBS (irritable bowel syndrome)    Anxiety disorder    Left carotid artery stenosis    Acute cholecystitis    PFO (patent foramen ovale)    Closed fracture of multiple ribs of right side    Hyponatremia    Acute retention of urine    Delirium due to another medical condition     Past Medical History:   Diagnosis Date    A-fib     Anemia     Coronary artery disease     HLD (hyperlipidemia)     Hypertension     Stroke      Past Surgical History:   Procedure Laterality Date    CHOLECYSTECTOMY WITH INTRAOPERATIVE CHOLANGIOGRAM N/A 2024    Procedure: Laparoscopic cholecystectomy with cholangiogram;  Surgeon: Tessa Montenegro MD;  Location: [facility];  Service: General;  Laterality: N/A;    HYSTERECTOMY        General Information       Row Name 24 1414          Physical Therapy Time and Intention    Document Type therapy note (daily note)  -DJ     Mode of  Treatment co-treatment;physical therapy;occupational therapy  cotx appropriate due to pt not feeling well and minimizing disruption as well as decreased activity tolerance  -DJ       Row Name 07/08/24 1414          General Information    Patient Profile Reviewed yes  -DJ     Existing Precautions/Restrictions fall  -DJ       Row Name 07/08/24 1442          Cognition    Orientation Status (Cognition) oriented x 3  -DJ       Row Name 07/08/24 1442          Safety Issues, Functional Mobility    Comment, Safety Issues/Impairments (Mobility) nonskid socks, gt belt (loose due to rib fx and surgical site)  -DJ               User Key  (r) = Recorded By, (t) = Taken By, (c) = Cosigned By      Initials Name Provider Type    DJ Aminah Torres, PT Physical Therapist                   Mobility       Row Name 07/08/24 1444          Bed Mobility    Bed Mobility supine-sit  -DJ     Supine-Sit [place] (Bed Mobility) minimum assist (75% patient effort);verbal cues;contact guard  -DJ     Sit-Supine [place] (Bed Mobility) not tested  -DJ     Comment, (Bed Mobility) slow, vc for sequencing; pt placed in chair after therapy  -DJ       Row Name 07/08/24 1444          Transfers    Comment, (Transfers) sit/stand from EOB  -DJ       Row Name 07/08/24 1444          Bed-Chair Transfer    Bed-Chair [place] (Transfers) not tested  -DJ       Row Name 07/08/24 1444          Sit-Stand Transfer    Sit-Stand [place] (Transfers) minimum assist (75% patient effort);2 person assist;1 person to manage equipment;verbal cues;contact guard  -DJ     Assistive Device (Sit-Stand Transfers) walker, front-wheeled  -DJ       Row Name 07/08/24 1444          Gait/Stairs (Locomotion)    [place] Level (Gait) contact guard;minimum assist (75% patient effort);2 person assist;1 person to manage equipment  -DJ     Assistive Device (Gait) walker, front-wheeled  -DJ     Distance in Feet (Gait) 20  -DJ     Deviations/Abnormal Patterns (Gait) gait speed  decreased;stride length decreased  -DJ     Bilateral Gait Deviations forward flexed posture;heel strike decreased  -DJ     [place] Level (Stairs) not tested  -DJ     Comment, (Gait/Stairs) Pt amb 20' with 1 turn with r wx and CGA-min A of 1 +1 for equip. Pace is slow, posture is flexed, endurance is poor.  -DJ               User Key  (r) = Recorded By, (t) = Taken By, (c) = Cosigned By      Initials Name Provider Type    Aminah Garzon PT Physical Therapist                   Obj/Interventions       Row Name 07/08/24 1451          Motor Skills    Motor Skills functional endurance  -DJ     Functional Endurance poor  -DJ     Therapeutic Exercise other (see comments)  AP  -DJ       Row Name 07/08/24 1451          Balance    Balance Assessment standing static balance;standing dynamic balance  -DJ     Static Standing Balance contact guard;verbal cues  -DJ     Dynamic Standing Balance contact guard;minimal assist;verbal cues  -DJ     Position/Device Used, Standing Balance walker, front-wheeled;supported  -DJ     Balance Interventions sitting;standing;sit to stand;supported;weight shifting activity  -DJ     Comment, Balance no LOB  -DJ               User Key  (r) = Recorded By, (t) = Taken By, (c) = Cosigned By      Initials Name Provider Type    Aminah Garzon PT Physical Therapist                   Goals/Plan    No documentation.                  Clinical Impression       Row Name 07/08/24 1452          Pain    Pre/Posttreatment Pain Comment Pt c/o nausea  -DJ     Pain Intervention(s) Repositioned;Ambulation/increased activity  -DJ       Row Name 07/08/24 1452          Plan of Care Review    Plan of Care Reviewed With patient;spouse  -DJ     Progress improving  -DJ     Outcome Evaluation Pt resting in bed with c/o nausea and reluctant to participate in therapies today but eventually agreed with encouragement. Pt req CGA and increased time to sit EOB with vc for sequencing. She stood from EOB with CGA using r wx.  Pt amb 20' with 1 turn with r wx and CGA-min A of 1 +1 for equip. Pace is slow, posture is flexed, endurance is poor. Pt returned to recliner chair with all needs met and encouraged to move extremities throughout the day. Pt progressing slowly, cont PT to address functiohanl deificts and prepare for d/c as appropriate  -DJ       Row Name 07/08/24 1452          Therapy Assessment/Plan (PT)    Criteria for Skilled Interventions Met (PT) skilled treatment is necessary  -DJ       Row Name 07/08/24 1452          Vital Signs    Pre Patient Position Supine  -DJ     Intra Patient Position Standing  -DJ     Post Patient Position Sitting  -DJ       Row Name 07/08/24 1452          Positioning and Restraints    Pre-Treatment Position in bed  -DJ     Post Treatment Position chair  -DJ     In Chair notified nsg;reclined;call light within reach;encouraged to call for assist;exit alarm on;with family/caregiver  -DJ               User Key  (r) = Recorded By, (t) = Taken By, (c) = Cosigned By      Initials Name Provider Type    Aminah Garzon, PT Physical Therapist                   Outcome Measures       Row Name 07/08/24 1456 07/08/24 0800       How much help from another person do you currently need...    Turning from your back to your side while in flat bed without using bedrails? 3  -DJ 3  -HT    Moving from lying on back to sitting on the side of a flat bed without bedrails? 3  -DJ 3  -HT    Moving to and from a bed to a chair (including a wheelchair)? 3  -DJ 2  -HT    Standing up from a chair using your arms (e.g., wheelchair, bedside chair)? 3  -DJ 2  -HT    Climbing 3-5 steps with a railing? 2  -DJ 2  -HT    To walk in hospital room? 2  -DJ 2  -HT    AM-PAC 6 Clicks Score (PT) 16  -DJ 14  -HT    Highest Level of Mobility Goal 5 --> Static standing  -DJ 4 --> Transfer to chair/commode  -HT              User Key  (r) = Recorded By, (t) = Taken By, (c) = Cosigned By      Initials Name Provider Type    Aminah Garzon, PT  Physical Therapist    Leticia Lundberg, RN Registered Nurse                                 Physical Therapy Education       Title: PT OT SLP Therapies (Done)       Topic: Physical Therapy (Done)       Point: Mobility training (Done)       Learning Progress Summary             Patient Acceptance, E, VU,NR by  at 7/8/2024 1456    Acceptance, E, VU,NR by  at 7/6/2024 1254   Family Acceptance, E, VU,NR by DJ at 7/8/2024 1456                         Point: Home exercise program (Done)       Learning Progress Summary             Patient Acceptance, E, VU,NR by DJ at 7/8/2024 1456    Acceptance, E, VU,NR by  at 7/6/2024 1254   Family Acceptance, E, VU,NR by DJ at 7/8/2024 1456                                         User Key       Initials Effective Dates Name Provider Type Discipline     07/11/23 -  Ashley Pappas, PT Physical Therapist PT     10/25/19 -  Aminah Torres, PT Physical Therapist PT                  PT Recommendation and Plan     Plan of Care Reviewed With: patient, spouse  Progress: improving  Outcome Evaluation: Pt resting in bed with c/o nausea and reluctant to participate in therapies today but eventually agreed with encouragement. Pt req CGA and increased time to sit EOB with vc for sequencing. She stood from EOB with CGA using r wx. Pt amb 20' with 1 turn with r wx and CGA-min A of 1 +1 for equip. Pace is slow, posture is flexed, endurance is poor. Pt returned to recliner chair with all needs met and encouraged to move extremities throughout the day. Pt progressing slowly, cont PT to address functiohanl deificts and prepare for d/c as appropriate     Time Calculation:         PT Charges       Row Name 07/08/24 1457             Time Calculation    Start Time 1356  -DJ      Stop Time 1413  -DJ      Time Calculation (min) 17 min  -DJ      PT Non-Billable Time (min) 10 min  -DJ      PT Received On 07/08/24  -DJ      PT - Next Appointment 07/09/24  -DJ                User Key  (r) = Recorded By,  (t) = Taken By, (c) = Cosigned By      Initials Name Provider Type    DJ Aminah Torres, PT Physical Therapist                  Therapy Charges for Today       Code Description Service Date Service Provider Modifiers Qty    [id number]  PT THERAPEUTIC ACT EA 15 MIN 7/8/2024 Aminah Torrse, PT GP 1            PT G-Codes  Outcome Measure Options: Modified Jose, AM-PAC 6 Clicks Daily Activity (OT)  AM-PAC 6 Clicks Score (PT): 16  AM-PAC 6 Clicks Score (OT): 16  Modified Jose Scale: 4 - Moderately severe disability.  Unable to walk without assistance, and unable to attend to own bodily needs without assistance.  PT Discharge Summary  Anticipated Discharge Disposition (PT): home with assist, home with home health    Aminah Torres PT  7/8/2024

## 2024-07-08 NOTE — PROGRESS NOTES
"General Surgery  Progress Note    CC: Follow-up acute on chronic cholecystitis    POD#3 laparoscopic cholecystectomy with cholangiogram    S: She developed atrial fibrillation with rapid ventricular response overnight with heart rate in the 130s to 140s.  She received IV Lopressor twice with no improvement in her heart rate.  She denies any chest pain, nausea, or vomiting.    O:/92 (BP Location: Right arm, Patient Position: Lying)   Pulse (!) 142   Temp 98.2 °F (36.8 °C) (Oral)   Resp 16   Ht 162.6 cm (64\")   Wt 58 kg (127 lb 13.9 oz)   SpO2 98%   BMI 21.95 kg/m²     GENERAL: alert, well appearing, and in no distress  HEENT: normocephalic, atraumatic, moist mucous membranes, clear sclerae   CHEST: clear to auscultation, no wheezes, rales or rhonchi, symmetric air entry  CARDIAC: regular rate and rhythm    ABDOMEN: Soft, nontender, nondistended, incisions are in good order with glue intact  EXTREMITIES: no cyanosis, clubbing, or edema   SKIN: Warm and moist, no rashes    LABS  Results from last 7 days   Lab Units 07/08/24  0538 07/07/24  0533 07/06/24  0929   WBC 10*3/mm3 10.82* 10.59 11.46*   HEMOGLOBIN g/dL 8.4* 7.5* 7.4*   HEMATOCRIT % 26.2* 23.2* 23.0*   PLATELETS 10*3/mm3 712* 628* 544*     Results from last 7 days   Lab Units 07/08/24  0538 07/07/24  0533 07/06/24  0441   SODIUM mmol/L 138 134* 130*   POTASSIUM mmol/L 3.7 3.7 5.3*   CHLORIDE mmol/L 103 99 96*   CO2 mmol/L 23.9 27.0 24.0   BUN mg/dL 6* 8 11   CREATININE mg/dL 0.42* 0.53* 0.42*   CALCIUM mg/dL 7.8* 8.2* 8.5*   BILIRUBIN mg/dL 0.4 0.4 0.4   ALK PHOS U/L 102 101 105   ALT (SGPT) U/L 41* 53* 65*   AST (SGOT) U/L 37* 51* 75*   GLUCOSE mg/dL 98 103* 111*     Results from last 7 days   Lab Units 07/05/24  0810   INR  1.08   APTT seconds 25.5         A/P: 88 y.o. female POD#3 laparoscopic cholecystectomy with cholangiogram    I will defer management of her A-fib with RVR to the cardiology service.  Her Eliquis and Plavix can be resumed " today.  Once she is discharged, I would like to see her for follow-up in about 2 weeks. I will make sure all of her discharge instructions regarding diet, activity, and bathing postop are included in her discharge tab.  I will sign off, please call back with questions or concerns.    Tessa Montenegro MD  General, Robotic, and Endoscopic Surgery  [facility]    [address], [address]  [place] [address]  P: [phone]  F: [phone]

## 2024-07-08 NOTE — PLAN OF CARE
Goal Outcome Evaluation:  Plan of Care Reviewed With: (P) patient, spouse        Progress: (P) improving  Outcome Evaluation: (P) Pt seen for OT treatment this date. Pt UIC upon arrival and agreeable to treatment with encouragement. Pt demo'd CGA-Debby STS transfer and CGA functional mobility with rwx. Pt ambulated appropriate household distance with no LOBs and completed g/h task once back in chair. Pt declined any further ADL's this date. Pt activity tolerance is poor d/t weakness and pain. OT would benefit pt to address (I) and safety with ADL's and functional mobility as well as high falls risk. Rec d/c home with assist vs. SNF pending progress      Anticipated Discharge Disposition (OT): (P) home with assist, skilled nursing facility

## 2024-07-08 NOTE — PROGRESS NOTES
Name: Danyell Osborne ADMIT: 2024   : 1935  PCP: Shanae Osborne APRN    MRN: [id number] LOS: 3 days   AGE/SEX: 88 y.o. female  ROOM: [facility]     Subjective   Subjective   Patient developed Afib with RVR overnight. She has since converted to NSR. Taking PO. No BM. No family at bedside.    Objective   Objective   Vital Signs  Temp:  [98.1 °F (36.7 °C)-98.2 °F (36.8 °C)] 98.2 °F (36.8 °C)  Heart Rate:  [] 142  Resp:  [16-18] 16  BP: ()/(48-92) 145/92  SpO2:  [97 %-98 %] 98 %  on  Flow (L/min):  [2] 2;   Device (Oxygen Therapy): nasal cannula  Body mass index is 21.95 kg/m².  Physical Exam  Vitals and nursing note reviewed.   Constitutional:       General: She is not in acute distress.     Appearance: She is not toxic-appearing or diaphoretic.   HENT:      Head: Normocephalic and atraumatic.      Nose: Nose normal.      Mouth/Throat:      Mouth: Mucous membranes are moist.      Pharynx: Oropharynx is clear.   Eyes:      Conjunctiva/sclera: Conjunctivae normal.      Pupils: Pupils are equal, round, and reactive to light.   Cardiovascular:      Rate and Rhythm: Normal rate and regular rhythm.      Pulses: Normal pulses.   Pulmonary:      Effort: Pulmonary effort is normal.      Breath sounds: Normal breath sounds.   Abdominal:      General: Bowel sounds are normal.      Palpations: Abdomen is soft.      Tenderness: There is abdominal tenderness (appropriate). There is no guarding or rebound.   Genitourinary:     Comments: Jameson catheter in place draining yellow  Musculoskeletal:         General: No swelling or tenderness.   Skin:     General: Skin is warm and dry.      Capillary Refill: Capillary refill takes less than 2 seconds.   Neurological:      General: No focal deficit present.      Mental Status: She is alert.   Psychiatric:         Mood and Affect: Mood normal.         Behavior: Behavior normal.         Cognition and Memory: Cognition is impaired.       Results Review     I  "reviewed the patient's new clinical results.  Results from last 7 days   Lab Units 07/08/24 0538 07/07/24  0533 07/06/24  0929 07/05/24  0810   WBC 10*3/mm3 10.82* 10.59 11.46* 10.84*   HEMOGLOBIN g/dL 8.4* 7.5* 7.4* 8.9*   PLATELETS 10*3/mm3 712* 628* 544* 586*     Results from last 7 days   Lab Units 07/08/24 0538 07/07/24 0533 07/06/24 0441 07/05/24  0810   SODIUM mmol/L 138 134* 130* 125*   POTASSIUM mmol/L 3.7 3.7 5.3* 4.4   CHLORIDE mmol/L 103 99 96* 92*   CO2 mmol/L 23.9 27.0 24.0 21.8*   BUN mg/dL 6* 8 11 11   CREATININE mg/dL 0.42* 0.53* 0.42* 0.49*   GLUCOSE mg/dL 98 103* 111* 110*   EGFR mL/min/1.73 94.8 89.6 94.8 91.3     Results from last 7 days   Lab Units 07/08/24 0538 07/07/24  0533 07/06/24 0441 07/05/24  0810   ALBUMIN g/dL 2.6* 2.9* 3.0* 3.3*   BILIRUBIN mg/dL 0.4 0.4 0.4 0.6   ALK PHOS U/L 102 101 105 84   AST (SGOT) U/L 37* 51* 75* 46*   ALT (SGPT) U/L 41* 53* 65* 52*     Results from last 7 days   Lab Units 07/08/24 0538 07/07/24 0533 07/06/24 0441 07/05/24  0810   CALCIUM mg/dL 7.8* 8.2* 8.5* 8.2*   ALBUMIN g/dL 2.6* 2.9* 3.0* 3.3*     Results from last 7 days   Lab Units 07/05/24  1033   LACTATE mmol/L 1.0     No results found for: \"HGBA1C\", \"POCGLU\"    XR Chest 1 View    Result Date: 7/8/2024  Moderate right and small left pleural effusions with associated basilar opacity. Known right rib fractures not well demonstrated on this exam. Cardiomegaly. Atherosclerotic disease. No definite pneumothorax.  This report was finalized on 7/8/2024 7:03 AM by Dr. Jorge Alberto Hawkins M.D on Workstation: [id number]      XR Chest 1 View    Result Date: 7/7/2024   1. Stable chest. 2. Bibasilar lung opacities. 3. Small layering right and possible left pleural effusions. 4. No pneumothorax seen.  This report was finalized on 7/7/2024 7:59 AM by Dr. Gerry Lopez M.D on Workstation: [id number]       I have personally reviewed all medications:  Scheduled Medications  apixaban, 2.5 mg, Oral, " Q12H  cefTRIAXone, 2,000 mg, Intravenous, Q24H  clopidogrel, 75 mg, Oral, Daily  famotidine, 20 mg, Intravenous, Q12H  melatonin, 5 mg, Oral, Nightly  metoprolol succinate XL, 50 mg, Oral, BID  metroNIDAZOLE, 500 mg, Intravenous, Q8H  senna-docusate sodium, 2 tablet, Oral, BID   And  polyethylene glycol, 17 g, Oral, Daily  sodium chloride, 10 mL, Intravenous, Q12H    Infusions  sodium chloride, 75 mL/hr, Last Rate: 75 mL/hr (07/06/24 2015)    Diet  Diet: Regular/House, Renal; Low Potassium; Texture: Soft to Chew (NDD 3); Soft to Chew: Ground Meat; Fluid Consistency: Thin (IDDSI 0)    I have personally reviewed:  [x]  Laboratory   [x]  Microbiology   []  Radiology   [x]  EKG/Telemetry  []  Cardiology/Vascular   []  Pathology    []  Records    Assessment/Plan     Active Hospital Problems    Diagnosis  POA    **Acute cholecystitis [K81.0]  Yes    Acute retention of urine [R33.8]  No    Delirium due to another medical condition [F05]  No    PFO (patent foramen ovale) [Q21.12]  Not Applicable    Closed fracture of multiple ribs of right side [S22.41XA]  Yes    Hyponatremia [E87.1]  Yes    Hypercholesteremia [E78.00]  Yes    Paroxysmal atrial fibrillation [I48.0]  Yes    Essential hypertension [I10]  Yes    Coronary artery disease involving native coronary artery of native heart without angina pectoris [I25.10]  Yes    PAD (peripheral artery disease) [I73.9]  Yes    Gastroesophageal reflux disease [K21.9]  Yes    Anxiety disorder [F41.9]  Yes      Resolved Hospital Problems   No resolved problems to display.   Acute Cholecystitis  - s/p laparoscopic cholecystectomy with negative intraoperative cholangiogram 7/5/24 with Dr. Roth  - continue ceftriaxone and flagyl  - on regular diet, SLP following given her prior swallowing issues  - appreciate general surgery recs     HTN/PAF/PAD/PFO  - BP acceptable, had RVR likely provoked by above and has received digoxin, now converted to NSR  - holding lisinopril, continue  metoprolol  - resume eliquis and plavix    Hyponatremia  - noted on hospitalization at [facility], resolved     GERD  - continue famotidine     Multiple Right-Sided Rib Fractures  - from mechanical fall down some stairs on 6/26/24  - had pneumothorax which resolved with conservative treatment  - continue pain control, encourage pulmonary toilet  - appreciate thoracic surgery recs, they have signed off    Acute Urine Retention  - boland catheter placed 7/7/24  - voiding trial today    Constipation  - resolved, abdominal examination is benign  - continue bowel regimen    Delirium  - continue scheduled nightly melatonin and PRN zyprexa  - redirect as needed, observe delirium precautions    SCDs for DVT prophylaxis.  Full code.  Discussed with patient, nursing staff, and care team on multidisciplinary rounds.  Anticipate discharge home with family in 1-2 days.  Expected Discharge Date: 7/9/2024; Expected Discharge Time:       Prince Meade MD  [facility]  07/08/24  10:55 EDT    Portions of this text have been copied and I have reviewed them. They are accurate as of 7/8/2024

## 2024-07-08 NOTE — DISCHARGE PLACEMENT REQUEST
"MarvinDanyell casey SAMIA (88 y.o. Female)       Date of Birth   1935    Social Security Number       Address   [facility] [address] [facility] [address]    Home Phone   [phone]    MRN   [id number]       M[employer]onism   [employer]    Marital Status                               Admission Date   7/5/24    Admission Type   Urgent    Admitting Provider   Prince Meade MD    Attending Provider   Prince Meade MD    Department, Room/Bed   [address], [address]       Discharge Date       Discharge Disposition       Discharge Destination                                 Attending Provider: Prince Meade MD    Allergies: Contrast Dye (Echo Or Unknown Ct/mr), Iodinated Contrast Media, Penicillins    Isolation: None   Infection: None   Code Status: CPR    Ht: 162.6 cm (64\")   Wt: 58 kg (127 lb 13.9 oz)    Admission Cmt: None   Principal Problem: Acute cholecystitis [K81.0]                   Active Insurance as of 7/5/2024       Primary Coverage       Payor Plan Insurance Group Employer/Plan Group    MEDICARE MEDICARE A & B        Payor Plan Address Payor Plan Phone Number Payor Plan Fax Number Effective Dates    [address] [phone]  12/1/2001 - None Entered    [facility] [id number]         Subscriber Name Subscriber Birth Date Member ID       DANYELL MARVIN SAMIA 1935 [id number]               Secondary Coverage       Payor Plan Insurance Group Employer/Plan Group    [facility] SUPP [id number]       Payor Plan Address Payor Plan Phone Number Payor Plan Fax Number Effective Dates    [address]   12/1/2016 - None Entered    [facility] [id number]         Subscriber Name Subscriber Birth Date Member ID       DANYELL MARVIN SAMIA 1935 [id number]                     Emergency Contacts        (Rel.) Home Phone Work Phone Mobile Phone    DANIELE MARVIN (Spouse) -- -- [phone]    Maida Armas (Daughter) -- -- [phone]    Elizabeth Marvin (Daughter) " -- -- [phone]

## 2024-07-08 NOTE — PLAN OF CARE
Goal Outcome Evaluation:     Patient was sleeping more this shift, pain decreased as well.  Patient hr went in afib with RVR, gave IV Lopressor x 2, 500 cc bolus, consulted cardiology.  Heart rate continues to be in afib with RVR after interventions.  Patient asymptomatic.  Bed alarm on, will continue to monitor.

## 2024-07-08 NOTE — PLAN OF CARE
Goal Outcome Evaluation:  Plan of Care Reviewed With: patient, spouse        Progress: improving  Outcome Evaluation: Pt resting in bed with c/o nausea and reluctant to participate in therapies today but eventually agreed with encouragement. Pt req CGA and increased time to sit EOB with vc for sequencing. She stood from EOB with CGA using r wx. Pt amb 20' with 1 turn with r wx and CGA-min A of 1 +1 for equip. Pace is slow, posture is flexed, endurance is poor. Pt returned to recliner chair with all needs met and encouraged to move extremities throughout the day. Pt progressing slowly, cont PT to address functiohanl deificts and prepare for d/c as appropriate      Anticipated Discharge Disposition (PT): home with assist, home with home health

## 2024-07-08 NOTE — TELEPHONE ENCOUNTER
stated patient is doing very well after her gallbladder surgery. Til state patient had a rough night last night with back and hip pain.

## 2024-07-08 NOTE — PROGRESS NOTES
"Nutrition Services    Patient Name:  Danyell Osborne  YOB: 1935  MRN: [id number]  Admit Date:  7/5/2024  Assessment Date:  07/08/24    Summary: Nutrition screen per RN admission screen for Decrease po/difficulty chewing  This is a 89 yo female admitted for Acute cholecystitis status post laparoscopic cholecystectomy and intraoperative cholangiogram. Visited pt this am. Family reports no po intake for breakfast. Note possible esophagram tomorrow.  Pt now NPO  Labs Na 138, platelet 712, alt 41, alb 2.6  Wound right abd incision    Plan  Will follow for restart of diet as medical conditions allow  Will monitor labs and replace electrolytes as needed  RD to follow    CLINICAL NUTRITION ASSESSMENT      Reason for Assessment MST score 2+, Nurse Admission Screen     Diagnosis/Problem   Acute cholecystitis    Medical/Surgical History Past Medical History:   Diagnosis Date    A-fib     Anemia     Coronary artery disease     HLD (hyperlipidemia)     Hypertension     Stroke        Past Surgical History:   Procedure Laterality Date    CHOLECYSTECTOMY WITH INTRAOPERATIVE CHOLANGIOGRAM N/A 7/5/2024    Procedure: Laparoscopic cholecystectomy with cholangiogram;  Surgeon: Tessa Montenegro MD;  Location: [facility];  Service: General;  Laterality: N/A;    HYSTERECTOMY          Anthropometrics        Current Height  Current Weight  BMI kg/m2 Height: 162.6 cm (64\")  Weight: 58 kg (127 lb 13.9 oz) (07/06/24 0510)  Body mass index is 21.95 kg/m².   Adjusted BMI (if applicable)    BMI Category Normal/Healthy (18.4 - 24.9)   Ideal Body Weight (IBW) 120lb   Usual Body Weight (UBW) 120's   Weight Trend Stable   Weight History Wt Readings from Last 30 Encounters:   07/06/24 0510 58 kg (127 lb 13.9 oz)   07/05/24 0620 60 kg (132 lb 4.4 oz)   07/05/24 0600 61 kg (134 lb 7.7 oz)   02/14/24 1522 54.6 kg (120 lb 6.4 oz)   02/05/24 1052 54.1 kg (119 lb 3.2 oz)   01/10/24 1252 54.8 kg (120 lb 12.8 oz)   10/18/23 1419 54 kg " "(119 lb)   10/11/23 1059 53.5 kg (118 lb)   10/10/23 0504 53.7 kg (118 lb 6.2 oz)   10/10/23 0321 54 kg (118 lb 15.7 oz)   08/07/23 1134 54.4 kg (120 lb)   07/10/23 1122 54.5 kg (120 lb 3.2 oz)   05/10/23 1132 54 kg (119 lb)   04/26/23 1415 54.3 kg (119 lb 9.6 oz)   01/09/23 1247 53.6 kg (118 lb 3.2 oz)   01/04/23 1134 53.6 kg (118 lb 3.2 oz)   12/08/22 1108 53.3 kg (117 lb 9.6 oz)   10/03/22 1135 54.1 kg (119 lb 6 oz)   06/09/22 1411 54.3 kg (119 lb 12.8 oz)   06/06/22 1434 54.9 kg (121 lb)   02/16/22 1035 55.8 kg (123 lb)   10/18/21 1444 55.4 kg (122 lb 3.2 oz)   05/20/21 1142 56.5 kg (124 lb 9.6 oz)   05/11/21 0000 56.8 kg (125 lb 2 oz)   04/27/21 0000 57.3 kg (126 lb 4 oz)   03/23/21 1351 57.3 kg (126 lb 6.4 oz)   09/23/20 1411 56.8 kg (125 lb 3.2 oz)   03/05/20 1217 59.3 kg (130 lb 12.8 oz)   12/09/19 1113 58.8 kg (129 lb 9.6 oz)   11/26/19 1520 60.1 kg (132 lb 6.4 oz)   06/03/19 1402 59.9 kg (132 lb)   05/13/19 1127 60.8 kg (134 lb)   11/12/18 1815 60.2 kg (132 lb 12.8 oz)   10/16/18 1344 59.7 kg (131 lb 9.6 oz)      --  Labs       Pertinent Labs    Results from last 7 days   Lab Units 07/08/24  0538 07/07/24  0533 07/06/24  0441   SODIUM mmol/L 138 134* 130*   POTASSIUM mmol/L 3.7 3.7 5.3*   CHLORIDE mmol/L 103 99 96*   CO2 mmol/L 23.9 27.0 24.0   BUN mg/dL 6* 8 11   CREATININE mg/dL 0.42* 0.53* 0.42*   CALCIUM mg/dL 7.8* 8.2* 8.5*   BILIRUBIN mg/dL 0.4 0.4 0.4   ALK PHOS U/L 102 101 105   ALT (SGPT) U/L 41* 53* 65*   AST (SGOT) U/L 37* 51* 75*   GLUCOSE mg/dL 98 103* 111*     Results from last 7 days   Lab Units 07/08/24  0538   HEMOGLOBIN g/dL 8.4*   HEMATOCRIT % 26.2*   WBC 10*3/mm3 10.82*   ALBUMIN g/dL 2.6*     Results from last 7 days   Lab Units 07/08/24  0538 07/07/24  0533 07/06/24  0929 07/05/24  0810   INR   --   --   --  1.08   APTT seconds  --   --   --  25.5   PLATELETS 10*3/mm3 712* 628* 544* 586*     No results found for: \"COVID19\"  Lab Results   Component Value Date    HGBA1C 5.90 (H) " 10/10/2023          Medications           Scheduled Medications [Held by provider] apixaban, 2.5 mg, Oral, Q12H  cefTRIAXone, 2,000 mg, Intravenous, Q24H  [Held by provider] clopidogrel, 75 mg, Oral, Daily  famotidine, 20 mg, Oral, BID  melatonin, 5 mg, Oral, Nightly  metoprolol succinate XL, 50 mg, Oral, BID  metroNIDAZOLE, 500 mg, Intravenous, Q8H  senna-docusate sodium, 2 tablet, Oral, BID   And  polyethylene glycol, 17 g, Oral, Daily  sodium chloride, 10 mL, Intravenous, Q12H       Infusions sodium chloride, 75 mL/hr, Last Rate: 75 mL/hr (07/06/24 2015)       PRN Medications   acetaminophen **OR** acetaminophen **OR** acetaminophen    senna-docusate sodium **AND** polyethylene glycol **AND** bisacodyl **AND** bisacodyl    calcium carbonate    HYDROcodone-acetaminophen    HYDROmorphone    [DISCONTINUED] HYDROmorphone **AND** naloxone    nitroglycerin    OLANZapine zydis    ondansetron    ondansetron ODT **OR** ondansetron    oxyCODONE-acetaminophen    prochlorperazine **OR** prochlorperazine **OR** prochlorperazine    sodium chloride    sodium chloride     Physical Findings          General Findings alert, oriented   Oral/Mouth Cavity dental appliance   Edema  no edema   Gastrointestinal abdominal pain   Skin  surgical incision: abd   Tubes/Drains/Lines none   NFPE Not indicated at this time   --  Current Nutrition Orders & Evaluation of Intake       Oral Nutrition     Food Allergies NKFA   Current PO Diet No diet orders on file   Supplement n/a   PO Evaluation     % PO Intake 0% for breakfast this am    Factors Affecting Intake: abdominal pain, altered GI function   --  PES STATEMENT / NUTRITION DIAGNOSIS      Nutrition Dx Problem  Problem: Altered GI Function  Etiology: Medical Diagnosis - acute cholecystitis    Signs/Symptoms: Report/Observation     NUTRITION INTERVENTION / PLAN OF CARE      Intervention Goal(s) Reduce/improve symptoms, Disease management/therapy, Initiate feeding/diet, Tolerate PO , and  Maintain weight         RD Intervention/Action Continue to monitor and Care plan reviewed   --      Prescription/Orders:       PO Diet       Supplements       Enteral Nutrition       Parenteral Nutrition    New Prescription Ordered? No changes at this time   --      Monitor/Evaluation Per protocol, GI status, Symptoms   Discharge Plan/Needs Pending clinical course   --    RD to follow per protocol.      Electronically signed by:  Lesvia Begum RD  07/08/24 14:13 EDT

## 2024-07-08 NOTE — PLAN OF CARE
Goal Outcome Evaluation:     Pt is an 87 yo female who was admitted to the unit 07/05/2024 due to acute cholecystitis. Pt is A/Ox4, PWD, PMSx4, PERRL, - JVD, trachea midline, = rise and fall of chest wall with respirations, CEBBS will note that the respirations are shallow but no advantageous lung sounds noted upon exam, pt c/o head, neck, back, flank/chest pain 2 to a fall approximately 1 week ago and then abdominal pain and distension due the acute cholecystitis. Pt on 2L O2 via NC due to sats dropping below 88% on RA. Pt had Lap Kathleen 07/05/2024, surgical sites look good, minor bruising noted, some swelling noted, pt offered Ice for pain control.  Pt received medications per MAR for the pain.   Pt received some IV digitalis this AM, pt was C/O severe nausea, pt received oral meds this AM and had some emesis.  Spoke with provider, orders changed PER MAR.  Pt refused metoprolol after vomiting.  Attempted to educate pt on boland and the need to get it removed, pt adamant that she need the boland to remain in place due to how poorly she is feeling at this time.  Educated pt on the risks of infection with the boland remaining in, pt still adamant that it stay in.  Pt remained stable this shift, rhythm converted from Afib with RVR to NSR.

## 2024-07-08 NOTE — CONSULTS
Danyell Osborne   88 y.o.  female    LOS: 3 days   Patient Care Team:  Shanae Osborne APRN as PCP - General (Family Medicine)      Subjective     Chief Complaint:    History of Present Illness:   88-year-old white female patient who been followed by my colleague Can Yanes recently fell down and fractures of 4 ribs and was treated conservatively at the [facility].  She was diagnosed with a porosis atrial fibrillation.  She has stenting of LAD and RCA 3 4 years ago.  She has hypertension and hyperlipidemia.  Patient was diagnosed with acute cholecystitis and underwent L laparoscopic cholecystectomy.  She denies any chest pain or dyspnea at this time.  She is in atrial fibrillation with heart rate 74/min today.  She is on Toprol-XL 50 mg twice a day.  Eliquis and Plavix has been restarted.  She was a homemaker and lives with her .  She had a stroke in October 2023.        Past Medical History:   Diagnosis Date    A-fib     Anemia     Coronary artery disease     HLD (hyperlipidemia)     Hypertension     Stroke      Past Surgical History:   Procedure Laterality Date    CHOLECYSTECTOMY WITH INTRAOPERATIVE CHOLANGIOGRAM N/A 7/5/2024    Procedure: Laparoscopic cholecystectomy with cholangiogram;  Surgeon: Tessa Montenegro MD;  Location: [facility];  Service: General;  Laterality: N/A;    HYSTERECTOMY       Medications Prior to Admission   Medication Sig Dispense Refill Last Dose    apixaban (ELIQUIS) 2.5 MG tablet tablet Take 1 tablet by mouth Every 12 (Twelve) Hours. Indications: Atrial Fibrillation 60 tablet 0 7/4/2024    aspirin 81 MG chewable tablet Chew 1 tablet Daily.   7/4/2024    atorvastatin (LIPITOR) 80 MG tablet Take 1 tablet by mouth Every Night. 90 tablet 0 Past Week    Cholecalciferol 50 MCG (2000 UT) capsule Take 1 capsule by mouth Daily.   7/4/2024    clopidogrel (PLAVIX) 75 MG tablet Take 1 tablet by mouth Daily.   7/4/2024 at 0900    Coenzyme Q10 200 MG capsule  Take 200 mg by mouth Daily.   7/4/2024    famotidine (PEPCID) 40 MG tablet Take 1 tablet by mouth Daily.   7/4/2024    lisinopril (PRINIVIL,ZESTRIL) 5 MG tablet Take 1 tablet by mouth Daily.   7/4/2024    metoprolol succinate XL (TOPROL-XL) 50 MG 24 hr tablet Take 1 tablet by mouth 2 (Two) Times a Day.   7/5/2024 at 1025    multivitamin with minerals tablet tablet Take 1 tablet by mouth Daily.   7/4/2024    Simethicone (GAS-X PO) Take  by mouth.   Past Week    metaxalone (Skelaxin) 800 MG tablet Take 1 tablet by mouth 3 (Three) Times a Day. 1/2 TID prn 30 tablet 0     nitroglycerin (NITROSTAT) 0.4 MG SL tablet DISSOLVE 1 TABLET UNDER THE TONGUE EVERY 5 MINUTES AS NEEDED FOR CHEST PAIN. DO NOT EXCEED A TOTAL OF 3 DOSES IN 15 MINUTES. IF NO RELIEF, CALL 911       Polyethylene Glycol 3350 (MIRALAX PO) Take  by mouth.          Family History   Problem Relation Age of Onset    Malig Hyperthermia Neg Hx      Social History     Socioeconomic History    Marital status:    Tobacco Use    Smoking status: Never     Passive exposure: Never    Smokeless tobacco: Never   Vaping Use    Vaping status: Never Used   Substance and Sexual Activity    Alcohol use: Never    Drug use: Never    Sexual activity: Defer     Objective       Review of Systems:   Constitutional: Negative for diaphoresis, fatigue, fever and unexpected weight change.   HENT: Negative.    Eyes: Negative.    Respiratory: Negative for cough, shortness of breath and wheezing.    Cardiovascular: Negative for chest pain, palpitations and leg swelling.   Gastrointestinal: Negative for abdominal pain, blood in stool, constipation, diarrhea, nausea and vomiting.   Endocrine: Negative.    Genitourinary: Negative for difficulty urinating, dysuria and frequency.   Musculoskeletal: Negative.    Skin: Negative.    Allergic/Immunologic: Negative for environmental allergies and food allergies.   Neurological: Negative.    Hematological: Negative.    Psychiatric/Behavioral:  Negative.        Current Facility-Administered Medications:     acetaminophen (TYLENOL) tablet 650 mg, 650 mg, Oral, Q4H PRN **OR** acetaminophen (TYLENOL) 160 MG/5ML oral solution 650 mg, 650 mg, Oral, Q4H PRN **OR** acetaminophen (TYLENOL) suppository 650 mg, 650 mg, Rectal, Q4H PRN, Tessa Montenegro MD    apixaban (ELIQUIS) tablet 2.5 mg, 2.5 mg, Oral, Q12H, Prince Meade MD, 2.5 mg at 07/08/24 1006    sennosides-docusate (PERICOLACE) 8.6-50 MG per tablet 2 tablet, 2 tablet, Oral, BID, 2 tablet at 07/08/24 0838 **AND** polyethylene glycol (MIRALAX) packet 17 g, 17 g, Oral, Daily, 17 g at 07/08/24 0838 **AND** bisacodyl (DULCOLAX) EC tablet 5 mg, 5 mg, Oral, Daily PRN **AND** bisacodyl (DULCOLAX) suppository 10 mg, 10 mg, Rectal, Daily PRN, Prince Meade MD    calcium carbonate (TUMS) chewable tablet 500 mg (200 mg elemental), 2 tablet, Oral, TID PRN, Tessa Montenegro MD, 2 tablet at 07/06/24 2007    cefTRIAXone (ROCEPHIN) 2,000 mg in sodium chloride 0.9 % 100 mL MBP, 2,000 mg, Intravenous, Q24H, Tessa Montenegro MD, Last Rate: 200 mL/hr at 07/08/24 1006, 2,000 mg at 07/08/24 1006    clopidogrel (PLAVIX) tablet 75 mg, 75 mg, Oral, Daily, Prince Meade MD, 75 mg at 07/08/24 1006    famotidine (PEPCID) tablet 20 mg, 20 mg, Oral, BID, Prince Meade MD    HYDROcodone-acetaminophen (NORCO) 5-325 MG per tablet 1 tablet, 1 tablet, Oral, Q6H PRN, Prince Meade MD, 1 tablet at 07/07/24 0812    HYDROmorphone (DILAUDID) injection 0.5 mg, 0.5 mg, Intravenous, Q2H PRN, Prince Meade MD, 0.5 mg at 07/07/24 1755    melatonin tablet 5 mg, 5 mg, Oral, Nightly, Prince Meade MD, 5 mg at 07/07/24 2054    metoprolol succinate XL (TOPROL-XL) 24 hr tablet 50 mg, 50 mg, Oral, BID, Prince Meade MD, 50 mg at 07/07/24 2054    metroNIDAZOLE (FLAGYL) IVPB 500 mg, 500 mg, Intravenous, Q8H, Tessa Montenegro MD, Last Rate: 200 mL/hr at 07/08/24 0514, 500 mg at 07/08/24 0514    [DISCONTINUED]  HYDROmorphone (DILAUDID) injection 0.5 mg, 0.5 mg, Intravenous, Q2H PRN **AND** naloxone (NARCAN) injection 0.4 mg, 0.4 mg, Intravenous, Q5 Min PRN, Tessa Montenegro MD    nitroglycerin (NITROSTAT) SL tablet 0.4 mg, 0.4 mg, Sublingual, Q5 Min PRN, Tessa Montenegro MD    OLANZapine zydis (zyPREXA) disintegrating tablet 5 mg, 5 mg, Oral, Q8H PRN, Prince Meade MD, 5 mg at 07/08/24 0122    ondansetron (ZOFRAN) injection 4 mg, 4 mg, Intravenous, Q6H PRN, Tessa Montenegro MD, 4 mg at 07/08/24 1022    ondansetron ODT (ZOFRAN-ODT) disintegrating tablet 4 mg, 4 mg, Oral, Q6H PRN **OR** ondansetron (ZOFRAN) injection 4 mg, 4 mg, Intravenous, Q6H PRN, Tessa Montenegro MD    oxyCODONE-acetaminophen (PERCOCET) 5-325 MG per tablet 1 tablet, 1 tablet, Oral, Q4H PRN, Prince Meade MD, 1 tablet at 07/08/24 0121    sodium chloride 0.9 % flush 10 mL, 10 mL, Intravenous, Q12H, Tessa Montenegro MD, 10 mL at 07/08/24 1007    sodium chloride 0.9 % flush 10 mL, 10 mL, Intravenous, PRN, Tessa Montenegro MD    sodium chloride 0.9 % infusion 40 mL, 40 mL, Intravenous, PRN, Tessa Montenegro MD    sodium chloride 0.9 % infusion, 75 mL/hr, Intravenous, Continuous, Tessa Montenegro MD, Last Rate: 75 mL/hr at 07/06/24 2015, 75 mL/hr at 07/06/24 2015      Physical Exam:   Vital Sign Min/Max for last 24 hours  Temp  Min: 98.1 °F (36.7 °C)  Max: 99.1 °F (37.3 °C)   BP  Min: 97/80  Max: 145/92    Pulse  Min: 78  Max: 153     Wt Readings from Last 3 Encounters:   07/06/24 58 kg (127 lb 13.9 oz)   02/14/24 54.6 kg (120 lb 6.4 oz)   02/05/24 54.1 kg (119 lb 3.2 oz)       General Appearance:  Awake,  Alert, cooperative, in no acute distress   Head:  Normocephalic, without obvious abnormality, atraumatic   Eyes:          Conjunctivae normal, anicteric, eom intact    Neck: No adenopathy, supple, trachea midline, no thyromegaly, no   carotid bruit, no JVD, no elevated cvp   Lungs:   Clear to auscultation,respirations  "regular, even and                  unlabored    Heart:  Regular rhythm and normal rate, normal S1 and S2,            No murmur, no gallop, no rub, no click    Chest Wall:  No abnormalities observed   Abdomen:   Normal bowel sounds, soft nontender, nondistended                    Rectal:   Deferred   Extremities: No edema. Moves all extremities well, no cyanosis, no erythema   Pulses: Pulses palpable and equal bilaterally   Skin: No bleeding, bruising or rash   Neurologic: Speech clear and appropriate, no facial drooping     :       MONITOR:    Results Review:     Sodium Sodium   Date Value Ref Range Status   07/08/2024 138 136 - 145 mmol/L Final   07/07/2024 134 (L) 136 - 145 mmol/L Final   07/06/2024 130 (L) 136 - 145 mmol/L Final      Potassium Potassium   Date Value Ref Range Status   07/08/2024 3.7 3.5 - 5.2 mmol/L Final     Comment:     Slight hemolysis detected by analyzer. Result may be falsely elevated.   07/07/2024 3.7 3.5 - 5.2 mmol/L Final   07/06/2024 5.3 (H) 3.5 - 5.2 mmol/L Final      Chloride Chloride   Date Value Ref Range Status   07/08/2024 103 98 - 107 mmol/L Final   07/07/2024 99 98 - 107 mmol/L Final   07/06/2024 96 (L) 98 - 107 mmol/L Final      Bicarbonate No results found for: \"PLASMABICARB\"   BUN BUN   Date Value Ref Range Status   07/08/2024 6 (L) 8 - 23 mg/dL Final   07/07/2024 8 8 - 23 mg/dL Final   07/06/2024 11 8 - 23 mg/dL Final      Creatinine Creatinine   Date Value Ref Range Status   07/08/2024 0.42 (L) 0.57 - 1.00 mg/dL Final   07/07/2024 0.53 (L) 0.57 - 1.00 mg/dL Final   07/06/2024 0.42 (L) 0.57 - 1.00 mg/dL Final      Calcium Calcium   Date Value Ref Range Status   07/08/2024 7.8 (L) 8.6 - 10.5 mg/dL Final   07/07/2024 8.2 (L) 8.6 - 10.5 mg/dL Final   07/06/2024 8.5 (L) 8.6 - 10.5 mg/dL Final      Magnesium No results found for: \"MG\"     Results from last 7 days   Lab Units 07/08/24  0538   WBC 10*3/mm3 10.82*   HEMOGLOBIN g/dL 8.4*   HEMATOCRIT % 26.2*   PLATELETS 10*3/mm3 " "712*     No results found for: \"TROPONINT\"  Lab Results   Component Value Date    CHOL 264 (H) 10/10/2023    CHOL 275 (H) 07/10/2023     Lab Results   Component Value Date    HDL 50 10/10/2023    HDL 57 07/10/2023    HDL 61 (H) 03/23/2021     Lab Results   Component Value Date     (H) 10/10/2023     (H) 07/10/2023     (H) 03/23/2021     Lab Results   Component Value Date    TRIG 134 10/10/2023    TRIG 129 07/10/2023    TRIG 134 03/23/2021     No components found for: \"CHOLHDL\"  No results found for: \"PTT\"  No components found for: \"PT/INR\"  Lab Results   Component Value Date    HGBA1C 5.90 (H) 10/10/2023          Echo EF Estimated  )No results found for: \"ECHOEFEST\"      Assessment/ Plan    Active Hospital Problems    Diagnosis  POA    **Acute cholecystitis [K81.0]  Yes    Acute retention of urine [R33.8]  No    Delirium due to another medical condition [F05]  No    PFO (patent foramen ovale) [Q21.12]  Not Applicable    Closed fracture of multiple ribs of right side [S22.41XA]  Yes    Hyponatremia [E87.1]  Yes    Hypercholesteremia [E78.00]  Yes    Paroxysmal atrial fibrillation [I48.0]  Yes    Essential hypertension [I10]  Yes    Coronary artery disease involving native coronary artery of native heart without angina pectoris [I25.10]  Yes    PAD (peripheral artery disease) [I73.9]  Yes    Gastroesophageal reflux disease [K21.9]  Yes    Anxiety disorder [F41.9]  Yes   Status post cholecystectomy atrial fibrillation  Patient CV status stable  DC per primary            Jacqueline Yates MD  07/08/24  11:49 EDT    Discussed with  Time:        "

## 2024-07-08 NOTE — THERAPY TREATMENT NOTE
Patient Name: Danyell Osborne  : 1935    MRN: [id number]                              Today's Date: 2024       Admit Date: 2024    Visit Dx:     ICD-10-CM ICD-9-CM   1. Acute cholecystitis  K81.0 575.0     Patient Active Problem List   Diagnosis    Essential hypertension    Coronary artery disease involving native coronary artery of native heart without angina pectoris    Skin lesions    Gastroesophageal reflux disease    Stress    Other headache syndrome    Paroxysmal atrial fibrillation    Routine general medical examination at a health care facility    Cough    Screening mammogram for breast cancer    Postmenopausal    Acute conjunctivitis of left eye    Other constipation    Memory loss    Ganglion cyst of finger    Hypercholesteremia    Left-sided weakness    Arthralgia of both knees    Myalgia    PND (post-nasal drip)    Congestion of nasal sinus    Coronary artery disease    Presence of stent in right coronary artery    Personal history of other diseases of the digestive system    PAD (peripheral artery disease)    Osteoporosis    IBS (irritable bowel syndrome)    Anxiety disorder    Left carotid artery stenosis    Acute cholecystitis    PFO (patent foramen ovale)    Closed fracture of multiple ribs of right side    Hyponatremia    Acute retention of urine    Delirium due to another medical condition     Past Medical History:   Diagnosis Date    A-fib     Anemia     Coronary artery disease     HLD (hyperlipidemia)     Hypertension     Stroke      Past Surgical History:   Procedure Laterality Date    CHOLECYSTECTOMY WITH INTRAOPERATIVE CHOLANGIOGRAM N/A 2024    Procedure: Laparoscopic cholecystectomy with cholangiogram;  Surgeon: Tessa Montenegro MD;  Location: [facility];  Service: General;  Laterality: N/A;    HYSTERECTOMY        General Information       Row Name 24 1452          OT Time and Intention    Document Type therapy note (daily note) (P)   -MF     Mode of  Treatment co-treatment;physical therapy;occupational therapy (P)   -MF       Row Name 07/08/24 1452          General Information    Patient Profile Reviewed yes (P)   -MF     Existing Precautions/Restrictions fall (P)   -MF     Barriers to Rehab none identified (P)   -MF       Row Name 07/08/24 1452          Cognition    Orientation Status (Cognition) oriented x 3 (P)   -MF       Row Name 07/08/24 1452          Safety Issues, Functional Mobility    Safety Issues Affecting Function (Mobility) awareness of need for assistance;insight into deficits/self-awareness;judgment;problem-solving;safety precaution awareness (P)   -MF     Impairments Affecting Function (Mobility) balance;endurance/activity tolerance;strength;pain (P)   -MF     Comment, Safety Issues/Impairments (Mobility) cotx appropriate due to pt not feeling well and minimizing disruption as well as decreased activity tolerance (P)   -MF               User Key  (r) = Recorded By, (t) = Taken By, (c) = Cosigned By      Initials Name Provider Type     Kaykay Camargo OT Student OT Student                     Mobility/ADL's       Row Name 07/08/24 1454          Bed Mobility    Comment, (Bed Mobility) NT [facility] this date (P)   -MF       Row Name 07/08/24 1454          Transfers    Transfers sit-stand transfer;stand-sit transfer (P)   -       Row Name 07/08/24 1454          Sit-Stand Transfer    Sit-Stand [place] (Transfers) minimum assist (75% patient effort);2 person assist (P)   -MF     Assistive Device (Sit-Stand Transfers) walker, front-wheeled (P)   -MF     Comment, (Sit-Stand Transfer) x1 from chair with rwx (P)   -       Row Name 07/08/24 1454          Stand-Sit Transfer    Stand-Sit [place] (Transfers) minimum assist (75% patient effort);1 person assist (P)   -     Assistive Device (Stand-Sit Transfers) walker, front-wheeled (P)   -MF       Row Name 07/08/24 1454          Functional Mobility    Functional Mobility- Ind. Level contact  guard assist (P)   -     Functional Mobility- Device walker, front-wheeled (P)   -     Functional Mobility- Comment ambulated appropriate household distance with rwx (P)   -     Patient was able to Ambulate yes (P)   -MF       Row Name 07/08/24 1454          Activities of Daily Living    BADL Assessment/Intervention toileting (P)   -       Row Name 07/08/24 1454          Toileting Assessment/Training    [place] Level (Toileting) toileting skills;dependent (less than 25% patient effort) (P)   -     Comment, (Toileting) boland cath (P)   -               User Key  (r) = Recorded By, (t) = Taken By, (c) = Cosigned By      Initials Name Provider Type    Kaykay Cool OT Student OT Student                   Obj/Interventions       Row Name 07/08/24 1456          Motor Skills    Motor Skills coordination;functional endurance (P)   -     Coordination WFL (P)   -     Functional Endurance limited by pain (P)   -       Row Name 07/08/24 1456          Balance    Balance Assessment sitting static balance;sitting dynamic balance;sit to stand dynamic balance;standing static balance;standing dynamic balance (P)   -     Static Sitting Balance standby assist;1-person assist (P)   -     Dynamic Sitting Balance contact guard;1-person assist (P)   -     Position, Sitting Balance sitting in chair (P)   -     Sit to Stand Dynamic Balance minimal assist;2-person assist (P)   -MF     Static Standing Balance contact guard;1-person assist (P)   -     Dynamic Standing Balance contact guard;minimal assist;1-person assist (P)   -     Position/Device Used, Standing Balance walker, front-wheeled (P)   -MF     Balance Interventions sitting;standing;sit to stand;supported;static;dynamic;moderate challenge (P)   -     Comment, Balance no overt LOBs (P)   -               User Key  (r) = Recorded By, (t) = Taken By, (c) = Cosigned By      Initials Name Provider Type    Kaykay Cool OT Student  OT Student                   Goals/Plan    No documentation.                  Clinical Impression       [facility] Name 07/08/24 1457          Pain Assessment    Pretreatment Pain Rating 5/10 (P)   -     Posttreatment Pain Rating 5/10 (P)   -     Pre/Posttreatment Pain Comment pain all over but did not rate (P)   -[facility] Name 07/08/24 1457          Plan of Care Review    Plan of Care Reviewed With patient;spouse (P)   -     Progress improving (P)   -     Outcome Evaluation Pt seen for OT treatment this date. Pt UIC upon arrival and agreeable to treatment with encouragement. Pt demo'd CGA-Debby STS transfer and CGA functional mobility with rwx. Pt ambulated appropriate household distance with no LOBs and completed g/h task once back in chair. Pt declined any further ADL's this date. Pt activity tolerance is poor d/t weakness and pain. OT would benefit pt to address (I) and safety with ADL's and functional mobility as well as high falls risk. Rec d/c home with assist vs. SNF pending progress (P)   -[facility] Name 07/08/24 1457          Therapy Assessment/Plan (OT)    Rehab Potential (OT) good, to achieve stated therapy goals (P)   -     Criteria for Skilled Therapeutic Interventions Met (OT) yes;meets criteria;skilled treatment is necessary (P)   -     Therapy Frequency (OT) 5 times/wk (P)   -[facility] Name 07/08/24 1457          Therapy Plan Review/Discharge Plan (OT)    Anticipated Discharge Disposition (OT) home with assist;skilled nursing facility (P)   -[facility] Name 07/08/24 1457          Vital Signs    O2 Delivery Pre Treatment room air (P)   -     O2 Delivery Intra Treatment room air (P)   -     O2 Delivery Post Treatment room air (P)   -[facility] Name 07/08/24 1457          Positioning and Restraints    Pre-Treatment Position sitting in chair/recliner (P)   -     Post Treatment Position chair (P)   -     In Chair notified nsg;reclined;call light within reach;exit alarm on (P)    -MF               User Key  (r) = Recorded By, (t) = Taken By, (c) = Cosigned By      Initials Name Provider Type    Kaykay Cool, OT Student OT Student                   Outcome Measures       Row Name 07/08/24 1456 07/08/24 0800       How much help from another person do you currently need...    Turning from your back to your side while in flat bed without using bedrails? 3  -DJ 3  -HT    Moving from lying on back to sitting on the side of a flat bed without bedrails? 3  -DJ 3  -HT    Moving to and from a bed to a chair (including a wheelchair)? 3  -DJ 2  -HT    Standing up from a chair using your arms (e.g., wheelchair, bedside chair)? 3  -DJ 2  -HT    Climbing 3-5 steps with a railing? 2  -DJ 2  -HT    To walk in hospital room? 2  -DJ 2  -HT    AM-PAC 6 Clicks Score (PT) 16  -DJ 14  -HT    Highest Level of Mobility Goal 5 --> Static standing  -DJ 4 --> Transfer to chair/commode  -HT              User Key  (r) = Recorded By, (t) = Taken By, (c) = Cosigned By      Initials Name Provider Type    Aminah Garzon, PT Physical Therapist    HT Leticia Chaidez, RN Registered Nurse                    Occupational Therapy Education       Title: PT OT SLP Therapies (Done)       Topic: Occupational Therapy (Done)       Point: ADL training (Done)       Description:   Instruct learner(s) on proper safety adaptation and remediation techniques during self care or transfers.   Instruct in proper use of assistive devices.                  Learning Progress Summary             Patient Acceptance, E, VU,NR by SV at 7/6/2024 1254    Acceptance, E, VU by MM at 7/6/2024 1212    Comment: role of OT, d/c rec, GOC   Family Acceptance, E, VU by MM at 7/6/2024 1212    Comment: role of OT, d/c rec, GOC                         Point: Precautions (Done)       Description:   Instruct learner(s) on prescribed precautions during self-care and functional transfers.                  Learning Progress Summary             Patient  Acceptance, E, VU,NR by SV at 7/6/2024 1254    Acceptance, E, VU by MM at 7/6/2024 1212    Comment: role of OT, d/c rec, GOC   Family Acceptance, E, VU by MM at 7/6/2024 1212    Comment: role of OT, d/c rec, GOC                         Point: Body mechanics (Done)       Description:   Instruct learner(s) on proper positioning and spine alignment during self-care, functional mobility activities and/or exercises.                  Learning Progress Summary             Patient Acceptance, E, VU,NR by SV at 7/6/2024 1254    Acceptance, E, VU by MM at 7/6/2024 1212    Comment: role of OT, d/c rec, GOC   Family Acceptance, E, VU by MM at 7/6/2024 1212    Comment: role of OT, d/c rec, GOC                                         User Key       Initials Effective Dates Name Provider Type Discipline     07/11/23 -  Ashley Pappas PT Physical Therapist PT     05/31/24 -  Kaykay Mallory OT Occupational Therapist OT                  OT Recommendation and Plan  Therapy Frequency (OT): (P) 5 times/wk  Plan of Care Review  Plan of Care Reviewed With: (P) patient, spouse  Progress: (P) improving  Outcome Evaluation: (P) Pt seen for OT treatment this date. Pt UIC upon arrival and agreeable to treatment with encouragement. Pt demo'd CGA-Debby STS transfer and CGA functional mobility with rwx. Pt ambulated appropriate household distance with no LOBs and completed g/h task once back in chair. Pt declined any further ADL's this date. Pt activity tolerance is poor d/t weakness and pain. OT would benefit pt to address (I) and safety with ADL's and functional mobility as well as high falls risk. Rec d/c home with assist vs. SNF pending progress     Time Calculation:         Time Calculation- OT       Row Name 07/08/24 1504 07/08/24 1345          Time Calculation- OT    OT Start Time 1358 (P)   -MF --     OT Stop Time 1410 (P)   -MF --     OT Time Calculation (min) 12 min (P)   -MF --     Total Timed Code Minutes- OT 12 minute(s) (P)    -MF --     OT Received On 07/08/24 (P)   - --     OT - Next Appointment 07/09/24 (P)   - 07/09/24  -MM (r)  (t) MM (c)        Timed Charges    82438 - OT Therapeutic Activity Minutes 12 (P)   - --        Total Minutes    Timed Charges Total Minutes 12 (P)   - --      Total Minutes 12 (P)   - --               User Key  (r) = Recorded By, (t) = Taken By, (c) = Cosigned By      Initials Name Provider Type    Kaykay Pérez OT Occupational Therapist    Kaykay Cool OT Student OT Student                  Therapy Charges for Today       Code Description Service Date Service Provider Modifiers Qty    [id number] HC OT THERAPEUTIC ACT EA 15 MIN 7/8/2024 Kaykay Camargo OT Student GO 1                 [place]  7/8/2024

## 2024-07-09 ENCOUNTER — APPOINTMENT (OUTPATIENT)
Dept: GENERAL RADIOLOGY | Facility: HOSPITAL | Age: 89
End: 2024-07-09
Payer: MEDICARE

## 2024-07-09 LAB
ALBUMIN SERPL-MCNC: 2.8 G/DL (ref 3.5–5.2)
ALBUMIN/GLOB SERPL: 1 G/DL
ALP SERPL-CCNC: 112 U/L (ref 39–117)
ALT SERPL W P-5'-P-CCNC: 36 U/L (ref 1–33)
ANION GAP SERPL CALCULATED.3IONS-SCNC: 16.2 MMOL/L (ref 5–15)
AST SERPL-CCNC: 31 U/L (ref 1–32)
BASOPHILS # BLD AUTO: 0.06 10*3/MM3 (ref 0–0.2)
BASOPHILS NFR BLD AUTO: 0.5 % (ref 0–1.5)
BILIRUB SERPL-MCNC: 0.4 MG/DL (ref 0–1.2)
BUN SERPL-MCNC: 8 MG/DL (ref 8–23)
BUN/CREAT SERPL: 17 (ref 7–25)
CALCIUM SPEC-SCNC: 8 MG/DL (ref 8.6–10.5)
CHLORIDE SERPL-SCNC: 99 MMOL/L (ref 98–107)
CO2 SERPL-SCNC: 19.8 MMOL/L (ref 22–29)
CREAT SERPL-MCNC: 0.47 MG/DL (ref 0.57–1)
DEPRECATED RDW RBC AUTO: 44.9 FL (ref 37–54)
EGFRCR SERPLBLD CKD-EPI 2021: 92.3 ML/MIN/1.73
EOSINOPHIL # BLD AUTO: 0.34 10*3/MM3 (ref 0–0.4)
EOSINOPHIL NFR BLD AUTO: 2.6 % (ref 0.3–6.2)
ERYTHROCYTE [DISTWIDTH] IN BLOOD BY AUTOMATED COUNT: 13.9 % (ref 12.3–15.4)
GLOBULIN UR ELPH-MCNC: 2.9 GM/DL
GLUCOSE SERPL-MCNC: 94 MG/DL (ref 65–99)
HCT VFR BLD AUTO: 24.7 % (ref 34–46.6)
HGB BLD-MCNC: 8.2 G/DL (ref 12–15.9)
IMM GRANULOCYTES # BLD AUTO: 0.15 10*3/MM3 (ref 0–0.05)
IMM GRANULOCYTES NFR BLD AUTO: 1.1 % (ref 0–0.5)
LYMPHOCYTES # BLD AUTO: 1.27 10*3/MM3 (ref 0.7–3.1)
LYMPHOCYTES NFR BLD AUTO: 9.6 % (ref 19.6–45.3)
MCH RBC QN AUTO: 29.9 PG (ref 26.6–33)
MCHC RBC AUTO-ENTMCNC: 33.2 G/DL (ref 31.5–35.7)
MCV RBC AUTO: 90.1 FL (ref 79–97)
MONOCYTES # BLD AUTO: 1.13 10*3/MM3 (ref 0.1–0.9)
MONOCYTES NFR BLD AUTO: 8.5 % (ref 5–12)
NEUTROPHILS NFR BLD AUTO: 10.27 10*3/MM3 (ref 1.7–7)
NEUTROPHILS NFR BLD AUTO: 77.7 % (ref 42.7–76)
NRBC BLD AUTO-RTO: 0.3 /100 WBC (ref 0–0.2)
PLATELET # BLD AUTO: 733 10*3/MM3 (ref 140–450)
PMV BLD AUTO: 8.8 FL (ref 6–12)
POTASSIUM SERPL-SCNC: 3.4 MMOL/L (ref 3.5–5.2)
PROT SERPL-MCNC: 5.7 G/DL (ref 6–8.5)
QT INTERVAL: 281 MS
QT INTERVAL: 313 MS
QTC INTERVAL: 410 MS
QTC INTERVAL: 418 MS
RBC # BLD AUTO: 2.74 10*6/MM3 (ref 3.77–5.28)
SODIUM SERPL-SCNC: 135 MMOL/L (ref 136–145)
WBC NRBC COR # BLD AUTO: 13.22 10*3/MM3 (ref 3.4–10.8)

## 2024-07-09 PROCEDURE — 93005 ELECTROCARDIOGRAM TRACING: CPT | Performed by: INTERNAL MEDICINE

## 2024-07-09 PROCEDURE — 71045 X-RAY EXAM CHEST 1 VIEW: CPT

## 2024-07-09 PROCEDURE — 25010000002 METRONIDAZOLE 500 MG/100ML SOLUTION: Performed by: SURGERY

## 2024-07-09 PROCEDURE — 25010000002 CEFTRIAXONE PER 250 MG: Performed by: SURGERY

## 2024-07-09 PROCEDURE — 93010 ELECTROCARDIOGRAM REPORT: CPT | Performed by: INTERNAL MEDICINE

## 2024-07-09 PROCEDURE — 25010000002 DIGOXIN PER 500 MCG: Performed by: INTERNAL MEDICINE

## 2024-07-09 PROCEDURE — 94799 UNLISTED PULMONARY SVC/PX: CPT

## 2024-07-09 PROCEDURE — 80053 COMPREHEN METABOLIC PANEL: CPT | Performed by: SURGERY

## 2024-07-09 PROCEDURE — 74221 X-RAY XM ESOPHAGUS 2CNTRST: CPT

## 2024-07-09 PROCEDURE — 85025 COMPLETE CBC W/AUTO DIFF WBC: CPT | Performed by: SURGERY

## 2024-07-09 PROCEDURE — 97530 THERAPEUTIC ACTIVITIES: CPT

## 2024-07-09 PROCEDURE — 99232 SBSQ HOSP IP/OBS MODERATE 35: CPT

## 2024-07-09 PROCEDURE — 94761 N-INVAS EAR/PLS OXIMETRY MLT: CPT

## 2024-07-09 RX ORDER — HYDROCODONE BITARTRATE AND ACETAMINOPHEN 5; 325 MG/1; MG/1
0.5 TABLET ORAL EVERY 6 HOURS PRN
Status: DISPENSED | OUTPATIENT
Start: 2024-07-09 | End: 2024-07-13

## 2024-07-09 RX ORDER — PANTOPRAZOLE SODIUM 40 MG/10ML
40 INJECTION, POWDER, LYOPHILIZED, FOR SOLUTION INTRAVENOUS
Status: DISCONTINUED | OUTPATIENT
Start: 2024-07-09 | End: 2024-07-12

## 2024-07-09 RX ORDER — OLANZAPINE 2.5 MG/1
2.5 TABLET, FILM COATED ORAL EVERY 8 HOURS PRN
Status: DISCONTINUED | OUTPATIENT
Start: 2024-07-09 | End: 2024-07-13 | Stop reason: HOSPADM

## 2024-07-09 RX ORDER — DIGOXIN 0.25 MG/ML
250 INJECTION INTRAMUSCULAR; INTRAVENOUS ONCE
Status: COMPLETED | OUTPATIENT
Start: 2024-07-09 | End: 2024-07-09

## 2024-07-09 RX ORDER — HYDROMORPHONE HYDROCHLORIDE 1 MG/ML
0.25 INJECTION, SOLUTION INTRAMUSCULAR; INTRAVENOUS; SUBCUTANEOUS
Status: DISCONTINUED | OUTPATIENT
Start: 2024-07-09 | End: 2024-07-10

## 2024-07-09 RX ORDER — SUCRALFATE 1 G/1
1 TABLET ORAL
Status: DISCONTINUED | OUTPATIENT
Start: 2024-07-09 | End: 2024-07-13 | Stop reason: HOSPADM

## 2024-07-09 RX ORDER — ACETAMINOPHEN 325 MG/1
650 TABLET ORAL 3 TIMES DAILY
Status: DISCONTINUED | OUTPATIENT
Start: 2024-07-09 | End: 2024-07-13 | Stop reason: HOSPADM

## 2024-07-09 RX ADMIN — METRONIDAZOLE 500 MG: 500 INJECTION, SOLUTION INTRAVENOUS at 13:43

## 2024-07-09 RX ADMIN — Medication 10 ML: at 20:25

## 2024-07-09 RX ADMIN — SENNOSIDES AND DOCUSATE SODIUM 2 TABLET: 50; 8.6 TABLET ORAL at 10:40

## 2024-07-09 RX ADMIN — SUCRALFATE 1 G: 1 TABLET ORAL at 18:08

## 2024-07-09 RX ADMIN — BARIUM SULFATE 183 ML: 960 POWDER, FOR SUSPENSION ORAL at 09:44

## 2024-07-09 RX ADMIN — HYDROCODONE BITARTRATE AND ACETAMINOPHEN 0.5 TABLET: 5; 325 TABLET ORAL at 13:43

## 2024-07-09 RX ADMIN — METRONIDAZOLE 500 MG: 500 INJECTION, SOLUTION INTRAVENOUS at 20:24

## 2024-07-09 RX ADMIN — CEFTRIAXONE 2000 MG: 2 INJECTION, POWDER, FOR SOLUTION INTRAMUSCULAR; INTRAVENOUS at 10:40

## 2024-07-09 RX ADMIN — BARIUM SULFATE 135 ML: 980 POWDER, FOR SUSPENSION ORAL at 09:44

## 2024-07-09 RX ADMIN — ACETAMINOPHEN 325MG 650 MG: 325 TABLET ORAL at 20:23

## 2024-07-09 RX ADMIN — PANTOPRAZOLE SODIUM 40 MG: 40 INJECTION, POWDER, FOR SOLUTION INTRAVENOUS at 16:33

## 2024-07-09 RX ADMIN — ANTACID/ANTIFLATULENT 1 PACKET: 380; 550; 10; 10 GRANULE, EFFERVESCENT ORAL at 09:44

## 2024-07-09 RX ADMIN — BARIUM SULFATE 700 MG: 700 TABLET ORAL at 09:44

## 2024-07-09 RX ADMIN — DIGOXIN 250 MCG: 0.25 INJECTION INTRAMUSCULAR; INTRAVENOUS at 13:34

## 2024-07-09 RX ADMIN — METOPROLOL SUCCINATE 50 MG: 50 TABLET, FILM COATED, EXTENDED RELEASE ORAL at 10:40

## 2024-07-09 RX ADMIN — METOPROLOL SUCCINATE 50 MG: 50 TABLET, FILM COATED, EXTENDED RELEASE ORAL at 20:24

## 2024-07-09 RX ADMIN — OLANZAPINE 2.5 MG: 2.5 TABLET, FILM COATED ORAL at 23:20

## 2024-07-09 RX ADMIN — HYDROCODONE BITARTRATE AND ACETAMINOPHEN 0.5 TABLET: 5; 325 TABLET ORAL at 20:26

## 2024-07-09 RX ADMIN — POLYETHYLENE GLYCOL 3350 17 G: 17 POWDER, FOR SOLUTION ORAL at 10:40

## 2024-07-09 RX ADMIN — Medication 5 MG: at 20:24

## 2024-07-09 RX ADMIN — METRONIDAZOLE 500 MG: 500 INJECTION, SOLUTION INTRAVENOUS at 05:43

## 2024-07-09 RX ADMIN — FAMOTIDINE 20 MG: 20 TABLET, FILM COATED ORAL at 10:40

## 2024-07-09 NOTE — PLAN OF CARE
Problem: Adult Inpatient Plan of Care  Goal: Absence of Hospital-Acquired Illness or Injury  Intervention: Identify and Manage Fall Risk  Recent Flowsheet Documentation  Taken 7/9/2024 0400 by Laurie Hopper RN  Safety Promotion/Fall Prevention: safety round/check completed  Taken 7/9/2024 0200 by Laurie Hopper RN  Safety Promotion/Fall Prevention: safety round/check completed  Taken 7/9/2024 0000 by Laurie Hopper RN  Safety Promotion/Fall Prevention: safety round/check completed  Taken 7/8/2024 2200 by Lauire Hopper RN  Safety Promotion/Fall Prevention: safety round/check completed  Taken 7/8/2024 2000 by Laurie Hopper RN  Safety Promotion/Fall Prevention:   activity supervised   assistive device/personal items within reach   clutter free environment maintained   fall prevention program maintained   lighting adjusted   mobility aid in reach   muscle strengthening facilitated   nonskid shoes/slippers when out of bed   room organization consistent   safety round/check completed  Intervention: Prevent Skin Injury  Recent Flowsheet Documentation  Taken 7/9/2024 0400 by Laurie Hopper RN  Body Position: position changed independently  Taken 7/9/2024 0200 by Laurie Hopper RN  Body Position: position changed independently  Taken 7/9/2024 0000 by Laurie Hopper RN  Body Position: position changed independently  Taken 7/8/2024 2200 by Laurie Hopper RN  Body Position: position changed independently  Taken 7/8/2024 2000 by Laurie Hopper RN  Body Position: position changed independently  Skin Protection: adhesive use limited  Intervention: Prevent and Manage VTE (Venous Thromboembolism) Risk  Recent Flowsheet Documentation  Taken 7/8/2024 2000 by Laurie Hopper RN  Activity Management: up in chair     Problem: Skin Injury Risk Increased  Goal: Skin Health and Integrity  Intervention: Optimize Skin Protection  Recent Flowsheet Documentation  Taken  7/8/2024 2000 by Laurie Hopper RN  Pressure Reduction Techniques: frequent weight shift encouraged  Pressure Reduction Devices: alternating pressure pump (ADD)  Skin Protection: adhesive use limited     Problem: Fall Injury Risk  Goal: Absence of Fall and Fall-Related Injury  Intervention: Promote Injury-Free Environment  Recent Flowsheet Documentation  Taken 7/9/2024 0400 by Laurie Hopper RN  Safety Promotion/Fall Prevention: safety round/check completed  Taken 7/9/2024 0200 by Laurie Hopper RN  Safety Promotion/Fall Prevention: safety round/check completed  Taken 7/9/2024 0000 by Laurie Hopper RN  Safety Promotion/Fall Prevention: safety round/check completed  Taken 7/8/2024 2200 by Laurie Hopper RN  Safety Promotion/Fall Prevention: safety round/check completed  Taken 7/8/2024 2000 by Laurie Hopper RN  Safety Promotion/Fall Prevention:   activity supervised   assistive device/personal items within reach   clutter free environment maintained   fall prevention program maintained   lighting adjusted   mobility aid in reach   muscle strengthening facilitated   nonskid shoes/slippers when out of bed   room organization consistent   safety round/check completed   Goal Outcome Evaluation:

## 2024-07-09 NOTE — PLAN OF CARE
Goal Outcome Evaluation:      Patient medicated for pain x1. Up in chair most of the day. Patient had episode of HR over 160- Dr. Yates with cardiology notified- dose of iv digoxin given. Patient currently down to 90s HR now. IV protonix started. Iv abx continue. Patient is up with nursing assistant walking in hallway today. Carafate started. Esophogram completed today.

## 2024-07-09 NOTE — PROGRESS NOTES
Danyell Osborne   88 y.o.  female    LOS: 4 days   Patient Care Team:  Shanae Osborne APRN as PCP - General (Family Medicine)      Subjective     Review of Systems:   Lungs: No cough, no SOA  CV: No CP, no palpitations  GI: No nausea, vomiting, or diarrhea.     Medication Review:   Current Facility-Administered Medications:     acetaminophen (TYLENOL) tablet 650 mg, 650 mg, Oral, Q4H PRN **OR** acetaminophen (TYLENOL) 160 MG/5ML oral solution 650 mg, 650 mg, Oral, Q4H PRN **OR** acetaminophen (TYLENOL) suppository 650 mg, 650 mg, Rectal, Q4H PRN, Tessa Montenegro MD    [Held by provider] apixaban (ELIQUIS) tablet 2.5 mg, 2.5 mg, Oral, Q12H, Prince Meade MD, 2.5 mg at 07/08/24 1006    sennosides-docusate (PERICOLACE) 8.6-50 MG per tablet 2 tablet, 2 tablet, Oral, BID, 2 tablet at 07/08/24 0838 **AND** polyethylene glycol (MIRALAX) packet 17 g, 17 g, Oral, Daily, 17 g at 07/08/24 0838 **AND** bisacodyl (DULCOLAX) EC tablet 5 mg, 5 mg, Oral, Daily PRN **AND** bisacodyl (DULCOLAX) suppository 10 mg, 10 mg, Rectal, Daily PRN, Prince Meade MD    calcium carbonate (TUMS) chewable tablet 500 mg (200 mg elemental), 2 tablet, Oral, TID PRN, Tessa Montenegro MD, 2 tablet at 07/06/24 2007    cefTRIAXone (ROCEPHIN) 2,000 mg in sodium chloride 0.9 % 100 mL MBP, 2,000 mg, Intravenous, Q24H, Tessa Montenegro MD, Last Rate: 200 mL/hr at 07/08/24 1006, 2,000 mg at 07/08/24 1006    [Held by provider] clopidogrel (PLAVIX) tablet 75 mg, 75 mg, Oral, Daily, Prince Meade MD, 75 mg at 07/08/24 1006    famotidine (PEPCID) tablet 20 mg, 20 mg, Oral, BID, Prince Meade MD, 20 mg at 07/08/24 1758    HYDROcodone-acetaminophen (NORCO) 5-325 MG per tablet 1 tablet, 1 tablet, Oral, Q6H PRN, Prince Meade MD, 1 tablet at 07/07/24 0812    HYDROmorphone (DILAUDID) injection 0.5 mg, 0.5 mg, Intravenous, Q2H PRN, Prince Meade MD, 0.5 mg at 07/07/24 1755    melatonin tablet 5 mg, 5 mg, Oral, Nightly,  Prince Meade MD, 5 mg at 07/08/24 2151    metoprolol succinate XL (TOPROL-XL) 24 hr tablet 50 mg, 50 mg, Oral, BID, Prince Meade MD, 50 mg at 07/08/24 2151    metroNIDAZOLE (FLAGYL) IVPB 500 mg, 500 mg, Intravenous, Q8H, Tessa Montenegro MD, Last Rate: 200 mL/hr at 07/09/24 0543, 500 mg at 07/09/24 0543    [DISCONTINUED] HYDROmorphone (DILAUDID) injection 0.5 mg, 0.5 mg, Intravenous, Q2H PRN **AND** naloxone (NARCAN) injection 0.4 mg, 0.4 mg, Intravenous, Q5 Min PRN, Tessa Montenegro MD    nitroglycerin (NITROSTAT) SL tablet 0.4 mg, 0.4 mg, Sublingual, Q5 Min PRN, Tessa Montenegro MD    OLANZapine zydis (zyPREXA) disintegrating tablet 5 mg, 5 mg, Oral, Q8H PRN, Prince Meade MD, 5 mg at 07/08/24 0122    ondansetron (ZOFRAN) injection 4 mg, 4 mg, Intravenous, Q6H PRN, Tessa Montenegro MD, 4 mg at 07/08/24 1022    ondansetron ODT (ZOFRAN-ODT) disintegrating tablet 4 mg, 4 mg, Oral, Q6H PRN **OR** ondansetron (ZOFRAN) injection 4 mg, 4 mg, Intravenous, Q6H PRN, Tessa Montenegro MD    oxyCODONE-acetaminophen (PERCOCET) 5-325 MG per tablet 1 tablet, 1 tablet, Oral, Q4H PRN, Prince Meade MD, 1 tablet at 07/08/24 0121    prochlorperazine (COMPAZINE) injection 5 mg, 5 mg, Intravenous, Q6H PRN, 5 mg at 07/08/24 2149 **OR** prochlorperazine (COMPAZINE) tablet 5 mg, 5 mg, Oral, Q6H PRN **OR** prochlorperazine (COMPAZINE) suppository 25 mg, 25 mg, Rectal, Q12H PRN, Prince Meade MD    sodium chloride 0.9 % flush 10 mL, 10 mL, Intravenous, Q12H, Tessa Montenegro MD, 10 mL at 07/08/24 1007    sodium chloride 0.9 % flush 10 mL, 10 mL, Intravenous, PRN, Tessa Montenegro MD    sodium chloride 0.9 % infusion 40 mL, 40 mL, Intravenous, PRN, Tessa Montenegro MD    sodium chloride 0.9 % infusion, 75 mL/hr, Intravenous, Continuous, Tessa Montenegro MD, Last Rate: 75 mL/hr at 07/06/24 2015, 75 mL/hr at 07/06/24 2015      Objective     Vital Sign Min/Max for last 24 hours  Temp  Min:  "98.4 °F (36.9 °C)  Max: 99.3 °F (37.4 °C)   BP  Min: 125/48  Max: 149/59    Pulse  Min: 78  Max: 88         07/05/24  0600 07/05/24  0620 07/06/24  0510   Weight: 61 kg (134 lb 7.7 oz) 60 kg (132 lb 4.4 oz) 58 kg (127 lb 13.9 oz)        Intake/Output Summary (Last 24 hours) at 7/9/2024 0920  Last data filed at 7/9/2024 0340  Gross per 24 hour   Intake --   Output 600 ml   Net -600 ml       Physical Exam:    General Appearance:    No acute distress   Lungs:     Clear to auscultation bilaterally. No wheezes, rhonchi or rales.     Heart:    Regular rate and rhythm.  Normal S1 and S2. No murmur, no gallop, rub or lift. , No JVD.   Abdomen:     Normal bowel sounds, soft, nontender, nondistended.   Extremities:   No clubbing, cyanosis or edema.     Skin:   Warm, dry   Neurologic:   Awake alert and oriented x3.      Monitor: sr  Results Review:     I reviewed the patient's new clinical results.    Sodium   Date Value Ref Range Status   07/09/2024 135 (L) 136 - 145 mmol/L Final   07/08/2024 138 136 - 145 mmol/L Final   07/07/2024 134 (L) 136 - 145 mmol/L Final     Potassium   Date Value Ref Range Status   07/09/2024 3.4 (L) 3.5 - 5.2 mmol/L Final   07/08/2024 3.7 3.5 - 5.2 mmol/L Final     Comment:     Slight hemolysis detected by analyzer. Result may be falsely elevated.   07/07/2024 3.7 3.5 - 5.2 mmol/L Final     Chloride   Date Value Ref Range Status   07/09/2024 99 98 - 107 mmol/L Final   07/08/2024 103 98 - 107 mmol/L Final   07/07/2024 99 98 - 107 mmol/L Final     No results found for: \"PLASMABICARB\"  BUN   Date Value Ref Range Status   07/09/2024 8 8 - 23 mg/dL Final   07/08/2024 6 (L) 8 - 23 mg/dL Final   07/07/2024 8 8 - 23 mg/dL Final     Creatinine   Date Value Ref Range Status   07/09/2024 0.47 (L) 0.57 - 1.00 mg/dL Final   07/08/2024 0.42 (L) 0.57 - 1.00 mg/dL Final   07/07/2024 0.53 (L) 0.57 - 1.00 mg/dL Final     Calcium   Date Value Ref Range Status   07/09/2024 8.0 (L) 8.6 - 10.5 mg/dL Final   07/08/2024 " "7.8 (L) 8.6 - 10.5 mg/dL Final   07/07/2024 8.2 (L) 8.6 - 10.5 mg/dL Final     No results found for: \"MG\"    Results from last 7 days   Lab Units 07/09/24  0334   WBC 10*3/mm3 13.22*   HEMOGLOBIN g/dL 8.2*   HEMATOCRIT % 24.7*   PLATELETS 10*3/mm3 733*     Lab Results   Component Value Date    CHOL 264 (H) 10/10/2023    CHOL 275 (H) 07/10/2023     Lab Results   Component Value Date    HDL 50 10/10/2023    HDL 57 07/10/2023    HDL 61 (H) 03/23/2021     Lab Results   Component Value Date     (H) 10/10/2023     (H) 07/10/2023     (H) 03/23/2021     Lab Results   Component Value Date    TRIG 134 10/10/2023    TRIG 129 07/10/2023    TRIG 134 03/23/2021     Results from last 7 days   Lab Units 07/05/24  0810   INR  1.08                         Assessment/ Plan    Acute cholecystitis s/p lap choley 7/5  PAF, had RVR  Dysphagia  HTN  H/o CAD  PFO  Recent rib fractures after falls    RVR resolved after dig yesterday.    Plavix and Eliquis on hold. Possible EGD. Plan for esophagram.   Further recs per Dr. Yates    Time:  20 min  Patient seen and examined  I reviewed and agree with Nadya Sosa's note and accept the inherent risk.  Patient  had esophagogram this morning    "

## 2024-07-09 NOTE — PLAN OF CARE
Goal Outcome Evaluation:  Plan of Care Reviewed With: patient, daughter, spouse        Progress: improving  Outcome Evaluation: Pt UIC in NAD, just returned from esophagram, pleasant and cooperative. Pt performed seated LE ther ex 10reps as well as sit/stand from recliner x5. She stood from recliner with CGA using r wx. Pt amb 80' with r wx and CGA - slow pace, flexed and guarded posture, fair balance, endurance improving but still limited. Pt req decreased assist today and amb increased distance. Cont PT to address functional deficits and prepare for d/c.      Anticipated Discharge Disposition (PT): home with assist, home with home health, skilled nursing facility (3 [facility], SNF vs home health pending progress)

## 2024-07-09 NOTE — THERAPY TREATMENT NOTE
Patient Name: Danyell Osborne  : 1935    MRN: [id number]                              Today's Date: 2024       Admit Date: 2024    Visit Dx:     ICD-10-CM ICD-9-CM   1. Acute cholecystitis  K81.0 575.0     Patient Active Problem List   Diagnosis    Essential hypertension    Coronary artery disease involving native coronary artery of native heart without angina pectoris    Skin lesions    Gastroesophageal reflux disease    Stress    Other headache syndrome    Paroxysmal atrial fibrillation    Routine general medical examination at a health care facility    Cough    Screening mammogram for breast cancer    Postmenopausal    Acute conjunctivitis of left eye    Other constipation    Memory loss    Ganglion cyst of finger    Hypercholesteremia    Left-sided weakness    Arthralgia of both knees    Myalgia    PND (post-nasal drip)    Congestion of nasal sinus    Coronary artery disease    Presence of stent in right coronary artery    Personal history of other diseases of the digestive system    PAD (peripheral artery disease)    Osteoporosis    IBS (irritable bowel syndrome)    Anxiety disorder    Left carotid artery stenosis    Acute cholecystitis    PFO (patent foramen ovale)    Closed fracture of multiple ribs of right side    Hyponatremia    Acute retention of urine    Delirium due to another medical condition     Past Medical History:   Diagnosis Date    A-fib     Anemia     Coronary artery disease     HLD (hyperlipidemia)     Hypertension     Stroke      Past Surgical History:   Procedure Laterality Date    CHOLECYSTECTOMY WITH INTRAOPERATIVE CHOLANGIOGRAM N/A 2024    Procedure: Laparoscopic cholecystectomy with cholangiogram;  Surgeon: Tessa Montenegro MD;  Location: [facility];  Service: General;  Laterality: N/A;    HYSTERECTOMY        General Information       Row Name 24 1033          Physical Therapy Time and Intention    Document Type therapy note (daily note)  -DJ     Mode of  Treatment individual therapy;physical therapy  -DJ       Row Name 07/09/24 1033          General Information    Patient Profile Reviewed yes  -DJ     Existing Precautions/Restrictions fall  -DJ       Row Name 07/09/24 1033          Cognition    Orientation Status (Cognition) oriented x 3  -DJ       Row Name 07/09/24 1033          Safety Issues, Functional Mobility    Comment, Safety Issues/Impairments (Mobility) nonskid socks, no gt belt due to recent rib fx and abd incision  -DJ               User Key  (r) = Recorded By, (t) = Taken By, (c) = Cosigned By      Initials Name Provider Type    Aminah Garzon, PT Physical Therapist                   Mobility       Row Name 07/09/24 1034          Bed Mobility    Supine-Sit [place] (Bed Mobility) not tested  -DJ     Sit-Supine [place] (Bed Mobility) not tested  -DJ     Comment, (Bed Mobility) UIC  -DJ       Row Name 07/09/24 1034          Transfers    Comment, (Transfers) sit/stand from recliner  -DJ       Row Name 07/09/24 1034          Bed-Chair Transfer    Bed-Chair [place] (Transfers) not tested  -DJ       Row Name 07/09/24 1034          Sit-Stand Transfer    Sit-Stand [place] (Transfers) contact guard;verbal cues  -DJ     Assistive Device (Sit-Stand Transfers) walker, front-wheeled  -DJ       Row Name 07/09/24 1034          Gait/Stairs (Locomotion)    [place] Level (Gait) contact guard;verbal cues  -DJ     Assistive Device (Gait) walker, front-wheeled  -DJ     Distance in Feet (Gait) 80  -DJ     Deviations/Abnormal Patterns (Gait) gait speed decreased;stride length decreased;antalgic  -DJ     Bilateral Gait Deviations forward flexed posture;heel strike decreased  -DJ     [place] Level (Stairs) not tested  -DJ     Comment, (Gait/Stairs) Pt amb 80' with r wx and CGA - slow pace, flexed and guarded posture, fair balance, endurance improving but still limited  -DJ               User Key  (r) = Recorded By, (t) = Taken By, (c) = Cosigned By       Initials Name Provider Type    Aminah Garzon, DAISHA Physical Therapist                   Obj/Interventions       Row Name 07/09/24 1036          Motor Skills    Motor Skills functional endurance  -DJ     Functional Endurance improvin gbut still limited  -DJ     Therapeutic Exercise other (see comments)  AP, LAQ, seated hip flex 10x; sit/stand from recliner 5x  -DJ       Row Name 07/09/24 1036          Balance    Balance Assessment standing static balance;standing dynamic balance  -DJ     Static Standing Balance contact guard;verbal cues  -DJ     Dynamic Standing Balance contact guard;verbal cues  -DJ     Position/Device Used, Standing Balance walker, front-wheeled;supported  -DJ     Balance Interventions sitting;standing;sit to stand;supported;weight shifting activity  -DJ     Comment, Balance no LOB  -DJ               User Key  (r) = Recorded By, (t) = Taken By, (c) = Cosigned By      Initials Name Provider Type    Aminah Garzon, PT Physical Therapist                   Goals/Plan    No documentation.                  Clinical Impression       Row Name 07/09/24 1037          Pain    Pretreatment Pain Rating 0/10 - no pain  -DJ     Pre/Posttreatment Pain Comment Pt reports she is feelin ga little better today  -DJ     Pain Intervention(s) Repositioned;Rest  -DJ       Row Name 07/09/24 1037          Plan of Care Review    Plan of Care Reviewed With patient;daughter;spouse  -DJ     Progress improving  -DJ     Outcome Evaluation Pt UIC in NAD, just returned from esophagram, pleasant and cooperative. Pt performed seated LE ther ex 10reps as well as sit/stand from recliner x5. She stood from recliner with CGA using r wx. Pt amb 80' with r wx and CGA - slow pace, flexed and guarded posture, fair balance, endurance improving but still limited. Pt req decreased assist today and amb increased distance. Cont PT to address functional deficits and prepare for d/c.  -DJ       Row Name 07/09/24 1037          Vital Signs     O2 Delivery Pre Treatment room air  -DJ     O2 Delivery Intra Treatment room air  -DJ     O2 Delivery Post Treatment room air  -DJ     Pre Patient Position Sitting  -DJ     Intra Patient Position Standing  -DJ     Post Patient Position Sitting  -DJ       Row Name 07/09/24 1037          Positioning and Restraints    Pre-Treatment Position sitting in chair/recliner  -DJ     Post Treatment Position chair  -DJ     In Chair notified nsg;reclined;call light within reach;encouraged to call for assist;with family/caregiver  -DJ               User Key  (r) = Recorded By, (t) = Taken By, (c) = Cosigned By      Initials Name Provider Type    Aminah Garzon, DAISHA Physical Therapist                   Outcome Measures       Row Name 07/09/24 1041          How much help from another person do you currently need...    Turning from your back to your side while in flat bed without using bedrails? 3  -DJ     Moving from lying on back to sitting on the side of a flat bed without bedrails? 3  -DJ     Moving to and from a bed to a chair (including a wheelchair)? 3  -DJ     Standing up from a chair using your arms (e.g., wheelchair, bedside chair)? 3  -DJ     Climbing 3-5 steps with a railing? 2  -DJ     To walk in hospital room? 3  -DJ     AM-PAC 6 Clicks Score (PT) 17  -DJ     Highest Level of Mobility Goal 5 --> Static standing  -DJ               User Key  (r) = Recorded By, (t) = Taken By, (c) = Cosigned By      Initials Name Provider Type    Aminah Garzon, PT Physical Therapist                                 Physical Therapy Education       Title: PT OT SLP Therapies (Done)       Topic: Physical Therapy (Done)       Point: Mobility training (Done)       Learning Progress Summary             Patient Acceptance, E, VU,NR by MICHAEL at 7/9/2024 1041    Acceptance, E, VU,NR by MICHAEL at 7/8/2024 1456    Acceptance, E, VU,NR by ARIEL at 7/6/2024 1254   Family Acceptance, E, VU,NR by MICHAEL at 7/9/2024 1041    Acceptance, E, VU,NR by MICHAEL at 7/8/2024 1456                          Point: Home exercise program (Done)       Learning Progress Summary             Patient Acceptance, E, VU,NR by  at 7/9/2024 1041    Acceptance, E, VU,NR by DJ at 7/8/2024 1456    Acceptance, E, VU,NR by  at 7/6/2024 1254   Family Acceptance, E, VU,NR by DJ at 7/9/2024 1041    Acceptance, E, VU,NR by DJ at 7/8/2024 1456                                         User Key       Initials Effective Dates Name Provider Type Discipline    SV 07/11/23 -  Ashley Pappas PT Physical Therapist PT     10/25/19 -  Aminah Torres PT Physical Therapist PT                  PT Recommendation and Plan     Plan of Care Reviewed With: patient, daughter, spouse  Progress: improving  Outcome Evaluation: Pt UIC in NAD, just returned from esophagram, pleasant and cooperative. Pt performed seated LE ther ex 10reps as well as sit/stand from recliner x5. She stood from recliner with CGA using r wx. Pt amb 80' with r wx and CGA - slow pace, flexed and guarded posture, fair balance, endurance improving but still limited. Pt req decreased assist today and amb increased distance. Cont PT to address functional deficits and prepare for d/c.     Time Calculation:         PT Charges       Row Name 07/09/24 1042             Time Calculation    Start Time 1010  -DJ      Stop Time 1033  -DJ      Time Calculation (min) 23 min  -DJ      PT Non-Billable Time (min) 10 min  -DJ      PT Received On 07/09/24  -DJ      PT - Next Appointment 07/10/24  -DJ                User Key  (r) = Recorded By, (t) = Taken By, (c) = Cosigned By      Initials Name Provider Type    Aminah Garzon, DAISHA Physical Therapist                  Therapy Charges for Today       Code Description Service Date Service Provider Modifiers Qty    28022595385 HC PT THERAPEUTIC ACT EA 15 MIN 7/8/2024 Aminah Torres, PT GP 1    98925978749 HC PT THERAPEUTIC ACT EA 15 MIN 7/9/2024 Aminah Torres PT GP 2            PT G-Codes  Outcome Measure Options: Modified [place],  AM-PAC 6 Clicks Daily Activity (OT)  AM-PAC 6 Clicks Score (PT): 17  AM-PAC 6 Clicks Score (OT): 16  Modified [place] Scale: 4 - Moderately severe disability.  Unable to walk without assistance, and unable to attend to own bodily needs without assistance.  PT Discharge Summary  Anticipated Discharge Disposition (PT): home with assist, home with home health, skilled nursing facility (3 [facility], SNF vs home health pending progress)    Aminah Torres, PT  7/9/2024

## 2024-07-09 NOTE — PROGRESS NOTES
Gastroenterology   Inpatient Progress Note    Reason for Follow Up: Globus/dysphagia    Subjective  Interval History:   Nutritional intake remains decreased.  Taking small sips of milkshake at time of today's rounding without odynophagia or dysphagia.  Esophagram results pending.    Current Facility-Administered Medications:     acetaminophen (TYLENOL) tablet 650 mg, 650 mg, Oral, Q4H PRN **OR** acetaminophen (TYLENOL) 160 MG/5ML oral solution 650 mg, 650 mg, Oral, Q4H PRN **OR** acetaminophen (TYLENOL) suppository 650 mg, 650 mg, Rectal, Q4H PRN, Tessa Montenegro MD    [Held by provider] apixaban (ELIQUIS) tablet 2.5 mg, 2.5 mg, Oral, Q12H, Prince Meade MD, 2.5 mg at 07/08/24 1006    sennosides-docusate (PERICOLACE) 8.6-50 MG per tablet 2 tablet, 2 tablet, Oral, BID, 2 tablet at 07/09/24 1040 **AND** polyethylene glycol (MIRALAX) packet 17 g, 17 g, Oral, Daily, 17 g at 07/09/24 1040 **AND** bisacodyl (DULCOLAX) EC tablet 5 mg, 5 mg, Oral, Daily PRN **AND** bisacodyl (DULCOLAX) suppository 10 mg, 10 mg, Rectal, Daily PRN, Prince Meade MD    calcium carbonate (TUMS) chewable tablet 500 mg (200 mg elemental), 2 tablet, Oral, TID PRN, Tessa Montenegro MD, 2 tablet at 07/06/24 2007    cefTRIAXone (ROCEPHIN) 2,000 mg in sodium chloride 0.9 % 100 mL MBP, 2,000 mg, Intravenous, Q24H, Tessa Montenegro MD, Last Rate: 200 mL/hr at 07/09/24 1040, 2,000 mg at 07/09/24 1040    [Held by provider] clopidogrel (PLAVIX) tablet 75 mg, 75 mg, Oral, Daily, Prince Meade MD, 75 mg at 07/08/24 1006    famotidine (PEPCID) tablet 20 mg, 20 mg, Oral, BID, Prince Meade MD, 20 mg at 07/09/24 1040    HYDROcodone-acetaminophen (NORCO) 5-325 MG per tablet 1 tablet, 1 tablet, Oral, Q6H PRN, Prince Meade MD, 1 tablet at 07/07/24 0812    HYDROmorphone (DILAUDID) injection 0.5 mg, 0.5 mg, Intravenous, Q2H PRN, Prince Meade MD, 0.5 mg at 07/07/24 1755    melatonin tablet 5 mg, 5 mg, Oral, Nightly, Deshaun  Prince GRACIA MD, 5 mg at 07/08/24 2151    metoprolol succinate XL (TOPROL-XL) 24 hr tablet 50 mg, 50 mg, Oral, BID, Prince Meade MD, 50 mg at 07/09/24 1040    metroNIDAZOLE (FLAGYL) IVPB 500 mg, 500 mg, Intravenous, Q8H, Tessa Montenegro MD, Last Rate: 200 mL/hr at 07/09/24 0543, 500 mg at 07/09/24 0543    [DISCONTINUED] HYDROmorphone (DILAUDID) injection 0.5 mg, 0.5 mg, Intravenous, Q2H PRN **AND** naloxone (NARCAN) injection 0.4 mg, 0.4 mg, Intravenous, Q5 Min PRN, Tessa Montenegro MD    nitroglycerin (NITROSTAT) SL tablet 0.4 mg, 0.4 mg, Sublingual, Q5 Min PRN, Tessa Montenegro MD    OLANZapine zydis (zyPREXA) disintegrating tablet 5 mg, 5 mg, Oral, Q8H PRN, Prince Meade MD, 5 mg at 07/08/24 0122    ondansetron (ZOFRAN) injection 4 mg, 4 mg, Intravenous, Q6H PRN, Tessa Montenegro MD, 4 mg at 07/08/24 1022    ondansetron ODT (ZOFRAN-ODT) disintegrating tablet 4 mg, 4 mg, Oral, Q6H PRN **OR** ondansetron (ZOFRAN) injection 4 mg, 4 mg, Intravenous, Q6H PRN, Tessa Montenegro MD    oxyCODONE-acetaminophen (PERCOCET) 5-325 MG per tablet 1 tablet, 1 tablet, Oral, Q4H PRN, Prince Meade MD, 1 tablet at 07/08/24 0121    prochlorperazine (COMPAZINE) injection 5 mg, 5 mg, Intravenous, Q6H PRN, 5 mg at 07/08/24 2149 **OR** prochlorperazine (COMPAZINE) tablet 5 mg, 5 mg, Oral, Q6H PRN **OR** prochlorperazine (COMPAZINE) suppository 25 mg, 25 mg, Rectal, Q12H PRN, Prince Meade MD    sodium chloride 0.9 % flush 10 mL, 10 mL, Intravenous, Q12H, Tessa Montenegro MD, 10 mL at 07/08/24 1007    sodium chloride 0.9 % flush 10 mL, 10 mL, Intravenous, PRN, Tessa Montenegro MD    sodium chloride 0.9 % infusion 40 mL, 40 mL, Intravenous, PRN, Tessa Montenegro MD    sodium chloride 0.9 % infusion, 75 mL/hr, Intravenous, Continuous, Tessa Montenegro MD, Last Rate: 75 mL/hr at 07/06/24 2015, 75 mL/hr at 07/06/24 2015  Review of Systems:               All systems were reviewed and negative  except for:  Constitution:  positive for See HPI  Gastrointestinal: positive for  See HPI    Objective     Vital Signs  Temp:  [98.4 °F (36.9 °C)-99.3 °F (37.4 °C)] 98.4 °F (36.9 °C)  Heart Rate:  [78-88] 84  Resp:  [18] 18  BP: (129-149)/(48-60) 143/60  Body mass index is 21.95 kg/m².                  General Appearance:  in no acute distress, alert, and cooperative  Abdomen:  Soft, non-tender, normal bowel sounds; no bruits, organomegaly or masses.                Results Review:                I reviewed the patient's new clinical results.    Results from last 7 days   Lab Units 07/09/24 0334 07/08/24 0538 07/07/24  0533   WBC 10*3/mm3 13.22* 10.82* 10.59   HEMOGLOBIN g/dL 8.2* 8.4* 7.5*   HEMATOCRIT % 24.7* 26.2* 23.2*   PLATELETS 10*3/mm3 733* 712* 628*     Results from last 7 days   Lab Units 07/09/24 0334 07/08/24 0538 07/07/24  0533   SODIUM mmol/L 135* 138 134*   POTASSIUM mmol/L 3.4* 3.7 3.7   CHLORIDE mmol/L 99 103 99   CO2 mmol/L 19.8* 23.9 27.0   BUN mg/dL 8 6* 8   CREATININE mg/dL 0.47* 0.42* 0.53*   CALCIUM mg/dL 8.0* 7.8* 8.2*   BILIRUBIN mg/dL 0.4 0.4 0.4   ALK PHOS U/L 112 102 101   ALT (SGPT) U/L 36* 41* 53*   AST (SGOT) U/L 31 37* 51*   GLUCOSE mg/dL 94 98 103*     Results from last 7 days   Lab Units 07/05/24  0810   INR  1.08     Lab Results   Lab Value Date/Time    LIPASE 19 07/08/2024 1432    LIPASE 21 07/05/2024 0810    LIPASE 35 07/04/2024 1946       Radiology:  XR Chest 1 View   Final Result   1. Increased density in both lung bases right greater than left as   described.   2. FINDINGS appear similar to the 7/8/2024 study.           This report was finalized on 7/9/2024 7:31 AM by Dr. Tucker Peters M.D on Workstation: [id number]          XR Chest 1 View   Final Result   Moderate right and small left pleural effusions with   associated basilar opacity. Known right rib fractures not well   demonstrated on this exam. Cardiomegaly. Atherosclerotic disease. No   definite pneumothorax.        This report was finalized on 7/8/2024 7:03 AM by Dr. Jorge Alberto Hawkins M.D on Workstation: [id number]          XR Chest 1 View   Final Result       1. Stable chest.   2. Bibasilar lung opacities.   3. Small layering right and possible left pleural effusions.   4. No pneumothorax seen.       This report was finalized on 7/7/2024 7:59 AM by Dr. Gerry Lopez M.D   on Workstation: [id number]          XR Chest 1 View   Final Result   No evidence for significant change when compared to   yesterday's exam.       This report was finalized on 7/6/2024 7:35 AM by Dr. Jorge Alberto Hawkins M.D on Workstation: [id number]          XR Chest 1 View   Final Result       1. Similar-appearing lung opacities and right pleural effusion with no   definite residual right pneumothorax.   2. Small volume free air under the right hemidiaphragm, likely   postoperative.       This report was finalized on 7/5/2024 2:28 PM by Praful Gutierrez MD on   Workstation: [id number]          FL Cholangiogram Operative   Final Result   Impression: Intraoperative cholangiogram, as described. See the   procedure note for details.           This report was finalized on 7/5/2024 2:17 PM by Praful Gutierrez MD on   Workstation: [id number]          XR Chest PA & Lateral   Final Result       1. Consolidation of the right lung base, likely a small pleural effusion   with underlying pneumonia and/or atelectasis, with a suspected small   right apical pneumothorax. Compared to more recent prior outside imaging   if available.   2. Ill-defined left basilar subsegmental atelectasis and/or scarring.   3. Partially calcified biapical pleural-parenchymal scarring.   3. Partially visualized acute anterior right rib fracture deformities   and similar-appearing possibly acute mild anterior wedge compression   fracture of the T11 superior endplate.       This report was finalized on 7/5/2024 8:47 AM by Praful Gutierrez MD on   Workstation: [id number]          CT  Outside Films   Final Result      XR Chest 1 View    (Results Pending)   XR Chest 1 View    (Results Pending)   FL ESOPHAGRAM DOUBLE CONTRAST    (Results Pending)       Assessment & Plan     Active Hospital Problems    Diagnosis     **Acute cholecystitis     Acute retention of urine     Delirium due to another medical condition     PFO (patent foramen ovale)     Closed fracture of multiple ribs of right side     Hyponatremia     Hypercholesteremia     Paroxysmal atrial fibrillation     Essential hypertension     Coronary artery disease involving native coronary artery of native heart without angina pectoris     PAD (peripheral artery disease)     Gastroesophageal reflux disease     Anxiety disorder        Assessment:  Acute cholecystitis s/p laparoscopic cholecystectomy and intraoperative cholangiogram 7/5  A-fib with RVR-now treated  Antiplatelet therapy  GERD  Globus, dysphagia.  This has been an ongoing concern over the past year with recent plans to complete outpatient evaluation however was postponed secondary to cholecystitis  Constipation-resolved  Anemia  Acute nausea.  Little relief received with Zofran  All problems are new to me    Plan:  Esophagram completed.  Results pending.  Plavix and Eliquis on hold per clearance from cardiology-last dose received 7/8   Continue Compazine as ordered by primary hospitalist team  Orders placed for nutritional supplements until appetite returns to baseline.    I discussed the patients findings and my recommendations with patient, family, and nursing staff.    Addendum:    Esophagram with evidence of presbyesophagus without overt esophageal stricturing or narrowing.  Reviewed findings with patient and family at bedside as well as discussed possible EGD evaluation in 48 to 72 hours.  However, patient did receive a single dose of both Plavix and Eliquis on a.m. of 7/8 albeit episode of vomiting immediately following dose administration.    If EGD is pursued and  stricturing is witnessed would defer dilation to future date once confirmed off of Plavix for at least 5 days to reduce procedural bleeding risk.    Patient and family request time to discuss amongst themselves before deciding on endoscopic evaluation.  Will update PPI regimen in the interim hopes of treating presbyesophagus witnessed on esophagram. GI will continue to follow         AGUSTIN Gibbs  [facility]  [address] [place] [address]

## 2024-07-09 NOTE — PROGRESS NOTES
[facility] Medicine Services  PROGRESS NOTE    Patient Name: Danyell Osborne  : 1935  MRN: [id number]    Date of Admission: 2024  Primary Care Physician: Shanae Osborne APRN    Subjective   Subjective     CC:  Follow-up for recent acute cholecystitis    Subjective: Patient still feeling weak and having some pain.  She feels she is recovering.  She has poor appetite and feels she is having trouble swallowing.  She is frustrated with this issue.  She is hoping the esophagram will give her more information.  No other new complaints.    Review of Systems  No current fevers or chills  No current shortness of breath or cough  No current nausea, vomiting, or diarrhea  No current chest pain or palpitations       Objective   Objective     Vital Signs:   Temp:  [98.4 °F (36.9 °C)-99.3 °F (37.4 °C)] 98.4 °F (36.9 °C)  Heart Rate:  [78-88] 84  Resp:  [18] 18  BP: (129-149)/(48-60) 143/60        Physical Exam:  Constitutional:Awake, alert, elderly  HENT: NCAT, mucous membranes moist, neck supple  Respiratory: No cough or wheezes, normal respirations, nonlabored breathing   Cardiovascular: Pulse rate is normal, palpable radial pulses  Gastrointestinal:  soft, appropriate expected abdominal tenderness, nondistended  Musculoskeletal: Frail, chronically debilitated appearance, mild lower extremity edema, BMI is 21  Psychiatric: Appropriate affect, cooperative, conversational  Neurologic: Somewhat poor historian, no slurred speech or facial droop, follows commands  Skin: No rashes or jaundice, warm      Results Reviewed:  Results from last 7 days   Lab Units 24  0334 24  0538 24  0533 24  0929 24  0810   WBC 10*3/mm3 13.22* 10.82* 10.59   < > 10.84*   HEMOGLOBIN g/dL 8.2* 8.4* 7.5*   < > 8.9*   HEMATOCRIT % 24.7* 26.2* 23.2*   < > 27.3*   PLATELETS 10*3/mm3 733* 712* 628*   < > 586*   INR   --   --   --   --  1.08    < > = values in this interval not displayed.     Results from  last 7 days   Lab Units 07/09/24  0334 07/08/24  0538 07/07/24  0533   SODIUM mmol/L 135* 138 134*   POTASSIUM mmol/L 3.4* 3.7 3.7   CHLORIDE mmol/L 99 103 99   CO2 mmol/L 19.8* 23.9 27.0   BUN mg/dL 8 6* 8   CREATININE mg/dL 0.47* 0.42* 0.53*   GLUCOSE mg/dL 94 98 103*   CALCIUM mg/dL 8.0* 7.8* 8.2*   ALK PHOS U/L 112 102 101   ALT (SGPT) U/L 36* 41* 53*   AST (SGOT) U/L 31 37* 51*     Estimated Creatinine Clearance: 77.2 mL/min (A) (by C-G formula based on SCr of 0.47 mg/dL (L)).    Microbiology Results Abnormal       Procedure Component Value - Date/Time    Blood Culture - Blood, Hand, Right [[id number]]  (Normal) Collected: 07/05/24 1033    Lab Status: Preliminary result Specimen: Blood from Hand, Right Updated: 07/09/24 1115     Blood Culture No growth at 4 days    Blood Culture - Blood, Hand, Left [[id number]]  (Normal) Collected: 07/05/24 1033    Lab Status: Preliminary result Specimen: Blood from Hand, Left Updated: 07/09/24 1115     Blood Culture No growth at 4 days            Imaging Results (Last 24 Hours)       Procedure Component Value Units Date/Time    FL ESOPHAGRAM DOUBLE CONTRAST [[id number]] Resulted: 07/09/24 0924     Updated: 07/09/24 0944    XR Chest 1 View [[id number]] Collected: 07/09/24 0728     Updated: 07/09/24 0734    Narrative:      XR CHEST 1 VW-7/9/2024     HISTORY: Possible pneumothorax.     Heart size is mildly enlarged. There is increased density in both lung  bases likely combination of pleural fluid atelectasis and/or pneumonia.  The images taken in a somewhat reversed lordotic position. There is some  calcification overlying the left lung apex which may be calcified  pleural plaque. There is some aortic calcification as well.       Impression:      1. Increased density in both lung bases right greater than left as  described.  2. FINDINGS appear similar to the 7/8/2024 study.        This report was finalized on 7/9/2024 7:31 AM by Dr. Tucker Peters M.D on Workstation: [email or web address]                Results for orders placed during the hospital encounter of 10/09/23    Adult Transthoracic Echo Complete W/ Cont if Necessary Per Protocol (With Agitated Saline)    Interpretation Summary    Left ventricular systolic function is normal. Left ventricular ejection fraction appears to be 56 - 60%.    Left ventricular diastolic function was indeterminate.    Saline test results are positive for right to left atrial level shunt.      I have reviewed the medications:  Scheduled Meds:[Held by provider] apixaban, 2.5 mg, Oral, Q12H  cefTRIAXone, 2,000 mg, Intravenous, Q24H  [Held by provider] clopidogrel, 75 mg, Oral, Daily  famotidine, 20 mg, Oral, BID  melatonin, 5 mg, Oral, Nightly  metoprolol succinate XL, 50 mg, Oral, BID  metroNIDAZOLE, 500 mg, Intravenous, Q8H  senna-docusate sodium, 2 tablet, Oral, BID   And  polyethylene glycol, 17 g, Oral, Daily  sodium chloride, 10 mL, Intravenous, Q12H      Continuous Infusions:sodium chloride, 50 mL/hr, Last Rate: 75 mL/hr (07/06/24 2015)      PRN Meds:.  acetaminophen **OR** acetaminophen **OR** acetaminophen    senna-docusate sodium **AND** polyethylene glycol **AND** bisacodyl **AND** bisacodyl    calcium carbonate    HYDROcodone-acetaminophen    HYDROmorphone    [DISCONTINUED] HYDROmorphone **AND** naloxone    nitroglycerin    OLANZapine zydis    ondansetron    ondansetron ODT **OR** ondansetron    oxyCODONE-acetaminophen    prochlorperazine **OR** prochlorperazine **OR** prochlorperazine    sodium chloride    sodium chloride    Assessment & Plan   Assessment & Plan     Active Hospital Problems    Diagnosis  POA    **Acute cholecystitis [K81.0]  Yes    Acute retention of urine [R33.8]  No    Delirium due to another medical condition [F05]  No    PFO (patent foramen ovale) [Q21.12]  Not Applicable    Closed fracture of multiple ribs of right side [S22.41XA]  Yes    Hyponatremia [E87.1]  Yes    Hypercholesteremia [E78.00]  Yes    Paroxysmal atrial fibrillation  [I48.0]  Yes    Essential hypertension [I10]  Yes    Coronary artery disease involving native coronary artery of native heart without angina pectoris [I25.10]  Yes    PAD (peripheral artery disease) [I73.9]  Yes    Gastroesophageal reflux disease [K21.9]  Yes    Anxiety disorder [F41.9]  Yes      Resolved Hospital Problems   No resolved problems to display.        Brief Hospital Course to date:  Daneyll Osborne is a 88 y.o. female     Discussion/plan for today: All medical problems are new under my management today.  Adjust IV fluid down to 50.  Jameson catheter removal today with voiding trial.  Monitor bladder scan postvoid.  Esophagram completed today.  Plan to follow-up on results and GI recommendations.  Restart diet following esophagram and monitor tolerance.  Telemetry monitor currently sinus rhythm.  Leukocytosis slightly increased but patient appears hemodynamically stable.  Plan to add flutter valve and incentive spirometer and trend.  Add scheduled Tylenol.  Adjust other medications.  Hold Eliquis and Plavix for now.  Melatonin to promote sleep.  Treatment plan discussed with patient and family who are in agreement.    Acute Cholecystitis  - s/p laparoscopic cholecystectomy with negative intraoperative cholangiogram 7/5/24 with Dr. Roth  - continue ceftriaxone and flagyl  - on regular diet, SLP following given her prior swallowing issues  - appreciate general surgery recs     HTN/PAF/PAD/PFO  - BP acceptable, had RVR likely provoked by above and has received digoxin, now converted to NSR  - holding lisinopril, continue metoprolol  - resume eliquis and plavix     Hyponatremia  - noted on hospitalization at [facility], resolved     GERD  - continue famotidine     Multiple Right-Sided Rib Fractures  - from mechanical fall down some stairs on 6/26/24  - had pneumothorax which resolved with conservative treatment  - continue pain control, encourage pulmonary toilet  - appreciate thoracic surgery recs, they have  signed off     Acute Urine Retention  - boland catheter placed 7/7/24  - voiding trial      Constipation  - resolved, abdominal examination is benign  - continue bowel regimen     Delirium  - continue scheduled nightly melatonin   - redirect as needed, observe delirium precautions     SCDs for DVT prophylaxis.  Full code.  Discussed with patient, nursing staff, and care team on multidisciplinary rounds.  Anticipate discharge home with home health and with family in 1-2 days.      CODE STATUS:   Code Status and Medical Interventions:   Ordered at: 07/05/24 0928     Code Status (Patient has no pulse and is not breathing):    CPR (Attempt to Resuscitate)     Medical Interventions (Patient has pulse or is breathing):    Full Support       Delmar Rosas MD  07/09/24

## 2024-07-10 ENCOUNTER — APPOINTMENT (OUTPATIENT)
Dept: GENERAL RADIOLOGY | Facility: HOSPITAL | Age: 89
End: 2024-07-10
Payer: MEDICARE

## 2024-07-10 PROBLEM — R93.3 ABNORMAL ESOPHAGRAM: Status: ACTIVE | Noted: 2024-07-05

## 2024-07-10 LAB
ALBUMIN SERPL-MCNC: 3 G/DL (ref 3.5–5.2)
ALBUMIN/GLOB SERPL: 1.1 G/DL
ALP SERPL-CCNC: 115 U/L (ref 39–117)
ALT SERPL W P-5'-P-CCNC: 31 U/L (ref 1–33)
ANION GAP SERPL CALCULATED.3IONS-SCNC: 10 MMOL/L (ref 5–15)
AST SERPL-CCNC: 28 U/L (ref 1–32)
BACTERIA SPEC AEROBE CULT: NORMAL
BACTERIA SPEC AEROBE CULT: NORMAL
BASOPHILS # BLD AUTO: 0.04 10*3/MM3 (ref 0–0.2)
BASOPHILS NFR BLD AUTO: 0.4 % (ref 0–1.5)
BILIRUB SERPL-MCNC: 0.5 MG/DL (ref 0–1.2)
BUN SERPL-MCNC: 7 MG/DL (ref 8–23)
BUN/CREAT SERPL: 20 (ref 7–25)
CALCIUM SPEC-SCNC: 8.2 MG/DL (ref 8.6–10.5)
CHLORIDE SERPL-SCNC: 102 MMOL/L (ref 98–107)
CO2 SERPL-SCNC: 25 MMOL/L (ref 22–29)
CREAT SERPL-MCNC: 0.35 MG/DL (ref 0.57–1)
DEPRECATED RDW RBC AUTO: 44.7 FL (ref 37–54)
EGFRCR SERPLBLD CKD-EPI 2021: 99.1 ML/MIN/1.73
EOSINOPHIL # BLD AUTO: 0.31 10*3/MM3 (ref 0–0.4)
EOSINOPHIL NFR BLD AUTO: 2.8 % (ref 0.3–6.2)
ERYTHROCYTE [DISTWIDTH] IN BLOOD BY AUTOMATED COUNT: 14.2 % (ref 12.3–15.4)
GLOBULIN UR ELPH-MCNC: 2.7 GM/DL
GLUCOSE SERPL-MCNC: 120 MG/DL (ref 65–99)
HCT VFR BLD AUTO: 26.1 % (ref 34–46.6)
HGB BLD-MCNC: 8.7 G/DL (ref 12–15.9)
IMM GRANULOCYTES # BLD AUTO: 0.1 10*3/MM3 (ref 0–0.05)
IMM GRANULOCYTES NFR BLD AUTO: 0.9 % (ref 0–0.5)
LYMPHOCYTES # BLD AUTO: 1.08 10*3/MM3 (ref 0.7–3.1)
LYMPHOCYTES NFR BLD AUTO: 9.9 % (ref 19.6–45.3)
MCH RBC QN AUTO: 29.6 PG (ref 26.6–33)
MCHC RBC AUTO-ENTMCNC: 33.3 G/DL (ref 31.5–35.7)
MCV RBC AUTO: 88.8 FL (ref 79–97)
MONOCYTES # BLD AUTO: 1.05 10*3/MM3 (ref 0.1–0.9)
MONOCYTES NFR BLD AUTO: 9.6 % (ref 5–12)
NEUTROPHILS NFR BLD AUTO: 76.4 % (ref 42.7–76)
NEUTROPHILS NFR BLD AUTO: 8.31 10*3/MM3 (ref 1.7–7)
NRBC BLD AUTO-RTO: 0 /100 WBC (ref 0–0.2)
PLATELET # BLD AUTO: 785 10*3/MM3 (ref 140–450)
PMV BLD AUTO: 8.7 FL (ref 6–12)
POTASSIUM SERPL-SCNC: 3.2 MMOL/L (ref 3.5–5.2)
PROT SERPL-MCNC: 5.7 G/DL (ref 6–8.5)
RBC # BLD AUTO: 2.94 10*6/MM3 (ref 3.77–5.28)
SODIUM SERPL-SCNC: 137 MMOL/L (ref 136–145)
WBC NRBC COR # BLD AUTO: 10.89 10*3/MM3 (ref 3.4–10.8)

## 2024-07-10 PROCEDURE — 99232 SBSQ HOSP IP/OBS MODERATE 35: CPT | Performed by: NURSE PRACTITIONER

## 2024-07-10 PROCEDURE — 25010000002 METRONIDAZOLE 500 MG/100ML SOLUTION: Performed by: SURGERY

## 2024-07-10 PROCEDURE — 85025 COMPLETE CBC W/AUTO DIFF WBC: CPT | Performed by: SURGERY

## 2024-07-10 PROCEDURE — 94799 UNLISTED PULMONARY SVC/PX: CPT

## 2024-07-10 PROCEDURE — 25010000002 CEFTRIAXONE PER 250 MG: Performed by: SURGERY

## 2024-07-10 PROCEDURE — 71045 X-RAY EXAM CHEST 1 VIEW: CPT

## 2024-07-10 PROCEDURE — 80053 COMPREHEN METABOLIC PANEL: CPT | Performed by: SURGERY

## 2024-07-10 PROCEDURE — 94761 N-INVAS EAR/PLS OXIMETRY MLT: CPT

## 2024-07-10 RX ADMIN — Medication 10 ML: at 09:31

## 2024-07-10 RX ADMIN — HYDROCODONE BITARTRATE AND ACETAMINOPHEN 0.5 TABLET: 5; 325 TABLET ORAL at 09:30

## 2024-07-10 RX ADMIN — CEFTRIAXONE 2000 MG: 2 INJECTION, POWDER, FOR SOLUTION INTRAMUSCULAR; INTRAVENOUS at 09:30

## 2024-07-10 RX ADMIN — METOPROLOL SUCCINATE 50 MG: 50 TABLET, FILM COATED, EXTENDED RELEASE ORAL at 20:41

## 2024-07-10 RX ADMIN — SUCRALFATE 1 G: 1 TABLET ORAL at 18:30

## 2024-07-10 RX ADMIN — HYDROCODONE BITARTRATE AND ACETAMINOPHEN 0.5 TABLET: 5; 325 TABLET ORAL at 22:12

## 2024-07-10 RX ADMIN — SENNOSIDES AND DOCUSATE SODIUM 2 TABLET: 50; 8.6 TABLET ORAL at 20:41

## 2024-07-10 RX ADMIN — SUCRALFATE 1 G: 1 TABLET ORAL at 11:40

## 2024-07-10 RX ADMIN — METRONIDAZOLE 500 MG: 500 INJECTION, SOLUTION INTRAVENOUS at 20:43

## 2024-07-10 RX ADMIN — PANTOPRAZOLE SODIUM 40 MG: 40 INJECTION, POWDER, FOR SOLUTION INTRAVENOUS at 17:04

## 2024-07-10 RX ADMIN — HYDROCODONE BITARTRATE AND ACETAMINOPHEN 0.5 TABLET: 5; 325 TABLET ORAL at 15:38

## 2024-07-10 RX ADMIN — Medication 10 ML: at 20:44

## 2024-07-10 RX ADMIN — METRONIDAZOLE 500 MG: 500 INJECTION, SOLUTION INTRAVENOUS at 06:14

## 2024-07-10 RX ADMIN — METOPROLOL SUCCINATE 50 MG: 50 TABLET, FILM COATED, EXTENDED RELEASE ORAL at 09:30

## 2024-07-10 RX ADMIN — Medication 5 MG: at 20:41

## 2024-07-10 RX ADMIN — ACETAMINOPHEN 325MG 650 MG: 325 TABLET ORAL at 20:41

## 2024-07-10 RX ADMIN — METRONIDAZOLE 500 MG: 500 INJECTION, SOLUTION INTRAVENOUS at 14:26

## 2024-07-10 RX ADMIN — PANTOPRAZOLE SODIUM 40 MG: 40 INJECTION, POWDER, FOR SOLUTION INTRAVENOUS at 09:30

## 2024-07-10 RX ADMIN — SUCRALFATE 1 G: 1 TABLET ORAL at 09:30

## 2024-07-10 NOTE — PLAN OF CARE
Goal Outcome Evaluation:         Patient eating small amounts and tolerating. Medicated for pain x2. In chair most of the day, was up. Up walking in hallway with staff.                                       HOSPITAL   PROGRESS NOTE. SUBJECTIVE:  Zack Vargas, 60 y.o., male C/O  RESTING QUIETLY. NO NEW COMPLAIN. CONDITION DISCUSSED WITH  PATIENT  AND NURSING   STAFF. CONSULT NOTES REVIEWED. ROS:GENERAL- APPETITE- GOOD. BOWEL MOVEMENTS- NORMAL. NO URINE    PROBLEM. EENT: NORMAL            CVS: NORMAL. NO CHEST PAIN OR PALPITATION. RESPIRATORY:NO SOB. GI/: NO ABDOMINAL PAINS            MUSCULOSKELETAL: NO COMPLAIN            CNS: NO  COMPLAIN            OBJECTIVE:    GENERAL:Temperature:  Current - Temp: 97.8 °F (36.6 °C); Max - Temp  Av °F (36.7 °C)  Min: 97.5 °F (36.4 °C)  Max: 98.5 °F (36.9 °C)  Respiratory Rate : Resp  Av.2  Min: 18  Max: 20  Pulse Range: Pulse  Av.8  Min: 87  Max: 94  Blood Presuure Range:  Systolic (16TLK), ENT:147 , Min:112 , BVW:659   ; Diastolic (59UNR), LBY:03, Min:55, Max:69    Pulse ox Range: SpO2  Av.6 %  Min: 96 %  Max: 100 %  24hr I & O:      Intake/Output Summary (Last 24 hours) at 2/10/2020 1124  Last data filed at 2/10/2020 0935  Gross per 24 hour   Intake 2470 ml   Output 1850 ml   Net 620 ml       CONSTITUTIONAL: MORBIDLY OBESE ,ORIENTED,CO-OPERATIVE  HEENT:NORMAL. NECK:  supple, symmetrical, trachea midline,Carotids- normal,JVP- flat  HEMATOLOGIC/LYMPHATICS:  no cervical lymphadenopathy  LUNGS:clear  CARDIOVASCULAR:  Normal apical impulse, regular rate and rhythm, normal S1 and S2, no S3 or S4, and no murmur noted  ABDOMEN:  normal bowel sounds, non-distended, non-tender, no masses palpated  EXTREMITIES: ARTHRITIC CHANGES. MOVEMENTS-LIMITED. PEDAL PULSES-FAIR.   SKIN:  normal skin color, texture, turgor    Data    CBC:   Lab Results   Component Value Date    WBC 8.1 02/10/2020    RBC 1.85 02/10/2020    HGB 6.8 02/10/2020    HCT 21.1 02/10/2020    .1 02/10/2020    MCH 36.8 02/10/2020    MCHC 32.2 02/10/2020    RDW 18.6 02/10/2020    PLT 39 02/10/2020

## 2024-07-10 NOTE — PLAN OF CARE
Goal Outcome Evaluation:      Patients heart rate up to 160's occasionally but came immediately back down.  Performed bladder scan on patient d/t complaints of abdominal pain, patient had been voiding a lot but bladder scan showed 600, patient was able to void 300 after bladder scan.  Patient ambulated around unit this shift as well.  Will continue to monitor. Bed alarm on.

## 2024-07-10 NOTE — PROGRESS NOTES
Gastroenterology   Inpatient Progress Note    Reason for Follow Up: Globus/dysphagia    Subjective  Interval History:   Patient states that symptoms are unchanged, her  is at bedside.  She would like to proceed with EGD tomorrow.    Current Facility-Administered Medications:     acetaminophen (TYLENOL) tablet 650 mg, 650 mg, Oral, Q4H PRN **OR** acetaminophen (TYLENOL) 160 MG/5ML oral solution 650 mg, 650 mg, Oral, Q4H PRN **OR** acetaminophen (TYLENOL) suppository 650 mg, 650 mg, Rectal, Q4H PRN, Tessa Montenegro MD    acetaminophen (TYLENOL) tablet 650 mg, 650 mg, Oral, TID, Delmar Rosas MD, 650 mg at 07/09/24 2023    [Held by provider] apixaban (ELIQUIS) tablet 2.5 mg, 2.5 mg, Oral, Q12H, Prince Meade MD, 2.5 mg at 07/08/24 1006    sennosides-docusate (PERICOLACE) 8.6-50 MG per tablet 2 tablet, 2 tablet, Oral, BID, 2 tablet at 07/09/24 1040 **AND** polyethylene glycol (MIRALAX) packet 17 g, 17 g, Oral, Daily, 17 g at 07/09/24 1040 **AND** bisacodyl (DULCOLAX) EC tablet 5 mg, 5 mg, Oral, Daily PRN **AND** bisacodyl (DULCOLAX) suppository 10 mg, 10 mg, Rectal, Daily PRN, Prince Meade MD    calcium carbonate (TUMS) chewable tablet 500 mg (200 mg elemental), 2 tablet, Oral, TID PRN, eTssa Montenegro MD, 2 tablet at 07/06/24 2007    cefTRIAXone (ROCEPHIN) 2,000 mg in sodium chloride 0.9 % 100 mL MBP, 2,000 mg, Intravenous, Q24H, Tessa Montenegro MD, Last Rate: 200 mL/hr at 07/10/24 0930, 2,000 mg at 07/10/24 0930    [Held by provider] clopidogrel (PLAVIX) tablet 75 mg, 75 mg, Oral, Daily, Prince Meade MD, 75 mg at 07/08/24 1006    HYDROcodone-acetaminophen (NORCO) 5-325 MG per tablet 0.5 tablet, 0.5 tablet, Oral, Q6H PRN, Delmar Rosas MD, 0.5 tablet at 07/10/24 0930    melatonin tablet 5 mg, 5 mg, Oral, Nightly, Prince Meade MD, 5 mg at 07/09/24 2024    metoprolol succinate XL (TOPROL-XL) 24 hr tablet 50 mg, 50 mg, Oral, BID, Prince Meade MD, 50 mg  at 07/10/24 0930    metroNIDAZOLE (FLAGYL) IVPB 500 mg, 500 mg, Intravenous, Q8H, Tesas Montenegro MD, Last Rate: 200 mL/hr at 07/10/24 1426, 500 mg at 07/10/24 1426    nitroglycerin (NITROSTAT) SL tablet 0.4 mg, 0.4 mg, Sublingual, Q5 Min PRN, Tessa Montenegro MD    OLANZapine (zyPREXA) tablet 2.5 mg, 2.5 mg, Oral, Q8H PRN, Delmar Rosas MD, 2.5 mg at 07/09/24 2320    ondansetron (ZOFRAN) injection 4 mg, 4 mg, Intravenous, Q6H PRN, Tessa Montenegro MD, 4 mg at 07/08/24 1022    ondansetron ODT (ZOFRAN-ODT) disintegrating tablet 4 mg, 4 mg, Oral, Q6H PRN **OR** ondansetron (ZOFRAN) injection 4 mg, 4 mg, Intravenous, Q6H PRN, Tessa Montenegro MD    pantoprazole (PROTONIX) injection 40 mg, 40 mg, Intravenous, BID AC, Aylin Restrepo, APRN, 40 mg at 07/10/24 0930    prochlorperazine (COMPAZINE) injection 5 mg, 5 mg, Intravenous, Q6H PRN, 5 mg at 07/08/24 2149 **OR** prochlorperazine (COMPAZINE) tablet 5 mg, 5 mg, Oral, Q6H PRN **OR** prochlorperazine (COMPAZINE) suppository 25 mg, 25 mg, Rectal, Q12H PRN, Prince Meade MD    sodium chloride 0.9 % flush 10 mL, 10 mL, Intravenous, Q12H, Tessa Montenegro MD, 10 mL at 07/10/24 0931    sodium chloride 0.9 % flush 10 mL, 10 mL, Intravenous, PRN, Tessa Montenegro MD    sodium chloride 0.9 % infusion 40 mL, 40 mL, Intravenous, PRN, Tessa Montenegro MD    sucralfate (CARAFATE) tablet 1 g, 1 g, Oral, TID Kaye TORO Kelsey, AGUSTIN, 1 g at 07/10/24 1140  Review of Systems:                Gastroenterology positive for nausea, globus, dysphagia    Objective     Vital Signs  Temp:  [98.1 °F (36.7 °C)-98.8 °F (37.1 °C)] 98.8 °F (37.1 °C)  Heart Rate:  [] 83  Resp:  [20] 20  BP: (134-169)/(51-66) 134/51  Body mass index is 20.78 kg/m².                  Physical Exam:              General: patient awake, alert and cooperative              Eyes: no scleral icterus              Skin: warm and dry, not jaundiced              Psychiatric:  Appropriate affect and behavior                Results Review:                I reviewed the patient's new clinical results.    Results from last 7 days   Lab Units 07/10/24  0353 07/09/24  0334 07/08/24  0538   WBC 10*3/mm3 10.89* 13.22* 10.82*   HEMOGLOBIN g/dL 8.7* 8.2* 8.4*   HEMATOCRIT % 26.1* 24.7* 26.2*   PLATELETS 10*3/mm3 785* 733* 712*     Results from last 7 days   Lab Units 07/10/24  0353 07/09/24  0334 07/08/24  0538   SODIUM mmol/L 137 135* 138   POTASSIUM mmol/L 3.2* 3.4* 3.7   CHLORIDE mmol/L 102 99 103   CO2 mmol/L 25.0 19.8* 23.9   BUN mg/dL 7* 8 6*   CREATININE mg/dL 0.35* 0.47* 0.42*   CALCIUM mg/dL 8.2* 8.0* 7.8*   BILIRUBIN mg/dL 0.5 0.4 0.4   ALK PHOS U/L 115 112 102   ALT (SGPT) U/L 31 36* 41*   AST (SGOT) U/L 28 31 37*   GLUCOSE mg/dL 120* 94 98     Results from last 7 days   Lab Units 07/05/24  0810   INR  1.08     Lab Results   Lab Value Date/Time    LIPASE 19 07/08/2024 1432    LIPASE 21 07/05/2024 0810    LIPASE 35 07/04/2024 1946       Radiology:  XR Chest 1 View   Final Result   Stable layering moderate right and small left pleural   effusions. No focal consolidation or measurable pneumothorax. Normal   size cardiomediastinal silhouette with coronary artery calcifications.   Known rib fractures better seen on prior CT.       This report was finalized on 7/10/2024 7:28 AM by Dr. Prince Bryan M.D on Workstation: [id number]          FL ESOPHAGRAM DOUBLE CONTRAST   Final Result   1.  Presbyesophagus demonstrated by moderate intermittent tertiary   contractions in the lower half of the esophagus. This results in delayed   clearance of barium with intraesophageal backflow while the patient is   in the semiupright position.   2.  Small volume of recumbent gastroesophageal reflux observed refluxing   to the upper thoracic esophagus.   3.  Smooth tapered narrowing at the distal esophagus. The patient was   unable to swallow the barium tablet limiting full evaluation.                         This report was finalized on 7/9/2024 4:13 PM by Dr. Melody Sin M.D on   Workstation: [id number]          XR Chest 1 View   Final Result   1. Increased density in both lung bases right greater than left as   described.   2. FINDINGS appear similar to the 7/8/2024 study.           This report was finalized on 7/9/2024 7:31 AM by Dr. Tucker Peters M.D on Workstation: [id number]          XR Chest 1 View   Final Result   Moderate right and small left pleural effusions with   associated basilar opacity. Known right rib fractures not well   demonstrated on this exam. Cardiomegaly. Atherosclerotic disease. No   definite pneumothorax.       This report was finalized on 7/8/2024 7:03 AM by Dr. Jorge Alberto Hawkins M.D on Workstation: [id number]          XR Chest 1 View   Final Result       1. Stable chest.   2. Bibasilar lung opacities.   3. Small layering right and possible left pleural effusions.   4. No pneumothorax seen.       This report was finalized on 7/7/2024 7:59 AM by Dr. Gerry Lopez M.D   on Workstation: [id number]          XR Chest 1 View   Final Result   No evidence for significant change when compared to   yesterday's exam.       This report was finalized on 7/6/2024 7:35 AM by Dr. Jorge Alberto Hawkins M.D on Workstation: [id number]          XR Chest 1 View   Final Result       1. Similar-appearing lung opacities and right pleural effusion with no   definite residual right pneumothorax.   2. Small volume free air under the right hemidiaphragm, likely   postoperative.       This report was finalized on 7/5/2024 2:28 PM by Praful Gutierrez MD on   Workstation: [id number]          FL Cholangiogram Operative   Final Result   Impression: Intraoperative cholangiogram, as described. See the   procedure note for details.           This report was finalized on 7/5/2024 2:17 PM by Praful Gutierrez MD on   Workstation: [id number]          XR Chest PA & Lateral   Final Result       1. Consolidation of the  right lung base, likely a small pleural effusion   with underlying pneumonia and/or atelectasis, with a suspected small   right apical pneumothorax. Compared to more recent prior outside imaging   if available.   2. Ill-defined left basilar subsegmental atelectasis and/or scarring.   3. Partially calcified biapical pleural-parenchymal scarring.   3. Partially visualized acute anterior right rib fracture deformities   and similar-appearing possibly acute mild anterior wedge compression   fracture of the T11 superior endplate.       This report was finalized on 7/5/2024 8:47 AM by Praful Gutierrez MD on   Workstation: [id number]          CT Outside Films   Final Result      XR Chest 1 View    (Results Pending)   XR Chest 1 View    (Results Pending)       Assessment & Plan     Active Hospital Problems    Diagnosis     **Acute cholecystitis     Acute retention of urine     Delirium due to another medical condition     PFO (patent foramen ovale)     Closed fracture of multiple ribs of right side     Hyponatremia     Hypercholesteremia     Paroxysmal atrial fibrillation     Essential hypertension     Coronary artery disease involving native coronary artery of native heart without angina pectoris     PAD (peripheral artery disease)     Gastroesophageal reflux disease     Anxiety disorder        Assessment:  GLobus, dysphagia, ongoing concern over the past year, worsening, outpatient evaluation was postponed secondary to cholecystitis    Esophagram with evidence of presbyesophagus without overt esophageal stricturing or narrowing.    Acute cholecystitis s/p laparoscopic cholecystectomy and intraoperative cholangiogram 7/5  A-fib with RVR-now treated  Antiplatelet therapy  GERD  Globus, dysphagia.  This has been an ongoing concern over the past year with recent plans to complete outpatient evaluation however was postponed secondary to cholecystitis  Constipation-resolved  Anemia  Plan:  Patient and  would like to  proceed with EGD tomorrow for ongoing globus and abnormal esophagram results    They are aware that dilation will not be performed during EGD tomorrow as last dose of Plavix and Eliquis July 8, 2024.  Orders placed for EGD in a.m. with Dr. Crabtree, n.p.o. at midnight.    I discussed the patients findings and my recommendations with patient, family, and nursing staff.           Blank VELEZ  [facility] [address] [address]

## 2024-07-10 NOTE — PROGRESS NOTES
[facility] Medicine Services  PROGRESS NOTE    Patient Name: Danyell Osborne  : 1935  MRN: [id number]    Date of Admission: 2024  Primary Care Physician: Shanae Osborne APRN    Subjective   Subjective     CC:  Follow-up for recent acute cholecystitis    Subjective: Patient feels great today.  She still feels a little weak.    Review of Systems  No current fevers or chills  No current shortness of breath or cough  No current nausea, vomiting, or diarrhea  No current chest pain or palpitations       Objective   Objective     Vital Signs:   Temp:  [97.9 °F (36.6 °C)-98.8 °F (37.1 °C)] 98.4 °F (36.9 °C)  Heart Rate:  [] 85  Resp:  [17-20] 20  BP: (141-170)/() 169/56        Physical Exam:  Constitutional:Awake, alert, elderly  HENT: NCAT, mucous membranes moist, neck supple  Respiratory: No cough or wheezes, normal respirations, nonlabored breathing   Cardiovascular: Pulse rate is normal, palpable radial pulses  Gastrointestinal:  soft, appropriate expected abdominal tenderness, nondistended  Musculoskeletal: Frail, chronically debilitated appearance, mild lower extremity edema, BMI is 21  Psychiatric: Appropriate affect, cooperative, conversational  Neurologic: Somewhat poor historian, no slurred speech or facial droop, follows commands  Skin: No rashes or jaundice, warm      Results Reviewed:  Results from last 7 days   Lab Units 07/10/24  0353 24  03324  0538 24  0929 24  0810   WBC 10*3/mm3 10.89* 13.22* 10.82*   < > 10.84*   HEMOGLOBIN g/dL 8.7* 8.2* 8.4*   < > 8.9*   HEMATOCRIT % 26.1* 24.7* 26.2*   < > 27.3*   PLATELETS 10*3/mm3 785* 733* 712*   < > 586*   INR   --   --   --   --  1.08    < > = values in this interval not displayed.     Results from last 7 days   Lab Units 07/10/24  0353 24  0334 24  0538   SODIUM mmol/L 137 135* 138   POTASSIUM mmol/L 3.2* 3.4* 3.7   CHLORIDE mmol/L 102 99 103   CO2 mmol/L 25.0 19.8* 23.9   BUN mg/dL 7*  8 6*   CREATININE mg/dL 0.35* 0.47* 0.42*   GLUCOSE mg/dL 120* 94 98   CALCIUM mg/dL 8.2* 8.0* 7.8*   ALK PHOS U/L 115 112 102   ALT (SGPT) U/L 31 36* 41*   AST (SGOT) U/L 28 31 37*     Estimated Creatinine Clearance: 98.1 mL/min (A) (by C-G formula based on SCr of 0.35 mg/dL (L)).    Microbiology Results Abnormal       Procedure Component Value - Date/Time    Blood Culture - Blood, Hand, Right [[id number]]  (Normal) Collected: 07/05/24 1033    Lab Status: Final result Specimen: Blood from Hand, Right Updated: 07/10/24 1115     Blood Culture No growth at 5 days    Blood Culture - Blood, Hand, Left [[id number]]  (Normal) Collected: 07/05/24 1033    Lab Status: Final result Specimen: Blood from Hand, Left Updated: 07/10/24 1115     Blood Culture No growth at 5 days            Imaging Results (Last 24 Hours)       Procedure Component Value Units Date/Time    XR Chest 1 View [[id number]] Collected: 07/10/24 0725     Updated: 07/10/24 0731    Narrative:      XR CHEST 1 VW-     INDICATION: Follow-up pneumothorax     COMPARISON: Chest radiographs dating back to 10/18/2021. Outside CT  7/5/2024       Impression:      Stable layering moderate right and small left pleural  effusions. No focal consolidation or measurable pneumothorax. Normal  size cardiomediastinal silhouette with coronary artery calcifications.  Known rib fractures better seen on prior CT.     This report was finalized on 7/10/2024 7:28 AM by Dr. Prince Bryan M.D on Workstation: [id number]       FL ESOPHAGRAM DOUBLE CONTRAST [[id number]] Collected: 07/09/24 1345     Updated: 07/09/24 1616    Narrative:      EXAMINATION:  Esophagram Complete Double-Contrast Fluoroscopy.     DATE: 7/9/2024     COMPARISON:  Chest 1 view 7/9/2024     CLINICAL HISTORY:  88-year-old female with globus sensation and dysphagia. Hospitalized  with acute cholecystitis status post laparoscopic cholecystectomy  7/5/2024 and multiple right-sided rib fractures 6/26/2024.      DETAILS:  Administration of thin barium, thick barium, and air crystals were  provided to the patient. Rapid sequence video clips and spot films of  the esophagus were obtained. Significantly limited mobility due to rib  fractures, weakness and severe kyphosis. Prone positioning was unable to  be performed. The patient was unable to swallow a 12.5 mm diameter  barium tablet.     Images were obtained by AGUSTIN Sanchez.     *  TOTAL FLUOROSCOPY DOSE: DAP: 910.6 uGym2  *  TOTAL NUMBER OF FLUOROSCOPIC IMAGES: 541     FINDINGS:     The preliminary  views of the chest show increased density in both  lung bases, right greater than left and similar findings on previous  exam 7/9/2024.     Esophagus:  The vallecula and pyriform sinuses are unremarkable, with no evidence of  mucosal abnormality or extrinsic mass compression. Serial images of the  cervical esophagus show no laryngeal penetration or aspiration. No  evidence of cervical esophageal diverticula. No prominent  cricopharyngeal bar.     Severe kyphosis. Normal esophageal distensibility and caliber. Prominent  intermittent tertiary contractions are observed within the lower half of  the esophagus resulting in delayed clearance of barium with  intraesophageal backflow with the patient in the semiupright position.  The esophageal mucosa is normal. No evidence of a filling defect,  ulceration, stricture or significant narrowing. No evidence of hiatal  hernia is identified. Small volume of gastroesophageal reflux is seen  refluxing to the upper thoracic esophagus with the patient in the  recumbent position.  A 12.5mm barium tablet was administered and the  patient was unable to swallow the tablet beyond the oropharynx. Smooth  tapered narrowing at the distal esophagus.      Stomach:  Barium flows through the esophagus and into the stomach.          Impression:      1.  Presbyesophagus demonstrated by moderate intermittent tertiary  contractions in the lower half  of the esophagus. This results in delayed  clearance of barium with intraesophageal backflow while the patient is  in the semiupright position.  2.  Small volume of recumbent gastroesophageal reflux observed refluxing  to the upper thoracic esophagus.  3.  Smooth tapered narrowing at the distal esophagus. The patient was  unable to swallow the barium tablet limiting full evaluation.                  This report was finalized on 7/9/2024 4:13 PM by Dr. Melody Sin M.D on  Workstation: [id number]               Results for orders placed during the hospital encounter of 10/09/23    Adult Transthoracic Echo Complete W/ Cont if Necessary Per Protocol (With Agitated Saline)    Interpretation Summary    Left ventricular systolic function is normal. Left ventricular ejection fraction appears to be 56 - 60%.    Left ventricular diastolic function was indeterminate.    Saline test results are positive for right to left atrial level shunt.      I have reviewed the medications:  Scheduled Meds:acetaminophen, 650 mg, Oral, TID  [Held by provider] apixaban, 2.5 mg, Oral, Q12H  cefTRIAXone, 2,000 mg, Intravenous, Q24H  [Held by provider] clopidogrel, 75 mg, Oral, Daily  melatonin, 5 mg, Oral, Nightly  metoprolol succinate XL, 50 mg, Oral, BID  metroNIDAZOLE, 500 mg, Intravenous, Q8H  pantoprazole, 40 mg, Intravenous, BID AC  senna-docusate sodium, 2 tablet, Oral, BID   And  polyethylene glycol, 17 g, Oral, Daily  sodium chloride, 10 mL, Intravenous, Q12H  sucralfate, 1 g, Oral, TID AC      Continuous Infusions:     PRN Meds:.  acetaminophen **OR** acetaminophen **OR** acetaminophen    senna-docusate sodium **AND** polyethylene glycol **AND** bisacodyl **AND** bisacodyl    calcium carbonate    HYDROcodone-acetaminophen    nitroglycerin    OLANZapine    ondansetron    ondansetron ODT **OR** ondansetron    prochlorperazine **OR** prochlorperazine **OR** prochlorperazine    sodium chloride    sodium chloride    Assessment & Plan    Assessment & Plan     Active Hospital Problems    Diagnosis  POA    **Acute cholecystitis [K81.0]  Yes    Acute retention of urine [R33.8]  No    Delirium due to another medical condition [F05]  No    PFO (patent foramen ovale) [Q21.12]  Not Applicable    Closed fracture of multiple ribs of right side [S22.41XA]  Yes    Hyponatremia [E87.1]  Yes    Hypercholesteremia [E78.00]  Yes    Paroxysmal atrial fibrillation [I48.0]  Yes    Essential hypertension [I10]  Yes    Coronary artery disease involving native coronary artery of native heart without angina pectoris [I25.10]  Yes    PAD (peripheral artery disease) [I73.9]  Yes    Gastroesophageal reflux disease [K21.9]  Yes    Anxiety disorder [F41.9]  Yes      Resolved Hospital Problems   No resolved problems to display.        Brief Hospital Course to date:  Danyell Osborne is a 88 y.o. female     Discussion/plan for today: Family is thinking they may want to pursue EGD as does patient.  Stop IV fluid for now as patient is eating a little more.  Patient appears adequately hydrated.  Remove Jameson catheter.  Esophagram completed and results reviewed.  Discussed case with gastroenterology.  Holding blood thinners for now.  Leukocytosis decreased today.  Continuing with flutter valve and incentive spirometer and trend.  Add scheduled Tylenol.  Adjust other medications.  Hold Eliquis and Plavix for now.  Melatonin to promote sleep.  Treatment plan discussed with patient and family who are in agreement.    Acute Cholecystitis  - s/p laparoscopic cholecystectomy with negative intraoperative cholangiogram 7/5/24 with Dr. Roth  - continue ceftriaxone and flagyl  - on regular diet, SLP following given her prior swallowing issues  - appreciate general surgery recs     HTN/PAF/PAD/PFO  - BP acceptable, had RVR likely provoked by above and has received digoxin, now converted to NSR  - holding lisinopril, continue metoprolol  - resume eliquis and plavix     Hyponatremia  -  noted on hospitalization at [facility], resolved     GERD  - continue famotidine     Multiple Right-Sided Rib Fractures  - from mechanical fall down some stairs on 6/26/24  - had pneumothorax which resolved with conservative treatment  - continue pain control, encourage pulmonary toilet  - appreciate thoracic surgery recs, they have signed off     Acute Urine Retention  - boland catheter placed 7/7/24  - voiding trial      Constipation  - resolved, abdominal examination is benign  - continue bowel regimen     Delirium  - continue scheduled nightly melatonin   - redirect as needed, observe delirium precautions     SCDs for DVT prophylaxis.  Full code.  Discussed with patient, nursing staff, and care team on multidisciplinary rounds.  Anticipate discharge home with home health and with family in 1-2 days.      CODE STATUS:   Code Status and Medical Interventions:   Ordered at: 07/05/24 0928     Code Status (Patient has no pulse and is not breathing):    CPR (Attempt to Resuscitate)     Medical Interventions (Patient has pulse or is breathing):    Full Support       Delmar Rosas MD  07/10/24

## 2024-07-10 NOTE — PROGRESS NOTES
Danyell Osborne   88 y.o.  female    LOS: 5 days   Patient Care Team:  Shanae Osborne APRN as PCP - General (Family Medicine)      Subjective     Review of Systems:   Lungs: No cough, no SOA  CV: No CP, no palpitations  GI: No nausea, vomiting, or diarrhea.     Medication Review:   Current Facility-Administered Medications:     acetaminophen (TYLENOL) tablet 650 mg, 650 mg, Oral, Q4H PRN **OR** acetaminophen (TYLENOL) 160 MG/5ML oral solution 650 mg, 650 mg, Oral, Q4H PRN **OR** acetaminophen (TYLENOL) suppository 650 mg, 650 mg, Rectal, Q4H PRN, Tessa Montenegro MD    acetaminophen (TYLENOL) tablet 650 mg, 650 mg, Oral, TID, Delmar Rosas MD, 650 mg at 07/09/24 2023    [Held by provider] apixaban (ELIQUIS) tablet 2.5 mg, 2.5 mg, Oral, Q12H, Prince Meade MD, 2.5 mg at 07/08/24 1006    sennosides-docusate (PERICOLACE) 8.6-50 MG per tablet 2 tablet, 2 tablet, Oral, BID, 2 tablet at 07/09/24 1040 **AND** polyethylene glycol (MIRALAX) packet 17 g, 17 g, Oral, Daily, 17 g at 07/09/24 1040 **AND** bisacodyl (DULCOLAX) EC tablet 5 mg, 5 mg, Oral, Daily PRN **AND** bisacodyl (DULCOLAX) suppository 10 mg, 10 mg, Rectal, Daily PRN, Prince Meade MD    calcium carbonate (TUMS) chewable tablet 500 mg (200 mg elemental), 2 tablet, Oral, TID PRN, Tessa Montenegro MD, 2 tablet at 07/06/24 2007    cefTRIAXone (ROCEPHIN) 2,000 mg in sodium chloride 0.9 % 100 mL MBP, 2,000 mg, Intravenous, Q24H, Tessa Montenegro MD, Last Rate: 200 mL/hr at 07/10/24 0930, 2,000 mg at 07/10/24 0930    [Held by provider] clopidogrel (PLAVIX) tablet 75 mg, 75 mg, Oral, Daily, Prince Meade MD, 75 mg at 07/08/24 1006    HYDROcodone-acetaminophen (NORCO) 5-325 MG per tablet 0.5 tablet, 0.5 tablet, Oral, Q6H PRN, Delmar Rosas MD, 0.5 tablet at 07/10/24 0930    melatonin tablet 5 mg, 5 mg, Oral, Nightly, Prince Meade MD, 5 mg at 07/09/24 2024    metoprolol succinate XL (TOPROL-XL) 24 hr tablet 50  mg, 50 mg, Oral, BID, Prince Meade MD, 50 mg at 07/10/24 0930    metroNIDAZOLE (FLAGYL) IVPB 500 mg, 500 mg, Intravenous, Q8H, Tessa Montenegro MD, Last Rate: 200 mL/hr at 07/10/24 0614, 500 mg at 07/10/24 0614    nitroglycerin (NITROSTAT) SL tablet 0.4 mg, 0.4 mg, Sublingual, Q5 Min PRN, Tessa Montenegro MD    OLANZapine (zyPREXA) tablet 2.5 mg, 2.5 mg, Oral, Q8H PRN, Delmar Rosas MD, 2.5 mg at 07/09/24 2320    ondansetron (ZOFRAN) injection 4 mg, 4 mg, Intravenous, Q6H PRN, Tessa Montenegro MD, 4 mg at 07/08/24 1022    ondansetron ODT (ZOFRAN-ODT) disintegrating tablet 4 mg, 4 mg, Oral, Q6H PRN **OR** ondansetron (ZOFRAN) injection 4 mg, 4 mg, Intravenous, Q6H PRN, Tessa Montenegro MD    pantoprazole (PROTONIX) injection 40 mg, 40 mg, Intravenous, BID CORTEZ, Aylin Restrepo, APRN, 40 mg at 07/10/24 0930    prochlorperazine (COMPAZINE) injection 5 mg, 5 mg, Intravenous, Q6H PRN, 5 mg at 07/08/24 2149 **OR** prochlorperazine (COMPAZINE) tablet 5 mg, 5 mg, Oral, Q6H PRN **OR** prochlorperazine (COMPAZINE) suppository 25 mg, 25 mg, Rectal, Q12H PRN, Pirnce Meade MD    sodium chloride 0.9 % flush 10 mL, 10 mL, Intravenous, Q12H, Tessa Montenegro MD, 10 mL at 07/10/24 0931    sodium chloride 0.9 % flush 10 mL, 10 mL, Intravenous, PRN, Tessa Montenegro MD    sodium chloride 0.9 % infusion 40 mL, 40 mL, Intravenous, PRN, Tessa Montenegro MD    sodium chloride 0.9 % infusion, 50 mL/hr, Intravenous, Continuous, Delmar Roass MD, Last Rate: 50 mL/hr at 07/09/24 1214, 50 mL/hr at 07/09/24 1214    sucralfate (CARAFATE) tablet 1 g, 1 g, Oral, TID AC, Aylin Restrepo, APRN, 1 g at 07/10/24 0930      Objective     Vital Sign Min/Max for last 24 hours  Temp  Min: 97.9 °F (36.6 °C)  Max: 98.8 °F (37.1 °C)   BP  Min: 141/65  Max: 170/101    Pulse  Min: 78  Max: 153         07/05/24  0620 07/06/24  0510 07/10/24  0505   Weight: 60 kg (132 lb 4.4 oz) 58 kg (127 lb 13.9 oz) 54.9 kg  "(121 lb 0.5 oz)        Intake/Output Summary (Last 24 hours) at 7/10/2024 1128  Last data filed at 7/10/2024 0240  Gross per 24 hour   Intake 328 ml   Output 500 ml   Net -172 ml       Physical Exam:    General Appearance:    No acute distress   Lungs:     Clear to auscultation bilaterally. No wheezes, rhonchi or rales.     Heart:    Regular rate and rhythm.  Normal S1 and S2. No murmur, no gallop, rub or lift. , No JVD.   Abdomen:     Normal bowel sounds, soft, nontender, nondistended.   Extremities:   No clubbing, cyanosis or edema.     Skin:   Warm, dry   Neurologic:   Awake alert and oriented x3.      Monitor: sr  Results Review:     I reviewed the patient's new clinical results.    Sodium   Date Value Ref Range Status   07/10/2024 137 136 - 145 mmol/L Final   07/09/2024 135 (L) 136 - 145 mmol/L Final   07/08/2024 138 136 - 145 mmol/L Final     Potassium   Date Value Ref Range Status   07/10/2024 3.2 (L) 3.5 - 5.2 mmol/L Final   07/09/2024 3.4 (L) 3.5 - 5.2 mmol/L Final   07/08/2024 3.7 3.5 - 5.2 mmol/L Final     Comment:     Slight hemolysis detected by analyzer. Result may be falsely elevated.     Chloride   Date Value Ref Range Status   07/10/2024 102 98 - 107 mmol/L Final   07/09/2024 99 98 - 107 mmol/L Final   07/08/2024 103 98 - 107 mmol/L Final     No results found for: \"PLASMABICARB\"  BUN   Date Value Ref Range Status   07/10/2024 7 (L) 8 - 23 mg/dL Final   07/09/2024 8 8 - 23 mg/dL Final   07/08/2024 6 (L) 8 - 23 mg/dL Final     Creatinine   Date Value Ref Range Status   07/10/2024 0.35 (L) 0.57 - 1.00 mg/dL Final   07/09/2024 0.47 (L) 0.57 - 1.00 mg/dL Final   07/08/2024 0.42 (L) 0.57 - 1.00 mg/dL Final     Calcium   Date Value Ref Range Status   07/10/2024 8.2 (L) 8.6 - 10.5 mg/dL Final   07/09/2024 8.0 (L) 8.6 - 10.5 mg/dL Final   07/08/2024 7.8 (L) 8.6 - 10.5 mg/dL Final     No results found for: \"MG\"    Results from last 7 days   Lab Units 07/10/24  0353   WBC 10*3/mm3 10.89*   HEMOGLOBIN g/dL 8.7* "   HEMATOCRIT % 26.1*   PLATELETS 10*3/mm3 785*     Lab Results   Component Value Date    CHOL 264 (H) 10/10/2023    CHOL 275 (H) 07/10/2023     Lab Results   Component Value Date    HDL 50 10/10/2023    HDL 57 07/10/2023    HDL 61 (H) 03/23/2021     Lab Results   Component Value Date     (H) 10/10/2023     (H) 07/10/2023     (H) 03/23/2021     Lab Results   Component Value Date    TRIG 134 10/10/2023    TRIG 129 07/10/2023    TRIG 134 03/23/2021     Results from last 7 days   Lab Units 07/05/24  0810   INR  1.08                         Assessment/ Plan    Acute cholecystitis s/p lap choley 7/5  PAF, had RVR  Dysphagia  HTN  H/o CAD  PFO  Recent rib fractures after falls    RVR resolved after dig yesterday.    Plavix and Eliquis on hold. Possible EGD.  esophagogram t    IMPRESSION:  1.  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 This results in delayed  clearance of barium with intraesophageal backflow while the patient is  in the semiupright position.  2.  Small volume of recumbent gastroesophageal reflux observed refluxing  to the upper thoracic esophagus.  3.  Smooth tapered narrowing at the distal esophagus. The patient was  unable to swallow the barium tablet limiting full evaluation.   Possible EGD and dilatation tomorrow  Will follow as needed

## 2024-07-11 ENCOUNTER — ANESTHESIA (OUTPATIENT)
Dept: GASTROENTEROLOGY | Facility: HOSPITAL | Age: 89
End: 2024-07-11
Payer: MEDICARE

## 2024-07-11 ENCOUNTER — ANESTHESIA EVENT (OUTPATIENT)
Dept: GASTROENTEROLOGY | Facility: HOSPITAL | Age: 89
End: 2024-07-11
Payer: MEDICARE

## 2024-07-11 ENCOUNTER — APPOINTMENT (OUTPATIENT)
Dept: GENERAL RADIOLOGY | Facility: HOSPITAL | Age: 89
End: 2024-07-11
Payer: MEDICARE

## 2024-07-11 PROBLEM — K26.9 DUODENAL ULCER: Status: ACTIVE | Noted: 2024-07-11

## 2024-07-11 PROBLEM — K31.89 EROSIVE GASTROPATHY: Status: ACTIVE | Noted: 2024-07-11

## 2024-07-11 LAB
ALBUMIN SERPL-MCNC: 3.1 G/DL (ref 3.5–5.2)
ALBUMIN/GLOB SERPL: 1 G/DL
ALP SERPL-CCNC: 130 U/L (ref 39–117)
ALT SERPL W P-5'-P-CCNC: 29 U/L (ref 1–33)
ANION GAP SERPL CALCULATED.3IONS-SCNC: 12.9 MMOL/L (ref 5–15)
AST SERPL-CCNC: 34 U/L (ref 1–32)
BASOPHILS # BLD AUTO: 0.05 10*3/MM3 (ref 0–0.2)
BASOPHILS NFR BLD AUTO: 0.6 % (ref 0–1.5)
BILIRUB SERPL-MCNC: 0.5 MG/DL (ref 0–1.2)
BUN SERPL-MCNC: 5 MG/DL (ref 8–23)
BUN/CREAT SERPL: 11.1 (ref 7–25)
CALCIUM SPEC-SCNC: 8.1 MG/DL (ref 8.6–10.5)
CHLORIDE SERPL-SCNC: 97 MMOL/L (ref 98–107)
CO2 SERPL-SCNC: 24.1 MMOL/L (ref 22–29)
CREAT SERPL-MCNC: 0.45 MG/DL (ref 0.57–1)
DEPRECATED RDW RBC AUTO: 46.4 FL (ref 37–54)
EGFRCR SERPLBLD CKD-EPI 2021: 93.2 ML/MIN/1.73
EOSINOPHIL # BLD AUTO: 0.3 10*3/MM3 (ref 0–0.4)
EOSINOPHIL NFR BLD AUTO: 3.6 % (ref 0.3–6.2)
ERYTHROCYTE [DISTWIDTH] IN BLOOD BY AUTOMATED COUNT: 14.5 % (ref 12.3–15.4)
GLOBULIN UR ELPH-MCNC: 3 GM/DL
GLUCOSE SERPL-MCNC: 111 MG/DL (ref 65–99)
HCT VFR BLD AUTO: 27.2 % (ref 34–46.6)
HGB BLD-MCNC: 8.9 G/DL (ref 12–15.9)
IMM GRANULOCYTES # BLD AUTO: 0.08 10*3/MM3 (ref 0–0.05)
IMM GRANULOCYTES NFR BLD AUTO: 1 % (ref 0–0.5)
LYMPHOCYTES # BLD AUTO: 0.98 10*3/MM3 (ref 0.7–3.1)
LYMPHOCYTES NFR BLD AUTO: 11.7 % (ref 19.6–45.3)
MAGNESIUM SERPL-MCNC: 1.8 MG/DL (ref 1.6–2.4)
MCH RBC QN AUTO: 29.5 PG (ref 26.6–33)
MCHC RBC AUTO-ENTMCNC: 32.7 G/DL (ref 31.5–35.7)
MCV RBC AUTO: 90.1 FL (ref 79–97)
MONOCYTES # BLD AUTO: 0.85 10*3/MM3 (ref 0.1–0.9)
MONOCYTES NFR BLD AUTO: 10.1 % (ref 5–12)
NEUTROPHILS NFR BLD AUTO: 6.15 10*3/MM3 (ref 1.7–7)
NEUTROPHILS NFR BLD AUTO: 73 % (ref 42.7–76)
NRBC BLD AUTO-RTO: 0.2 /100 WBC (ref 0–0.2)
PLATELET # BLD AUTO: 825 10*3/MM3 (ref 140–450)
PMV BLD AUTO: 9 FL (ref 6–12)
POTASSIUM SERPL-SCNC: 3 MMOL/L (ref 3.5–5.2)
PROT SERPL-MCNC: 6.1 G/DL (ref 6–8.5)
RBC # BLD AUTO: 3.02 10*6/MM3 (ref 3.77–5.28)
SODIUM SERPL-SCNC: 134 MMOL/L (ref 136–145)
WBC NRBC COR # BLD AUTO: 8.41 10*3/MM3 (ref 3.4–10.8)

## 2024-07-11 PROCEDURE — 97535 SELF CARE MNGMENT TRAINING: CPT

## 2024-07-11 PROCEDURE — 88305 TISSUE EXAM BY PATHOLOGIST: CPT | Performed by: INTERNAL MEDICINE

## 2024-07-11 PROCEDURE — 0DB78ZX EXCISION OF STOMACH, PYLORUS, VIA NATURAL OR ARTIFICIAL OPENING ENDOSCOPIC, DIAGNOSTIC: ICD-10-PCS | Performed by: INTERNAL MEDICINE

## 2024-07-11 PROCEDURE — 85025 COMPLETE CBC W/AUTO DIFF WBC: CPT | Performed by: SURGERY

## 2024-07-11 PROCEDURE — 25010000002 PROPOFOL 10 MG/ML EMULSION: Performed by: ANESTHESIOLOGY

## 2024-07-11 PROCEDURE — 0DB98ZX EXCISION OF DUODENUM, VIA NATURAL OR ARTIFICIAL OPENING ENDOSCOPIC, DIAGNOSTIC: ICD-10-PCS | Performed by: INTERNAL MEDICINE

## 2024-07-11 PROCEDURE — 83735 ASSAY OF MAGNESIUM: CPT | Performed by: NURSE PRACTITIONER

## 2024-07-11 PROCEDURE — 80053 COMPREHEN METABOLIC PANEL: CPT | Performed by: SURGERY

## 2024-07-11 PROCEDURE — 43239 EGD BIOPSY SINGLE/MULTIPLE: CPT | Performed by: INTERNAL MEDICINE

## 2024-07-11 PROCEDURE — 25010000002 METRONIDAZOLE 500 MG/100ML SOLUTION: Performed by: SURGERY

## 2024-07-11 PROCEDURE — 25010000002 CEFTRIAXONE PER 250 MG: Performed by: SURGERY

## 2024-07-11 PROCEDURE — 97530 THERAPEUTIC ACTIVITIES: CPT

## 2024-07-11 PROCEDURE — 97116 GAIT TRAINING THERAPY: CPT

## 2024-07-11 PROCEDURE — 25810000003 SODIUM CHLORIDE 0.9 % SOLUTION: Performed by: INTERNAL MEDICINE

## 2024-07-11 PROCEDURE — 25810000003 LACTATED RINGERS PER 1000 ML: Performed by: ANESTHESIOLOGY

## 2024-07-11 PROCEDURE — 71045 X-RAY EXAM CHEST 1 VIEW: CPT

## 2024-07-11 RX ORDER — PROPOFOL 10 MG/ML
VIAL (ML) INTRAVENOUS CONTINUOUS PRN
Status: DISCONTINUED | OUTPATIENT
Start: 2024-07-11 | End: 2024-07-11 | Stop reason: SURG

## 2024-07-11 RX ORDER — SODIUM CHLORIDE, SODIUM LACTATE, POTASSIUM CHLORIDE, CALCIUM CHLORIDE 600; 310; 30; 20 MG/100ML; MG/100ML; MG/100ML; MG/100ML
INJECTION, SOLUTION INTRAVENOUS CONTINUOUS PRN
Status: DISCONTINUED | OUTPATIENT
Start: 2024-07-11 | End: 2024-07-11 | Stop reason: SURG

## 2024-07-11 RX ORDER — POTASSIUM CHLORIDE 750 MG/1
40 TABLET, FILM COATED, EXTENDED RELEASE ORAL EVERY 4 HOURS
Status: DISPENSED | OUTPATIENT
Start: 2024-07-11 | End: 2024-07-11

## 2024-07-11 RX ORDER — SODIUM CHLORIDE 9 MG/ML
30 INJECTION, SOLUTION INTRAVENOUS CONTINUOUS
Status: DISCONTINUED | OUTPATIENT
Start: 2024-07-11 | End: 2024-07-11

## 2024-07-11 RX ORDER — LIDOCAINE HYDROCHLORIDE 20 MG/ML
INJECTION, SOLUTION INFILTRATION; PERINEURAL AS NEEDED
Status: DISCONTINUED | OUTPATIENT
Start: 2024-07-11 | End: 2024-07-11 | Stop reason: SURG

## 2024-07-11 RX ADMIN — CEFTRIAXONE 2000 MG: 2 INJECTION, POWDER, FOR SOLUTION INTRAMUSCULAR; INTRAVENOUS at 12:32

## 2024-07-11 RX ADMIN — Medication 5 MG: at 20:30

## 2024-07-11 RX ADMIN — METOPROLOL SUCCINATE 50 MG: 50 TABLET, FILM COATED, EXTENDED RELEASE ORAL at 12:31

## 2024-07-11 RX ADMIN — ACETAMINOPHEN 325MG 650 MG: 325 TABLET ORAL at 20:30

## 2024-07-11 RX ADMIN — PANTOPRAZOLE SODIUM 40 MG: 40 INJECTION, POWDER, FOR SOLUTION INTRAVENOUS at 16:46

## 2024-07-11 RX ADMIN — SODIUM CHLORIDE, POTASSIUM CHLORIDE, SODIUM LACTATE AND CALCIUM CHLORIDE: 600; 310; 30; 20 INJECTION, SOLUTION INTRAVENOUS at 10:41

## 2024-07-11 RX ADMIN — Medication 10 ML: at 12:34

## 2024-07-11 RX ADMIN — SODIUM CHLORIDE 30 ML/HR: 9 INJECTION, SOLUTION INTRAVENOUS at 09:44

## 2024-07-11 RX ADMIN — ACETAMINOPHEN 325MG 650 MG: 325 TABLET ORAL at 16:46

## 2024-07-11 RX ADMIN — METRONIDAZOLE 500 MG: 500 INJECTION, SOLUTION INTRAVENOUS at 20:26

## 2024-07-11 RX ADMIN — ACETAMINOPHEN 325MG 650 MG: 325 TABLET ORAL at 12:30

## 2024-07-11 RX ADMIN — HYDROCODONE BITARTRATE AND ACETAMINOPHEN 0.5 TABLET: 5; 325 TABLET ORAL at 23:56

## 2024-07-11 RX ADMIN — METRONIDAZOLE 500 MG: 500 INJECTION, SOLUTION INTRAVENOUS at 14:07

## 2024-07-11 RX ADMIN — METRONIDAZOLE 500 MG: 500 INJECTION, SOLUTION INTRAVENOUS at 06:24

## 2024-07-11 RX ADMIN — METOPROLOL SUCCINATE 50 MG: 50 TABLET, FILM COATED, EXTENDED RELEASE ORAL at 20:30

## 2024-07-11 RX ADMIN — PANTOPRAZOLE SODIUM 40 MG: 40 INJECTION, POWDER, FOR SOLUTION INTRAVENOUS at 12:31

## 2024-07-11 RX ADMIN — LIDOCAINE HYDROCHLORIDE 60 MG: 20 INJECTION, SOLUTION INFILTRATION; PERINEURAL at 10:41

## 2024-07-11 RX ADMIN — PROPOFOL 160 MCG/KG/MIN: 10 INJECTION, EMULSION INTRAVENOUS at 10:41

## 2024-07-11 RX ADMIN — Medication 10 ML: at 20:27

## 2024-07-11 NOTE — ANESTHESIA PREPROCEDURE EVALUATION
Anesthesia Evaluation     Patient summary reviewed   no history of anesthetic complications:                Airway   Mallampati: II  TM distance: >3 FB  Neck ROM: limited  Possible difficult intubation  Dental      Pulmonary     breath sounds clear to auscultation  (+) pleural effusion,  (-) shortness of breath, recent URI, not a smoker    ROS comment: CXR 1. Consolidation of the right lung base, likely a small pleural effusion  with underlying pneumonia and/or atelectasis, with a suspected small  right apical pneumothorax. Compared to more recent prior outside imaging  if available.    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    Musculoskeletal     (+) neck stiffness  (-) neck pain  Abdominal    Substance History      OB/GYN          Other   blood dyscrasia (Hb 8.9) anemia,                       Anesthesia Plan    ASA 3     MAC     intravenous induction       Use of blood products discussed with patient .        CODE STATUS:    Code Status (Patient has no pulse and is not breathing): CPR (Attempt to Resuscitate)  Medical Interventions (Patient has pulse or is breathing): Full Support

## 2024-07-11 NOTE — THERAPY TREATMENT NOTE
Patient Name: Danyell Osborne  : 1935    MRN: [id number]                              Today's Date: 2024       Admit Date: 2024    Visit Dx:     ICD-10-CM ICD-9-CM   1. Acute cholecystitis  K81.0 575.0   2. Abnormal esophagram  R93.3 793.4     Patient Active Problem List   Diagnosis    Essential hypertension    Coronary artery disease involving native coronary artery of native heart without angina pectoris    Skin lesions    Gastroesophageal reflux disease    Stress    Other headache syndrome    Paroxysmal atrial fibrillation    Routine general medical examination at a health care facility    Cough    Screening mammogram for breast cancer    Postmenopausal    Acute conjunctivitis of left eye    Other constipation    Memory loss    Ganglion cyst of finger    Hypercholesteremia    Left-sided weakness    Arthralgia of both knees    Myalgia    PND (post-nasal drip)    Congestion of nasal sinus    Coronary artery disease    Presence of stent in right coronary artery    Personal history of other diseases of the digestive system    PAD (peripheral artery disease)    Osteoporosis    IBS (irritable bowel syndrome)    Anxiety disorder    Left carotid artery stenosis    Acute cholecystitis    PFO (patent foramen ovale)    Closed fracture of multiple ribs of right side    Hyponatremia    Acute retention of urine    Delirium due to another medical condition    Abnormal esophagram    Duodenal erosions on EGD    Erosive gastropathy     Past Medical History:   Diagnosis Date    A-fib     Anemia     Coronary artery disease     HLD (hyperlipidemia)     Hypertension     Stroke      Past Surgical History:   Procedure Laterality Date    CHOLECYSTECTOMY WITH INTRAOPERATIVE CHOLANGIOGRAM N/A 2024    Procedure: Laparoscopic cholecystectomy with cholangiogram;  Surgeon: Tessa Montenegro MD;  Location: [facility];  Service: General;  Laterality: N/A;    HYSTERECTOMY        General Information       Row Name  07/11/24 1416          Physical Therapy Time and Intention    Document Type therapy note (daily note)  -     Mode of Treatment physical therapy  -       Row Name 07/11/24 1416          General Information    Existing Precautions/Restrictions fall  -       Row Name 07/11/24 1416          Cognition    Orientation Status (Cognition) oriented x 3  -               User Key  (r) = Recorded By, (t) = Taken By, (c) = Cosigned By      Initials Name Provider Type     Andreina Florian PTA Physical Therapist Assistant                   Mobility       Row Name 07/11/24 1418          Bed Mobility    Comment, (Bed Mobility) up in chair  -       Row Name 07/11/24 1418          Sit-Stand Transfer    Sit-Stand [place] (Transfers) standby assist  -       Row Name 07/11/24 1418          Gait/Stairs (Locomotion)    [place] Level (Gait) standby assist  -     Assistive Device (Gait) walker, front-wheeled  -     Patient was able to Ambulate yes  -SM     Distance in Feet (Gait) 200  -SM     Deviations/Abnormal Patterns (Gait) lynne decreased;stride length decreased  -     Bilateral Gait Deviations forward flexed posture  -               User Key  (r) = Recorded By, (t) = Taken By, (c) = Cosigned By      Initials Name Provider Type     Andreina Florian PTA Physical Therapist Assistant                   Obj/Interventions    No documentation.                  Goals/Plan    No documentation.                  Clinical Impression       [facility] Name 07/11/24 1420          Pain    Pretreatment Pain Rating 0/10 - no pain  -     Posttreatment Pain Rating 0/10 - no pain  -[facility] Name 07/11/24 1420          Positioning and Restraints    Pre-Treatment Position sitting in chair/recliner  -     Post Treatment Position chair  -SM     In Chair reclined;call light within reach;encouraged to call for assist;with family/caregiver  -               User Key  (r) = Recorded By, (t) = Taken By, (c) = Cosigned By       Initials Name Provider Type    Andreina Enrique DILSHAD Barrientos Physical Therapist Assistant                   Outcome Measures       Row Name 07/11/24 1614 07/11/24 1400       How much help from another person do you currently need...    Turning from your back to your side while in flat bed without using bedrails? 3  -SM 3  -RF    Moving from lying on back to sitting on the side of a flat bed without bedrails? 3  -SM 3  -RF    Moving to and from a bed to a chair (including a wheelchair)? 3  -SM 3  -RF    Standing up from a chair using your arms (e.g., wheelchair, bedside chair)? 3  -SM 3  -RF    Climbing 3-5 steps with a railing? 3  -SM 2  -RF    To walk in hospital room? 3  -SM 2  -RF    AM-PAC 6 Clicks Score (PT) 18  -SM 16  -RF    Highest Level of Mobility Goal 6 --> Walk 10 steps or more  - 5 --> Static standing  -RF      Row Name 07/11/24 0750          How much help from another person do you currently need...    Turning from your back to your side while in flat bed without using bedrails? 3  -RF     Moving from lying on back to sitting on the side of a flat bed without bedrails? 3  -RF     Moving to and from a bed to a chair (including a wheelchair)? 3  -RF     Standing up from a chair using your arms (e.g., wheelchair, bedside chair)? 3  -RF     Climbing 3-5 steps with a railing? 2  -RF     To walk in hospital room? 2  -RF     AM-PAC 6 Clicks Score (PT) 16  -RF     Highest Level of Mobility Goal 5 --> Static standing  -RF       Row Name 07/11/24 1614 07/11/24 1544       Functional Assessment    Outcome Measure Options AM-PAC 6 Clicks Basic Mobility (PT)  - AM-PAC 6 Clicks Daily Activity (OT)  -              User Key  (r) = Recorded By, (t) = Taken By, (c) = Cosigned By      Initials Name Provider Type    RAMY Florian Andreinaaicha Barrientos PTA Physical Therapist Assistant    RF Bessie Fry, RN Registered Nurse    Ke Frias, OT Occupational Therapist                                 Physical Therapy  Education       Title: PT OT SLP Therapies (Done)       Topic: Physical Therapy (Done)       Point: Mobility training (Done)       Learning Progress Summary             Patient Acceptance, E,TB,D, VU,NR by  at 7/11/2024 1615    Acceptance, E, VU,NR by  at 7/9/2024 1041    Acceptance, E, VU,NR by DJ at 7/8/2024 1456    Acceptance, E, VU,NR by  at 7/6/2024 1254   Family Acceptance, E, VU,NR by DJ at 7/9/2024 1041    Acceptance, E, VU,NR by DJ at 7/8/2024 1456                         Point: Home exercise program (Done)       Learning Progress Summary             Patient Acceptance, E,TB,D, VU,NR by  at 7/11/2024 1615    Acceptance, E, VU,NR by  at 7/9/2024 1041    Acceptance, E, VU,NR by DJ at 7/8/2024 1456    Acceptance, E, VU,NR by  at 7/6/2024 1254   Family Acceptance, E, VU,NR by DJ at 7/9/2024 1041    Acceptance, E, VU,NR by DJ at 7/8/2024 1456                                         User Key       Initials Effective Dates Name Provider Type Discipline     07/11/23 -  Ashley Pappas, PT Physical Therapist PT     03/07/18 -  Andreina Florian PTA Physical Therapist Assistant PT     10/25/19 -  Aminah Torres PT Physical Therapist PT                  PT Recommendation and Plan     Plan of Care Reviewed With: patient  Progress: improving  Outcome Evaluation: Pt tolerated treatment well this date. Required SBA to stand, then pt ambulated 200ft w/ Rw and CGA. Encouraged pt to attempt a few seated LE exercises on own during the day and to continue ambulating w/ nsg as well.     Time Calculation:         PT Charges       Row Name 07/11/24 1617             Time Calculation    Start Time 1340  -      Stop Time 1403  -      Time Calculation (min) 23 min  -      PT Received On 07/11/24  -      PT - Next Appointment 07/12/24  -                User Key  (r) = Recorded By, (t) = Taken By, (c) = Cosigned By      Initials Name Provider Type     Andreina Florian PTA Physical Therapist  Assistant                  Therapy Charges for Today       Code Description Service Date Service Provider Modifiers Qty    94728184985  GAIT TRAINING EA 15 MIN 7/11/2024 Andreina Florian, DILSHAD GP 1    [id number]  PT THERAPEUTIC ACT EA 15 MIN 7/11/2024 Andreina Florian PTA GP 1            PT G-Codes  Outcome Measure Options: AM-PAC 6 Clicks Basic Mobility (PT)  AM-PAC 6 Clicks Score (PT): 18  AM-PAC 6 Clicks Score (OT): 21  Modified Jose Scale: 4 - Moderately severe disability.  Unable to walk without assistance, and unable to attend to own bodily needs without assistance.  PT Discharge Summary  Anticipated Discharge Disposition (PT): home with home health    Andreina Florian PTA  7/11/2024

## 2024-07-11 NOTE — PLAN OF CARE
Goal Outcome Evaluation:  Plan of Care Reviewed With: patient        Progress: improving  Outcome Evaluation: Pt tolerated treatment well this date. Required SBA to stand, then pt ambulated 200ft w/ Rw and CGA. Encouraged pt to attempt a few seated LE exercises on own during the day and to continue ambulating w/ nsg as well.      Anticipated Discharge Disposition (PT): home with home health

## 2024-07-11 NOTE — ANESTHESIA POSTPROCEDURE EVALUATION
"Patient: Danyell Osborne    Procedure Summary       Date: 07/11/24 Room / [facility]:  [facility]    Anesthesia Start: 1041 Anesthesia Stop: 1058    Procedure: ESOPHAGOGASTRODUODENOSCOPY WITH COLD BIOPSIES (Esophagus) Diagnosis:       Abnormal esophagram      (Abnormal esophagram [R93.3])    Surgeons: Santos Crabtree MD Provider: Tiago Soliman MD    Anesthesia Type: MAC ASA Status: 3            Anesthesia Type: MAC    Vitals  No vitals data found for the desired time range.          Post Anesthesia Care and Evaluation    Patient location during evaluation: bedside  Patient participation: complete - patient participated  Level of consciousness: awake and alert  Pain management: adequate    Airway patency: patent  Anesthetic complications: No anesthetic complications    Cardiovascular status: acceptable  Respiratory status: acceptable  Hydration status: acceptable    Comments: /70 (BP Location: Right arm, Patient Position: Lying)   Pulse 71   Temp 36.9 °C (98.4 °F) (Oral)   Resp 16   Ht 162.6 cm (64\")   Wt 54.9 kg (121 lb 0.5 oz)   SpO2 97%   BMI 20.78 kg/m²     "

## 2024-07-11 NOTE — THERAPY TREATMENT NOTE
Patient Name: Danyell Osborne  : 1935    MRN: [id number]                              Today's Date: 2024       Admit Date: 2024    Visit Dx:     ICD-10-CM ICD-9-CM   1. Acute cholecystitis  K81.0 575.0   2. Abnormal esophagram  R93.3 793.4     Patient Active Problem List   Diagnosis    Essential hypertension    Coronary artery disease involving native coronary artery of native heart without angina pectoris    Skin lesions    Gastroesophageal reflux disease    Stress    Other headache syndrome    Paroxysmal atrial fibrillation    Routine general medical examination at a health care facility    Cough    Screening mammogram for breast cancer    Postmenopausal    Acute conjunctivitis of left eye    Other constipation    Memory loss    Ganglion cyst of finger    Hypercholesteremia    Left-sided weakness    Arthralgia of both knees    Myalgia    PND (post-nasal drip)    Congestion of nasal sinus    Coronary artery disease    Presence of stent in right coronary artery    Personal history of other diseases of the digestive system    PAD (peripheral artery disease)    Osteoporosis    IBS (irritable bowel syndrome)    Anxiety disorder    Left carotid artery stenosis    Acute cholecystitis    PFO (patent foramen ovale)    Closed fracture of multiple ribs of right side    Hyponatremia    Acute retention of urine    Delirium due to another medical condition    Abnormal esophagram    Duodenal erosions on EGD    Erosive gastropathy     Past Medical History:   Diagnosis Date    A-fib     Anemia     Coronary artery disease     HLD (hyperlipidemia)     Hypertension     Stroke      Past Surgical History:   Procedure Laterality Date    CHOLECYSTECTOMY WITH INTRAOPERATIVE CHOLANGIOGRAM N/A 2024    Procedure: Laparoscopic cholecystectomy with cholangiogram;  Surgeon: Tessa Montenegro MD;  Location: [facility];  Service: General;  Laterality: N/A;    HYSTERECTOMY        General Information       Row Name  07/11/24 1540          OT Time and Intention    Document Type therapy note (daily note)  -AG     Mode of Treatment occupational therapy  -AG       Row Name 07/11/24 1540          General Information    Patient Profile Reviewed yes  -AG     Existing Precautions/Restrictions fall  -AG       Row Name 07/11/24 1540          Cognition    Orientation Status (Cognition) oriented x 3  -AG       Row Name 07/11/24 1540          Safety Issues, Functional Mobility    Safety Issues Affecting Function (Mobility) awareness of need for assistance;safety precaution awareness;judgment;positioning of assistive device  -AG     Impairments Affecting Function (Mobility) balance;endurance/activity tolerance;strength;pain  -AG               User Key  (r) = Recorded By, (t) = Taken By, (c) = Cosigned By      Initials Name Provider Type    AG Ke Mann, [place] Occupational Therapist                     Mobility/ADL's       Row Name 07/11/24 1540          Bed Mobility    Comment, (Bed Mobility) UIC at start and end of session  -AG       Row Name 07/11/24 1540          Transfers    Transfers sit-stand transfer;toilet transfer  -AG       Row Name 07/11/24 1540          Sit-Stand Transfer    Sit-Stand [place] (Transfers) standby assist  -AG     Assistive Device (Sit-Stand Transfers) walker, front-wheeled  -AG     Comment, (Sit-Stand Transfer) 1x from chair, 1x from toilet  -AG       Row Name 07/11/24 1540          Toilet Transfer    Type (Toilet Transfer) sit-stand  -AG     [place] Level (Toilet Transfer) contact guard  -AG     Assistive Device (Toilet Transfer) commode;grab bars/safety frame;walker, front-wheeled  -AG       Row Name 07/11/24 1540          Functional Mobility    Functional Mobility- Comment household distance in room from chair <> toilet w RW  -AG       Row Name 07/11/24 1540          Activities of Daily Living    BADL Assessment/Intervention lower body dressing;grooming;toileting  -AG       Row Name 07/11/24 1540           Lower Body Dressing Assessment/Training    [place] Level (Lower Body Dressing) lower body dressing skills;doff;pants/bottoms;don;minimum assist (75% patient effort)  -AG     Position (Lower Body Dressing) supported standing  -AG       Row Name 07/11/24 1540          Grooming Assessment/Training    [place] Level (Grooming) grooming skills;wash face, hands;supervision  -AG     Position (Grooming) sink side  -AG       Row Name 07/11/24 1540          Toileting Assessment/Training    [place] Level (Toileting) toileting skills;adjust/manage clothing;perform perineal hygiene;supervision  -AG     Assistive Devices (Toileting) commode;grab bar/safety frame  -AG     Position (Toileting) supported standing  -AG               User Key  (r) = Recorded By, (t) = Taken By, (c) = Cosigned By      Initials Name Provider Type    Ke Frias OT Occupational Therapist                   Obj/Interventions       Row Name 07/11/24 1542          Motor Skills    Motor Skills functional endurance  -AG     Functional Endurance fair  -AG       Row Name 07/11/24 1542          Balance    Balance Assessment sitting static balance;sitting dynamic balance;standing static balance;standing dynamic balance  -AG     Static Sitting Balance standby assist  -AG     Dynamic Sitting Balance standby assist  -AG     Position, Sitting Balance sitting in chair  -AG     Static Standing Balance contact guard  -AG     Dynamic Standing Balance contact guard  -AG     Position/Device Used, Standing Balance supported;walker, 4-wheeled  -AG     Balance Interventions sitting;standing  -AG               User Key  (r) = Recorded By, (t) = Taken By, (c) = Cosigned By      Initials Name Provider Type    Ke Frias OT Occupational Therapist                   Goals/Plan    No documentation.                  Clinical Impression       Row Name 07/11/24 1542          Pain Assessment    Pretreatment Pain Rating 0/10 - no pain  -AG      Posttreatment Pain Rating 0/10 - no pain  -AG       Row Name 07/11/24 1542          Plan of Care Review    Plan of Care Reviewed With patient;spouse  -AG     Progress improving  -AG     Outcome Evaluation Pt. seen this PM for OT session, agreeable to participate in ADL retraining. Pt. was able to perform STS from recliner SBA, func mob with RW CGA and toileting/grooming with CGA-supervision A. Pt. continues to demo decreased act vielka and strength but improving from previous sessions. OT will continue to follow and progress as able.  -AG       Row Name 07/11/24 1542          Therapy Assessment/Plan (OT)    Rehab Potential (OT) good, to achieve stated therapy goals  -AG     Criteria for Skilled Therapeutic Interventions Met (OT) yes;meets criteria;skilled treatment is necessary  -AG     Therapy Frequency (OT) 5 times/wk  -AG       Row Name 07/11/24 1542          Therapy Plan Review/Discharge Plan (OT)    Anticipated Discharge Disposition (OT) home with assist;skilled nursing facility  -AG       Row Name 07/11/24 1542          Vital Signs    O2 Delivery Pre Treatment room air  -AG       Row Name 07/11/24 1542          Positioning and Restraints    Pre-Treatment Position sitting in chair/recliner  -AG     Post Treatment Position chair  -AG     In Chair notified nsg;reclined;call light within reach;encouraged to call for assist;exit alarm on;with family/caregiver  -AG               User Key  (r) = Recorded By, (t) = Taken By, (c) = Cosigned By      Initials Name Provider Type    AG Ke Mann, OT Occupational Therapist                   Outcome Measures       Row Name 07/11/24 4830          How much help from another is currently needed...    Putting on and taking off regular lower body clothing? 3  -AG     Bathing (including washing, rinsing, and drying) 3  -AG     Toileting (which includes using toilet bed pan or urinal) 3  -AG     Putting on and taking off regular upper body clothing 4  -AG     Taking care of  personal grooming (such as brushing teeth) 4  -AG     Eating meals 4  -AG     AM-PAC 6 Clicks Score (OT) 21  -AG       Row Name 07/11/24 0750          How much help from another person do you currently need...    Turning from your back to your side while in flat bed without using bedrails? 3  -RF     Moving from lying on back to sitting on the side of a flat bed without bedrails? 3  -RF     Moving to and from a bed to a chair (including a wheelchair)? 3  -RF     Standing up from a chair using your arms (e.g., wheelchair, bedside chair)? 3  -RF     Climbing 3-5 steps with a railing? 2  -RF     To walk in hospital room? 2  -RF     AM-PAC 6 Clicks Score (PT) 16  -RF     Highest Level of Mobility Goal 5 --> Static standing  -RF       Row Name 07/11/24 1544          Functional Assessment    Outcome Measure Options AM-PAC 6 Clicks Daily Activity (OT)  -AG               User Key  (r) = Recorded By, (t) = Taken By, (c) = Cosigned By      Initials Name Provider Type    RF Bessie Fry, RN Registered Nurse    Ke Frias OT Occupational Therapist                    Occupational Therapy Education       Title: PT OT SLP Therapies (Done)       Topic: Occupational Therapy (Done)       Point: ADL training (Done)       Description:   Instruct learner(s) on proper safety adaptation and remediation techniques during self care or transfers.   Instruct in proper use of assistive devices.                  Learning Progress Summary             Patient Acceptance, E, VU,NR by SV at 7/6/2024 1254    Acceptance, E, VU by MM at 7/6/2024 1212    Comment: role of OT, d/c rec, GOC   Family Acceptance, E, VU by MM at 7/6/2024 1212    Comment: role of OT, d/c rec, GOC                         Point: Precautions (Done)       Description:   Instruct learner(s) on prescribed precautions during self-care and functional transfers.                  Learning Progress Summary             Patient Acceptance, E, VU,NR by  at 7/6/2024 1254     Acceptance, E, VU by MM at 7/6/2024 1212    Comment: role of OT, d/c rec, GOC   Family Acceptance, E, VU by MM at 7/6/2024 1212    Comment: role of OT, d/c rec, GOC                         Point: Body mechanics (Done)       Description:   Instruct learner(s) on proper positioning and spine alignment during self-care, functional mobility activities and/or exercises.                  Learning Progress Summary             Patient Acceptance, E, VU,NR by  at 7/6/2024 1254    Acceptance, E, VU by MM at 7/6/2024 1212    Comment: role of OT, d/c rec, GOC   Family Acceptance, E, VU by MM at 7/6/2024 1212    Comment: role of OT, d/c rec, GOC                                         User Key       Initials Effective Dates Name Provider Type Discipline     07/11/23 -  Ashley Pappas PT Physical Therapist PT    MM 05/31/24 -  Kaykay Mallory OT Occupational Therapist OT                  OT Recommendation and Plan  Therapy Frequency (OT): 5 times/wk  Plan of Care Review  Plan of Care Reviewed With: patient, spouse  Progress: improving  Outcome Evaluation: Pt. seen this PM for OT session, agreeable to participate in ADL retraining. Pt. was able to perform STS from recliner SBA, func mob with RW CGA and toileting/grooming with CGA-supervision A. Pt. continues to demo decreased act vielka and strength but improving from previous sessions. OT will continue to follow and progress as able.     Time Calculation:         Time Calculation- OT       Row Name 07/11/24 1545             Time Calculation- OT    OT Start Time 1440  -AG      OT Stop Time 1450  -AG      OT Time Calculation (min) 10 min  -AG      Total Timed Code Minutes- OT 10 minute(s)  -AG      OT Received On 07/11/24  -AG      OT - Next Appointment 07/12/24  -AG      OT Goal Re-Cert Due Date 07/20/24  -AG         Timed Charges    41028 - OT Self Care/Mgmt Minutes 10  -AG         Total Minutes    Timed Charges Total Minutes 10  -AG       Total Minutes 10  -AG                 User Key  (r) = Recorded By, (t) = Taken By, (c) = Cosigned By      Initials Name Provider Type     Ke Mann OT Occupational Therapist                  Therapy Charges for Today       Code Description Service Date Service Provider Modifiers Qty    [id number] HC OT SELF CARE/MGMT/TRAIN EA 15 MIN 7/11/2024 Ke Mann OT GO 1                 Ke Mann OT  7/11/2024

## 2024-07-11 NOTE — CASE MANAGEMENT/SOCIAL WORK
Continued Stay Note  [facility]     Patient Name: Danyell Osborne  MRN: [id number]  Today's Date: 7/11/2024    Admit Date: 7/5/2024    Plan: Home w/ [facility], family to transport   Discharge Plan       Row Name 07/11/24 1135       Plan    Plan Home w/ [facility], family to transport    Plan Comments Clinical chart reviewed; plan remains home w/ [facility]. Family can transport. CCP will continue to follow for any dc planning needs/recommendations. FREDY, JOSEW                   Discharge Codes    No documentation.                 Expected Discharge Date and Time       Expected Discharge Date Expected Discharge Time    Jul 11, 2024               AURORA Aleman

## 2024-07-11 NOTE — PLAN OF CARE
Goal Outcome Evaluation:  Plan of Care Reviewed With: patient, spouse        Progress: improving  Outcome Evaluation: Pt. seen this PM for OT session, agreeable to participate in ADL retraining. Pt. was able to perform STS from recliner SBA, func mob with RW CGA and toileting/grooming with CGA-supervision A. Pt. continues to demo decreased act vielka and strength but improving from previous sessions. OT will continue to follow and progress as able.      Anticipated Discharge Disposition (OT): home with assist, skilled nursing facility

## 2024-07-11 NOTE — PLAN OF CARE
Goal Outcome Evaluation:              Outcome Evaluation: No acute changes this shift. Pt refusing multiple oral medications. Meds given per MAR. WCTM

## 2024-07-11 NOTE — PROGRESS NOTES
[facility] Medicine Services  PROGRESS NOTE    Patient Name: Danyell Osborne  : 1935  MRN: [id number]    Date of Admission: 2024  Primary Care Physician: Shanae Osborne APRN    Subjective   Subjective     CC:  Follow-up for recent acute cholecystitis    Subjective:   Patient seen after endoscopy today.  She is feeling a bit better    Review of Systems  No current fevers or chills  No current shortness of breath or cough  No current nausea, vomiting, or diarrhea  No current chest pain or palpitations       Objective   Objective     Vital Signs:   Temp:  [98.1 °F (36.7 °C)-98.8 °F (37.1 °C)] 98.4 °F (36.9 °C)  Heart Rate:  [71-87] 75  Resp:  [16-18] 16  BP: (131-176)/(51-93) 158/62        Physical Exam:  Constitutional:Awake, alert, elderly  HENT: NCAT, mucous membranes moist, neck supple  Respiratory: No cough or wheezes, normal respirations, nonlabored breathing   Cardiovascular: Pulse rate is normal, palpable radial pulses  Gastrointestinal:  soft, appropriate expected abdominal tenderness, nondistended  Musculoskeletal: Frail, chronically debilitated appearance, mild lower extremity edema, BMI is 21  Psychiatric: Appropriate affect, cooperative, conversational  Neurologic: Somewhat poor historian, no slurred speech or facial droop, follows commands  Skin: No rashes or jaundice, warm      Results Reviewed:  Results from last 7 days   Lab Units 24  0626 07/10/24  0353 24  0334 24  0929 24  0810   WBC 10*3/mm3 8.41 10.89* 13.22*   < > 10.84*   HEMOGLOBIN g/dL 8.9* 8.7* 8.2*   < > 8.9*   HEMATOCRIT % 27.2* 26.1* 24.7*   < > 27.3*   PLATELETS 10*3/mm3 825* 785* 733*   < > 586*   INR   --   --   --   --  1.08    < > = values in this interval not displayed.     Results from last 7 days   Lab Units 24  0626 07/10/24  0353 24  0334   SODIUM mmol/L 134* 137 135*   POTASSIUM mmol/L 3.0* 3.2* 3.4*   CHLORIDE mmol/L 97* 102 99   CO2 mmol/L 24.1 25.0 19.8*   BUN  mg/dL 5* 7* 8   CREATININE mg/dL 0.45* 0.35* 0.47*   GLUCOSE mg/dL 111* 120* 94   CALCIUM mg/dL 8.1* 8.2* 8.0*   ALK PHOS U/L 130* 115 112   ALT (SGPT) U/L 29 31 36*   AST (SGOT) U/L 34* 28 31     Estimated Creatinine Clearance: 76.3 mL/min (A) (by C-G formula based on SCr of 0.45 mg/dL (L)).    Microbiology Results Abnormal       Procedure Component Value - Date/Time    Blood Culture - Blood, Hand, Right [[id number]]  (Normal) Collected: 07/05/24 1033    Lab Status: Final result Specimen: Blood from Hand, Right Updated: 07/10/24 1115     Blood Culture No growth at 5 days    Blood Culture - Blood, Hand, Left [[id number]]  (Normal) Collected: 07/05/24 1033    Lab Status: Final result Specimen: Blood from Hand, Left Updated: 07/10/24 1115     Blood Culture No growth at 5 days            Imaging Results (Last 24 Hours)       Procedure Component Value Units Date/Time    XR Chest 1 View [[id number]] Collected: 07/11/24 0700     Updated: 07/11/24 0706    Narrative:      XR CHEST 1 VW-     DATE OF EXAM: 7/11/2024 5:30 AM     INDICATION: Assess pneumothorax.     COMPARISON: Radiographs 7/10/2024, 7/9/2024, 7/8/2024, and 7/7/2024. CT  abdomen and pelvis 7/4/2024.     TECHNIQUE: A single portable AP view of the chest was obtained.     FINDINGS:  Patient rotation. Overlying artifacts. Similar-appearing right greater  than left bibasilar opacities, obscuring the diaphragm in each  costophrenic angle. Biapical pleural-parenchymal scarring and partially  calcified pleural plaques. No pneumothorax is seen. Unchanged cardiac  and mediastinal contours. Calcified atherosclerotic disease in the  thoracic aorta. Known right rib fractures are not well visualized.       Impression:         1. Similar appearing right greater than left bibasilar opacities, which  could represent atelectasis, pneumonia, and pulmonary contusions, with  small right greater than left bilateral pleural effusions.  2. No pneumothorax is seen.     This report was  finalized on 7/11/2024 7:03 AM by Praful Gutierrez MD on  Workstation: [id number]               Results for orders placed during the hospital encounter of 10/09/23    Adult Transthoracic Echo Complete W/ Cont if Necessary Per Protocol (With Agitated Saline)    Interpretation Summary    Left ventricular systolic function is normal. Left ventricular ejection fraction appears to be 56 - 60%.    Left ventricular diastolic function was indeterminate.    Saline test results are positive for right to left atrial level shunt.      I have reviewed the medications:  Scheduled Meds:acetaminophen, 650 mg, Oral, TID  [Held by provider] apixaban, 2.5 mg, Oral, Q12H  cefTRIAXone, 2,000 mg, Intravenous, Q24H  [Held by provider] clopidogrel, 75 mg, Oral, Daily  melatonin, 5 mg, Oral, Nightly  metoprolol succinate XL, 50 mg, Oral, BID  metroNIDAZOLE, 500 mg, Intravenous, Q8H  pantoprazole, 40 mg, Intravenous, BID AC  senna-docusate sodium, 2 tablet, Oral, BID   And  polyethylene glycol, 17 g, Oral, Daily  sodium chloride, 10 mL, Intravenous, Q12H  sucralfate, 1 g, Oral, TID AC      Continuous Infusions:sodium chloride, 30 mL/hr, Last Rate: 30 mL/hr (07/11/24 0944)        PRN Meds:.  acetaminophen **OR** acetaminophen **OR** acetaminophen    senna-docusate sodium **AND** polyethylene glycol **AND** bisacodyl **AND** bisacodyl    calcium carbonate    HYDROcodone-acetaminophen    nitroglycerin    OLANZapine    ondansetron    ondansetron ODT **OR** ondansetron    prochlorperazine **OR** prochlorperazine **OR** prochlorperazine    sodium chloride    sodium chloride    Assessment & Plan   Assessment & Plan     Active Hospital Problems    Diagnosis  POA    **Acute cholecystitis [K81.0]  Yes    Acute retention of urine [R33.8]  No    Delirium due to another medical condition [F05]  No    PFO (patent foramen ovale) [Q21.12]  Not Applicable    Closed fracture of multiple ribs of right side [S22.41XA]  Yes    Hyponatremia [E87.1]  Yes     Abnormal esophagram [R93.3]  Unknown    Hypercholesteremia [E78.00]  Yes    Paroxysmal atrial fibrillation [I48.0]  Yes    Essential hypertension [I10]  Yes    Coronary artery disease involving native coronary artery of native heart without angina pectoris [I25.10]  Yes    PAD (peripheral artery disease) [I73.9]  Yes    Gastroesophageal reflux disease [K21.9]  Yes    Anxiety disorder [F41.9]  Yes      Resolved Hospital Problems   No resolved problems to display.        Brief Hospital Course to date:  Danyell Osborne is a 88 y.o. female     Discussion/plan for today: EGD report reviewed.  Plan will be for twice daily PPI and Carafate therapy for gastric and duodenal ulcerations.  Family is aware that pathology was sent and results will be pending.  Plan to monitor postoperatively and trend oral intake.  Leukocytosis has normalized.  Restart blood thinners when cleared by GI, holding blood thinners for now.  Leukocytosis normalized today.  Continuing with flutter valve and incentive spirometer and trend.  Add scheduled Tylenol.  Adjust other medications.   Melatonin to promote sleep.  Treatment plan discussed with patient and family who are in agreement.    Acute Cholecystitis  - s/p laparoscopic cholecystectomy with negative intraoperative cholangiogram 7/5/24 with Dr. Roth  - continue ceftriaxone and flagyl  - on regular diet, SLP following given her prior swallowing issues  - appreciate general surgery recs     HTN/PAF/PAD/PFO  - BP acceptable, had RVR likely provoked by above and has received digoxin, now converted to NSR  - holding lisinopril, continue metoprolol  - resume eliquis and plavix     Hyponatremia  - noted on hospitalization at [facility], resolved     GERD  - continue famotidine     Multiple Right-Sided Rib Fractures  - from mechanical fall down some stairs on 6/26/24  - had pneumothorax which resolved with conservative treatment  - continue pain control, encourage pulmonary toilet  - appreciate thoracic  surgery recs, they have signed off     Acute Urine Retention  - boland catheter placed 7/7/24  - voiding trial      Constipation  - resolved, abdominal examination is benign  - continue bowel regimen     Delirium  - continue scheduled nightly melatonin   - redirect as needed, observe delirium precautions     SCDs for DVT prophylaxis.  Full code.  Discussed with patient, nursing staff, and care team on multidisciplinary rounds.  Anticipate discharge home with home health and with family in 1-2 days.      CODE STATUS:   Code Status and Medical Interventions:   Ordered at: 07/05/24 0928     Code Status (Patient has no pulse and is not breathing):    CPR (Attempt to Resuscitate)     Medical Interventions (Patient has pulse or is breathing):    Full Support       Delmar Rosas MD  07/11/24

## 2024-07-11 NOTE — PLAN OF CARE
Goal Outcome Evaluation:                 NO ISSUES OVERNIGHT. Npo FOR EGD

## 2024-07-12 ENCOUNTER — APPOINTMENT (OUTPATIENT)
Dept: GENERAL RADIOLOGY | Facility: HOSPITAL | Age: 89
End: 2024-07-12
Payer: MEDICARE

## 2024-07-12 LAB
ALBUMIN SERPL-MCNC: 3.2 G/DL (ref 3.5–5.2)
ALBUMIN/GLOB SERPL: 1.1 G/DL
ALP SERPL-CCNC: 138 U/L (ref 39–117)
ALT SERPL W P-5'-P-CCNC: 27 U/L (ref 1–33)
ANION GAP SERPL CALCULATED.3IONS-SCNC: 11 MMOL/L (ref 5–15)
AST SERPL-CCNC: 34 U/L (ref 1–32)
BASOPHILS # BLD AUTO: 0.05 10*3/MM3 (ref 0–0.2)
BASOPHILS NFR BLD AUTO: 0.6 % (ref 0–1.5)
BILIRUB SERPL-MCNC: 0.5 MG/DL (ref 0–1.2)
BUN SERPL-MCNC: 4 MG/DL (ref 8–23)
BUN/CREAT SERPL: 8.7 (ref 7–25)
CALCIUM SPEC-SCNC: 8.5 MG/DL (ref 8.6–10.5)
CHLORIDE SERPL-SCNC: 99 MMOL/L (ref 98–107)
CO2 SERPL-SCNC: 25 MMOL/L (ref 22–29)
CREAT SERPL-MCNC: 0.46 MG/DL (ref 0.57–1)
DEPRECATED RDW RBC AUTO: 47.9 FL (ref 37–54)
EGFRCR SERPLBLD CKD-EPI 2021: 92.8 ML/MIN/1.73
EOSINOPHIL # BLD AUTO: 0.39 10*3/MM3 (ref 0–0.4)
EOSINOPHIL NFR BLD AUTO: 4.9 % (ref 0.3–6.2)
ERYTHROCYTE [DISTWIDTH] IN BLOOD BY AUTOMATED COUNT: 14.4 % (ref 12.3–15.4)
GLOBULIN UR ELPH-MCNC: 2.9 GM/DL
GLUCOSE SERPL-MCNC: 112 MG/DL (ref 65–99)
HCT VFR BLD AUTO: 27.6 % (ref 34–46.6)
HGB BLD-MCNC: 9 G/DL (ref 12–15.9)
IMM GRANULOCYTES # BLD AUTO: 0.07 10*3/MM3 (ref 0–0.05)
IMM GRANULOCYTES NFR BLD AUTO: 0.9 % (ref 0–0.5)
LAB AP CASE REPORT: NORMAL
LYMPHOCYTES # BLD AUTO: 1.13 10*3/MM3 (ref 0.7–3.1)
LYMPHOCYTES NFR BLD AUTO: 14.1 % (ref 19.6–45.3)
MCH RBC QN AUTO: 29.9 PG (ref 26.6–33)
MCHC RBC AUTO-ENTMCNC: 32.6 G/DL (ref 31.5–35.7)
MCV RBC AUTO: 91.7 FL (ref 79–97)
MONOCYTES # BLD AUTO: 0.85 10*3/MM3 (ref 0.1–0.9)
MONOCYTES NFR BLD AUTO: 10.6 % (ref 5–12)
NEUTROPHILS NFR BLD AUTO: 5.53 10*3/MM3 (ref 1.7–7)
NEUTROPHILS NFR BLD AUTO: 68.9 % (ref 42.7–76)
NRBC BLD AUTO-RTO: 0 /100 WBC (ref 0–0.2)
PATH REPORT.FINAL DX SPEC: NORMAL
PATH REPORT.GROSS SPEC: NORMAL
PLATELET # BLD AUTO: 701 10*3/MM3 (ref 140–450)
PMV BLD AUTO: 8.8 FL (ref 6–12)
POTASSIUM SERPL-SCNC: 3.4 MMOL/L (ref 3.5–5.2)
POTASSIUM SERPL-SCNC: 3.6 MMOL/L (ref 3.5–5.2)
PROT SERPL-MCNC: 6.1 G/DL (ref 6–8.5)
RBC # BLD AUTO: 3.01 10*6/MM3 (ref 3.77–5.28)
SODIUM SERPL-SCNC: 135 MMOL/L (ref 136–145)
WBC NRBC COR # BLD AUTO: 8.02 10*3/MM3 (ref 3.4–10.8)

## 2024-07-12 PROCEDURE — 85025 COMPLETE CBC W/AUTO DIFF WBC: CPT | Performed by: SURGERY

## 2024-07-12 PROCEDURE — 99232 SBSQ HOSP IP/OBS MODERATE 35: CPT | Performed by: NURSE PRACTITIONER

## 2024-07-12 PROCEDURE — 97530 THERAPEUTIC ACTIVITIES: CPT

## 2024-07-12 PROCEDURE — 84132 ASSAY OF SERUM POTASSIUM: CPT | Performed by: INTERNAL MEDICINE

## 2024-07-12 PROCEDURE — 71045 X-RAY EXAM CHEST 1 VIEW: CPT

## 2024-07-12 PROCEDURE — 25010000002 METRONIDAZOLE 500 MG/100ML SOLUTION: Performed by: SURGERY

## 2024-07-12 PROCEDURE — 25010000002 POTASSIUM CHLORIDE 10 MEQ/100ML SOLUTION: Performed by: INTERNAL MEDICINE

## 2024-07-12 PROCEDURE — 97116 GAIT TRAINING THERAPY: CPT

## 2024-07-12 PROCEDURE — 80053 COMPREHEN METABOLIC PANEL: CPT | Performed by: SURGERY

## 2024-07-12 RX ORDER — LOPERAMIDE HYDROCHLORIDE 2 MG/1
2 CAPSULE ORAL ONCE
Status: COMPLETED | OUTPATIENT
Start: 2024-07-12 | End: 2024-07-12

## 2024-07-12 RX ORDER — POTASSIUM CHLORIDE 7.45 MG/ML
10 INJECTION INTRAVENOUS
Status: COMPLETED | OUTPATIENT
Start: 2024-07-12 | End: 2024-07-12

## 2024-07-12 RX ORDER — PANTOPRAZOLE SODIUM 40 MG/1
40 TABLET, DELAYED RELEASE ORAL
Status: DISCONTINUED | OUTPATIENT
Start: 2024-07-12 | End: 2024-07-13 | Stop reason: HOSPADM

## 2024-07-12 RX ORDER — L.ACID,PARA/B.BIFIDUM/S.THERM 8B CELL
1 CAPSULE ORAL DAILY
Status: DISCONTINUED | OUTPATIENT
Start: 2024-07-12 | End: 2024-07-13 | Stop reason: HOSPADM

## 2024-07-12 RX ADMIN — ACETAMINOPHEN 325MG 650 MG: 325 TABLET ORAL at 16:44

## 2024-07-12 RX ADMIN — POTASSIUM CHLORIDE 10 MEQ: 7.46 INJECTION, SOLUTION INTRAVENOUS at 20:07

## 2024-07-12 RX ADMIN — POTASSIUM CHLORIDE 10 MEQ: 7.46 INJECTION, SOLUTION INTRAVENOUS at 07:54

## 2024-07-12 RX ADMIN — METOPROLOL SUCCINATE 50 MG: 50 TABLET, FILM COATED, EXTENDED RELEASE ORAL at 09:00

## 2024-07-12 RX ADMIN — APIXABAN 2.5 MG: 2.5 TABLET, FILM COATED ORAL at 09:00

## 2024-07-12 RX ADMIN — POTASSIUM CHLORIDE 10 MEQ: 7.46 INJECTION, SOLUTION INTRAVENOUS at 22:12

## 2024-07-12 RX ADMIN — ACETAMINOPHEN 325MG 650 MG: 325 TABLET ORAL at 09:00

## 2024-07-12 RX ADMIN — Medication 10 ML: at 22:11

## 2024-07-12 RX ADMIN — METOPROLOL SUCCINATE 50 MG: 50 TABLET, FILM COATED, EXTENDED RELEASE ORAL at 20:08

## 2024-07-12 RX ADMIN — POTASSIUM CHLORIDE 10 MEQ: 7.46 INJECTION, SOLUTION INTRAVENOUS at 06:32

## 2024-07-12 RX ADMIN — Medication 1 CAPSULE: at 16:44

## 2024-07-12 RX ADMIN — SUCRALFATE 1 G: 1 TABLET ORAL at 13:37

## 2024-07-12 RX ADMIN — Medication 10 ML: at 10:22

## 2024-07-12 RX ADMIN — HYDROCODONE BITARTRATE AND ACETAMINOPHEN 0.5 TABLET: 5; 325 TABLET ORAL at 23:01

## 2024-07-12 RX ADMIN — POTASSIUM CHLORIDE 10 MEQ: 7.46 INJECTION, SOLUTION INTRAVENOUS at 10:21

## 2024-07-12 RX ADMIN — SUCRALFATE 1 G: 1 TABLET ORAL at 16:44

## 2024-07-12 RX ADMIN — LOPERAMIDE HYDROCHLORIDE 2 MG: 2 CAPSULE ORAL at 13:37

## 2024-07-12 RX ADMIN — PANTOPRAZOLE SODIUM 40 MG: 40 TABLET, DELAYED RELEASE ORAL at 16:44

## 2024-07-12 RX ADMIN — SUCRALFATE 1 G: 1 TABLET ORAL at 07:55

## 2024-07-12 RX ADMIN — CLOPIDOGREL BISULFATE 75 MG: 75 TABLET, FILM COATED ORAL at 09:00

## 2024-07-12 RX ADMIN — PANTOPRAZOLE SODIUM 40 MG: 40 INJECTION, POWDER, FOR SOLUTION INTRAVENOUS at 07:54

## 2024-07-12 RX ADMIN — POTASSIUM CHLORIDE 10 MEQ: 7.46 INJECTION, SOLUTION INTRAVENOUS at 09:00

## 2024-07-12 RX ADMIN — APIXABAN 2.5 MG: 2.5 TABLET, FILM COATED ORAL at 20:07

## 2024-07-12 RX ADMIN — HYDROCODONE BITARTRATE AND ACETAMINOPHEN 0.5 TABLET: 5; 325 TABLET ORAL at 10:20

## 2024-07-12 RX ADMIN — POTASSIUM CHLORIDE 10 MEQ: 7.46 INJECTION, SOLUTION INTRAVENOUS at 21:08

## 2024-07-12 RX ADMIN — ACETAMINOPHEN 325MG 650 MG: 325 TABLET ORAL at 20:07

## 2024-07-12 RX ADMIN — POTASSIUM CHLORIDE 10 MEQ: 7.46 INJECTION, SOLUTION INTRAVENOUS at 18:30

## 2024-07-12 RX ADMIN — Medication 5 MG: at 20:07

## 2024-07-12 RX ADMIN — METRONIDAZOLE 500 MG: 500 INJECTION, SOLUTION INTRAVENOUS at 04:49

## 2024-07-12 NOTE — PLAN OF CARE
Goal Outcome Evaluation:  Plan of Care Reviewed With: patient        Progress: improving  Outcome Evaluation: Pt tolerated treatment well this date. Required SBA to stand and ambulate. Pt ambulated 300ft w/ Rw, no LOBs noted. Pt did take a few standing rest breaks throughout d/t fatigue.      Anticipated Discharge Disposition (PT): home with assist                         No deformities present

## 2024-07-12 NOTE — PLAN OF CARE
Goal Outcome Evaluation:  Plan of Care Reviewed With: patient        Progress: improving          Patient has not slept good this night. Up to BSC about every 1-2 hours to urinate. Patient still refuses to sleep in bed, instead she sleeps in the chair. Educated on ways to prevent skin breakdown. No distress noted. WCTM

## 2024-07-12 NOTE — PROGRESS NOTES
"Nutrition Services    Patient Name:  Danyell Osborne  YOB: 1935  MRN: [id number]  Admit Date:  7/5/2024  Assessment Date:  07/12/24    Summary: Follow up note  Visited pt who just finished eating breakfast, Po %.  Labs Na 134, K+ 3.0,  BM 5/11  Po %  Labs Na 134, k 3.0,  Pt drinking Boost supplements  Pt states she is drinking the Boost supplements    Plan  Good po intake encouraged  Will monitor labs and replace electrolytes as needed  RD to follow      CLINICAL NUTRITION ASSESSMENT      Reason for Assessment Follow-up Protocol     Diagnosis/Problem   Acute cholecystitis    Medical/Surgical History Past Medical History:   Diagnosis Date    A-fib     Anemia     Coronary artery disease     HLD (hyperlipidemia)     Hypertension     Stroke        Past Surgical History:   Procedure Laterality Date    CHOLECYSTECTOMY WITH INTRAOPERATIVE CHOLANGIOGRAM N/A 7/5/2024    Procedure: Laparoscopic cholecystectomy with cholangiogram;  Surgeon: Tessa Montenegro MD;  Location: [facility] MAIN OR;  Service: General;  Laterality: N/A;    ENDOSCOPY N/A 7/11/2024    Procedure: ESOPHAGOGASTRODUODENOSCOPY WITH COLD BIOPSIES;  Surgeon: Santos Crabtree MD;  Location: [facility] ENDOSCOPY;  Service: Gastroenterology;  Laterality: N/A;  PRE- DYSPHAGIA  POST-DUODENAL, GASTRIC EROSIONS    HYSTERECTOMY          Anthropometrics        Current Height  Current Weight  BMI kg/m2 Height: 162.6 cm (64\")  Weight: 54.9 kg (121 lb 0.5 oz) (07/10/24 0505)  Body mass index is 20.78 kg/m².   Adjusted BMI (if applicable)    BMI Category Normal/Healthy (18.4 - 24.9)   Ideal Body Weight (IBW) 120lb   Usual Body Weight (UBW) 120's   Weight Trend Stable   Weight History Wt Readings from Last 30 Encounters:   07/10/24 0505 54.9 kg (121 lb 0.5 oz)   07/06/24 0510 58 kg (127 lb 13.9 oz)   07/05/24 0620 60 kg (132 lb 4.4 oz)   07/05/24 0600 61 kg (134 lb 7.7 oz)   02/14/24 1522 54.6 kg (120 lb 6.4 oz)   02/05/24 1052 54.1 kg (119 lb 3.2 " oz)   01/10/24 1252 54.8 kg (120 lb 12.8 oz)   10/18/23 1419 54 kg (119 lb)   10/11/23 1059 53.5 kg (118 lb)   10/10/23 0504 53.7 kg (118 lb 6.2 oz)   10/10/23 0321 54 kg (118 lb 15.7 oz)   08/07/23 1134 54.4 kg (120 lb)   07/10/23 1122 54.5 kg (120 lb 3.2 oz)   05/10/23 1132 54 kg (119 lb)   04/26/23 1415 54.3 kg (119 lb 9.6 oz)   01/09/23 1247 53.6 kg (118 lb 3.2 oz)   01/04/23 1134 53.6 kg (118 lb 3.2 oz)   12/08/22 1108 53.3 kg (117 lb 9.6 oz)   10/03/22 1135 54.1 kg (119 lb 6 oz)   06/09/22 1411 54.3 kg (119 lb 12.8 oz)   06/06/22 1434 54.9 kg (121 lb)   02/16/22 1035 55.8 kg (123 lb)   10/18/21 1444 55.4 kg (122 lb 3.2 oz)   05/20/21 1142 56.5 kg (124 lb 9.6 oz)   05/11/21 0000 56.8 kg (125 lb 2 oz)   04/27/21 0000 57.3 kg (126 lb 4 oz)   03/23/21 1351 57.3 kg (126 lb 6.4 oz)   09/23/20 1411 56.8 kg (125 lb 3.2 oz)   03/05/20 1217 59.3 kg (130 lb 12.8 oz)   12/09/19 1113 58.8 kg (129 lb 9.6 oz)   11/26/19 1520 60.1 kg (132 lb 6.4 oz)   06/03/19 1402 59.9 kg (132 lb)   05/13/19 1127 60.8 kg (134 lb)   11/12/18 1815 60.2 kg (132 lb 12.8 oz)   10/16/18 1344 59.7 kg (131 lb 9.6 oz)      --  Labs       Pertinent Labs    Results from last 7 days   Lab Units 07/12/24  0410 07/11/24  0626 07/10/24  0353   SODIUM mmol/L 135* 134* 137   POTASSIUM mmol/L 3.4* 3.0* 3.2*   CHLORIDE mmol/L 99 97* 102   CO2 mmol/L 25.0 24.1 25.0   BUN mg/dL 4* 5* 7*   CREATININE mg/dL 0.46* 0.45* 0.35*   CALCIUM mg/dL 8.5* 8.1* 8.2*   BILIRUBIN mg/dL 0.5 0.5 0.5   ALK PHOS U/L 138* 130* 115   ALT (SGPT) U/L 27 29 31   AST (SGOT) U/L 34* 34* 28   GLUCOSE mg/dL 112* 111* 120*     Results from last 7 days   Lab Units 07/12/24  0410 07/11/24  2110   MAGNESIUM mg/dL  --  1.8   HEMOGLOBIN g/dL 9.0*  --    HEMATOCRIT % 27.6*  --    WBC 10*3/mm3 8.02  --    ALBUMIN g/dL 3.2*  --      Results from last 7 days   Lab Units 07/12/24  0410 07/11/24  0626 07/10/24  0353 07/09/24  0334 07/08/24  0538   PLATELETS 10*3/mm3 701* 825* 785* 733* 712*     No  "results found for: \"COVID19\"  Lab Results   Component Value Date    HGBA1C 5.90 (H) 10/10/2023          Medications           Scheduled Medications acetaminophen, 650 mg, Oral, TID  apixaban, 2.5 mg, Oral, Q12H  clopidogrel, 75 mg, Oral, Daily  lactobacillus acidophilus, 1 capsule, Oral, Daily  melatonin, 5 mg, Oral, Nightly  metoprolol succinate XL, 50 mg, Oral, BID  pantoprazole, 40 mg, Oral, BID AC  senna-docusate sodium, 2 tablet, Oral, BID   And  polyethylene glycol, 17 g, Oral, Daily  sodium chloride, 10 mL, Intravenous, Q12H  sucralfate, 1 g, Oral, TID AC       Infusions       PRN Medications   acetaminophen **OR** acetaminophen **OR** acetaminophen    senna-docusate sodium **AND** polyethylene glycol **AND** bisacodyl **AND** bisacodyl    calcium carbonate    HYDROcodone-acetaminophen    nitroglycerin    OLANZapine    ondansetron    ondansetron ODT **OR** ondansetron    Potassium Replacement - Follow Nurse / BPA Driven Protocol    Potassium Replacement - Follow Nurse / BPA Driven Protocol    prochlorperazine **OR** prochlorperazine **OR** prochlorperazine    sodium chloride    sodium chloride     Physical Findings          General Findings alert, oriented   Oral/Mouth Cavity dental appliance   Edema  no edema   Gastrointestinal last bowel movement: 7/11   Skin  surgical incision: abd   Tubes/Drains/Lines none   NFPE Not indicated at this time   --  Current Nutrition Orders & Evaluation of Intake       Oral Nutrition     Food Allergies NKFA   Current PO Diet Diet: Regular/House, Gastrointestinal; Fat-Restricted; Texture: Mechanical Ground (NDD 2); Fluid Consistency: Thin (IDDSI 0)   Supplement Boost Plus   PO Evaluation     % PO Intake %    Factors Affecting Intake: abdominal pain, altered GI function   --  PES STATEMENT / NUTRITION DIAGNOSIS      Nutrition Dx Problem  Problem: Altered GI Function  Etiology: Medical Diagnosis - acute cholecystitis    Signs/Symptoms: Report/Observation     NUTRITION " INTERVENTION / PLAN OF CARE      Intervention Goal(s) Reduce/improve symptoms, Disease management/therapy, Initiate feeding/diet, Tolerate PO , and Maintain weight         RD Intervention/Action Continue to monitor and Care plan reviewed   --      Prescription/Orders:       PO Diet       Supplements boost      Enteral Nutrition       Parenteral Nutrition    New Prescription Ordered? Continue same per protocol   --      Monitor/Evaluation Per protocol, GI status, Symptoms   Discharge Plan/Needs Pending clinical course   --    RD to follow per protocol.      Electronically signed by:  Lesvia Begum RD  07/12/24 14:01 EDT

## 2024-07-12 NOTE — THERAPY TREATMENT NOTE
Patient Name: Danyell Osborne  : 1935    MRN: [id number]                              Today's Date: 2024       Admit Date: 2024    Visit Dx:     ICD-10-CM ICD-9-CM   1. Acute cholecystitis  K81.0 575.0   2. Abnormal esophagram  R93.3 793.4     Patient Active Problem List   Diagnosis    Essential hypertension    Coronary artery disease involving native coronary artery of native heart without angina pectoris    Skin lesions    Gastroesophageal reflux disease    Stress    Other headache syndrome    Paroxysmal atrial fibrillation    Routine general medical examination at a health care facility    Cough    Screening mammogram for breast cancer    Postmenopausal    Acute conjunctivitis of left eye    Other constipation    Memory loss    Ganglion cyst of finger    Hypercholesteremia    Left-sided weakness    Arthralgia of both knees    Myalgia    PND (post-nasal drip)    Congestion of nasal sinus    Coronary artery disease    Presence of stent in right coronary artery    Personal history of other diseases of the digestive system    PAD (peripheral artery disease)    Osteoporosis    IBS (irritable bowel syndrome)    Anxiety disorder    Left carotid artery stenosis    Acute cholecystitis    PFO (patent foramen ovale)    Closed fracture of multiple ribs of right side    Hyponatremia    Acute retention of urine    Delirium due to another medical condition    Abnormal esophagram    Duodenal erosions on EGD    Erosive gastropathy     Past Medical History:   Diagnosis Date    A-fib     Anemia     Coronary artery disease     HLD (hyperlipidemia)     Hypertension     Stroke      Past Surgical History:   Procedure Laterality Date    CHOLECYSTECTOMY WITH INTRAOPERATIVE CHOLANGIOGRAM N/A 2024    Procedure: Laparoscopic cholecystectomy with cholangiogram;  Surgeon: Tessa Montenegro MD;  Location: [facility];  Service: General;  Laterality: N/A;    ENDOSCOPY N/A 2024    Procedure:  ESOPHAGOGASTRODUODENOSCOPY WITH COLD BIOPSIES;  Surgeon: Santos Crabtree MD;  Location: [facility] ENDOSCOPY;  Service: Gastroenterology;  Laterality: N/A;  PRE- DYSPHAGIA  POST-DUODENAL, GASTRIC EROSIONS    HYSTERECTOMY        General Information       Row Name 07/12/24 1158          Physical Therapy Time and Intention    Document Type therapy note (daily note)  -     Mode of Treatment physical therapy  -       Row Name 07/12/24 1158          General Information    Existing Precautions/Restrictions fall  -       Row Name 07/12/24 1158          Cognition    Orientation Status (Cognition) oriented x 4  -               User Key  (r) = Recorded By, (t) = Taken By, (c) = Cosigned By      Initials Name Provider Type     Andreina Florian PTA Physical Therapist Assistant                   Mobility       Row Name 07/12/24 1158          Bed Mobility    Comment, (Bed Mobility) up in chair  -       Row Name 07/12/24 1158          Sit-Stand Transfer    Sit-Stand Henderson (Transfers) standby assist  -       Row Name 07/12/24 1158          Gait/Stairs (Locomotion)    Henderson Level (Gait) standby assist  -     Assistive Device (Gait) walker, front-wheeled  -     Patient was able to Ambulate yes  -SM     Distance in Feet (Gait) 300  -SM     Deviations/Abnormal Patterns (Gait) lynne decreased;stride length decreased  -     Bilateral Gait Deviations forward flexed posture  -     Comment, (Gait/Stairs) few standing rest breaks taken  -               User Key  (r) = Recorded By, (t) = Taken By, (c) = Cosigned By      Initials Name Provider Type     Andreina Florian PTA Physical Therapist Assistant                   Obj/Interventions    No documentation.                  Goals/Plan    No documentation.                  Clinical Impression       Row Name 07/12/24 1158          Pain    Pretreatment Pain Rating 0/10 - no pain  -     Posttreatment Pain Rating 0/10 - no pain  -       Row Name  07/12/24 1158          Positioning and Restraints    Pre-Treatment Position sitting in chair/recliner  -     Post Treatment Position chair  -SM     In Chair reclined;call light within reach;encouraged to call for assist;with family/caregiver  -               User Key  (r) = Recorded By, (t) = Taken By, (c) = Cosigned By      Initials Name Provider Type     Andreina Florian PTA Physical Therapist Assistant                   Outcome Measures       Row Name 07/12/24 1159 07/12/24 0800       How much help from another person do you currently need...    Turning from your back to your side while in flat bed without using bedrails? 4  -SM 3  -HT    Moving from lying on back to sitting on the side of a flat bed without bedrails? 4  -SM 3  -HT    Moving to and from a bed to a chair (including a wheelchair)? 4  -SM 3  -HT    Standing up from a chair using your arms (e.g., wheelchair, bedside chair)? 4  -SM 3  -HT    Climbing 3-5 steps with a railing? 3  -SM 2  -HT    To walk in hospital room? 3  -SM 3  -HT    AM-PAC 6 Clicks Score (PT) 22  -SM 17  -HT    Highest Level of Mobility Goal 7 --> Walk 25 feet or more  - 5 --> Static standing  -HT      Row Name 07/12/24 1159          Functional Assessment    Outcome Measure Options AM-PAC 6 Clicks Basic Mobility (PT)  -               User Key  (r) = Recorded By, (t) = Taken By, (c) = Cosigned By      Initials Name Provider Type     Andreina Florian PTA Physical Therapist Assistant     Dm, Leticia, RN Registered Nurse                                 Physical Therapy Education       Title: PT OT SLP Therapies (Done)       Topic: Physical Therapy (Done)       Point: Mobility training (Done)       Learning Progress Summary             Patient Acceptance, E,TB,D, VU,NR by  at 7/12/2024 1159    Acceptance, E,TB,D, VU,NR by  at 7/11/2024 1615    Acceptance, E, VU,NR by MICHAEL at 7/9/2024 1041    Acceptance, E, VU,NR by MICHAEL at 7/8/2024 1456    Acceptance, E, VU,NR by   at 7/6/2024 1254   Family Acceptance, E, VU,NR by  at 7/9/2024 1041    Acceptance, E, VU,NR by  at 7/8/2024 1456                         Point: Home exercise program (Done)       Learning Progress Summary             Patient Acceptance, E,TB,D, VU,NR by  at 7/12/2024 1159    Acceptance, E,TB,D, VU,NR by  at 7/11/2024 1615    Acceptance, E, VU,NR by  at 7/9/2024 1041    Acceptance, E, VU,NR by  at 7/8/2024 1456    Acceptance, E, VU,NR by  at 7/6/2024 1254   Family Acceptance, E, VU,NR by  at 7/9/2024 1041    Acceptance, E, VU,NR by  at 7/8/2024 1456                                         User Key       Initials Effective Dates Name Provider Type Discipline     07/11/23 -  Ashley Pappas PT Physical Therapist PT     03/07/18 -  Andreina Florian PTA Physical Therapist Assistant PT     10/25/19 -  Aminah Torres, PT Physical Therapist PT                  PT Recommendation and Plan     Plan of Care Reviewed With: patient  Progress: improving  Outcome Evaluation: Pt tolerated treatment well this date. Required SBA to stand and ambulate. Pt ambulated 300ft w/ Rw, no LOBs noted. Pt did take a few standing rest breaks throughout d/t fatigue.     Time Calculation:         PT Charges       Row Name 07/12/24 1201             Time Calculation    Start Time 1125  -      Stop Time 1148  -      Time Calculation (min) 23 min  -      PT Received On 07/12/24  -      PT - Next Appointment 07/15/24  -                User Key  (r) = Recorded By, (t) = Taken By, (c) = Cosigned By      Initials Name Provider Type     Andreina Florian PTA Physical Therapist Assistant                  Therapy Charges for Today       Code Description Service Date Service Provider Modifiers Qty    60854490388 HC GAIT TRAINING EA 15 MIN 7/11/2024 Andreina Florian, PTA GP 1    95771789691 HC PT THERAPEUTIC ACT EA 15 MIN 7/11/2024 Andreina Florian PTA GP 1    82322510052 HC GAIT TRAINING EA 15 MIN 7/12/2024  Andreina Florian, DILSHAD GP 1    98529036531  PT THERAPEUTIC ACT EA 15 MIN 7/12/2024 Andreina Florian, DILSHAD GP 1            PT G-Codes  Outcome Measure Options: AM-PAC 6 Clicks Basic Mobility (PT)  AM-PAC 6 Clicks Score (PT): 22  AM-PAC 6 Clicks Score (OT): 21  Modified Jose Scale: 4 - Moderately severe disability.  Unable to walk without assistance, and unable to attend to own bodily needs without assistance.  PT Discharge Summary  Anticipated Discharge Disposition (PT): home with assist    Andreina Florian PTA  7/12/2024

## 2024-07-12 NOTE — PLAN OF CARE
Goal Outcome Evaluation:      Pt is a 89 yo female who was admitted on 07/05/2024 with an admitting diagnosis of acute cholecystitis for which pt had her gallbladder removed on 07/05/2024.  Pt is A/Ox4, PWD, PMSx4, PERRL, - JVD, trachea midline, = rise and fall of chest wall with respirations, CEBBS with diminished lung sounds anteriorly noted.  Pt had multiple episodes of diarrhea this AM.  Spoke to provider who added medication see MAR.  Pt remained stable, no complaints this shift.

## 2024-07-12 NOTE — PROGRESS NOTES
[facility] Medicine Services  PROGRESS NOTE    Patient Name: Danyell Osborne  : 1935  MRN: [id number]    Date of Admission: 2024  Primary Care Physician: Shanae Osborne APRN    Subjective   Subjective     CC:  Follow-up for recent acute cholecystitis    Subjective:   Patient has been refusing Carafate.  I asked her if she can try to start using it.  She says she will try to see what she can do.  I explained to her that certain medications may be required to heal her stomach inflammation.  She still feels somewhat poor and has some abdominal pain.  She feels better than when she first arrived.    Review of Systems  No current fevers or chills  No current shortness of breath or cough  No current nausea, vomiting, or diarrhea  No current chest pain or palpitations       Objective   Objective     Vital Signs:   Temp:  [97.8 °F (36.6 °C)-98.2 °F (36.8 °C)] 97.8 °F (36.6 °C)  Heart Rate:  [70-95] 83  Resp:  [17] 17  BP: (151-185)/(72-93) 151/86        Physical Exam:  Constitutional:Awake, alert, elderly  HENT: NCAT, mucous membranes moist, neck supple  Respiratory: No cough or wheezes, normal respirations, nonlabored breathing   Cardiovascular: Pulse rate is normal, palpable radial pulses  Gastrointestinal:  soft, mild abdominal tenderness, nondistended  Musculoskeletal: Frail, chronically debilitated appearance, mild lower extremity edema, BMI is 21  Psychiatric: Appropriate affect, cooperative, conversational  Neurologic: Somewhat poor historian, no slurred speech or facial droop, follows commands  Skin: No rashes or jaundice, warm      Results Reviewed:  Results from last 7 days   Lab Units 07/12/24  0410 07/11/24  0626 07/10/24  0353   WBC 10*3/mm3 8.02 8.41 10.89*   HEMOGLOBIN g/dL 9.0* 8.9* 8.7*   HEMATOCRIT % 27.6* 27.2* 26.1*   PLATELETS 10*3/mm3 701* 825* 785*     Results from last 7 days   Lab Units 24  0626 07/10/24  0353   SODIUM mmol/L 135* 134* 137   POTASSIUM  mmol/L 3.4* 3.0* 3.2*   CHLORIDE mmol/L 99 97* 102   CO2 mmol/L 25.0 24.1 25.0   BUN mg/dL 4* 5* 7*   CREATININE mg/dL 0.46* 0.45* 0.35*   GLUCOSE mg/dL 112* 111* 120*   CALCIUM mg/dL 8.5* 8.1* 8.2*   ALK PHOS U/L 138* 130* 115   ALT (SGPT) U/L 27 29 31   AST (SGOT) U/L 34* 34* 28     Estimated Creatinine Clearance: 74.7 mL/min (A) (by C-G formula based on SCr of 0.46 mg/dL (L)).    Microbiology Results Abnormal       Procedure Component Value - Date/Time    Blood Culture - Blood, Hand, Right [[id number]]  (Normal) Collected: 07/05/24 1033    Lab Status: Final result Specimen: Blood from Hand, Right Updated: 07/10/24 1115     Blood Culture No growth at 5 days    Blood Culture - Blood, Hand, Left [[id number]]  (Normal) Collected: 07/05/24 1033    Lab Status: Final result Specimen: Blood from Hand, Left Updated: 07/10/24 1115     Blood Culture No growth at 5 days            Imaging Results (Last 24 Hours)       Procedure Component Value Units Date/Time    XR Chest 1 View [[id number]] Collected: 07/12/24 0636     Updated: 07/12/24 0938    Narrative:      XR CHEST 1 VW-7/12/2024     HISTORY: Possible pneumothorax.     Heart size is at the upper limits of normal. Small right pleural  effusion is seen with some minimal right basilar suspected atelectasis.  Soft tissue calcification is seen in the left apex. There is some aortic  calcification.     No significant pneumothorax is seen.        This report was finalized on 7/12/2024 9:35 AM by Dr. Tucker Peters M.D on Workstation: [id number]               Results for orders placed during the hospital encounter of 10/09/23    Adult Transthoracic Echo Complete W/ Cont if Necessary Per Protocol (With Agitated Saline)    Interpretation Summary    Left ventricular systolic function is normal. Left ventricular ejection fraction appears to be 56 - 60%.    Left ventricular diastolic function was indeterminate.    Saline test results are positive for right to left atrial level  shunt.      I have reviewed the medications:  Scheduled Meds:acetaminophen, 650 mg, Oral, TID  apixaban, 2.5 mg, Oral, Q12H  clopidogrel, 75 mg, Oral, Daily  lactobacillus acidophilus, 1 capsule, Oral, Daily  melatonin, 5 mg, Oral, Nightly  metoprolol succinate XL, 50 mg, Oral, BID  pantoprazole, 40 mg, Oral, BID AC  senna-docusate sodium, 2 tablet, Oral, BID   And  polyethylene glycol, 17 g, Oral, Daily  sodium chloride, 10 mL, Intravenous, Q12H  sucralfate, 1 g, Oral, TID AC      Continuous Infusions:       PRN Meds:.  acetaminophen **OR** acetaminophen **OR** acetaminophen    senna-docusate sodium **AND** polyethylene glycol **AND** bisacodyl **AND** bisacodyl    calcium carbonate    HYDROcodone-acetaminophen    nitroglycerin    OLANZapine    ondansetron    ondansetron ODT **OR** ondansetron    Potassium Replacement - Follow Nurse / BPA Driven Protocol    Potassium Replacement - Follow Nurse / BPA Driven Protocol    prochlorperazine **OR** prochlorperazine **OR** prochlorperazine    sodium chloride    sodium chloride    Assessment & Plan   Assessment & Plan     Active Hospital Problems    Diagnosis  POA    **Acute cholecystitis [K81.0]  Yes    Duodenal erosions on EGD [K26.9]  Yes    Erosive gastropathy [K31.89]  Yes    Acute retention of urine [R33.8]  No    Delirium due to another medical condition [F05]  No    PFO (patent foramen ovale) [Q21.12]  Not Applicable    Closed fracture of multiple ribs of right side [S22.41XA]  Yes    Hyponatremia [E87.1]  Yes    Abnormal esophagram [R93.3]  Yes    Hypercholesteremia [E78.00]  Yes    Paroxysmal atrial fibrillation [I48.0]  Yes    Essential hypertension [I10]  Yes    Coronary artery disease involving native coronary artery of native heart without angina pectoris [I25.10]  Yes    PAD (peripheral artery disease) [I73.9]  Yes    Gastroesophageal reflux disease [K21.9]  Yes    Anxiety disorder [F41.9]  Yes      Resolved Hospital Problems   No resolved problems to  display.        Brief Hospital Course to date:  Danyell Osborne is a 88 y.o. female     Discussion/plan for today:   Clinically patient still appears quite weak and ill and not quite ready to discharge home.  Patient having significant diarrhea today.  I will give trial of Imodium.  Nursing reported several watery stools this morning.  Strongly recommending patient take Carafate.  Add probiotic.  PPI and Carafate per GI.  Case discussed with GI attending today.  Pathology returned benign.  H. pylori studies were negative.  Leukocytosis continues to be normalized.  Continue to monitor blood counts and transfuse if needed.  Continuing with flutter valve and incentive spirometer and trend.  Add scheduled Tylenol.  Adjust other medications.   Melatonin to promote sleep.  Treatment plan discussed with patient and family who are in agreement.    Acute Cholecystitis  - s/p laparoscopic cholecystectomy with negative intraoperative cholangiogram 7/5/24 with Dr. Roth  - continue ceftriaxone and flagyl  - on regular diet, SLP following given her prior swallowing issues  - appreciate general surgery recs    Acute gastric and duodenal erosions/ulcers:  Biopsy benign.  EGD 7/11/2024.  GI recommending Carafate and PPI.     HTN/PAF/PAD/PFO  - BP acceptable, had RVR likely provoked by above and has received digoxin, now converted to NSR  - holding lisinopril, continue metoprolol  - resume eliquis and plavix     Hyponatremia  - noted on hospitalization at [facility], resolved     GERD  - continue famotidine     Multiple Right-Sided Rib Fractures  - from mechanical fall down some stairs on 6/26/24  - had pneumothorax which resolved with conservative treatment  - continue pain control, encourage pulmonary toilet  - appreciate thoracic surgery recs, they have signed off     Acute Urine Retention  - boland catheter placed 7/7/24  - voiding trial      Constipation  - resolved, abdominal examination is benign  - continue bowel regimen      Delirium  - continue scheduled nightly melatonin   - redirect as needed, observe delirium precautions     SCDs for DVT prophylaxis.  Full code.  Discussed with patient, nursing staff, and care team on multidisciplinary rounds.  Anticipate discharge home with home health and with family in 1-2 days.      CODE STATUS:   Code Status and Medical Interventions:   Ordered at: 07/05/24 0928     Code Status (Patient has no pulse and is not breathing):    CPR (Attempt to Resuscitate)     Medical Interventions (Patient has pulse or is breathing):    Full Support       Delmar Rosas MD  07/12/24

## 2024-07-12 NOTE — PROGRESS NOTES
Gastroenterology   Inpatient Progress Note    Reason for Follow Up: Globus/dysphagia    Subjective  Interval History:   EGD July 11, 2024  Patient on twice daily PPI.  She got sick after taking crushed sucralfate in applesauce.  Patient states she is feeling better, she is interested in discharge, she was told it will likely be tomorrow.    EGD with gastric and duodenal ulcerations biopsies benign, chemical gastropathy.    Current Facility-Administered Medications:     acetaminophen (TYLENOL) tablet 650 mg, 650 mg, Oral, Q4H PRN **OR** acetaminophen (TYLENOL) 160 MG/5ML oral solution 650 mg, 650 mg, Oral, Q4H PRN **OR** acetaminophen (TYLENOL) suppository 650 mg, 650 mg, Rectal, Q4H PRN, Tessa Montenegro MD    acetaminophen (TYLENOL) tablet 650 mg, 650 mg, Oral, TID, Delmar Rosas MD, 650 mg at 07/12/24 0900    apixaban (ELIQUIS) tablet 2.5 mg, 2.5 mg, Oral, Q12H, Delmar Rosas MD, 2.5 mg at 07/12/24 0900    sennosides-docusate (PERICOLACE) 8.6-50 MG per tablet 2 tablet, 2 tablet, Oral, BID, 2 tablet at 07/10/24 2041 **AND** polyethylene glycol (MIRALAX) packet 17 g, 17 g, Oral, Daily, 17 g at 07/09/24 1040 **AND** bisacodyl (DULCOLAX) EC tablet 5 mg, 5 mg, Oral, Daily PRN **AND** bisacodyl (DULCOLAX) suppository 10 mg, 10 mg, Rectal, Daily PRN, Prince Meade MD    calcium carbonate (TUMS) chewable tablet 500 mg (200 mg elemental), 2 tablet, Oral, TID PRN, Tessa Montenegro MD, 2 tablet at 07/06/24 2007    clopidogrel (PLAVIX) tablet 75 mg, 75 mg, Oral, Daily, Delmar Rosas MD, 75 mg at 07/12/24 0900    HYDROcodone-acetaminophen (NORCO) 5-325 MG per tablet 0.5 tablet, 0.5 tablet, Oral, Q6H PRN, Delmar Rosas MD, 0.5 tablet at 07/12/24 1020    lactobacillus acidophilus (RISAQUAD) capsule 1 capsule, 1 capsule, Oral, Daily, Delmar Rosas MD    loperamide (IMODIUM) capsule 2 mg, 2 mg, Oral, Once, Delmar Rosas MD    melatonin tablet 5 mg, 5 mg,  Oral, Nightly, Prince Meade MD, 5 mg at 07/11/24 2030    metoprolol succinate XL (TOPROL-XL) 24 hr tablet 50 mg, 50 mg, Oral, BID, Prince Meade MD, 50 mg at 07/12/24 0900    nitroglycerin (NITROSTAT) SL tablet 0.4 mg, 0.4 mg, Sublingual, Q5 Min PRN, Tessa Montenegro MD    OLANZapine (zyPREXA) tablet 2.5 mg, 2.5 mg, Oral, Q8H PRN, Delmar Rosas MD, 2.5 mg at 07/09/24 2320    ondansetron (ZOFRAN) injection 4 mg, 4 mg, Intravenous, Q6H PRN, Tessa Montenegro MD, 4 mg at 07/08/24 1022    ondansetron ODT (ZOFRAN-ODT) disintegrating tablet 4 mg, 4 mg, Oral, Q6H PRN **OR** ondansetron (ZOFRAN) injection 4 mg, 4 mg, Intravenous, Q6H PRN, Tessa Montenegro MD    pantoprazole (PROTONIX) EC tablet 40 mg, 40 mg, Oral, BID AC, Blank Yeager APRN    Potassium Replacement - Follow Nurse / BPA Driven Protocol, , Does not apply, Jason DUTTON Stephanie M, APRN    Potassium Replacement - Follow Nurse / BPA Driven Protocol, , Does not apply, PRN, Marietta Gomez, AGUSTIN    prochlorperazine (COMPAZINE) injection 5 mg, 5 mg, Intravenous, Q6H PRN, 5 mg at 07/08/24 2149 **OR** prochlorperazine (COMPAZINE) tablet 5 mg, 5 mg, Oral, Q6H PRN **OR** prochlorperazine (COMPAZINE) suppository 25 mg, 25 mg, Rectal, Q12H PRN, Prince Meade MD    sodium chloride 0.9 % flush 10 mL, 10 mL, Intravenous, Q12H, Tessa Montenegro MD, 10 mL at 07/12/24 1022    sodium chloride 0.9 % flush 10 mL, 10 mL, Intravenous, PRN, Tessa Montenegro MD    sodium chloride 0.9 % infusion 40 mL, 40 mL, Intravenous, PRN, Tessa Montenegro MD    sucralfate (CARAFATE) tablet 1 g, 1 g, Oral, TID Kaye TORO Kelsey, APRN, 1 g at 07/12/24 0755  Review of Systems:                GI, see HPI    Objective     Vital Signs  Temp:  [97.8 °F (36.6 °C)-98.2 °F (36.8 °C)] 97.8 °F (36.6 °C)  Heart Rate:  [70-95] 83  Resp:  [17] 17  BP: (151-185)/(72-93) 151/86  Body mass index is 20.78 kg/m².                  Physical Exam:               General: patient awake, alert and cooperative, seated in chair,  at bedside, appears stated age, no acute distress              Eyes: no scleral icterus              Skin: warm and dry, not jaundiced              Psychiatric: Appropriate affect and behavior                Results Review:                I reviewed the patient's new clinical results.    Results from last 7 days   Lab Units 07/12/24  0410 07/11/24  0626 07/10/24  0353   WBC 10*3/mm3 8.02 8.41 10.89*   HEMOGLOBIN g/dL 9.0* 8.9* 8.7*   HEMATOCRIT % 27.6* 27.2* 26.1*   PLATELETS 10*3/mm3 701* 825* 785*     Results from last 7 days   Lab Units 07/12/24  0410 07/11/24  0626 07/10/24  0353   SODIUM mmol/L 135* 134* 137   POTASSIUM mmol/L 3.4* 3.0* 3.2*   CHLORIDE mmol/L 99 97* 102   CO2 mmol/L 25.0 24.1 25.0   BUN mg/dL 4* 5* 7*   CREATININE mg/dL 0.46* 0.45* 0.35*   CALCIUM mg/dL 8.5* 8.1* 8.2*   BILIRUBIN mg/dL 0.5 0.5 0.5   ALK PHOS U/L 138* 130* 115   ALT (SGPT) U/L 27 29 31   AST (SGOT) U/L 34* 34* 28   GLUCOSE mg/dL 112* 111* 120*         Lab Results   Lab Value Date/Time    LIPASE 19 07/08/2024 1432    LIPASE 21 07/05/2024 0810    LIPASE 35 07/04/2024 1946       Radiology:  XR Chest 1 View   Final Result      XR Chest 1 View   Final Result       1. Similar appearing right greater than left bibasilar opacities, which   could represent atelectasis, pneumonia, and pulmonary contusions, with   small right greater than left bilateral pleural effusions.   2. No pneumothorax is seen.       This report was finalized on 7/11/2024 7:03 AM by Praful Gutierrez MD on   Workstation: [id number]          XR Chest 1 View   Final Result   Stable layering moderate right and small left pleural   effusions. No focal consolidation or measurable pneumothorax. Normal   size cardiomediastinal silhouette with coronary artery calcifications.   Known rib fractures better seen on prior CT.       This report was finalized on 7/10/2024 7:28 AM by Dr. Prince Bryan M.D on  Workstation: [id number]          FL ESOPHAGRAM DOUBLE CONTRAST   Final Result   1.  Presbyesophagus demonstrated by moderate intermittent tertiary   contractions in the lower half of the esophagus. This results in delayed   clearance of barium with intraesophageal backflow while the patient is   in the semiupright position.   2.  Small volume of recumbent gastroesophageal reflux observed refluxing   to the upper thoracic esophagus.   3.  Smooth tapered narrowing at the distal esophagus. The patient was   unable to swallow the barium tablet limiting full evaluation.                        This report was finalized on 7/9/2024 4:13 PM by Dr. Melody Sin M.D on   Workstation: [id number]          XR Chest 1 View   Final Result   1. Increased density in both lung bases right greater than left as   described.   2. FINDINGS appear similar to the 7/8/2024 study.           This report was finalized on 7/9/2024 7:31 AM by Dr. Tucker Peters M.D on Workstation: [id number]          XR Chest 1 View   Final Result   Moderate right and small left pleural effusions with   associated basilar opacity. Known right rib fractures not well   demonstrated on this exam. Cardiomegaly. Atherosclerotic disease. No   definite pneumothorax.       This report was finalized on 7/8/2024 7:03 AM by Dr. Jorge Alberto Hawkins M.D on Workstation: [id number]          XR Chest 1 View   Final Result       1. Stable chest.   2. Bibasilar lung opacities.   3. Small layering right and possible left pleural effusions.   4. No pneumothorax seen.       This report was finalized on 7/7/2024 7:59 AM by Dr. Gerry Lopez M.D   on Workstation: [id number]          XR Chest 1 View   Final Result   No evidence for significant change when compared to   yesterday's exam.       This report was finalized on 7/6/2024 7:35 AM by Dr. Jorge Alberto Hawkins M.D on Workstation: [id number]          XR Chest 1 View   Final Result       1. Similar-appearing lung opacities  and right pleural effusion with no   definite residual right pneumothorax.   2. Small volume free air under the right hemidiaphragm, likely   postoperative.       This report was finalized on 7/5/2024 2:28 PM by Praful Gutierrez MD on   Workstation: [id number]          FL Cholangiogram Operative   Final Result   Impression: Intraoperative cholangiogram, as described. See the   procedure note for details.           This report was finalized on 7/5/2024 2:17 PM by Praful Gutierrez MD on   Workstation: [id number]          XR Chest PA & Lateral   Final Result       1. Consolidation of the right lung base, likely a small pleural effusion   with underlying pneumonia and/or atelectasis, with a suspected small   right apical pneumothorax. Compared to more recent prior outside imaging   if available.   2. Ill-defined left basilar subsegmental atelectasis and/or scarring.   3. Partially calcified biapical pleural-parenchymal scarring.   3. Partially visualized acute anterior right rib fracture deformities   and similar-appearing possibly acute mild anterior wedge compression   fracture of the T11 superior endplate.       This report was finalized on 7/5/2024 8:47 AM by Praful Gutierrez MD on   Workstation: [id number]          CT Outside Films   Final Result      XR Chest 1 View    (Results Pending)   XR Chest 1 View    (Results Pending)       Assessment & Plan     Active Hospital Problems    Diagnosis     **Acute cholecystitis     Duodenal erosions on EGD     Erosive gastropathy     Acute retention of urine     Delirium due to another medical condition     PFO (patent foramen ovale)     Closed fracture of multiple ribs of right side     Hyponatremia     Abnormal esophagram     Hypercholesteremia     Paroxysmal atrial fibrillation     Essential hypertension     Coronary artery disease involving native coronary artery of native heart without angina pectoris     PAD (peripheral artery disease)     Gastroesophageal reflux disease      Anxiety disorder        Assessment and Plan:  GLobus, dysphagia, ongoing concern over the past year, worsening, outpatient evaluation was postponed secondary to cholecystitis  EGD July 11, 2024 with scattered gastric and duodenal erosions, 2 cm hiatal hernia, benign biopsies.  Esophagram with evidence of presbyesophagus without overt esophageal stricturing or narrowing.    Acute cholecystitis s/p laparoscopic cholecystectomy and intraoperative cholangiogram 7/5  A-fib with RVR-now treated  Antiplatelet therapy  Constipation-resolved  Anemia      Plan:  ReViewed EGD and pathology results  Recommend twice daily PPI x 4 weeks then once daily  Recommend sucralfate 3 times daily CRUSHED IN LIQUID x 2 weeks then discontinue  Okay for discharge from GI standpoint    GI will sign off but we are available if we can be of any additional assistance during this admission    I discussed the patients findings and my recommendations with patient and family.           Blank VELEZ  [facility]  [address] [place] [address]

## 2024-07-13 ENCOUNTER — APPOINTMENT (OUTPATIENT)
Dept: GENERAL RADIOLOGY | Facility: HOSPITAL | Age: 89
End: 2024-07-13
Payer: MEDICARE

## 2024-07-13 ENCOUNTER — READMISSION MANAGEMENT (OUTPATIENT)
Dept: CALL CENTER | Facility: HOSPITAL | Age: 89
End: 2024-07-13
Payer: MEDICARE

## 2024-07-13 VITALS
BODY MASS INDEX: 20.66 KG/M2 | OXYGEN SATURATION: 97 % | HEART RATE: 81 BPM | TEMPERATURE: 98.6 F | WEIGHT: 121.03 LBS | SYSTOLIC BLOOD PRESSURE: 155 MMHG | DIASTOLIC BLOOD PRESSURE: 73 MMHG | RESPIRATION RATE: 17 BRPM | HEIGHT: 64 IN

## 2024-07-13 PROBLEM — K81.0 ACUTE CHOLECYSTITIS: Status: RESOLVED | Noted: 2024-07-05 | Resolved: 2024-07-13

## 2024-07-13 PROBLEM — F05 DELIRIUM DUE TO ANOTHER MEDICAL CONDITION: Status: RESOLVED | Noted: 2024-07-07 | Resolved: 2024-07-13

## 2024-07-13 LAB
ALBUMIN SERPL-MCNC: 3.3 G/DL (ref 3.5–5.2)
ALBUMIN/GLOB SERPL: 1.1 G/DL
ALP SERPL-CCNC: 149 U/L (ref 39–117)
ALT SERPL W P-5'-P-CCNC: 25 U/L (ref 1–33)
ANION GAP SERPL CALCULATED.3IONS-SCNC: 8.1 MMOL/L (ref 5–15)
AST SERPL-CCNC: 28 U/L (ref 1–32)
BASOPHILS # BLD AUTO: 0.05 10*3/MM3 (ref 0–0.2)
BASOPHILS NFR BLD AUTO: 0.6 % (ref 0–1.5)
BILIRUB SERPL-MCNC: 0.5 MG/DL (ref 0–1.2)
BUN SERPL-MCNC: 6 MG/DL (ref 8–23)
BUN/CREAT SERPL: 12.5 (ref 7–25)
CALCIUM SPEC-SCNC: 9 MG/DL (ref 8.6–10.5)
CHLORIDE SERPL-SCNC: 100 MMOL/L (ref 98–107)
CO2 SERPL-SCNC: 23.9 MMOL/L (ref 22–29)
CREAT SERPL-MCNC: 0.48 MG/DL (ref 0.57–1)
DEPRECATED RDW RBC AUTO: 46.8 FL (ref 37–54)
EGFRCR SERPLBLD CKD-EPI 2021: 91.8 ML/MIN/1.73
EOSINOPHIL # BLD AUTO: 0.34 10*3/MM3 (ref 0–0.4)
EOSINOPHIL NFR BLD AUTO: 4.3 % (ref 0.3–6.2)
ERYTHROCYTE [DISTWIDTH] IN BLOOD BY AUTOMATED COUNT: 14.6 % (ref 12.3–15.4)
GLOBULIN UR ELPH-MCNC: 3 GM/DL
GLUCOSE SERPL-MCNC: 117 MG/DL (ref 65–99)
HCT VFR BLD AUTO: 30.5 % (ref 34–46.6)
HGB BLD-MCNC: 9.9 G/DL (ref 12–15.9)
IMM GRANULOCYTES # BLD AUTO: 0.07 10*3/MM3 (ref 0–0.05)
IMM GRANULOCYTES NFR BLD AUTO: 0.9 % (ref 0–0.5)
LYMPHOCYTES # BLD AUTO: 1.43 10*3/MM3 (ref 0.7–3.1)
LYMPHOCYTES NFR BLD AUTO: 18.1 % (ref 19.6–45.3)
MCH RBC QN AUTO: 29.3 PG (ref 26.6–33)
MCHC RBC AUTO-ENTMCNC: 32.5 G/DL (ref 31.5–35.7)
MCV RBC AUTO: 90.2 FL (ref 79–97)
MONOCYTES # BLD AUTO: 0.93 10*3/MM3 (ref 0.1–0.9)
MONOCYTES NFR BLD AUTO: 11.8 % (ref 5–12)
NEUTROPHILS NFR BLD AUTO: 5.09 10*3/MM3 (ref 1.7–7)
NEUTROPHILS NFR BLD AUTO: 64.3 % (ref 42.7–76)
NRBC BLD AUTO-RTO: 0 /100 WBC (ref 0–0.2)
PLATELET # BLD AUTO: 836 10*3/MM3 (ref 140–450)
PMV BLD AUTO: 8.8 FL (ref 6–12)
POTASSIUM SERPL-SCNC: 3.9 MMOL/L (ref 3.5–5.2)
PROT SERPL-MCNC: 6.3 G/DL (ref 6–8.5)
RBC # BLD AUTO: 3.38 10*6/MM3 (ref 3.77–5.28)
SODIUM SERPL-SCNC: 132 MMOL/L (ref 136–145)
WBC NRBC COR # BLD AUTO: 7.91 10*3/MM3 (ref 3.4–10.8)

## 2024-07-13 PROCEDURE — 80053 COMPREHEN METABOLIC PANEL: CPT | Performed by: SURGERY

## 2024-07-13 PROCEDURE — 85025 COMPLETE CBC W/AUTO DIFF WBC: CPT | Performed by: SURGERY

## 2024-07-13 PROCEDURE — 71045 X-RAY EXAM CHEST 1 VIEW: CPT

## 2024-07-13 RX ORDER — SUCRALFATE 1 G/1
1 TABLET ORAL
Qty: 39 TABLET | Refills: 0 | Status: SHIPPED | OUTPATIENT
Start: 2024-07-13 | End: 2024-07-26

## 2024-07-13 RX ORDER — ACETAMINOPHEN 325 MG/1
650 TABLET ORAL EVERY 6 HOURS PRN
Start: 2024-07-13

## 2024-07-13 RX ORDER — HYDROCODONE BITARTRATE AND ACETAMINOPHEN 5; 325 MG/1; MG/1
0.5 TABLET ORAL EVERY 8 HOURS PRN
Qty: 8 TABLET | Refills: 0 | Status: SHIPPED | OUTPATIENT
Start: 2024-07-13

## 2024-07-13 RX ORDER — PANTOPRAZOLE SODIUM 40 MG/1
40 TABLET, DELAYED RELEASE ORAL
Qty: 60 TABLET | Refills: 0 | Status: SHIPPED | OUTPATIENT
Start: 2024-07-13

## 2024-07-13 RX ORDER — ACETAMINOPHEN 325 MG/1
650 TABLET ORAL 2 TIMES DAILY
Start: 2024-07-13

## 2024-07-13 RX ADMIN — PANTOPRAZOLE SODIUM 40 MG: 40 TABLET, DELAYED RELEASE ORAL at 08:41

## 2024-07-13 RX ADMIN — Medication 1 CAPSULE: at 08:40

## 2024-07-13 RX ADMIN — SENNOSIDES AND DOCUSATE SODIUM 2 TABLET: 50; 8.6 TABLET ORAL at 08:41

## 2024-07-13 RX ADMIN — Medication 10 ML: at 08:42

## 2024-07-13 RX ADMIN — HYDROCODONE BITARTRATE AND ACETAMINOPHEN 0.5 TABLET: 5; 325 TABLET ORAL at 08:41

## 2024-07-13 RX ADMIN — ACETAMINOPHEN 325MG 650 MG: 325 TABLET ORAL at 08:41

## 2024-07-13 RX ADMIN — METOPROLOL SUCCINATE 50 MG: 50 TABLET, FILM COATED, EXTENDED RELEASE ORAL at 08:41

## 2024-07-13 RX ADMIN — CLOPIDOGREL BISULFATE 75 MG: 75 TABLET, FILM COATED ORAL at 08:41

## 2024-07-13 RX ADMIN — SUCRALFATE 1 G: 1 TABLET ORAL at 08:40

## 2024-07-13 RX ADMIN — APIXABAN 2.5 MG: 2.5 TABLET, FILM COATED ORAL at 08:41

## 2024-07-13 NOTE — OUTREACH NOTE
Prep Survey      Flowsheet Row Responses   [facility] facility patient discharged from? [facility]   Is LACE score < 7 ? No   Eligibility [facility]   Date of Admission 07/05/24   Date of Discharge 07/13/24   Discharge Disposition Home-Health Care Sv   Discharge diagnosis Acute cholecystitis-EGD this visit   Does the patient have one of the following disease processes/diagnoses(primary or secondary)? Other   Does the patient have Home health ordered? Yes   What is the Home health agency?  [facility]   Is there a DME ordered? No   Prep survey completed? Yes            GREGG MITCHELL - Registered Nurse

## 2024-07-13 NOTE — PROGRESS NOTES
Continued Stay Note  [facility]     Patient Name: Danyell Osborne  MRN: [id number]  Today's Date: 7/13/2024    Admit Date: 7/5/2024    Plan: Home with family and [facility], [facility] transport Saturday 7/13/24 @ 1230..........Angus MAY   Discharge Plan       Row Name 07/13/24 0938       Plan    Plan Home with family and [facility], [facility] transport Saturday 7/13/24 @ 1230..........Angus MAY    Patient/Family in Agreement with Plan yes    Plan Comments Inbound call from RN requesting transport home. S/W patient's spouse at bedside, introduced self and role. Face sheet information verified as correct. Family wishes to use [facility] for transport home and agreeable to paying for service. S/W [facility] at transport scheduled for today at 1230, confirmation number: [id number] and cost $242.00. Update to spouse by phone and provided with contact information for Doug to call and make payment arrangements. No further needs identified, spouse states family members will be available at home to assist with getting patient into home and situated. Call to Maria Isabel/SARAHI LOWE to notify of DC today........Angus MAY                   Discharge Codes    No documentation.                 Expected Discharge Date and Time       Expected Discharge Date Expected Discharge Time    Jul 13, 2024               Karena Mcneil, YADIRA

## 2024-07-13 NOTE — DISCHARGE SUMMARY
"    [facility] Medicine Services  DISCHARGE SUMMARY    Patient Name: Danyell Osborne  : 1935  MRN: [id number]    Date of Admission: 2024  5:33 AM  Date of Discharge: 2024  Primary Care Physician: Shanae Osborne APRN    Consults       Date and Time Order Name Status Description    2024 11:09 AM Inpatient Gastroenterology Consult      2024  4:24 AM Inpatient Cardiology Consult      2024  5:14 PM Inpatient Thoracic Surgery Consult Completed             Hospital Course       Active Hospital Problems    Diagnosis  POA    Duodenal erosions on EGD [K26.9]  Yes    Erosive gastropathy [K31.89]  Yes    Acute retention of urine [R33.8]  No    PFO (patent foramen ovale) [Q21.12]  Not Applicable    Closed fracture of multiple ribs of right side [S22.41XA]  Yes    Hyponatremia [E87.1]  Yes    Abnormal esophagram [R93.3]  Yes    Hypercholesteremia [E78.00]  Yes    Paroxysmal atrial fibrillation [I48.0]  Yes    Essential hypertension [I10]  Yes    Coronary artery disease involving native coronary artery of native heart without angina pectoris [I25.10]  Yes    PAD (peripheral artery disease) [I73.9]  Yes    Gastroesophageal reflux disease [K21.9]  Yes    Anxiety disorder [F41.9]  Yes    Osteoporosis [M81.0]  Yes      Resolved Hospital Problems    Diagnosis Date Resolved POA    **Acute cholecystitis [K81.0] 2024 Yes    Delirium due to another medical condition [F05] 2024 No      History of present illness as written by the admitting physician on 2024:  \"Ms. Osborne is a 88 y.o. non-smoker with a history of HTN, HLD, CAD s/p LAD stent and PAD on plavix, PAF on eliquis, PFO, GERD, anxiety disorder, and recent mechanical fall with right sided rib fractures that presents to [facility] complaining of generalized weakness and right upper quadrant abdominal pain. She was admitted to the trauma service at [facility] following a fall sown some stairs on 24 and was " "discharged on 7/3/24. She was treated conservatively for multiple right-sided rib fractures and a small right-sided pneumothorax. Since then she has had some generalized weakness, abdominal pain, and bloating. She presented to [facility] with these symptoms and was found to have acute cholecystitis. She received antibiotics and was transferred to this facility for further evaluation and treatment. \"    Hospital Course:  Danyell Osborne is a 88 y.o. female presents to the hospital as per above with mechanical fall and right-sided rib fractures and right upper quadrant pain.  She was found to have acute cholecystitis this hospitalization as well as acute gastritis and duodenal ulcerations.       Acute Cholecystitis  - s/p laparoscopic cholecystectomy with negative intraoperative cholangiogram 7/5/24 with Dr. Roth, she will follow-up in surgery clinic  -Provided ceftriaxone and flagyl, patient completed a course of antibiotics  - on regular diet, SLP following given her prior swallowing issues  - appreciate general surgery recs and they have cleared her to discharge and discussed with her appropriate low-fat diet at discharge and other limitations.     Acute gastric and duodenal erosions/ulcers:  Biopsy benign.  EGD 7/11/2024.  Please see EGD report for full details.  GI recommending Carafate and PPI.  Please see full recommendations below.    Possible bibasilar pneumonia: POA  Infiltrate noted on imaging.  Patient completed antibiotic course.  Respiratory status is stable at the time of discharge.  Leukocytosis normalized     HTN/PAF/PAD/PFO  - BP acceptable, had RVR likely provoked by above and has received digoxin, now converted to NSR  - holding lisinopril, continue metoprolol  - resume eliquis and plavix, no further issues     Hyponatremia  - noted on hospitalization at [facility], sodium level has stabilized.  Recommend BMP at follow-up.     GERD  - continue PPI     Multiple Right-Sided Rib Fractures  - from mechanical " "fall down some stairs on 6/26/24  - had pneumothorax which resolved with conservative treatment  - continue pain control, encourage pulmonary toilet  - appreciate thoracic surgery recs, they have signed off  -Pain is stabilized and improved.  Patient requiring low-dose Norco for pain control.  I discussed the risks, benefits, and alternatives to narcotic therapies.  After counseling, due to pain severity, the patient feels narcotics are needed to adequately controlled their pain.  They agreed to only take the medication as prescribed, to hold with any sign of sedation, and to avoid driving while actively using narcotics.     Acute Urine Retention  - boland catheter placed 7/7/24  - voiding trial successful now voiding     Constipation  - resolved, abdominal examination is benign, resolved with bowel regimen     Delirium, resolved  -Likely due to acute infection     Gastroenterology recommendations for discharge:  \"GLobus, dysphagia, ongoing concern over the past year, worsening, outpatient evaluation was postponed secondary to cholecystitis  EGD July 11, 2024 with scattered gastric and duodenal erosions, 2 cm hiatal hernia, benign biopsies.  Esophagram with evidence of presbyesophagus without overt esophageal stricturing or narrowing.    Acute cholecystitis s/p laparoscopic cholecystectomy and intraoperative cholangiogram 7/5  A-fib with RVR-now treated  Antiplatelet therapy  Constipation-resolved  Anemia  Plan:  ReViewed EGD and pathology results  Recommend twice daily PPI x 4 weeks then once daily  Recommend sucralfate 3 times daily CRUSHED IN LIQUID x 2 weeks then discontinue  Okay for discharge from GI standpoint\" Blank VELEZ [facility] [place]      Day of Discharge     Subjective: Patient is feeling much better.  She wishes to discharge home today.  She feels her pain is reasonably controlled.  Her  feels that she has adequate help.  He reports there will be lots of " family members helping her at discharge around the house.  Patient agrees to minimize Norco pain medicine is much as possible and use Tylenol.  Patient agrees to follow-up closely with all recommended providers.  Patient now is no longer refusing Carafate she says.    Vital Signs:   Temp:  [97.8 °F (36.6 °C)-98.6 °F (37 °C)] 98.6 °F (37 °C)  Heart Rate:  [74-98] 81  Resp:  [17-18] 17  BP: (151-169)/(58-86) 155/73     Physical Exam:    Constitutional:Awake, alert, elderly  HENT: NCAT, mucous membranes moist, neck supple  Respiratory: No cough or wheezes, normal respirations, nonlabored breathing   Cardiovascular: Pulse rate is normal, palpable radial pulses  Gastrointestinal:  soft, mild abdominal tenderness, nondistended  Musculoskeletal: Frail, chronically debilitated appearance, mild lower extremity edema, BMI is 21  Psychiatric: Appropriate affect, cooperative, conversational  Neurologic: Somewhat poor historian, no slurred speech or facial droop, follows commands  Skin: No rashes or jaundice, warm    Pertinent  and/or Most Recent Results     Results from last 7 days   Lab Units 07/13/24  0418 07/12/24  1538 07/12/24  0410 07/11/24  0626 07/10/24  0353 07/09/24  0334 07/08/24  0538 07/07/24  0533   WBC 10*3/mm3 7.91  --  8.02 8.41 10.89* 13.22* 10.82* 10.59   HEMOGLOBIN g/dL 9.9*  --  9.0* 8.9* 8.7* 8.2* 8.4* 7.5*   HEMATOCRIT % 30.5*  --  27.6* 27.2* 26.1* 24.7* 26.2* 23.2*   PLATELETS 10*3/mm3 836*  --  701* 825* 785* 733* 712* 628*   SODIUM mmol/L 132*  --  135* 134* 137 135* 138 134*   POTASSIUM mmol/L 3.9 3.6 3.4* 3.0* 3.2* 3.4* 3.7 3.7   CHLORIDE mmol/L 100  --  99 97* 102 99 103 99   CO2 mmol/L 23.9  --  25.0 24.1 25.0 19.8* 23.9 27.0   BUN mg/dL 6*  --  4* 5* 7* 8 6* 8   CREATININE mg/dL 0.48*  --  0.46* 0.45* 0.35* 0.47* 0.42* 0.53*   GLUCOSE mg/dL 117*  --  112* 111* 120* 94 98 103*   CALCIUM mg/dL 9.0  --  8.5* 8.1* 8.2* 8.0* 7.8* 8.2*     Results from last 7 days   Lab Units 07/13/24  0418  "07/12/24  0410 07/11/24  0626 07/10/24  0353 07/09/24  0334 07/08/24  0538 07/07/24  0533   BILIRUBIN mg/dL 0.5 0.5 0.5 0.5 0.4 0.4 0.4   ALK PHOS U/L 149* 138* 130* 115 112 102 101   ALT (SGPT) U/L 25 27 29 31 36* 41* 53*   AST (SGOT) U/L 28 34* 34* 28 31 37* 51*           Invalid input(s): \"TG\", \"LDLCALC\", \"LDLREALC\"        Brief Urine Lab Results  (Last result in the past 365 days)        Color   Clarity   Blood   Leuk Est   Nitrite   Protein   CREAT   Urine HCG        07/07/24 0508 Yellow   Clear   Negative   Negative   Negative   Negative                   Microbiology Results Abnormal       Procedure Component Value - Date/Time    Blood Culture - Blood, Hand, Right [[id number]]  (Normal) Collected: 07/05/24 1033    Lab Status: Final result Specimen: Blood from Hand, Right Updated: 07/10/24 1115     Blood Culture No growth at 5 days    Blood Culture - Blood, Hand, Left [[id number]]  (Normal) Collected: 07/05/24 1033    Lab Status: Final result Specimen: Blood from Hand, Left Updated: 07/10/24 1115     Blood Culture No growth at 5 days            Imaging Results (All)       Procedure Component Value Units Date/Time    XR Chest 1 View [[id number]] Resulted: 07/13/24 0643     Updated: 07/13/24 0643    XR Chest 1 View [[id number]] Collected: 07/12/24 0636     Updated: 07/12/24 0938    Narrative:      XR CHEST 1 VW-7/12/2024     HISTORY: Possible pneumothorax.     Heart size is at the upper limits of normal. Small right pleural  effusion is seen with some minimal right basilar suspected atelectasis.  Soft tissue calcification is seen in the left apex. There is some aortic  calcification.     No significant pneumothorax is seen.        This report was finalized on 7/12/2024 9:35 AM by Dr. Tucekr Peters M.D on Workstation: [id number]       XR Chest 1 View [[id number]] Collected: 07/11/24 0700     Updated: 07/11/24 0706    Narrative:      XR CHEST 1 VW-     DATE OF EXAM: 7/11/2024 5:30 AM     INDICATION: Assess " pneumothorax.     COMPARISON: Radiographs 7/10/2024, 7/9/2024, 7/8/2024, and 7/7/2024. CT  abdomen and pelvis 7/4/2024.     TECHNIQUE: A single portable AP view of the chest was obtained.     FINDINGS:  Patient rotation. Overlying artifacts. Similar-appearing right greater  than left bibasilar opacities, obscuring the diaphragm in each  costophrenic angle. Biapical pleural-parenchymal scarring and partially  calcified pleural plaques. No pneumothorax is seen. Unchanged cardiac  and mediastinal contours. Calcified atherosclerotic disease in the  thoracic aorta. Known right rib fractures are not well visualized.       Impression:         1. Similar appearing right greater than left bibasilar opacities, which  could represent atelectasis, pneumonia, and pulmonary contusions, with  small right greater than left bilateral pleural effusions.  2. No pneumothorax is seen.     This report was finalized on 7/11/2024 7:03 AM by Praful Gutierrez MD on  Workstation: [id number]       XR Chest 1 View [[id number]] Collected: 07/10/24 0725     Updated: 07/10/24 0731    Narrative:      XR CHEST 1 VW-     INDICATION: Follow-up pneumothorax     COMPARISON: Chest radiographs dating back to 10/18/2021. Outside CT  7/5/2024       Impression:      Stable layering moderate right and small left pleural  effusions. No focal consolidation or measurable pneumothorax. Normal  size cardiomediastinal silhouette with coronary artery calcifications.  Known rib fractures better seen on prior CT.     This report was finalized on 7/10/2024 7:28 AM by Dr. Prince Bryan M.D on Workstation: [id number]       FL ESOPHAGRAM DOUBLE CONTRAST [[id number]] Collected: 07/09/24 1345     Updated: 07/09/24 1616    Narrative:      EXAMINATION:  Esophagram Complete Double-Contrast Fluoroscopy.     DATE: 7/9/2024     COMPARISON:  Chest 1 view 7/9/2024     CLINICAL HISTORY:  88-year-old female with globus sensation and dysphagia. Hospitalized  with acute  cholecystitis status post laparoscopic cholecystectomy  7/5/2024 and multiple right-sided rib fractures 6/26/2024.     DETAILS:  Administration of thin barium, thick barium, and air crystals were  provided to the patient. Rapid sequence video clips and spot films of  the esophagus were obtained. Significantly limited mobility due to rib  fractures, weakness and severe kyphosis. Prone positioning was unable to  be performed. The patient was unable to swallow a 12.5 mm diameter  barium tablet.     Images were obtained by AGUSTIN Sanchez.     *  TOTAL FLUOROSCOPY DOSE: DAP: 910.6 uGym2  *  TOTAL NUMBER OF FLUOROSCOPIC IMAGES: 541     FINDINGS:     The preliminary  views of the chest show increased density in both  lung bases, right greater than left and similar findings on previous  exam 7/9/2024.     Esophagus:  The vallecula and pyriform sinuses are unremarkable, with no evidence of  mucosal abnormality or extrinsic mass compression. Serial images of the  cervical esophagus show no laryngeal penetration or aspiration. No  evidence of cervical esophageal diverticula. No prominent  cricopharyngeal bar.     Severe kyphosis. Normal esophageal distensibility and caliber. Prominent  intermittent tertiary contractions are observed within the lower half of  the esophagus resulting in delayed clearance of barium with  intraesophageal backflow with the patient in the semiupright position.  The esophageal mucosa is normal. No evidence of a filling defect,  ulceration, stricture or significant narrowing. No evidence of hiatal  hernia is identified. Small volume of gastroesophageal reflux is seen  refluxing to the upper thoracic esophagus with the patient in the  recumbent position.  A 12.5mm barium tablet was administered and the  patient was unable to swallow the tablet beyond the oropharynx. Smooth  tapered narrowing at the distal esophagus.      Stomach:  Barium flows through the esophagus and into the stomach.           Impression:      1.  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 This results in delayed  clearance of barium with intraesophageal backflow while the patient is  in the semiupright position.  2.  Small volume of recumbent gastroesophageal reflux observed refluxing  to the upper thoracic esophagus.  3.  Smooth tapered narrowing at the distal esophagus. The patient was  unable to swallow the barium tablet limiting full evaluation.                  This report was finalized on 7/9/2024 4:13 PM by Dr. Melody Sin M.D on  Workstation: [id number]       XR Chest 1 View [[id number]] Collected: 07/09/24 0728     Updated: 07/09/24 0734    Narrative:      XR CHEST 1 VW-7/9/2024     HISTORY: Possible pneumothorax.     Heart size is mildly enlarged. There is increased density in both lung  bases likely combination of pleural fluid atelectasis and/or pneumonia.  The images taken in a somewhat reversed lordotic position. There is some  calcification overlying the left lung apex which may be calcified  pleural plaque. There is some aortic calcification as well.       Impression:      1. Increased density in both lung bases right greater than left as  described.  2. FINDINGS appear similar to the 7/8/2024 study.        This report was finalized on 7/9/2024 7:31 AM by Dr. Tucker Peters M.D on Workstation: [id number]       XR Chest 1 View [[id number]] Collected: 07/08/24 0636     Updated: 07/08/24 0706    Narrative:      CHEST SINGLE VIEW     HISTORY: Follow-up pneumothorax.     COMPARISON: AP chest 07/07/2024, 07/06/2024, 07/05/2024.     FINDINGS: Moderate right and small left pleural effusions are present.  Heart size is enlarged. Aortic vascular calcifications are present.  There is biapical pleural-parenchymal scarring. There is no definite  pneumothorax. Evaluation limited by patient rotation to the left.  Cardiac monitoring leads are present.        Impression:      Moderate right and small left pleural effusions with  associated basilar opacity. Known right rib fractures not well  demonstrated on this exam. Cardiomegaly. Atherosclerotic disease. No  definite pneumothorax.     This report was finalized on 7/8/2024 7:03 AM by Dr. Jorge Alberto Hawkins M.D on Workstation: [id number]       XR Chest 1 View [[id number]] Collected: 07/07/24 0757     Updated: 07/07/24 0802    Narrative:      XR CHEST 1 VW-        INDICATION: Assess pneumothorax     COMPARISON: Chest radiograph July 6, 2024     TECHNIQUE: 1 view chest     FINDINGS:      Biapical pleural parenchymal calcifications. Bibasilar lung opacities.  Blunting costophrenic angles. Stable mediastinum. Suspect coronary  stent. Cholecystectomy clips.       Impression:         1. Stable chest.  2. Bibasilar lung opacities.  3. Small layering right and possible left pleural effusions.  4. No pneumothorax seen.     This report was finalized on 7/7/2024 7:59 AM by Dr. Gerry Lopez M.D  on Workstation: [id number]       XR Chest 1 View [[id number]] Collected: 07/06/24 0731     Updated: 07/06/24 0738    Narrative:      CHEST SINGLE VIEW     HISTORY: Follow-up pneumothorax.     COMPARISON: AP chest 07/05/2024, PA and lateral chest 05/10/2023.     FINDINGS: Heart size is enlarged. Thoracic aorta is tortuous and aortic  vascular calcifications are present. There is biapical pleural  parenchymal scarring. Small bilateral pleural effusions are present with  basilar atelectasis or infiltrates. There is no evidence for  pneumothorax.       Impression:      No evidence for significant change when compared to  yesterday's exam.     This report was finalized on 7/6/2024 7:35 AM by Dr. Jorge Alberto Hawkins M.D on Workstation: [id number]       XR Chest 1 View [[id number]] Collected: 07/05/24 1424     Updated: 07/05/24 1432    Narrative:      XR CHEST 1 VW-     DATE OF EXAM: 7/5/2024 1:59 PM     INDICATION: Right apical pneumothorax.  Status post laparoscopic  cholecystectomy.     COMPARISON: Radiographs 7/5/2024 and 5/10/2023. CT abdomen pelvis  7/4/2024.     TECHNIQUE: Portable AP views of the chest were obtained.     FINDINGS:  Overlying artifacts. The patient's chin partially obscures the lung  apices. No definite pneumothorax. Basilar predominant opacification of  both lungs, right greater than left, obscuring the right hemidiaphragm  and right costophrenic angle. Calcified biapical pleural-parenchymal  scarring. Unchanged cardiac and mediastinal contours. Calcified  atherosclerotic disease in the thoracic aorta. Small volume free air  under the right hemidiaphragm, likely postoperative.       Impression:         1. Similar-appearing lung opacities and right pleural effusion with no  definite residual right pneumothorax.  2. Small volume free air under the right hemidiaphragm, likely  postoperative.     This report was finalized on 7/5/2024 2:28 PM by Praful Gutierrez MD on  Workstation: [id number]       FL Cholangiogram Operative [[id number]] Collected: 07/05/24 1412     Updated: 07/05/24 1420    Narrative:      ACCESSION NUMBER: [id number]     DATE: 7/5/2024 11:30 AM     Exam: FL CHOLANGIOGRAM OPERATIVE-.     Exam Reason: Intraoperative cholangiogram.      Comparison: CT abdomen pelvis 7/4/2024.     Technique:  Multiple spot fluoroscopic images were obtained during an  intraoperative cholangiogram performed by the surgical service.       Fluoroscopy time: 25 seconds     Dose: 3.019 mGy     Number of images: 163        Findings: The cystic duct and distal common duct were opacified with  some retrograde opacification of the pancreatic duct. The intrahepatic  ducts and proximal common duct were not opacified. Distal common duct  dilatation with tapering at the ampulla and free spillage of contrast  into the duodenum.  No stricture or filling defect is identified.       Impression:      Impression: Intraoperative cholangiogram, as described.  See the  procedure note for details.        This report was finalized on 7/5/2024 2:17 PM by Praful Gutierrez MD on  Workstation: [id number]       XR Chest PA & Lateral [[id number]] Collected: 07/05/24 0838     Updated: 07/05/24 0850    Narrative:      XR CHEST PA AND LATERAL-     DATE OF EXAM: 7/5/2024 8:22 AM     INDICATION: Recent pneumothorax.     COMPARISON: CT abdomen pelvis 7/4/2024. Chest radiograph 5/10/2023 and  10/18/2021.     TECHNIQUE: Frontal and lateral views of the chest were obtained.     FINDINGS:  Consolidation of the right lung base obscuring the right hemidiaphragm  and right costophrenic angle. Ill-defined left basilar opacities.  Partially calcified biapical pleural-parenchymal scarring with a  suspected small right apical pneumothorax. Cardiomediastinal contours  within normal limits given technique. Coronary artery calcifications  and/or stents. Calcified atherosclerotic disease in the thoracic aorta.  Severe thoracic dextroscoliosis and age-indeterminate but possibly acute  anterior wedge compression fracture of the T11 superior endplate.  Partially visualized acute anterior right rib deformities.       Impression:         1. Consolidation of the right lung base, likely a small pleural effusion  with underlying pneumonia and/or atelectasis, with a suspected small  right apical pneumothorax. Compared to more recent prior outside imaging  if available.  2. Ill-defined left basilar subsegmental atelectasis and/or scarring.  3. Partially calcified biapical pleural-parenchymal scarring.  3. Partially visualized acute anterior right rib fracture deformities  and similar-appearing possibly acute mild anterior wedge compression  fracture of the T11 superior endplate.     This report was finalized on 7/5/2024 8:47 AM by Praful Gutierrez MD on  Workstation: [id number]       CT Outside Films [[id number]] Resulted: 07/05/24 0718     Updated: 07/05/24 0718    Narrative:      This procedure was  auto-finalized with no dictation required.            Results for orders placed during the hospital encounter of 10/09/23    Duplex Carotid Ultrasound CAR    Interpretation Summary    Right internal carotid artery demonstrates a less than 50% stenosis.    Left internal carotid artery demonstrates a 50-69% stenosis.      Results for orders placed during the hospital encounter of 10/09/23    Duplex Carotid Ultrasound CAR    Interpretation Summary    Right internal carotid artery demonstrates a less than 50% stenosis.    Left internal carotid artery demonstrates a 50-69% stenosis.      Results for orders placed during the hospital encounter of 10/09/23    Adult Transthoracic Echo Complete W/ Cont if Necessary Per Protocol (With Agitated Saline)    Interpretation Summary    Left ventricular systolic function is normal. Left ventricular ejection fraction appears to be 56 - 60%.    Left ventricular diastolic function was indeterminate.    Saline test results are positive for right to left atrial level shunt.        Discharge Details        Discharge Medications        New Medications        Instructions Start Date   acetaminophen 325 MG tablet  Commonly known as: TYLENOL   650 mg, Oral, Every 6 Hours PRN      acetaminophen 325 MG tablet  Commonly known as: TYLENOL   650 mg, Oral, 2 Times Daily, 7 days      HYDROcodone-acetaminophen 5-325 MG per tablet  Commonly known as: NORCO   0.5 tablets, Oral, Every 8 Hours PRN      pantoprazole 40 MG EC tablet  Commonly known as: PROTONIX   40 mg, Oral, 2 Times Daily Before Meals      sucralfate 1 g tablet  Commonly known as: CARAFATE   1 g, Oral, 3 Times Daily Before Meals, CRUSHED IN LIQUID x 2 weeks then discontinue             Continue These Medications        Instructions Start Date   atorvastatin 80 MG tablet  Commonly known as: LIPITOR   80 mg, Oral, Nightly      Cholecalciferol 50 MCG (2000 UT) capsule   1 capsule, Oral, Daily      clopidogrel 75 MG tablet  Commonly known as:  PLAVIX   1 tablet, Oral, Daily      Coenzyme Q10 200 MG capsule   200 mg, Oral, Daily      Eliquis 2.5 MG tablet tablet  Generic drug: apixaban   2.5 mg, Oral, Every 12 Hours Scheduled      GAS-X PO   Oral      lisinopril 5 MG tablet  Commonly known as: PRINIVIL,ZESTRIL   5 mg, Oral, Daily      metoprolol succinate XL 50 MG 24 hr tablet  Commonly known as: TOPROL-XL   50 mg, Oral, 2 Times Daily      MIRALAX PO   Oral      multivitamin with minerals tablet tablet   1 tablet, Oral, Daily      nitroglycerin 0.4 MG SL tablet  Commonly known as: NITROSTAT   DISSOLVE 1 TABLET UNDER THE TONGUE EVERY 5 MINUTES AS NEEDED FOR CHEST PAIN. DO NOT EXCEED A TOTAL OF 3 DOSES IN 15 MINUTES. IF NO RELIEF, CALL 911             Stop These Medications      aspirin 81 MG chewable tablet     famotidine 40 MG tablet  Commonly known as: PEPCID     metaxalone 800 MG tablet  Commonly known as: Skelaxin              Allergies   Allergen Reactions    Contrast Dye (Echo Or Unknown Ct/Mr) Hives and Unknown - High Severity     hives    Iodinated Contrast Media Hives and Unknown - High Severity    Penicillins Rash     Beta lactam allergy details    Antibiotic reaction: rash, hives    Age at reaction: adult    Dose to reaction time: unknown    Reason for antibiotic: unknown    Epinephrine required for reaction?: no    Tolerated antibiotics: unknown         Discharge Disposition:  Home-Health Care [facility]    Diet:  Hospital:  Diet Order   Procedures    Diet: Regular/House, Gastrointestinal; Fat-Restricted; Texture: Mechanical Ground (NDD 2); Fluid Consistency: Thin (IDDSI 0)       Activity:  Activity Instructions       Up WIth Assist                   CODE STATUS:    Code Status and Medical Interventions:   Ordered at: 07/05/24 0928     Code Status (Patient has no pulse and is not breathing):    CPR (Attempt to Resuscitate)     Medical Interventions (Patient has pulse or is breathing):    Full Support       Future Appointments   Date Time Provider  Department Center   2/14/2025  3:00 PM Marietta Brown APRN [facility]   3/28/2025 12:30 PM Dasha Gan APRN [facility]             Additional Instructions for the Follow-ups that You Need to Schedule       Ambulatory Referral to Home Health ([facility])   As directed      Face to Face Visit Date: 7/13/2024   Follow-up provider for Plan of Care?: I treated the patient in an acute care facility and will not continue treatment after discharge.   Follow-up provider: SHANAE MARVIN [[id number]]   Reason/Clinical Findings: rib fractures, weakness   Describe mobility limitations that make leaving home difficult: rib fractures, weakness   Nursing/Therapeutic Services Requested: Physical Therapy   PT orders: Strengthening Home safety assessment   Frequency: 1 Week 1        Discharge Follow-up with PCP   As directed       Currently Documented PCP:    Shanae Marvin APRN    PCP Phone Number:    [phone]     Follow Up Details: 1 week, call Monday               Contact information for follow-up providers       Tessa Montenegro MD Follow up in 2 week(s).    Specialty: General Surgery  Why: Call to schedule appointment.  Contact information:  [address] [facility] [address]  [place] [id number] [phone]               Shanae Marvin APRN .    Specialty: Family Medicine  Why: 1 week, call Monday  Contact information:  [address]  [facility] [address]  [facility] [id number] [phone]                       Contact information for after-discharge care       Home Medical Care       [facility] .    Services: Home Living Aide Services, Home Medical , Home Nursing, Home Rehabilitation  Contact information:  [address] [facility], [address]  [facility] [address] [phone]                                       Delmar Rosas MD  07/13/24      Time Spent on Discharge:  I spent greater than 35 minutes on this discharge activity which  included: face-to-face encounter with the patient, reviewing the data in the system, coordination of the care with the nursing staff as well as consultants, documentation, and entering orders.

## 2024-07-13 NOTE — NURSING NOTE
Went over d/c paperwork w/ Pt and Spouse at bedside. Informed Both about follow up appointments and new and changed medication at D/C. Both (Pt and Spouse) understands and has no further questions at this time. Pt has all belongings and is going home via wheelchair van. D/C IV

## 2024-07-13 NOTE — PLAN OF CARE
Goal Outcome Evaluation:  Plan of Care Reviewed With: patient        Progress: improving       No issues overnight. To be discharged today. TM

## 2024-07-14 NOTE — CASE MANAGEMENT/SOCIAL WORK
Case Management Discharge Note      Final Note: home w/HH         Selected Continued Care - Discharged on 7/13/2024 Admission date: 7/5/2024 - Discharge disposition: Home-Health Care Svc      Destination    No services have been selected for the patient.                Durable Medical Equipment    No services have been selected for the patient.                Dialysis/Infusion    No services have been selected for the patient.                Home Medical Care Coordination complete.      Service Provider Selected Services Address Phone Fax Patient Preferred    [facility] ,  Home Medical  ,  Home Nursing ,  Home Rehabilitation [address] [place] [address], [facility] [address] [address] [phone] --              Therapy    No services have been selected for the patient.                Community Resources    No services have been selected for the patient.                Community & DME    No services have been selected for the patient.                         Final Discharge Disposition Code: 06 - home with home health care

## 2024-07-15 ENCOUNTER — TRANSITIONAL CARE MANAGEMENT TELEPHONE ENCOUNTER (OUTPATIENT)
Dept: CALL CENTER | Facility: HOSPITAL | Age: 89
End: 2024-07-15
Payer: MEDICARE

## 2024-07-15 ENCOUNTER — TELEPHONE (OUTPATIENT)
Dept: FAMILY MEDICINE CLINIC | Age: 89
End: 2024-07-15
Payer: MEDICARE

## 2024-07-15 DIAGNOSIS — M54.59 OTHER LOW BACK PAIN: Primary | ICD-10-CM

## 2024-07-15 RX ORDER — BACLOFEN 10 MG/1
10 TABLET ORAL 2 TIMES DAILY
Qty: 60 TABLET | Refills: 1 | Status: SHIPPED | OUTPATIENT
Start: 2024-07-15

## 2024-07-15 NOTE — TELEPHONE ENCOUNTER
Pt states she is doing okay, her pain is mostly in her back, but she is managing, states she has been taking her pain medication, tylenol and baclofen, also spoke to Elizabeth and advised we could not refill pain medication, advised to take sparingly, can supplement tylenol and baclofen to help, advised if they needed pain medication they would need to contact surgeon's office, has follow up with them, but isn't sure if they can get patient there because it is all the way in [place], they are requesting a refill on baclofen, per Elizabeth they were only given about 10 pills. Advised they could try use heating pad to help ease back pain as well.

## 2024-07-17 ENCOUNTER — TELEPHONE (OUTPATIENT)
Dept: FAMILY MEDICINE CLINIC | Age: 89
End: 2024-07-17
Payer: MEDICARE

## 2024-07-17 DIAGNOSIS — U07.1 COVID-19: Primary | ICD-10-CM

## 2024-07-17 NOTE — TELEPHONE ENCOUNTER
See what her oxygen is and any symptoms, exactly what they are, see if she has a temp, then call jose and see if there is any issue with any of her rx's and taking paxlovid 3 pills BID for 5 days

## 2024-07-17 NOTE — TELEPHONE ENCOUNTER
Spoke with [facility] office. Asking if paxlovid and heart medicines  are ok together. Office will return call.

## 2024-07-17 NOTE — TELEPHONE ENCOUNTER
Per Til, Danyell is having fatigue, body aches, low cough. No fever. Positive covid at home this morning. Symptoms started 7/16/24 Til stated that patient feels  little better this morning after moving around some

## 2024-07-17 NOTE — TELEPHONE ENCOUNTER
Til states her O2 97%     Per Malik with [facility], they do not carry paxlovid. Only [facility] pharmacy as of this morning. Eliquis can be decreased by paxlovid. Please advise

## 2024-07-17 NOTE — PROGRESS NOTES
Telephone encounter started on Danyell Osborne and Eze Osborne for positive home covid test. Let message with Lauren, Eze stated that Lauren may have paxlovid already sent in for her from her PCP office.

## 2024-07-17 NOTE — PROGRESS NOTES
Pt's  and daughter have contacted me and the three of them, Eze Child and Lauren are covid +, they are asking for paxlovid, but you need to talk to them first. And document, maybe they need a tele health visit with someone.  Renal function has to be documented.  Lauren has a PCP not here.

## 2024-07-17 NOTE — TELEPHONE ENCOUNTER
Per Til, Danyell is having fatigue, body aches, low cough. No fever. Positive covid at home this morning. Symptoms started 7/16/24

## 2024-07-17 NOTE — TELEPHONE ENCOUNTER
Spoke with Eze () advised him that patient would need to be seen for paxlovid. Patient is on medicines that could have effects on them due to paxlovid. Advise  to go to ER if patient gets worse, no better.  states patient feels a little better right now. Has been up walking to bathroom.

## 2024-07-17 NOTE — TELEPHONE ENCOUNTER
Ashleigh with [facility] office states that Can Y[facility] said he did not see any problems with patient taking paxlovid  with her list of  medications. States you could check with pharmacy also.

## 2024-07-17 NOTE — TELEPHONE ENCOUNTER
Shanae Osborne, AGUSTIN  [facility] C47 minutes ago (10:37 AM)       Pt's  and daughter have contacted me and the three of them, Eze Child and Lauren are covid +, they are asking for paxlovid, but you need to talk to them first. And document, maybe they need a tele health visit with someone.  Renal function has to be documented.  Lauren has a PCP not here.

## 2024-07-17 NOTE — TELEPHONE ENCOUNTER
Lauren states she did  her medicine today. Family is concern about Til their father with memory issues. Informed Lauren that patient would have to be the one to schedule an apt to discuss the issue.

## 2024-07-17 NOTE — TELEPHONE ENCOUNTER
Her kidney function is adequate for paxlovid treatment if she is covid +, the concern is some of her rx's and paxlovid, would need to check with her cardiologist, how are her symptoms and how many days has she had covid symptoms

## 2024-07-17 NOTE — TELEPHONE ENCOUNTER
CALLER NOT ON BH VERBAL    Caller: Elizabeth Osborne    Relationship to patient: Emergency Contact        Patient is needing: CALLER REQUESTED TO SPEAK TO NURSE.    HUB EXPLAINED TO CALLER SHE WAS NOT ON [facility] VERBAL AND REQUESTED TO SPEAK TO PATIENT FOR VERBAL PERMISSION AND CALLER HUNG UP BEFORE ANY INFORMATION COULD BE TAKEN TO SEND MESSAGE TO NURSE OR TRANSFER TO OFFICE COULD BE MADE.

## 2024-07-18 ENCOUNTER — TELEPHONE (OUTPATIENT)
Dept: SURGERY | Facility: CLINIC | Age: 89
End: 2024-07-18
Payer: MEDICARE

## 2024-07-18 DIAGNOSIS — K81.0 ACUTE CHOLECYSTITIS: ICD-10-CM

## 2024-07-18 RX ORDER — GUAIFENESIN 200 MG/10ML
200 LIQUID ORAL 3 TIMES DAILY PRN
Qty: 118 ML | Refills: 2 | Status: SHIPPED | OUTPATIENT
Start: 2024-07-18

## 2024-07-18 RX ORDER — BENZONATATE 100 MG/1
100 CAPSULE ORAL 3 TIMES DAILY PRN
Qty: 30 CAPSULE | Refills: 1 | Status: SHIPPED | OUTPATIENT
Start: 2024-07-18 | End: 2025-07-18

## 2024-07-18 RX ORDER — HYDROCODONE BITARTRATE AND ACETAMINOPHEN 5; 325 MG/1; MG/1
0.5 TABLET ORAL EVERY 8 HOURS PRN
Qty: 10 TABLET | Refills: 0 | Status: SHIPPED | OUTPATIENT
Start: 2024-07-18

## 2024-07-18 NOTE — TELEPHONE ENCOUNTER
Spoke with Lauren, States patient stomach is cramping really bad. Feels like she may be constipation. O2 is 96

## 2024-07-18 NOTE — TELEPHONE ENCOUNTER
Patients daughter Elizabeth called stating that the patient is having some abdominal cramping. She is taking tylenol in between the Norco. She is taking stool softeners. Patient and her whole family have tested positive for Covid. Patients daughter would like to know if you can send a refill of the Norco, and also asked if you could send in some cough syrup. Advised that Dr. Montenegro does not treat for Covid and was not sure if she would be willing to send in a prescription for cough syrup. Patients daughter voiced understanding and did not have any further questions.

## 2024-07-18 NOTE — TELEPHONE ENCOUNTER
Lauren daughter requesting something for patient cough. OTC or script. Daughter states mother has thick mucus and hard to get it out.

## 2024-07-18 NOTE — TELEPHONE ENCOUNTER
Can Yanes was contacted at cardiology and was okay with paxlovid and being on Eliquis.  Her oxygen is good. Check on her today.

## 2024-07-18 NOTE — TELEPHONE ENCOUNTER
Sure, I have sent a refill for Marmarth as well as a new prescription for Tessalon Perles and guaifenesin to her pharmacy in [place].  Beyond this refill of Marmarth, I do not feel comfortable giving her any more narcotics so she will need to use ibuprofen 800 mg as needed for pain.

## 2024-07-22 ENCOUNTER — TELEPHONE (OUTPATIENT)
Dept: FAMILY MEDICINE CLINIC | Age: 89
End: 2024-07-22
Payer: MEDICARE

## 2024-07-22 NOTE — TELEPHONE ENCOUNTER
Per Rich Osborne  flako scottard family texting me for tele health visit, she had covid, I don't to tele health, talk to them      Per Eze Osborne, patient is not eating well. No fever, cough, body aches.   States patient is not able to come in and declined apt. Advise that patient would need to be seen and also need a hospital f/u apt.

## 2024-08-02 ENCOUNTER — OUTSIDE FACILITY SERVICE (OUTPATIENT)
Dept: FAMILY MEDICINE CLINIC | Age: 89
End: 2024-08-02
Payer: MEDICARE

## 2024-08-12 ENCOUNTER — OFFICE VISIT (OUTPATIENT)
Dept: FAMILY MEDICINE CLINIC | Age: 89
End: 2024-08-12
Payer: MEDICARE

## 2024-08-12 ENCOUNTER — LAB (OUTPATIENT)
Dept: LAB | Facility: HOSPITAL | Age: 89
End: 2024-08-12
Payer: MEDICARE

## 2024-08-12 VITALS
TEMPERATURE: 98.1 F | OXYGEN SATURATION: 98 % | HEART RATE: 71 BPM | BODY MASS INDEX: 18.47 KG/M2 | DIASTOLIC BLOOD PRESSURE: 73 MMHG | SYSTOLIC BLOOD PRESSURE: 145 MMHG | WEIGHT: 108.2 LBS | HEIGHT: 64 IN

## 2024-08-12 DIAGNOSIS — M54.6 ACUTE LEFT-SIDED THORACIC BACK PAIN: ICD-10-CM

## 2024-08-12 DIAGNOSIS — K21.9 GASTROESOPHAGEAL REFLUX DISEASE WITHOUT ESOPHAGITIS: ICD-10-CM

## 2024-08-12 DIAGNOSIS — I10 ESSENTIAL HYPERTENSION: Primary | ICD-10-CM

## 2024-08-12 DIAGNOSIS — D75.839 THROMBOCYTOSIS: ICD-10-CM

## 2024-08-12 DIAGNOSIS — F41.8 OTHER SPECIFIED ANXIETY DISORDERS: ICD-10-CM

## 2024-08-12 DIAGNOSIS — Z78.0 POSTMENOPAUSAL: ICD-10-CM

## 2024-08-12 DIAGNOSIS — I10 ESSENTIAL HYPERTENSION: ICD-10-CM

## 2024-08-12 LAB
ALBUMIN SERPL-MCNC: 4.1 G/DL (ref 3.5–5.2)
ALBUMIN/GLOB SERPL: 1.4 G/DL
ALP SERPL-CCNC: 80 U/L (ref 39–117)
ALT SERPL W P-5'-P-CCNC: 17 U/L (ref 1–33)
ANION GAP SERPL CALCULATED.3IONS-SCNC: 12.5 MMOL/L (ref 5–15)
AST SERPL-CCNC: 24 U/L (ref 1–32)
BASOPHILS # BLD AUTO: 0.03 10*3/MM3 (ref 0–0.2)
BASOPHILS NFR BLD AUTO: 0.4 % (ref 0–1.5)
BILIRUB SERPL-MCNC: 0.4 MG/DL (ref 0–1.2)
BUN SERPL-MCNC: 8 MG/DL (ref 8–23)
BUN/CREAT SERPL: 11.1 (ref 7–25)
CALCIUM SPEC-SCNC: 9.7 MG/DL (ref 8.6–10.5)
CHLORIDE SERPL-SCNC: 103 MMOL/L (ref 98–107)
CO2 SERPL-SCNC: 20.5 MMOL/L (ref 22–29)
CREAT SERPL-MCNC: 0.72 MG/DL (ref 0.57–1)
DEPRECATED RDW RBC AUTO: 48.9 FL (ref 37–54)
EGFRCR SERPLBLD CKD-EPI 2021: 81 ML/MIN/1.73
EOSINOPHIL # BLD AUTO: 0.2 10*3/MM3 (ref 0–0.4)
EOSINOPHIL NFR BLD AUTO: 3 % (ref 0.3–6.2)
ERYTHROCYTE [DISTWIDTH] IN BLOOD BY AUTOMATED COUNT: 14.3 % (ref 12.3–15.4)
GLOBULIN UR ELPH-MCNC: 3 GM/DL
GLUCOSE SERPL-MCNC: 121 MG/DL (ref 65–99)
HCT VFR BLD AUTO: 36.9 % (ref 34–46.6)
HGB BLD-MCNC: 11.7 G/DL (ref 12–15.9)
IMM GRANULOCYTES # BLD AUTO: 0 10*3/MM3 (ref 0–0.05)
IMM GRANULOCYTES NFR BLD AUTO: 0 % (ref 0–0.5)
LYMPHOCYTES # BLD AUTO: 1.46 10*3/MM3 (ref 0.7–3.1)
LYMPHOCYTES NFR BLD AUTO: 21.6 % (ref 19.6–45.3)
MCH RBC QN AUTO: 28.9 PG (ref 26.6–33)
MCHC RBC AUTO-ENTMCNC: 31.7 G/DL (ref 31.5–35.7)
MCV RBC AUTO: 91.1 FL (ref 79–97)
MONOCYTES # BLD AUTO: 0.58 10*3/MM3 (ref 0.1–0.9)
MONOCYTES NFR BLD AUTO: 8.6 % (ref 5–12)
NEUTROPHILS NFR BLD AUTO: 4.49 10*3/MM3 (ref 1.7–7)
NEUTROPHILS NFR BLD AUTO: 66.4 % (ref 42.7–76)
PLATELET # BLD AUTO: 489 10*3/MM3 (ref 140–450)
PMV BLD AUTO: 10.2 FL (ref 6–12)
POTASSIUM SERPL-SCNC: 4.1 MMOL/L (ref 3.5–5.2)
PROT SERPL-MCNC: 7.1 G/DL (ref 6–8.5)
RBC # BLD AUTO: 4.05 10*6/MM3 (ref 3.77–5.28)
SODIUM SERPL-SCNC: 136 MMOL/L (ref 136–145)
WBC NRBC COR # BLD AUTO: 6.76 10*3/MM3 (ref 3.4–10.8)

## 2024-08-12 PROCEDURE — 99214 OFFICE O/P EST MOD 30 MIN: CPT | Performed by: NURSE PRACTITIONER

## 2024-08-12 PROCEDURE — 36415 COLL VENOUS BLD VENIPUNCTURE: CPT

## 2024-08-12 PROCEDURE — 80053 COMPREHEN METABOLIC PANEL: CPT

## 2024-08-12 PROCEDURE — 85025 COMPLETE CBC W/AUTO DIFF WBC: CPT

## 2024-08-12 PROCEDURE — 1126F AMNT PAIN NOTED NONE PRSNT: CPT | Performed by: NURSE PRACTITIONER

## 2024-08-12 NOTE — PROGRESS NOTES
Chief Complaint  Rib Injury (Was in hospital in July for broken ribs due to a fall.  Ribs punctured lung.)    Subjective          Danyell Osborne presents to [facility]    History of Present Illness  Follow up from hospital  She had a fall, rib fracture, punctured lung and after discharge, developed abd pain and was hospitalized with cholecysitis and had surgery and after that discharge was exposed to covid and was dg with Covid, she only had mild symptoms and has cleared up.    She uses a walker to get around.  Her appetite is down, but her bowels are moving regularly.  One of her daughter's lives with her and helps her, she cooks some and her  (he is with her today) picks up fast food some. She does have some mid left sided back pain, but does not take any medication for this.       PAST MEDICAL HISTORY changes since 1-:           Atrial Fibrillation: dx'd in 3-2020;     Carotid Artery Stenosis: yuridia madera;     Coronary Artery Disease: dx'd in 3-23-20;     Myocardial Infarction: due to occlusion of the LAD, RCA, and stened again 3-25-20; heart cath/ stenting for NSTEMI;     AAA         Hospitalizations:     Acute cholecystits 7-2024  Fall/fractures 6-26-24 discharged on 7-  acute stroke 10-9-23   covid 6-2022  Coronary Artery Disease last admit date 3-23-20 anemia, admitted & 4-9-20   A fib 9-2022          CURRENT MEDICAL PROVIDERS:    Cardiologist: Fozia         PREVENTIVE HEALTH MAINTENANCE                 COLORECTAL CANCER SCREENING: Up to date (colonoscopy q10y; sigmoidoscopy q5y; Cologuard q3y) was last done  3-2023 no colitits   Also CLN done on 3/2017          Surgical History:      7-11-24 EGD [place]    Cholecystecomy 7-5-2024  EGD and CLN 3-2023    Biopsy of breast; benign    Coronary Artery Stent Placement: 2011 2;     Hysterectomy: at age 30; ovaries intact;     left foot neuroma;     Procedures:    Colonoscopy ( 9-12-11/normal/Ronna )    EGD (   normal ) &  esophageal spasms         Family History:        Father:  at age 80's; Cause of death was TB;  Parkinson's Disease     Mother:  at age 30's; Cause of death was renal issues;  goiter     Sister(s): 3 sister(s) total; 1 ; Neurological/Genetic Cerebrovascular Accident; Endocrine Type 2 Diabetes         Social History:        Occupation: quit working 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/brown [employer];     Marital Status:      Children: 6                       Past Medical History:   Diagnosis Date    A-fib     Anemia     Coronary artery disease     HLD (hyperlipidemia)     Hypertension     Stroke        Allergies   Allergen Reactions    Contrast Dye (Echo Or Unknown Ct/Mr) Hives and Unknown - High Severity     hives    Iodinated Contrast Media Hives and Unknown - High Severity    Penicillins Rash     Beta lactam allergy details    Antibiotic reaction: rash, hives    Age at reaction: adult    Dose to reaction time: unknown    Reason for antibiotic: unknown    Epinephrine required for reaction?: no    Tolerated antibiotics: unknown        Past Surgical History:   Procedure Laterality Date    CHOLECYSTECTOMY WITH INTRAOPERATIVE CHOLANGIOGRAM N/A 2024    Procedure: Laparoscopic cholecystectomy with cholangiogram;  Surgeon: Tessa Montenegro MD;  Location: [facility] MAIN OR;  Service: General;  Laterality: N/A;    ENDOSCOPY N/A 2024    Procedure: ESOPHAGOGASTRODUODENOSCOPY WITH COLD BIOPSIES;  Surgeon: Santos Crabtree MD;  Location: [facility] ENDOSCOPY;  Service: Gastroenterology;  Laterality: N/A;  PRE- DYSPHAGIA  POST-DUODENAL, GASTRIC EROSIONS    HYSTERECTOMY          Social History     Tobacco Use    Smoking status: Never     Passive exposure: Never    Smokeless tobacco: Never   Substance Use Topics    Alcohol use: Never       Family History   Problem Relation Age of Onset    Malig Hyperthermia Neg Hx         Health Maintenance Due   Topic Date Due    DXA SCAN  Never done    TDAP/TD  VACCINES (1 - Tdap) Never done    ZOSTER VACCINE (1 of 2) Never done    RSV Vaccine - Adults (1 - 1-dose 60+ series) Never done    Pneumococcal Vaccine 65+ (1 of 1 - PCV) Never done    COVID-19 Vaccine (4 - 2023-24 season) 09/01/2023    INFLUENZA VACCINE  08/01/2024        Current Outpatient Medications on File Prior to Visit   Medication Sig    acetaminophen (TYLENOL) 325 MG tablet Take 2 tablets by mouth Every 6 (Six) Hours As Needed for Mild Pain.    apixaban (ELIQUIS) 2.5 MG tablet tablet Take 1 tablet by mouth Every 12 (Twelve) Hours. Indications: Atrial Fibrillation    atorvastatin (LIPITOR) 80 MG tablet Take 1 tablet by mouth Every Night.    clopidogrel (PLAVIX) 75 MG tablet Take 1 tablet by mouth Daily.    Coenzyme Q10 200 MG capsule Take 200 mg by mouth Daily.    metoprolol succinate XL (TOPROL-XL) 50 MG 24 hr tablet Take 1 tablet by mouth 2 (Two) Times a Day.    multivitamin with minerals tablet tablet Take 1 tablet by mouth Daily.    nitroglycerin (NITROSTAT) 0.4 MG SL tablet DISSOLVE 1 TABLET UNDER THE TONGUE EVERY 5 MINUTES AS NEEDED FOR CHEST PAIN. DO NOT EXCEED A TOTAL OF 3 DOSES IN 15 MINUTES. IF NO RELIEF, CALL 911    pantoprazole (PROTONIX) 40 MG EC tablet Take 1 tablet by mouth 2 (Two) Times a Day Before Meals.    Polyethylene Glycol 3350 (MIRALAX PO) Take  by mouth.    Simethicone (GAS-X PO) Take  by mouth.    [DISCONTINUED] acetaminophen (TYLENOL) 325 MG tablet Take 2 tablets by mouth 2 (Two) Times a Day. 7 days (Patient not taking: Reported on 8/12/2024)    [DISCONTINUED] baclofen (LIORESAL) 10 MG tablet Take 1 tablet by mouth 2 (Two) Times a Day. (Patient not taking: Reported on 8/12/2024)    [DISCONTINUED] benzonatate (Tessalon Perles) 100 MG capsule Take 1 capsule by mouth 3 (Three) Times a Day As Needed for Cough. (Patient not taking: Reported on 8/12/2024)    [DISCONTINUED] Cholecalciferol 50 MCG (2000 UT) capsule Take 1 capsule by mouth Daily. (Patient not taking: Reported on 8/12/2024)  "   [DISCONTINUED] guaifenesin (ROBITUSSIN) 100 MG/5ML liquid Take 10 mL by mouth 3 (Three) Times a Day As Needed for Cough. (Patient not taking: Reported on 8/12/2024)    [DISCONTINUED] HYDROcodone-acetaminophen (NORCO) 5-325 MG per tablet Take 0.5 tablets by mouth Every 8 (Eight) Hours As Needed for Moderate Pain. (Patient not taking: Reported on 8/12/2024)    [DISCONTINUED] lisinopril (PRINIVIL,ZESTRIL) 5 MG tablet Take 1 tablet by mouth Daily. (Patient not taking: Reported on 8/12/2024)    [DISCONTINUED] Nirmatrelvir & Ritonavir, 300mg/100mg, (PAXLOVID) Take 3 tablets by mouth 2 (Two) Times a Day. Indications: COVID-19 Confirmed Infection (Patient not taking: Reported on 8/12/2024)     No current facility-administered medications on file prior to visit.       Immunization History   Administered Date(s) Administered    COVID-19 (PFIZER) Purple Cap Monovalent 02/17/2021, 03/10/2021, 01/03/2022       Review of Systems   Constitutional:  Negative for fatigue and fever.   Respiratory:  Negative for cough and shortness of breath.    Cardiovascular:  Negative for chest pain, palpitations and leg swelling.   Psychiatric/Behavioral:  The patient is nervous/anxious (related to her daughter's fighting / does not want rx for this).         Objective     Vitals:    08/12/24 1136 08/12/24 1204   BP: 156/69 145/73   BP Location: Right arm Right arm   Patient Position: Sitting Sitting   Cuff Size: Adult Adult   Pulse: 71 71   Temp: 98.1 °F (36.7 °C)    TempSrc: Oral    SpO2: 98%    Weight: 49.1 kg (108 lb 3.2 oz)    Height: 162.6 cm (64\")             Physical Exam  Vitals reviewed.   Constitutional:       General: She is not in acute distress.     Appearance: Normal appearance.   Neck:      Vascular: No carotid bruit.   Cardiovascular:      Rate and Rhythm: Normal rate and regular rhythm.      Heart sounds: Normal heart sounds. No murmur heard.  Pulmonary:      Effort: Pulmonary effort is normal. No respiratory distress.      " Breath sounds: Normal breath sounds.   Musculoskeletal:         General: No tenderness (to palpation of thoracic para spinous muscles).      Right lower leg: No edema.      Left lower leg: No edema.      Comments: Using a walker to ambulate    Neurological:      Mental Status: She is alert.   Psychiatric:         Mood and Affect: Mood normal.         Behavior: Behavior normal.         Result Review :     The following data was reviewed by: AGUSTIN Jimenez on 08/12/2024:                       Assessment and Plan      Diagnoses and all orders for this visit:    1. Essential hypertension (Primary)  Assessment & Plan:  Repeated BP, rechecking some labs ; she sees cardiology     Orders:  -     Comprehensive metabolic panel; Future    2. Gastroesophageal reflux disease without esophagitis  Assessment & Plan:  She is on PPI, I advised to take protonix 40 mg just once a day and discussed the best time to take       3. Thrombocytosis  Assessment & Plan:  Reviewed labs, will recheck a CBC today     Orders:  -     CBC w AUTO Differential; Future    4. Postmenopausal  Assessment & Plan:  Reviewed her chart, her fall, fracture, discussed getting DEXA, she declines today       5. Other specified anxiety disorders  Assessment & Plan:  Patient declines rx, discussed that she has home health coming to her home, they may have services that would include counseling, may need a referral, let me know, recommend family therapy       6. Acute left-sided thoracic back pain  Assessment & Plan:  Discussed pain, rx, advised her to try tylenol, if not improving, let me know           BMI is within normal parameters. No other follow-up for BMI required.         Follow Up     Return if symptoms worsen or fail to improve, for followup pending lab results.    Patient was given instructions and counseling regarding her condition or for health maintenance advice. Please see specific information pulled into the AVS if appropriate.

## 2024-08-12 NOTE — ASSESSMENT & PLAN NOTE
Patient declines rx, discussed that she has home health coming to her home, they may have services that would include counseling, may need a referral, let me know, recommend family therapy

## 2024-08-12 NOTE — ASSESSMENT & PLAN NOTE
She is on PPI, I advised to take protonix 40 mg just once a day and discussed the best time to take

## 2024-08-26 RX ORDER — PANTOPRAZOLE SODIUM 40 MG/1
40 TABLET, DELAYED RELEASE ORAL
Qty: 60 TABLET | Refills: 0 | Status: SHIPPED | OUTPATIENT
Start: 2024-08-26

## 2024-08-26 NOTE — TELEPHONE ENCOUNTER
Rx Refill Note  Requested Prescriptions     Pending Prescriptions Disp Refills    pantoprazole (PROTONIX) 40 MG EC tablet 60 tablet 0     Sig: Take 1 tablet by mouth 2 (Two) Times a Day Before Meals.      Last office visit with prescribing clinician: 8/12/2024   Last telemedicine visit with prescribing clinician: Visit date not found   Next office visit with prescribing clinician: 1/14/2025  Filled by Dr Rosas while in the hospital.     Amara Arnold LPN  08/26/24, 09:57 EDT

## 2024-09-05 ENCOUNTER — TELEPHONE (OUTPATIENT)
Dept: FAMILY MEDICINE CLINIC | Age: 89
End: 2024-09-05
Payer: MEDICARE

## 2024-09-05 ENCOUNTER — HOSPITAL ENCOUNTER (OUTPATIENT)
Dept: GENERAL RADIOLOGY | Facility: HOSPITAL | Age: 89
Discharge: HOME | End: 2024-09-05
Admitting: NURSE PRACTITIONER
Payer: MEDICARE

## 2024-09-05 ENCOUNTER — OFFICE VISIT (OUTPATIENT)
Dept: FAMILY MEDICINE CLINIC | Age: 89
End: 2024-09-05
Payer: MEDICARE

## 2024-09-05 VITALS
OXYGEN SATURATION: 100 % | HEART RATE: 77 BPM | BODY MASS INDEX: 18.3 KG/M2 | WEIGHT: 107.2 LBS | SYSTOLIC BLOOD PRESSURE: 160 MMHG | HEIGHT: 64 IN | TEMPERATURE: 98.6 F | DIASTOLIC BLOOD PRESSURE: 72 MMHG

## 2024-09-05 DIAGNOSIS — M54.50 LUMBAR BACK PAIN: Primary | ICD-10-CM

## 2024-09-05 DIAGNOSIS — M54.50 LUMBAR BACK PAIN: ICD-10-CM

## 2024-09-05 PROCEDURE — 72100 X-RAY EXAM L-S SPINE 2/3 VWS: CPT

## 2024-09-05 NOTE — PROGRESS NOTES
"Chief Complaint  Fall (July 2024. Pt states it was hurting before but now her back pain is worse. )    Subjective      Danyell Osborne is an 88 year old female that presents to [facility] with c/o back pain that has been going on since she fell on July 5th. She states the pain has been worsening since then. She states she was going in the back door and the wind blew the storm door, knocking her off of the step. She fell onto a bench and landed on concrete. States she sustained rib fractures.  Pain is in the center of the lower back. It does not radiate. No numbness, tingling, loss of bowel or bladder or saddle anesthesia.   She has been wearing a pain patch and occasionally takes tylenol.     History of Present Illness    Current Outpatient Medications   Medication Instructions    acetaminophen (TYLENOL) 650 mg, Oral, Every 6 Hours PRN    atorvastatin (LIPITOR) 80 mg, Oral, Nightly    clopidogrel (PLAVIX) 75 MG tablet 1 tablet, Oral, Daily    Coenzyme Q10 200 mg, Oral, Daily    Eliquis 2.5 mg, Oral, Every 12 Hours Scheduled    metoprolol succinate XL (TOPROL-XL) 50 mg, Oral, 2 Times Daily    multivitamin with minerals tablet tablet 1 tablet, Oral, Daily    nitroglycerin (NITROSTAT) 0.4 MG SL tablet DISSOLVE 1 TABLET UNDER THE TONGUE EVERY 5 MINUTES AS NEEDED FOR CHEST PAIN. DO NOT EXCEED A TOTAL OF 3 DOSES IN 15 MINUTES. IF NO RELIEF, CALL 911    pantoprazole (PROTONIX) 40 mg, Oral, 2 Times Daily Before Meals    Polyethylene Glycol 3350 (MIRALAX PO) Oral    Simethicone (GAS-X PO) Oral       The following portions of the patient's history were reviewed and updated as appropriate: allergies, current medications, past family history, past medical history, past social history, past surgical history, and problem list.    Objective   Vital Signs:   /72 (BP Location: Right arm, Patient Position: Sitting)   Pulse 77   Temp 98.6 °F (37 °C) (Oral)   Ht 162.6 cm (64\")   Wt 48.6 kg " (107 lb 3.2 oz)   SpO2 100%   BMI 18.40 kg/m²     Wt Readings from Last 3 Encounters:   09/05/24 48.6 kg (107 lb 3.2 oz)   08/12/24 49.1 kg (108 lb 3.2 oz)   07/10/24 54.9 kg (121 lb 0.5 oz)     BP Readings from Last 3 Encounters:   09/05/24 160/72   08/12/24 145/73   07/13/24 155/73     Physical Exam  Vitals and nursing note reviewed.   Constitutional:       Appearance: Normal appearance. She is not ill-appearing.   HENT:      Head: Normocephalic and atraumatic.   Eyes:      Conjunctiva/sclera: Conjunctivae normal.      Pupils: Pupils are equal, round, and reactive to light.   Pulmonary:      Effort: Pulmonary effort is normal.   Musculoskeletal:      Lumbar back: No swelling, deformity, spasms, tenderness or bony tenderness.   Skin:     General: Skin is warm and dry.   Neurological:      Mental Status: She is alert and oriented to person, place, and time.   Psychiatric:         Mood and Affect: Mood normal.         Behavior: Behavior normal.          Result Review :  The following data was reviewed by: AGUSTIN Armijo on 09/05/2024:      Common labs          7/12/2024    04:10 7/12/2024    15:38 7/13/2024    04:18 8/12/2024    12:22   Common Labs   Glucose 112   117  121    BUN 4   6  8    Creatinine 0.46   0.48  0.72    Sodium 135   132  136    Potassium 3.4  3.6  3.9  4.1    Chloride 99   100  103    Calcium 8.5   9.0  9.7    Albumin 3.2   3.3  4.1    Total Bilirubin 0.5   0.5  0.4    Alkaline Phosphatase 138   149  80    AST (SGOT) 34   28  24    ALT (SGPT) 27   25  17    WBC 8.02   7.91  6.76    Hemoglobin 9.0   9.9  11.7    Hematocrit 27.6   30.5  36.9    Platelets 701   836  489        Lab Results (last 72 hours)       ** No results found for the last 72 hours. **             XR Chest 1 View    Result Date: 7/13/2024  No significant change.  This report was finalized on 7/13/2024 2:47 PM by Dr. Ayaz Maynard M.D on Workstation: [email or web address]      XR Chest 1 View    Result Date: 7/11/2024   1.  Similar appearing right greater than left bibasilar opacities, which could represent atelectasis, pneumonia, and pulmonary contusions, with small right greater than left bilateral pleural effusions. 2. No pneumothorax is seen.  This report was finalized on 7/11/2024 7:03 AM by Praful Gutierrez MD on Workstation: [id number]      XR Chest 1 View    Result Date: 7/10/2024  Stable layering moderate right and small left pleural effusions. No focal consolidation or measurable pneumothorax. Normal size cardiomediastinal silhouette with coronary artery calcifications. Known rib fractures better seen on prior CT.  This report was finalized on 7/10/2024 7:28 AM by Dr. Prince Bryan M.D on Workstation: [id number]      FL ESOPHAGRAM DOUBLE CONTRAST    Result Date: 7/9/2024  1.  Presbyesophagus demonstrated by moderate intermittent tertiary contractions in the lower half of the esophagus. This results in delayed clearance of barium with intraesophageal backflow while the patient is in the semiupright position. 2.  Small volume of recumbent gastroesophageal reflux observed refluxing to the upper thoracic esophagus. 3.  Smooth tapered narrowing at the distal esophagus. The patient was unable to swallow the barium tablet limiting full evaluation.      This report was finalized on 7/9/2024 4:13 PM by Dr. Melody Sin M.D on Workstation: [id number]      XR Chest 1 View    Result Date: 7/9/2024  1. Increased density in both lung bases right greater than left as described. 2. FINDINGS appear similar to the 7/8/2024 study.   This report was finalized on 7/9/2024 7:31 AM by Dr. Tucker Peters M.D on Workstation: [id number]      XR Chest 1 View    Result Date: 7/8/2024  Moderate right and small left pleural effusions with associated basilar opacity. Known right rib fractures not well demonstrated on this exam. Cardiomegaly. Atherosclerotic disease. No definite pneumothorax.  This report was finalized on 7/8/2024 7:03 AM by   Jorge Alberto Hawkins M.D on Workstation: [id number]      XR Chest 1 View    Result Date: 7/7/2024   1. Stable chest. 2. Bibasilar lung opacities. 3. Small layering right and possible left pleural effusions. 4. No pneumothorax seen.  This report was finalized on 7/7/2024 7:59 AM by Dr. Gerry Lopez M.D on Workstation: [id number]      XR Chest 1 View    Result Date: 7/6/2024  No evidence for significant change when compared to yesterday's exam.  This report was finalized on 7/6/2024 7:35 AM by Dr. Jorge Alberto Hawkins M.D on Workstation: [id number]      XR Chest 1 View    Result Date: 7/5/2024   1. Similar-appearing lung opacities and right pleural effusion with no definite residual right pneumothorax. 2. Small volume free air under the right hemidiaphragm, likely postoperative.  This report was finalized on 7/5/2024 2:28 PM by Praful Gutierrez MD on Workstation: [id number]      FL Cholangiogram Operative    Result Date: 7/5/2024  Impression: Intraoperative cholangiogram, as described. See the procedure note for details.   This report was finalized on 7/5/2024 2:17 PM by Praful Gutierrez MD on Workstation: [id number]      XR Chest PA & Lateral    Result Date: 7/5/2024   1. Consolidation of the right lung base, likely a small pleural effusion with underlying pneumonia and/or atelectasis, with a suspected small right apical pneumothorax. Compared to more recent prior outside imaging if available. 2. Ill-defined left basilar subsegmental atelectasis and/or scarring. 3. Partially calcified biapical pleural-parenchymal scarring. 3. Partially visualized acute anterior right rib fracture deformities and similar-appearing possibly acute mild anterior wedge compression fracture of the T11 superior endplate.  This report was finalized on 7/5/2024 8:47 AM by Praful Gutierrez MD on Workstation: [id number]      CT Abdomen Pelvis Without Contrast    Result Date: 7/4/2024  Findings suggestive of acute cholecystitis. Please correlate.  Right basilar consolidative changes (most likely reflecting atelectasis) and moderate size right pleural effusion. Images reviewed, interpreted and report dictated by SOSA Whitman M.D.    CT Abdomen Pelvis Without Contrast    Result Date: 6/25/2024  Mildly displaced fractures of the right anterior third, fourth, fifth and sixth ribs. Miniscule right-sided pneumothorax. Mild right chest wall contusion. No acute traumatic findings in the abdomen or pelvis. 3.6 cm infrarenal abdominal aortic aneurysm. Images personally reviewed, interpreted and dictated by JETHRO Schumacher M.D.    CT Chest Without Contrast Diagnostic    Result Date: 6/25/2024  Mildly displaced fractures of the right anterior third, fourth, fifth and sixth ribs. Miniscule right-sided pneumothorax. Mild right chest wall contusion. No acute traumatic findings in the abdomen or pelvis. 3.6 cm infrarenal abdominal aortic aneurysm. Images personally reviewed, interpreted and dictated by JETHRO Schumacher M.D.    CT cervical spine without contrast    Result Date: 6/25/2024  No acute intracranial hemorrhage or large acute cortical infarct. Small right posterior scalp laceration. No acute fracture or malalignment of the cervical spine. Images personally reviewed, interpreted and dictated by JETHRO Schumacher M.D.    CT Head Without Contrast    Result Date: 6/25/2024  No acute intracranial hemorrhage or large acute cortical infarct. Small right posterior scalp laceration. No acute fracture or malalignment of the cervical spine. Images personally reviewed, interpreted and dictated by JETHRO Schumacher M.D.      Lab Results   Component Value Date    SARSANTIGEN Not Detected 02/05/2024    RAPFLUA Negative 06/06/2022    RAPFLUB Negative 06/06/2022    FLUAAG Not Detected 02/05/2024    FLUBAG Not Detected 02/05/2024    INR 1.08 07/05/2024    BILIRUBINUR Negative 07/07/2024    POCGLU 110 10/10/2023       Procedures        Assessment and Plan   Diagnoses and all orders for  this visit:    1. Lumbar back pain (Primary)  Comments:  suspect arthritis. Will get an xray to rule out acute abnormality.  Orders:  -     XR Spine Lumbar 2 or 3 View; Future                 There are no discontinued medications.       Follow Up   No follow-ups on file.  Patient was given instructions and counseling regarding her condition or for health maintenance advice. Please see specific information pulled into the AVS if appropriate.       AGUSTIN Armijo  09/05/24  11:44 EDT

## 2024-09-05 NOTE — TELEPHONE ENCOUNTER
Katarzyna with [facility] called and said pt was due to d/c from skilled nursing this week but her nurse is out of the office until next week so they will post pone her d/c until her nurse returns.

## 2024-09-06 NOTE — TELEPHONE ENCOUNTER
Pt said her back still hurts and she is weak.  She came into the office yesterday and saw Gregoria Vic spears. She ordered a xray of her back which she did do yesterday.  ( Results not released in Epic yet).  Told her they will call her when her results are in.

## 2024-09-09 NOTE — TELEPHONE ENCOUNTER
I see pt has an age indeterminate compression fracture of her thoracic vertebrae, see how she is doing, where her pain is ?

## 2024-09-09 NOTE — TELEPHONE ENCOUNTER
Pt said the pain is not as bad as it was.  She takes tylenol, uses a lidocaine patch and is excercising. It seems to be managing it.

## 2025-01-14 ENCOUNTER — LAB (OUTPATIENT)
Dept: LAB | Facility: HOSPITAL | Age: 89
End: 2025-01-14
Payer: MEDICARE

## 2025-01-14 ENCOUNTER — OFFICE VISIT (OUTPATIENT)
Dept: FAMILY MEDICINE CLINIC | Age: 89
End: 2025-01-14
Payer: MEDICARE

## 2025-01-14 VITALS
BODY MASS INDEX: 18.68 KG/M2 | WEIGHT: 109.4 LBS | HEIGHT: 64 IN | HEART RATE: 60 BPM | TEMPERATURE: 98 F | SYSTOLIC BLOOD PRESSURE: 181 MMHG | OXYGEN SATURATION: 100 % | DIASTOLIC BLOOD PRESSURE: 83 MMHG

## 2025-01-14 DIAGNOSIS — Z00.00 ROUTINE GENERAL MEDICAL EXAMINATION AT A HEALTH CARE FACILITY: Primary | ICD-10-CM

## 2025-01-14 DIAGNOSIS — Z78.0 POSTMENOPAUSAL: ICD-10-CM

## 2025-01-14 DIAGNOSIS — I10 ESSENTIAL HYPERTENSION: ICD-10-CM

## 2025-01-14 DIAGNOSIS — Z00.00 ROUTINE GENERAL MEDICAL EXAMINATION AT A HEALTH CARE FACILITY: ICD-10-CM

## 2025-01-14 DIAGNOSIS — I25.10 CORONARY ARTERY DISEASE INVOLVING NATIVE CORONARY ARTERY OF NATIVE HEART WITHOUT ANGINA PECTORIS: ICD-10-CM

## 2025-01-14 DIAGNOSIS — R25.2 LEG CRAMPS: ICD-10-CM

## 2025-01-14 DIAGNOSIS — K21.9 GASTROESOPHAGEAL REFLUX DISEASE WITHOUT ESOPHAGITIS: ICD-10-CM

## 2025-01-14 DIAGNOSIS — Z12.31 SCREENING MAMMOGRAM FOR BREAST CANCER: ICD-10-CM

## 2025-01-14 PROBLEM — R05.9 COUGH: Status: RESOLVED | Noted: 2023-01-09 | Resolved: 2025-01-14

## 2025-01-14 LAB
ALBUMIN SERPL-MCNC: 4.3 G/DL (ref 3.5–5.2)
ALBUMIN/GLOB SERPL: 1.3 G/DL
ALP SERPL-CCNC: 76 U/L (ref 39–117)
ALT SERPL W P-5'-P-CCNC: 19 U/L (ref 1–33)
ANION GAP SERPL CALCULATED.3IONS-SCNC: 12 MMOL/L (ref 5–15)
AST SERPL-CCNC: 38 U/L (ref 1–32)
BASOPHILS # BLD AUTO: 0.04 10*3/MM3 (ref 0–0.2)
BASOPHILS NFR BLD AUTO: 0.6 % (ref 0–1.5)
BILIRUB SERPL-MCNC: 0.4 MG/DL (ref 0–1.2)
BUN SERPL-MCNC: 14 MG/DL (ref 8–23)
BUN/CREAT SERPL: 18.7 (ref 7–25)
CALCIUM SPEC-SCNC: 10.1 MG/DL (ref 8.6–10.5)
CHLORIDE SERPL-SCNC: 102 MMOL/L (ref 98–107)
CHOLEST SERPL-MCNC: 194 MG/DL (ref 0–200)
CO2 SERPL-SCNC: 26 MMOL/L (ref 22–29)
CREAT SERPL-MCNC: 0.75 MG/DL (ref 0.57–1)
DEPRECATED RDW RBC AUTO: 53.1 FL (ref 37–54)
EGFRCR SERPLBLD CKD-EPI 2021: 76.7 ML/MIN/1.73
EOSINOPHIL # BLD AUTO: 0.14 10*3/MM3 (ref 0–0.4)
EOSINOPHIL NFR BLD AUTO: 2.2 % (ref 0.3–6.2)
ERYTHROCYTE [DISTWIDTH] IN BLOOD BY AUTOMATED COUNT: 15.7 % (ref 12.3–15.4)
GLOBULIN UR ELPH-MCNC: 3.3 GM/DL
GLUCOSE SERPL-MCNC: 105 MG/DL (ref 65–99)
HCT VFR BLD AUTO: 39.9 % (ref 34–46.6)
HDLC SERPL-MCNC: 65 MG/DL (ref 40–60)
HGB BLD-MCNC: 12.4 G/DL (ref 12–15.9)
IMM GRANULOCYTES # BLD AUTO: 0.01 10*3/MM3 (ref 0–0.05)
IMM GRANULOCYTES NFR BLD AUTO: 0.2 % (ref 0–0.5)
LDLC SERPL CALC-MCNC: 107 MG/DL (ref 0–100)
LDLC/HDLC SERPL: 1.6 {RATIO}
LYMPHOCYTES # BLD AUTO: 2.02 10*3/MM3 (ref 0.7–3.1)
LYMPHOCYTES NFR BLD AUTO: 32 % (ref 19.6–45.3)
MAGNESIUM SERPL-MCNC: 2.1 MG/DL (ref 1.6–2.4)
MCH RBC QN AUTO: 28.4 PG (ref 26.6–33)
MCHC RBC AUTO-ENTMCNC: 31.1 G/DL (ref 31.5–35.7)
MCV RBC AUTO: 91.3 FL (ref 79–97)
MONOCYTES # BLD AUTO: 0.57 10*3/MM3 (ref 0.1–0.9)
MONOCYTES NFR BLD AUTO: 9 % (ref 5–12)
NEUTROPHILS NFR BLD AUTO: 3.53 10*3/MM3 (ref 1.7–7)
NEUTROPHILS NFR BLD AUTO: 56 % (ref 42.7–76)
PLATELET # BLD AUTO: 369 10*3/MM3 (ref 140–450)
PMV BLD AUTO: 10.3 FL (ref 6–12)
POTASSIUM SERPL-SCNC: 5 MMOL/L (ref 3.5–5.2)
PROT SERPL-MCNC: 7.6 G/DL (ref 6–8.5)
RBC # BLD AUTO: 4.37 10*6/MM3 (ref 3.77–5.28)
SODIUM SERPL-SCNC: 140 MMOL/L (ref 136–145)
TRIGL SERPL-MCNC: 125 MG/DL (ref 0–150)
TSH SERPL DL<=0.05 MIU/L-ACNC: 2.56 UIU/ML (ref 0.27–4.2)
VLDLC SERPL-MCNC: 22 MG/DL (ref 5–40)
WBC NRBC COR # BLD AUTO: 6.31 10*3/MM3 (ref 3.4–10.8)

## 2025-01-14 PROCEDURE — 1160F RVW MEDS BY RX/DR IN RCRD: CPT | Performed by: NURSE PRACTITIONER

## 2025-01-14 PROCEDURE — 80061 LIPID PANEL: CPT

## 2025-01-14 PROCEDURE — 84443 ASSAY THYROID STIM HORMONE: CPT | Performed by: NURSE PRACTITIONER

## 2025-01-14 PROCEDURE — 80053 COMPREHEN METABOLIC PANEL: CPT

## 2025-01-14 PROCEDURE — 1170F FXNL STATUS ASSESSED: CPT | Performed by: NURSE PRACTITIONER

## 2025-01-14 PROCEDURE — 85025 COMPLETE CBC W/AUTO DIFF WBC: CPT

## 2025-01-14 PROCEDURE — 83735 ASSAY OF MAGNESIUM: CPT

## 2025-01-14 PROCEDURE — 1159F MED LIST DOCD IN RCRD: CPT | Performed by: NURSE PRACTITIONER

## 2025-01-14 PROCEDURE — G0439 PPPS, SUBSEQ VISIT: HCPCS | Performed by: NURSE PRACTITIONER

## 2025-01-14 PROCEDURE — 36415 COLL VENOUS BLD VENIPUNCTURE: CPT

## 2025-01-14 PROCEDURE — 1126F AMNT PAIN NOTED NONE PRSNT: CPT | Performed by: NURSE PRACTITIONER

## 2025-01-14 RX ORDER — RABEPRAZOLE SODIUM 20 MG/1
20 TABLET, DELAYED RELEASE ORAL DAILY
COMMUNITY

## 2025-01-14 NOTE — ASSESSMENT & PLAN NOTE
Repeat BP still up, sending to lab, advised to monitor at home and will need to follow up with cardiology

## 2025-01-14 NOTE — ASSESSMENT & PLAN NOTE
She has an appt later this week with cardiology, had cheerios early this am, reviewed last lab, sending for a lipid panel today

## 2025-01-14 NOTE — ASSESSMENT & PLAN NOTE
Advise regular exercise, healthy eating, always wear seat belts. Living will discussed, she has a booklet at home, to complete and bring a copy in our office, fall prevention discussed.  Immunizations discussed, she was considering the flu vaccine today, but decided against, advised that and pneumonia vaccine and then consider covid, and to check with her pharmacy in the future on RSV, shingrex and Tdap   To continue yearly optometry and dental exams.    Discussed her weight, drinking ensure/protein drinks.

## 2025-01-14 NOTE — PROGRESS NOTES
Subjective   The ABCs of the Annual Wellness Visit  Medicare Wellness Visit      Danyell Osborne is a 89 y.o. patient who presents for a Medicare Wellness Visit.    Medicare wellness HPI  Exercises regularly:not really   Eats healthy:yes   Last mammogram:7-28-23  Last DEXA:none in epic   Last pap smear:n/a  BSE:not now   Wears seatbelts:yes   Living will:does not have one, has a booklet   Optometrist:juan antonio   Dentist:staci   Tobacco:none   Alcohol intake:none   Drugs:none   Falls:July fracture ribs /back  Colonoscopy: 3-2023 colitis   Immunizations:had covid vaccines in the past     The following portions of the patient's history were reviewed and   updated as appropriate: allergies, current medications, past family history, past medical history, past social history, past surgical history, and problem list.    Compared to one year ago, the patient's physical   health is the same.  Compared to one year ago, the patient's mental   health is the same.    Recent Hospitalizations:  This patient has had a [facility] admission record on file within the last 365 days.  Current Medical Providers:  Patient Care Team:  Shanae Osborne APRN as PCP - General (Family Medicine)  Can Yanes APRN (Family Medicine)    Outpatient Medications Prior to Visit   Medication Sig Dispense Refill    acetaminophen (TYLENOL) 325 MG tablet Take 2 tablets by mouth Every 6 (Six) Hours As Needed for Mild Pain.      apixaban (ELIQUIS) 2.5 MG tablet tablet Take 1 tablet by mouth Every 12 (Twelve) Hours. Indications: Atrial Fibrillation 60 tablet 0    atorvastatin (LIPITOR) 80 MG tablet Take 1 tablet by mouth Every Night. (Patient taking differently: Take 1 tablet by mouth Every Night. Only on Monday and Friday) 90 tablet 0    clopidogrel (PLAVIX) 75 MG tablet Take 1 tablet by mouth Daily.      Coenzyme Q10 200 MG capsule Take 200 mg by mouth Daily.      metoprolol succinate XL (TOPROL-XL) 50 MG 24 hr tablet Take 1 tablet by  mouth 2 (Two) Times a Day.      multivitamin with minerals tablet tablet Take 1 tablet by mouth Daily.      nitroglycerin (NITROSTAT) 0.4 MG SL tablet DISSOLVE 1 TABLET UNDER THE TONGUE EVERY 5 MINUTES AS NEEDED FOR CHEST PAIN. DO NOT EXCEED A TOTAL OF 3 DOSES IN 15 MINUTES. IF NO RELIEF, CALL 911      Polyethylene Glycol 3350 (MIRALAX PO) Take  by mouth.      RABEprazole (ACIPHEX) 20 MG EC tablet Take 1 tablet by mouth Daily.      Simethicone (GAS-X PO) Take  by mouth.      pantoprazole (PROTONIX) 40 MG EC tablet Take 1 tablet by mouth 2 (Two) Times a Day Before Meals. (Patient not taking: Reported on 1/14/2025) 60 tablet 0     No facility-administered medications prior to visit.     No opioid medication identified on active medication list. I have reviewed chart for other potential  high risk medication/s and harmful drug interactions in the elderly.      Aspirin is not on active medication list.  Aspirin use is not indicated based on review of current medical condition/s. Risk of harm outweighs potential benefits.  .    Patient Active Problem List   Diagnosis    Essential hypertension    Coronary artery disease involving native coronary artery of native heart without angina pectoris    Skin lesions    Gastroesophageal reflux disease    Stress    Other headache syndrome    Paroxysmal atrial fibrillation    Routine general medical examination at a health care facility    Screening mammogram for breast cancer    Postmenopausal    Acute conjunctivitis of left eye    Other constipation    Memory loss    Ganglion cyst of finger    Hypercholesteremia    Left-sided weakness    Arthralgia of both knees    Myalgia    PND (post-nasal drip)    Congestion of nasal sinus    Coronary artery disease    Presence of stent in right coronary artery    Personal history of other diseases of the digestive system    PAD (peripheral artery disease)    Osteoporosis    IBS (irritable bowel syndrome)    Anxiety disorder    Left carotid  "artery stenosis    PFO (patent foramen ovale)    Closed fracture of multiple ribs of right side    Hyponatremia    Acute retention of urine    Abnormal esophagram    Duodenal erosions on EGD    Erosive gastropathy    Thrombocytosis    Acute left-sided thoracic back pain    Leg cramps     Advance Care Planning Advance Directive is not on file.  ACP discussion was held with the patient during this visit. Patient does not have an advance directive, information provided.            Objective   Vitals:    25 1251 25 1334   BP: (!) 192/85 (!) 181/83   BP Location: Right arm Right arm   Patient Position: Sitting Sitting   Cuff Size: Adult Adult   Pulse: 61 60   Temp: 98 °F (36.7 °C)    TempSrc: Oral    SpO2: 100%    Weight: 49.6 kg (109 lb 6.4 oz)    Height: 162.6 cm (64\")        Estimated body mass index is 18.78 kg/m² as calculated from the following:    Height as of this encounter: 162.6 cm (64\").    Weight as of this encounter: 49.6 kg (109 lb 6.4 oz).    BMI is below normal parameters (malnutrition). Recommendations: drink protein shakes, like boost or ensure            Does the patient have evidence of cognitive impairment? No                                                                                                Health  Risk Assessment    Smoking Status:  Social History     Tobacco Use   Smoking Status Never    Passive exposure: Never   Smokeless Tobacco Never     Alcohol Consumption:  Social History     Substance and Sexual Activity   Alcohol Use Never       Fall Risk Screen  STEADI Fall Risk Assessment was completed, and patient is at HIGH risk for falls. Assessment completed on:2025    Depression Screening   Little interest or pleasure in doing things? Not at all   Feeling down, depressed, or hopeless? Not at all   PHQ-2 Total Score 0      Health Habits and Functional and Cognitive Screenin/14/2025    12:55 PM   Functional & Cognitive Status   Do you have difficulty preparing food " and eating? No   Do you have difficulty bathing yourself, getting dressed or grooming yourself? No   Do you have difficulty using the toilet? No   Do you have difficulty moving around from place to place? No   Do you have trouble with steps or getting out of a bed or a chair? No   Current Diet Unhealthy Diet   Dental Exam Up to date   Eye Exam Up to date   Exercise (times per week) 0 times per week   Current Exercises Include No Regular Exercise   Do you need help using the phone?  No   Are you deaf or do you have serious difficulty hearing?  No   Do you need help to go to places out of walking distance? No   Do you need help shopping? No   Do you need help preparing meals?  No   Do you need help with housework?  No   Do you need help with laundry? No   Do you need help taking your medications? No   Do you need help managing money? No   Do you ever drive or ride in a car without wearing a seat belt? No   Have you felt unusual stress, anger or loneliness in the last month? No   Who do you live with? Spouse   If you need help, do you have trouble finding someone available to you? No   Have you been bothered in the last four weeks by sexual problems? No   Do you have difficulty concentrating, remembering or making decisions? No           Age-appropriate Screening Schedule:  Refer to the list below for future screening recommendations based on patient's age, sex and/or medical conditions. Orders for these recommended tests are listed in the plan section. The patient has been provided with a written plan.    Health Maintenance List  Health Maintenance   Topic Date Due    DXA SCAN  Never done    TDAP/TD VACCINES (1 - Tdap) Never done    ZOSTER VACCINE (1 of 2) Never done    Pneumococcal Vaccine 65+ (1 of 1 - PCV) Never done    RSV Vaccine - Adults (1 - 1-dose 75+ series) Never done    INFLUENZA VACCINE  Never done    COVID-19 Vaccine (4 - 2024-25 season) 09/01/2024    LIPID PANEL  10/10/2024    ANNUAL WELLNESS VISIT   01/14/2026                                                                                                                                                Gait and Balance Evaluation: Normal  CMS Preventative Services Quick Reference  Risk Factors Identified During Encounter  Immunizations Discussed/Encouraged: Tdap, Influenza, Prevnar 20 (Pneumococcal 20-valent conjugate), Shingrix, COVID19, and RSV (Respiratory Syncytial Virus)    The above risks/problems have been discussed with the patient.  Pertinent information has been shared with the patient in the After Visit Summary.  An After Visit Summary and PPPS were made available to the patient.    Follow Up:   Next Medicare Wellness visit to be scheduled in 1 year.          Additional E&M Note during same encounter follows:  Patient has multiple medical problems which are significant and separately identifiable that require additional work above and beyond the Medicare Wellness Visit.      Chief Complaint  Medicare Wellness-subsequent    Danyell Osborne is a 89 y.o. female who presents to [facility]     Hyperlipidemia  Current medication: taking lipitor 2 days a week per cardiology   Tolerating medication: she is not sure   Sees cardiology     Lab Results       Component                Value               Date                       CHOL                     264 (H)             10/10/2023                 CHLPL                    290 (H)             03/23/2021                 TRIG                     134                 10/10/2023                 HDL                      50                  10/10/2023                 LDL                      190 (H)             10/10/2023                Hypertension:  Current medication: metoprol   Tolerating Medication: Yes  Checking BP at home and it is: not checking but can   Sees cardiology   Labs:  Lab Results       Component                Value               Date                       GLUCOSE                   121 (H)             08/12/2024                 BUN                      8                   08/12/2024                 CREATININE               0.72                08/12/2024                 BCR                      11.1                08/12/2024                 K                        4.1                 08/12/2024                 CO2                      20.5 (L)            08/12/2024                 CALCIUM                  9.7                 08/12/2024                 ALBUMIN                  4.1                 08/12/2024                 LABIL2                   1.4                 03/23/2021                 AST                      24                  08/12/2024                 ALT                      17                  08/12/2024              Has GI issues, recently saw Dr Singh, still having issues     PAST MEDICAL HISTORY changes since 8-:           Atrial Fibrillation: dx'd in 3-2020;     Carotid Artery Stenosis: sees santy;     Coronary Artery Disease: dx'd in 3-23-20;     Myocardial Infarction: due to occlusion of the LAD, RCA, and stened again 3-25-20; heart cath/ stenting for NSTEMI;     AAA         Hospitalizations:     Acute cholecystits 7-2024  Fall/fractures 6-26-24 discharged on 7-  acute stroke 10-9-23   covid 6-2022  Coronary Artery Disease last admit date 3-23-20 anemia, admitted & 4-9-20   A fib 9-2022          CURRENT MEDICAL PROVIDERS:    Cardiologist: Fozia         PREVENTIVE HEALTH MAINTENANCE                 COLORECTAL CANCER SCREENING: Up to date (colonoscopy q10y; sigmoidoscopy q5y; Cologuard q3y) was last done  3-2023 no colitits   Also CLN done on 3/2017          Surgical History:      7-11-24 EGD [place]    Cholecystecomy 7-5-2024  EGD and CLN 3-2023    Biopsy of breast; benign    Coronary Artery Stent Placement: 2011 2;     Hysterectomy: at age 30; ovaries intact;     left foot neuroma;     Procedures:    Colonoscopy ( 9-12-11/normal/Ronna )    EGD (  " normal ) &  esophageal spasms         Family History:        Father:  at age 80's; Cause of death was TB;  Parkinson's Disease     Mother:  at age 30's; Cause of death was renal issues;  goiter     Sister(s): 3 sister(s) total; 1 ; Neurological/Genetic Cerebrovascular Accident; Endocrine Type 2 Diabetes         Social History:        Occupation: quit working 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/[employer];     Marital Status:      Children: 6            Objective   Vital Signs:   Vitals:    25 1251 25 1334   BP: (!) 192/85 (!) 181/83   BP Location: Right arm Right arm   Patient Position: Sitting Sitting   Cuff Size: Adult Adult   Pulse: 61 60   Temp: 98 °F (36.7 °C)    TempSrc: Oral    SpO2: 100%    Weight: 49.6 kg (109 lb 6.4 oz)    Height: 162.6 cm (64\")      Body mass index is 18.78 kg/m².    Wt Readings from Last 3 Encounters:   25 49.6 kg (109 lb 6.4 oz)   24 48.6 kg (107 lb 3.2 oz)   24 49.1 kg (108 lb 3.2 oz)     BP Readings from Last 3 Encounters:   25 (!) 181/83   24 160/72   24 145/73       Review of Systems   Constitutional:  Negative for activity change, appetite change, chills, fatigue and fever.   HENT:  Negative for congestion and hearing loss.    Eyes:  Negative for visual disturbance.   Respiratory:  Negative for cough, shortness of breath and wheezing.    Cardiovascular:  Positive for chest pain (occ, has an appt later this week with cardiology). Negative for palpitations and leg swelling.   Gastrointestinal:  Negative for abdominal pain, constipation, diarrhea, nausea and vomiting.   Musculoskeletal:  Positive for myalgias (leg cramps at night). Negative for arthralgias.   Skin:  Negative for rash.   Neurological:  Negative for dizziness, weakness and numbness.   Psychiatric/Behavioral:  Negative for confusion, sleep disturbance and suicidal ideas.         Physical Exam  Vitals reviewed.   Constitutional:       General: She is not " in acute distress.     Appearance: Normal appearance. She is well-developed.      Comments: Thin    HENT:      Head: Normocephalic. Hair is normal.      Right Ear: Hearing, tympanic membrane, ear canal and external ear normal. No decreased hearing noted. No drainage.      Left Ear: Hearing, tympanic membrane, ear canal and external ear normal. No decreased hearing noted.      Nose: Nose normal. No nasal deformity.      Mouth/Throat:      Mouth: Mucous membranes are moist.   Eyes:      General: Lids are normal.      Extraocular Movements: Extraocular movements intact.      Conjunctiva/sclera: Conjunctivae normal.      Pupils: Pupils are equal, round, and reactive to light.   Neck:      Thyroid: No thyromegaly.      Vascular: No carotid bruit or JVD.   Cardiovascular:      Rate and Rhythm: Normal rate and regular rhythm.      Pulses: Normal pulses.      Heart sounds: Normal heart sounds. No murmur heard.     No friction rub. No gallop.   Pulmonary:      Effort: Pulmonary effort is normal. No respiratory distress.      Breath sounds: Normal breath sounds. No wheezing.   Chest:   Breasts:     Right: Normal. No mass, nipple discharge or skin change.      Left: Normal. No mass, nipple discharge or skin change.   Abdominal:      General: Bowel sounds are normal.      Palpations: Abdomen is soft. There is no mass.      Tenderness: There is no abdominal tenderness.   Musculoskeletal:         General: No tenderness or deformity. Normal range of motion.      Cervical back: Normal range of motion and neck supple.   Lymphadenopathy:      Cervical: No cervical adenopathy.      Upper Body:      Right upper body: No axillary adenopathy.      Left upper body: No axillary adenopathy.   Skin:     General: Skin is warm and dry.      Findings: No erythema or rash.   Neurological:      Mental Status: She is alert and oriented to person, place, and time.      Motor: No abnormal muscle tone.      Gait: Gait normal.      Deep Tendon  Reflexes: Reflexes are normal and symmetric.   Psychiatric:         Mood and Affect: Mood normal.         Behavior: Behavior normal.         Thought Content: Thought content normal.         Judgment: Judgment normal.         The following data was reviewed by AGUSTIN Jimenez on 01/14/2025        Assessment & Plan   Diagnoses and all orders for this visit:    1. Routine general medical examination at a health care facility (Primary)  Assessment & Plan:  Advise regular exercise, healthy eating, always wear seat belts. Living will discussed, she has a booklet at home, to complete and bring a copy in our office, fall prevention discussed.  Immunizations discussed, she was considering the flu vaccine today, but decided against, advised that and pneumonia vaccine and then consider covid, and to check with her pharmacy in the future on RSV, shingrex and Tdap   To continue yearly optometry and dental exams.    Discussed her weight, drinking ensure/protein drinks.         Orders:  -     Comprehensive metabolic panel; Future  -     CBC w AUTO Differential; Future  -     TSH Rfx On Abnormal To Free T4  -     Lipid panel; Future    2. Essential hypertension  Assessment & Plan:  Repeat BP still up, sending to lab, advised to monitor at home and will need to follow up with cardiology     Orders:  -     Comprehensive metabolic panel; Future  -     Magnesium; Future    3. Leg cramps  Assessment & Plan:  Drink adequate water daily, checking labs     Orders:  -     Comprehensive metabolic panel; Future  -     Magnesium; Future    4. Postmenopausal  -     DEXA Bone Density Axial; Future    5. Screening mammogram for breast cancer  Assessment & Plan:  Advise monthly BSE and setting up mammogram     Orders:  -     Mammo Screening Digital Tomosynthesis Bilateral With CAD; Future    6. Gastroesophageal reflux disease without esophagitis  Assessment & Plan:  Follow up with surgeon as directed, reviewed his last note , she is on  Aciphex       7. Coronary artery disease involving native coronary artery of native heart without angina pectoris  Assessment & Plan:  She has an appt later this week with cardiology, had chasity early this am, reviewed last lab, sending for a lipid panel today             BMI is below normal parameters (malnutrition). Recommendations: advised to drink boost/ensure daily        FOLLOW UP  Return for followup pending lab results.  Patient was given instructions and counseling regarding her condition or for health maintenance advice. Please see specific information pulled into the AVS if appropriate.     Shanae Osborne, APRN  01/14/25  14:01 EST

## 2025-01-29 ENCOUNTER — TELEPHONE (OUTPATIENT)
Dept: FAMILY MEDICINE CLINIC | Age: 89
End: 2025-01-29
Payer: MEDICARE

## 2025-01-29 NOTE — TELEPHONE ENCOUNTER
Patient came in today with rt lower extremity pain, patient concern of a blood clot. No appt available today. Patient advise to go to ER for eval. Patient understood and agreed.

## 2025-01-30 NOTE — TELEPHONE ENCOUNTER
Pt said she went to the ER yesterday and the doctor checked her out and didn't think anything was wrong with her leg.  She said she guesses if feels okay today. It doesn't hurt all the time, it just has shooting pains some days. She said it comes from a patch of broken veins on her lower right leg and goes up to her knee.  No redness, warmth or hardness felt in that area.  Advised her to let us know if anything changes.

## 2025-02-12 ENCOUNTER — TELEPHONE (OUTPATIENT)
Dept: NEUROLOGY | Facility: CLINIC | Age: 89
End: 2025-02-12
Payer: MEDICARE

## 2025-02-12 NOTE — TELEPHONE ENCOUNTER
Attempted to LVM in regard to provider being out of office. Informed on VM, MyChart message, and mail reminder on the next new appointment set for patient. Patient is scheduled for May 7th at 2:00PM

## 2025-03-21 ENCOUNTER — APPOINTMENT (OUTPATIENT)
Dept: BONE DENSITY | Facility: HOSPITAL | Age: 89
End: 2025-03-21
Payer: MEDICARE

## 2025-03-21 ENCOUNTER — HOSPITAL ENCOUNTER (OUTPATIENT)
Dept: MAMMOGRAPHY | Facility: HOSPITAL | Age: 89
Discharge: HOME | End: 2025-03-21
Admitting: NURSE PRACTITIONER
Payer: MEDICARE

## 2025-03-21 DIAGNOSIS — Z12.31 SCREENING MAMMOGRAM FOR BREAST CANCER: ICD-10-CM

## 2025-03-21 PROCEDURE — 77067 SCR MAMMO BI INCL CAD: CPT

## 2025-03-21 PROCEDURE — 77063 BREAST TOMOSYNTHESIS BI: CPT

## 2025-03-24 ENCOUNTER — RESULTS FOLLOW-UP (OUTPATIENT)
Dept: MAMMOGRAPHY | Facility: HOSPITAL | Age: 89
End: 2025-03-24
Payer: MEDICARE

## 2025-03-24 DIAGNOSIS — R92.8 ABNORMAL MAMMOGRAM OF LEFT BREAST: Primary | ICD-10-CM

## 2025-03-24 DIAGNOSIS — R92.1 BREAST CALCIFICATIONS ON MAMMOGRAM: ICD-10-CM

## 2025-03-28 ENCOUNTER — HOSPITAL ENCOUNTER (OUTPATIENT)
Facility: HOSPITAL | Age: 89
Discharge: HOME | End: 2025-03-28
Payer: MEDICARE

## 2025-03-28 ENCOUNTER — OFFICE VISIT (OUTPATIENT)
Age: 89
End: 2025-03-28
Payer: MEDICARE

## 2025-03-28 VITALS
BODY MASS INDEX: 18.71 KG/M2 | DIASTOLIC BLOOD PRESSURE: 92 MMHG | RESPIRATION RATE: 16 BRPM | HEIGHT: 64 IN | WEIGHT: 109.6 LBS | SYSTOLIC BLOOD PRESSURE: 162 MMHG

## 2025-03-28 DIAGNOSIS — I65.23 CAROTID STENOSIS, BILATERAL: Primary | ICD-10-CM

## 2025-03-28 DIAGNOSIS — I65.23 BILATERAL CAROTID ARTERY OCCLUSION: ICD-10-CM

## 2025-03-28 DIAGNOSIS — I65.23 BILATERAL CAROTID ARTERY OCCLUSION: Primary | ICD-10-CM

## 2025-03-28 LAB
BH CV XLRA MEAS LEFT CAROTID BULB EDV: -11.5 CM/SEC
BH CV XLRA MEAS LEFT CAROTID BULB PSV: -43.4 CM/SEC
BH CV XLRA MEAS LEFT DIST CCA EDV: -16.8 CM/SEC
BH CV XLRA MEAS LEFT DIST CCA PSV: -69.4 CM/SEC
BH CV XLRA MEAS LEFT DIST ICA EDV: -21 CM/SEC
BH CV XLRA MEAS LEFT DIST ICA PSV: -83.4 CM/SEC
BH CV XLRA MEAS LEFT ICA/CCA RATIO: 1.67
BH CV XLRA MEAS LEFT MID CCA EDV: 14 CM/SEC
BH CV XLRA MEAS LEFT MID CCA PSV: 79.9 CM/SEC
BH CV XLRA MEAS LEFT MID ICA EDV: -23.1 CM/SEC
BH CV XLRA MEAS LEFT MID ICA PSV: -86.2 CM/SEC
BH CV XLRA MEAS LEFT PROX CCA EDV: 16.1 CM/SEC
BH CV XLRA MEAS LEFT PROX CCA PSV: 89.7 CM/SEC
BH CV XLRA MEAS LEFT PROX ECA EDV: -13.3 CM/SEC
BH CV XLRA MEAS LEFT PROX ECA PSV: -84.1 CM/SEC
BH CV XLRA MEAS LEFT PROX ICA EDV: 25.1 CM/SEC
BH CV XLRA MEAS LEFT PROX ICA PSV: 115.2 CM/SEC
BH CV XLRA MEAS LEFT PROX SCLA PSV: 86.2 CM/SEC
BH CV XLRA MEAS LEFT VERTEBRAL A EDV: -12.6 CM/SEC
BH CV XLRA MEAS LEFT VERTEBRAL A PSV: -60.9 CM/SEC
BH CV XLRA MEAS RIGHT CAROTID BULB EDV: -26 CM/SEC
BH CV XLRA MEAS RIGHT CAROTID BULB PSV: -107.4 CM/SEC
BH CV XLRA MEAS RIGHT DIST CCA EDV: -10.2 CM/SEC
BH CV XLRA MEAS RIGHT DIST CCA PSV: -61.1 CM/SEC
BH CV XLRA MEAS RIGHT DIST ICA EDV: -19.9 CM/SEC
BH CV XLRA MEAS RIGHT DIST ICA PSV: -75.1 CM/SEC
BH CV XLRA MEAS RIGHT ICA/CCA RATIO: 1.75
BH CV XLRA MEAS RIGHT MID CCA EDV: 12.5 CM/SEC
BH CV XLRA MEAS RIGHT MID CCA PSV: 74.5 CM/SEC
BH CV XLRA MEAS RIGHT MID ICA EDV: -19.6 CM/SEC
BH CV XLRA MEAS RIGHT MID ICA PSV: -67.3 CM/SEC
BH CV XLRA MEAS RIGHT PROX CCA EDV: 13.3 CM/SEC
BH CV XLRA MEAS RIGHT PROX CCA PSV: 67.4 CM/SEC
BH CV XLRA MEAS RIGHT PROX ECA EDV: -8.6 CM/SEC
BH CV XLRA MEAS RIGHT PROX ECA PSV: -76 CM/SEC
BH CV XLRA MEAS RIGHT PROX ICA EDV: 26 CM/SEC
BH CV XLRA MEAS RIGHT PROX ICA PSV: 107.4 CM/SEC
BH CV XLRA MEAS RIGHT PROX SCLA PSV: 122.9 CM/SEC
BH CV XLRA MEAS RIGHT VERTEBRAL A EDV: -16.1 CM/SEC
BH CV XLRA MEAS RIGHT VERTEBRAL A PSV: -56.7 CM/SEC
LEFT ARM BP: NORMAL MMHG
RIGHT ARM BP: NORMAL MMHG

## 2025-03-28 PROCEDURE — 93880 EXTRACRANIAL BILAT STUDY: CPT

## 2025-03-28 RX ORDER — AMLODIPINE BESYLATE 5 MG/1
5 TABLET ORAL DAILY
COMMUNITY

## 2025-03-28 NOTE — PROGRESS NOTES
Chief Complaint  Carotid Artery Disease    Subjective        Danyell Osborne presents to [facility]  HPI   Danyell Osborne is a 89 y.o. female that has been followed in our office for carotid artery stenosis. She returns today in follow up along with a carotid duplex. She  reports she fell last summer and had rib fractures and had to have her gallbladder removed. She also reports she had a minor stroke in 2023. She denies any further symptoms consistent with CVA, TIA, or amaurosis fugax.     Review of Systems   Constitutional:  Negative for fever.   Eyes:  Negative for visual disturbance.   Cardiovascular:  Negative for leg swelling.   Gastrointestinal:  Negative for abdominal pain.   Musculoskeletal:  Negative for back pain.   Skin:  Negative for color change, pallor and wound.   Neurological:  Negative for dizziness, facial asymmetry, speech difficulty and weakness.        Danyell Osborne  reports that she has never smoked. She has never been exposed to tobacco smoke. She has never used smokeless tobacco..        Objective   Vital Signs:  Vitals:    03/28/25 1218   BP: 162/92   Resp:       Body mass index is 18.8 kg/m².   BMI is within normal parameters. No other follow-up for BMI required.       Physical Exam  Vitals reviewed.   Constitutional:       Appearance: Normal appearance.   HENT:      Head: Normocephalic.   Cardiovascular:      Rate and Rhythm: Normal rate and regular rhythm.      Pulses: Normal pulses.           Dorsalis pedis pulses are 3+ on the right side and 3+ on the left side.        Posterior tibial pulses are 3+ on the right side and 3+ on the left side.   Pulmonary:      Effort: Pulmonary effort is normal.   Skin:     General: Skin is warm.   Neurological:      General: No focal deficit present.      Mental Status: She is alert and oriented to person, place, and time.   Psychiatric:         Mood and Affect: Mood normal.          Result Review :      Previous  carotid duplex: Less than 50% stenosis bilaterally    Carotid duplex from today: Duplex Carotid Ultrasound CAR (03/28/2025 11:39)                    Assessment and Plan     Diagnoses and all orders for this visit:    1. Carotid stenosis, bilateral (Primary)  -     Duplex Carotid Ultrasound CAR; Future             Patient present today for follow up of carotid artery stenosis.  Today, she has a less than 50% stenosis bilaterally.  This is unchanged from previous imaging.  She is to continue her antiplatelet agent which is Plavix.  She is also on Eliquis.  She is on a statin for cholesterol control.  We discussed adequate blood pressure control. She will return in 2 years along with a repeat carotid artery duplex.    Follow Up     Return in about 2 years (around 3/28/2027) for carotid duplex.  Patient was given instructions and counseling regarding her condition or for health maintenance advice. Please see specific information pulled into the AVS if appropriate.     AGUSTIN Carrillo

## 2025-04-17 ENCOUNTER — HOSPITAL ENCOUNTER (OUTPATIENT)
Dept: MAMMOGRAPHY | Facility: HOSPITAL | Age: 89
Discharge: HOME | End: 2025-04-17
Admitting: NURSE PRACTITIONER
Payer: MEDICARE

## 2025-04-17 DIAGNOSIS — R92.1 BREAST CALCIFICATIONS ON MAMMOGRAM: ICD-10-CM

## 2025-04-17 DIAGNOSIS — R92.8 ABNORMAL MAMMOGRAM OF LEFT BREAST: ICD-10-CM

## 2025-04-17 PROCEDURE — 77065 DX MAMMO INCL CAD UNI: CPT

## 2025-04-17 PROCEDURE — G0279 TOMOSYNTHESIS, MAMMO: HCPCS

## 2025-04-30 ENCOUNTER — OFFICE VISIT (OUTPATIENT)
Dept: FAMILY MEDICINE CLINIC | Age: 89
End: 2025-04-30
Payer: MEDICARE

## 2025-04-30 VITALS
HEART RATE: 67 BPM | DIASTOLIC BLOOD PRESSURE: 55 MMHG | HEIGHT: 64 IN | SYSTOLIC BLOOD PRESSURE: 125 MMHG | TEMPERATURE: 98 F | BODY MASS INDEX: 18.95 KG/M2 | WEIGHT: 111 LBS | OXYGEN SATURATION: 100 %

## 2025-04-30 DIAGNOSIS — R92.8 ABNORMAL MAMMOGRAM OF LEFT BREAST: Primary | ICD-10-CM

## 2025-04-30 PROCEDURE — 1160F RVW MEDS BY RX/DR IN RCRD: CPT | Performed by: NURSE PRACTITIONER

## 2025-04-30 PROCEDURE — 99213 OFFICE O/P EST LOW 20 MIN: CPT | Performed by: NURSE PRACTITIONER

## 2025-04-30 PROCEDURE — 1126F AMNT PAIN NOTED NONE PRSNT: CPT | Performed by: NURSE PRACTITIONER

## 2025-04-30 PROCEDURE — 1159F MED LIST DOCD IN RCRD: CPT | Performed by: NURSE PRACTITIONER

## 2025-04-30 NOTE — ASSESSMENT & PLAN NOTE
Reviewed mammogram, screening and dg, discussed risk, family history, patient would like to wait and have repeat dg mammogram in six months, advised to do monthly BSE and if anything changes to let me know

## 2025-04-30 NOTE — PROGRESS NOTES
Chief Complaint  Abnormal Breast Imaging (Discuss mammogram results from 4/17/25)    Subjective          Danyell Osborne presents to [facility]  History of Present Illness  Abnormal mammogram  (She does not check her breast regularly but has had no symptoms)    Date of Exam: 3/21/2025 11:40 AM     Indication: Screening.     Comparison: July 28, 2023, July 26, 2022     Technique: Routine screening mammogram images were obtained per  protocol.  Tomosynthesis was utilized.  These mammographic images were  interpreted with the assistance of a computer aided detection system.     Breast Density: There are scattered areas of fibroglandular density.     Findings:   There has been no change in appearance of the right breast. There are  coarse clustered calcifications around 12:00 4.8 cm from the nipple  which have developed. More nodular masses within the breast on prior  exam shown to reflect cysts are not well defined on the current exam.  There is vascular calcification present involving both breasts..     IMPRESSION:  Incomplete: Clustered calcifications left breast. Diagnostic mammogram  recommended.     BI-RADS ASSESSMENT: BI-RADS 0. Incomplete: Need Additional Imaging  Evaluation.    MAMMO DIAGNOSTIC DIGITAL TOMOSYNTHESIS LEFT W CAD-     Date of Exam: 4/17/2025 12:51 PM     Indication: 89-year-old woman called back from screening mammogram for  new calcifications in the left breast.     Comparison: 3/21/2025, 7/28/2023, 7/26/2022.     Technique: Left diagnostic mammogram was performed utilizing  tomosynthesis.     These mammographic images were interpreted with the assistance of a  computer aided detection system.     FINDINGS:  There are scattered areas of fibroglandular density.     New group of coarse heterogeneous calcifications in the upper, slightly  outer left breast, middle depth.     IMPRESSION:  New group of left breast calcifications, for which stereotactic biopsy  is  typically recommended. I discussed this with the patient, and she  would like to speak with her clinician to decide whether she would  prefer to have a biopsy or short-term imaging follow-up. If the informed  patient and her clinician defer biopsy, then 6-month follow-up left  diagnostic mammogram would be recommended.     BI-RADS ASSESSMENT: Category 4: Suspicious        The patient's information is entered into a computerized reminder system  with a targeted due date for the next mammogram.     Note:  It has been reported that there is approximately a 15% false  negative in mammography.  Therefore, management of a palpable  abnormality should not be deferred because of a negative mammogram.      PAST MEDICAL HISTORY changes since 2025:           Atrial Fibrillation: dx'd in 3-2020;     Carotid Artery Stenosis: yuridia madera;     Coronary Artery Disease: dx'd in 3-23-20;     Myocardial Infarction: due to occlusion of the LAD, RCA, and stened again 3-25-20; heart cath/ stenting for NSTEMI;     AAA         Hospitalizations:     Acute cholecystits   Fall/fractures 24 discharged on -  acute stroke 10-9-23   covid   Coronary Artery Disease last admit date 3-23-20 anemia, admitted & 20   A fib           CURRENT MEDICAL PROVIDERS:    Cardiologist: Fozia         PREVENTIVE HEALTH MAINTENANCE                 COLORECTAL CANCER SCREENING: Up to date (colonoscopy q10y; sigmoidoscopy q5y; Cologuard q3y) was last done  3-2023 no colitits   Also CLN done on 3/2017          Surgical History:      25 EGD Tuvlin    Cholecystecomy 2024  EGD and CLN 3-2023    Biopsy of breast; benign Left    Coronary Artery Stent Placement:  2;     Hysterectomy: at age 30; ovaries intact;     left foot neuroma;     Procedures:    Colonoscopy ( 11/normal/Ronna )    EGD (  normal ) &  esophageal spasms         Family History:        Father:  at age 80's; Cause of death was TB;   Parkinson's Disease     Mother:  at age 30's; Cause of death was renal issues;  goiter     Sister(s): 6 sister(s) total; 1 ; Neurological/Genetic, 2 breast cancer, Cerebrovascular Accident; Endocrine Type 2 Diabetes   Brothers: 2, 1 , DM    Niece with breast cancer      Social History:        Occupation: quit working 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/[employer];     Marital Status:      Children: 6                             Past Medical History:   Diagnosis Date    A-fib     Anemia     Coronary artery disease     HLD (hyperlipidemia)     Hypertension     Stroke        Allergies   Allergen Reactions    Contrast Dye (Echo Or Unknown Ct/Mr) Hives and Unknown - High Severity     hives    Iodinated Contrast Media Hives and Unknown - High Severity    Penicillins Rash     Beta lactam allergy details    Antibiotic reaction: rash, hives    Age at reaction: adult    Dose to reaction time: unknown    Reason for antibiotic: unknown    Epinephrine required for reaction?: no    Tolerated antibiotics: unknown        Past Surgical History:   Procedure Laterality Date    CHOLECYSTECTOMY WITH INTRAOPERATIVE CHOLANGIOGRAM N/A 2024    Procedure: Laparoscopic cholecystectomy with cholangiogram;  Surgeon: Tessa Montenegro MD;  Location: [facility] MAIN OR;  Service: General;  Laterality: N/A;    ENDOSCOPY N/A 2024    Procedure: ESOPHAGOGASTRODUODENOSCOPY WITH COLD BIOPSIES;  Surgeon: Santos Crabtree MD;  Location: [facility] ENDOSCOPY;  Service: Gastroenterology;  Laterality: N/A;  PRE- DYSPHAGIA  POST-DUODENAL, GASTRIC EROSIONS    HYSTERECTOMY          Social History     Tobacco Use    Smoking status: Never     Passive exposure: Never    Smokeless tobacco: Never   Substance Use Topics    Alcohol use: Never       Family History   Problem Relation Age of Onset    Malig Hyperthermia Neg Hx         Health Maintenance Due   Topic Date Due    DXA SCAN  Never done    TDAP/TD VACCINES (1 - Tdap) Never done    Pneumococcal  Vaccine 50+ (1 of 1 - PCV) Never done    ZOSTER VACCINE (1 of 2) Never done    RSV Vaccine - Adults (1 - 1-dose 75+ series) Never done    COVID-19 Vaccine (4 - 2024-25 season) 09/01/2024        Current Outpatient Medications on File Prior to Visit   Medication Sig    acetaminophen (TYLENOL) 325 MG tablet Take 2 tablets by mouth Every 6 (Six) Hours As Needed for Mild Pain.    amLODIPine (NORVASC) 5 MG tablet Take 1 tablet by mouth Daily.    apixaban (ELIQUIS) 2.5 MG tablet tablet Take 1 tablet by mouth Every 12 (Twelve) Hours. Indications: Atrial Fibrillation    Cholecalciferol (D3) 25 MCG (1000 UT) capsule Take 1 capsule by mouth Daily.    clopidogrel (PLAVIX) 75 MG tablet Take 1 tablet by mouth Daily.    metoprolol succinate XL (TOPROL-XL) 50 MG 24 hr tablet Take 1 tablet by mouth 2 (Two) Times a Day.    multivitamin with minerals tablet tablet Take 1 tablet by mouth Daily.    nitroglycerin (NITROSTAT) 0.4 MG SL tablet DISSOLVE 1 TABLET UNDER THE TONGUE EVERY 5 MINUTES AS NEEDED FOR CHEST PAIN. DO NOT EXCEED A TOTAL OF 3 DOSES IN 15 MINUTES. IF NO RELIEF, CALL 911    Polyethylene Glycol 3350 (MIRALAX PO) Take  by mouth Daily.    RABEprazole (ACIPHEX) 20 MG EC tablet Take 1 tablet by mouth Daily.    Simethicone (GAS-X PO) Take  by mouth As Needed.    [DISCONTINUED] Coenzyme Q10 200 MG capsule Take 200 mg by mouth Daily. (Patient not taking: Reported on 4/30/2025)     No current facility-administered medications on file prior to visit.       Immunization History   Administered Date(s) Administered    COVID-19 (PFIZER) Purple Cap Monovalent 02/17/2021, 03/10/2021, 01/03/2022       Review of Systems   Constitutional:  Negative for fatigue and fever.   Respiratory:  Negative for cough and shortness of breath.    Cardiovascular:  Negative for chest pain, palpitations and leg swelling.   Psychiatric/Behavioral:  Positive for stress (some family issues).         Objective     Vitals:    04/30/25 1352   BP: 125/55   BP  "Location: Left arm   Patient Position: Sitting   Cuff Size: Adult   Pulse: 67   Temp: 98 °F (36.7 °C)   TempSrc: Oral   SpO2: 100%   Weight: 50.3 kg (111 lb)   Height: 162.6 cm (64.02\")            Physical Exam  Vitals reviewed.   Constitutional:       General: She is not in acute distress.     Appearance: Normal appearance.   Neck:      Vascular: No carotid bruit.   Cardiovascular:      Rate and Rhythm: Normal rate and regular rhythm.      Heart sounds: Normal heart sounds. No murmur heard.  Pulmonary:      Effort: Pulmonary effort is normal. No respiratory distress.      Breath sounds: Normal breath sounds.   Chest:   Breasts:     Right: Normal. No mass, nipple discharge or skin change.      Left: Normal. No mass, nipple discharge or skin change.   Musculoskeletal:      Right lower leg: No edema.      Left lower leg: No edema.   Lymphadenopathy:      Upper Body:      Right upper body: No axillary adenopathy.      Left upper body: No axillary adenopathy.   Neurological:      Mental Status: She is alert.   Psychiatric:         Mood and Affect: Mood normal.         Behavior: Behavior normal.         Result Review :     The following data was reviewed by: AGUSTIN Jimenez on 04/30/2025:                       Assessment and Plan      Diagnoses and all orders for this visit:    1. Abnormal mammogram of left breast (Primary)  Assessment & Plan:  Reviewed mammogram, screening and dg, discussed risk, family history, patient would like to wait and have repeat dg mammogram in six months, advised to do monthly BSE and if anything changes to let me know           BMI is within normal parameters. No other follow-up for BMI required.         Follow Up     Return for Next scheduled follow up.    Patient was given instructions and counseling regarding her condition or for health maintenance advice. Please see specific information pulled into the AVS if appropriate.       "

## 2025-05-02 NOTE — PROGRESS NOTES
CC: Stroke follow-up    HPI:  Danyell Osborne is a  89 y.o.  right-handed female with hypertension, hyperlipidemia, PAF, carotid disease, and CAD status post previous stent is being seen in follow-up today for stroke.  She presents unaccompanied.    She was admitted to [facility] in October 2023 after she had an episode of left arm and leg heaviness which resolved.  Symptoms recurred a few days later without associated speech difficulty, vision change, or headache.  She went to [facility] originally and MRI brain showed right basal ganglia stroke and she was transferred to [facility] for further evaluation.  She did have a reported contrast allergy so did not get a CTA.  Carotid ultrasound showed right ICA less than 50% left ICA 50 to 69% stenosis.  MRA head showed moderate to severe irregularity and stenosis in the right P2 segment.  2D echo showed EF of 56 to 60%, normal left atrial cavity size, saline test result was positive for right to left atrial level shunt.  There is trace aortic valve regurgitation.  She was seen by cardiology and started on Eliquis 2.5 mg twice daily and recommended to continue single antiplatelet, Plavix 75 mg daily.  Lipitor 80 mg daily was also started.     No TIA or stroke symptoms since I last saw her.  She continues to take Eliquis 2.5 mg twice daily, Plavix 75 mg daily, and Lipitor 80 mg daily.  Reports she is tolerating these medications without any significant side effects.    She last saw vascular surgery in March 2025, carotid ultrasound showed less than 50% stenosis bilaterally.    She did have a fall down stairs on a windy day, knocked back when the storm door got caught in the wind.  She fell down her porch stairs and had a back fracture as well as multiple risk factors.  She was treated at [facility].  She also underwent gallbladder surgery.  She completed outpatient PT with Vik.    The above history was reviewed  and updated.     Past Medical History:   Diagnosis Date    A-fib     Anemia     Coronary artery disease     HLD (hyperlipidemia)     Hypertension     Stroke          Past Surgical History:   Procedure Laterality Date    CHOLECYSTECTOMY WITH INTRAOPERATIVE CHOLANGIOGRAM N/A 7/5/2024    Procedure: Laparoscopic cholecystectomy with cholangiogram;  Surgeon: Tessa Montenegro MD;  Location: [facility] MAIN OR;  Service: General;  Laterality: N/A;    ENDOSCOPY N/A 7/11/2024    Procedure: ESOPHAGOGASTRODUODENOSCOPY WITH COLD BIOPSIES;  Surgeon: Santos Crabtree MD;  Location: [facility] ENDOSCOPY;  Service: Gastroenterology;  Laterality: N/A;  PRE- DYSPHAGIA  POST-DUODENAL, GASTRIC EROSIONS    HYSTERECTOMY             Current Outpatient Medications:     acetaminophen (TYLENOL) 325 MG tablet, Take 2 tablets by mouth Every 6 (Six) Hours As Needed for Mild Pain., Disp: , Rfl:     amLODIPine (NORVASC) 5 MG tablet, Take 1 tablet by mouth Daily., Disp: , Rfl:     apixaban (ELIQUIS) 2.5 MG tablet tablet, Take 1 tablet by mouth Every 12 (Twelve) Hours. Indications: Atrial Fibrillation, Disp: 60 tablet, Rfl: 0    Cholecalciferol (D3) 25 MCG (1000 UT) capsule, Take 1 capsule by mouth Daily., Disp: , Rfl:     clopidogrel (PLAVIX) 75 MG tablet, Take 1 tablet by mouth Daily., Disp: , Rfl:     metoprolol succinate XL (TOPROL-XL) 50 MG 24 hr tablet, Take 1 tablet by mouth 2 (Two) Times a Day., Disp: , Rfl:     multivitamin with minerals tablet tablet, Take 1 tablet by mouth Daily., Disp: , Rfl:     nitroglycerin (NITROSTAT) 0.4 MG SL tablet, DISSOLVE 1 TABLET UNDER THE TONGUE EVERY 5 MINUTES AS NEEDED FOR CHEST PAIN. DO NOT EXCEED A TOTAL OF 3 DOSES IN 15 MINUTES. IF NO RELIEF, CALL 911, Disp: , Rfl:     Polyethylene Glycol 3350 (MIRALAX PO), Take  by mouth Daily., Disp: , Rfl:     RABEprazole (ACIPHEX) 20 MG EC tablet, Take 1 tablet by mouth Daily., Disp: , Rfl:     Simethicone (GAS-X PO), Take  by mouth As Needed., Disp: , Rfl:  "      Family History   Problem Relation Age of Onset    Malig Hyperthermia Neg Hx          Social History     Socioeconomic History    Marital status:    Tobacco Use    Smoking status: Never     Passive exposure: Never    Smokeless tobacco: Never   Vaping Use    Vaping status: Never Used   Substance and Sexual Activity    Alcohol use: Never    Drug use: Never    Sexual activity: Defer         Allergies   Allergen Reactions    Contrast Dye (Echo Or Unknown Ct/Mr) Hives and Unknown - High Severity     hives    Iodinated Contrast Media Hives and Unknown - High Severity    Penicillins Rash     Beta lactam allergy details    Antibiotic reaction: rash, hives    Age at reaction: adult    Dose to reaction time: unknown    Reason for antibiotic: unknown    Epinephrine required for reaction?: no    Tolerated antibiotics: unknown           Physical Exam:  Vitals:    05/07/25 1350   BP: 122/80   Pulse: 66   SpO2: 97%   Weight: 51 kg (112 lb 8 oz)   Height: 162.6 cm (64\")     Orthostatic BP:    Body mass index is 19.31 kg/m².    General appearance: Well developed, thin, well groomed, alert and cooperative.   HEENT: Normocephalic.   Cardiac: Regular rate and rhythm. No murmurs.   Chest Exam: Clear to auscultation bilaterally, no wheezes, no rhonchi.  Extremities: Normal, no edema.     Higher integrative function: Oriented to time, place, person, intact recent and remote memory, attention span, concentration and language. Spontaneous speech, fund of vocabulary are normal.   Cranial nerves: Visual fields intact bilaterally, extraocular moods full without nystagmus, PERRL, normal facial sensation, face symmetric, hearing intact, symmetric palate rise, tongue midline, no dysarthria.  Motor: Normal muscle strength, bulk, and tone in upper and lower extremities. No fasciculations, rigidity, spasticity or abnormal movements.   Sensation: Decreased to LT in LLE  Station and gait: Mildly antalgic gait.  Coordination: Finger to nose " "test showed no dysmetria.      Results:      Lab Results   Component Value Date    GLUCOSE 105 (H) 01/14/2025    BUN 14 01/14/2025    CREATININE 0.75 01/14/2025    BCR 18.7 01/14/2025    CO2 26.0 01/14/2025    CALCIUM 10.1 01/14/2025    ALBUMIN 4.3 01/14/2025    AST 38 (H) 01/14/2025    ALT 19 01/14/2025       Lab Results   Component Value Date    WBC 6.31 01/14/2025    HGB 12.4 01/14/2025    HCT 39.9 01/14/2025    MCV 91.3 01/14/2025     01/14/2025         .No results found for: \"RPR\"      Lab Results   Component Value Date    TSH 2.560 01/14/2025         Lab Results   Component Value Date    [id number] 577 05/13/2019         No results found for: \"FOLATE\"      Lab Results   Component Value Date    HGBA1C 5.90 (H) 10/10/2023         Lab Results   Component Value Date    GLUCOSE 105 (H) 01/14/2025    BUN 14 01/14/2025    CREATININE 0.75 01/14/2025    BCR 18.7 01/14/2025    K 5.0 01/14/2025    CO2 26.0 01/14/2025    CALCIUM 10.1 01/14/2025    ALBUMIN 4.3 01/14/2025    AST 38 (H) 01/14/2025    ALT 19 01/14/2025         Lab Results   Component Value Date    WBC 6.31 01/14/2025    HGB 12.4 01/14/2025    HCT 39.9 01/14/2025    MCV 91.3 01/14/2025     01/14/2025             Assessment/Plan:        Diagnoses and all orders for this visit:    1. History of stroke (Primary)    2. Paroxysmal atrial fibrillation    For stroke prevention:  Continue Eliquis 2.5 mg twice daily and Plavix  Blood pressure control to <130/80  Goal LDL <70-recommend high dose statins-continue Lipitor 80 mg   Serum glucose < 140  Call 911 for stroke any stroke symptoms    Will continue to follow longitudinally. Follow-up in 1 year or sooner if needed      Total time:>20 min            Dictated utilizing Dragon dictation.   "

## 2025-05-07 ENCOUNTER — OFFICE VISIT (OUTPATIENT)
Dept: NEUROLOGY | Facility: CLINIC | Age: 89
End: 2025-05-07
Payer: MEDICARE

## 2025-05-07 VITALS
HEART RATE: 66 BPM | OXYGEN SATURATION: 97 % | SYSTOLIC BLOOD PRESSURE: 122 MMHG | WEIGHT: 112.5 LBS | HEIGHT: 64 IN | BODY MASS INDEX: 19.21 KG/M2 | DIASTOLIC BLOOD PRESSURE: 80 MMHG

## 2025-05-07 DIAGNOSIS — I48.0 PAROXYSMAL ATRIAL FIBRILLATION: ICD-10-CM

## 2025-05-07 DIAGNOSIS — Z86.73 HISTORY OF STROKE: Primary | ICD-10-CM

## 2025-05-07 NOTE — LETTER
May 7, 2025     Shanae Osborne, APRN  [address] [facility]  [address]  [facility] [address]    Patient: Danyell Osborne   YOB: 1935   Date of Visit: 5/7/2025     Dear AGUSTIN Jimenez:       Thank you for referring Danyell Osborne to me for evaluation. Below are the relevant portions of my assessment and plan of care.    If you have questions, please do not hesitate to call me. I look forward to following Danyell along with you.         Sincerely,        AGUSTIN Cornell        CC: No Recipients    Marietta Brown APRN  05/07/25 1433  Sign when Signing Visit  CC: Stroke follow-up    HPI:  Danyell Osborne is a  89 y.o.  right-handed female with hypertension, hyperlipidemia, PAF, carotid disease, and CAD status post previous stent is being seen in follow-up today for stroke.  She presents unaccompanied.    She was admitted to [facility] in October 2023 after she had an episode of left arm and leg heaviness which resolved.  Symptoms recurred a few days later without associated speech difficulty, vision change, or headache.  She went to [facility] originally and MRI brain showed right basal ganglia stroke and she was transferred to [facility] for further evaluation.  She did have a reported contrast allergy so did not get a CTA.  Carotid ultrasound showed right ICA less than 50% left ICA 50 to 69% stenosis.  MRA head showed moderate to severe irregularity and stenosis in the right P2 segment.  2D echo showed EF of 56 to 60%, normal left atrial cavity size, saline test result was positive for right to left atrial level shunt.  There is trace aortic valve regurgitation.  She was seen by cardiology and started on Eliquis 2.5 mg twice daily and recommended to continue single antiplatelet, Plavix 75 mg daily.  Lipitor 80 mg daily was also started.     No TIA or stroke symptoms since I last saw her.  She continues to take Eliquis 2.5 mg twice daily,  Plavix 75 mg daily, and Lipitor 80 mg daily.  Reports she is tolerating these medications without any significant side effects.    She last saw vascular surgery in March 2025, carotid ultrasound showed less than 50% stenosis bilaterally.    She did have a fall down stairs on a windy day, knocked back when the storm door got caught in the wind.  She fell down her porch stairs and had a back fracture as well as multiple risk factors.  She was treated at [facility].  She also underwent gallbladder surgery.  She completed outpatient PT with Vik.    The above history was reviewed and updated.     Past Medical History:   Diagnosis Date   • A-fib    • Anemia    • Coronary artery disease    • HLD (hyperlipidemia)    • Hypertension    • Stroke          Past Surgical History:   Procedure Laterality Date   • CHOLECYSTECTOMY WITH INTRAOPERATIVE CHOLANGIOGRAM N/A 7/5/2024    Procedure: Laparoscopic cholecystectomy with cholangiogram;  Surgeon: Tessa Montenegro MD;  Location: [facility] MAIN OR;  Service: General;  Laterality: N/A;   • ENDOSCOPY N/A 7/11/2024    Procedure: ESOPHAGOGASTRODUODENOSCOPY WITH COLD BIOPSIES;  Surgeon: Santos Crabtree MD;  Location: [facility] ENDOSCOPY;  Service: Gastroenterology;  Laterality: N/A;  PRE- DYSPHAGIA  POST-DUODENAL, GASTRIC EROSIONS   • HYSTERECTOMY             Current Outpatient Medications:   •  acetaminophen (TYLENOL) 325 MG tablet, Take 2 tablets by mouth Every 6 (Six) Hours As Needed for Mild Pain., Disp: , Rfl:   •  amLODIPine (NORVASC) 5 MG tablet, Take 1 tablet by mouth Daily., Disp: , Rfl:   •  apixaban (ELIQUIS) 2.5 MG tablet tablet, Take 1 tablet by mouth Every 12 (Twelve) Hours. Indications: Atrial Fibrillation, Disp: 60 tablet, Rfl: 0  •  Cholecalciferol (D3) 25 MCG (1000 UT) capsule, Take 1 capsule by mouth Daily., Disp: , Rfl:   •  clopidogrel (PLAVIX) 75 MG tablet, Take 1 tablet by mouth Daily., Disp: , Rfl:   •  metoprolol succinate XL (TOPROL-XL) 50  "MG 24 hr tablet, Take 1 tablet by mouth 2 (Two) Times a Day., Disp: , Rfl:   •  multivitamin with minerals tablet tablet, Take 1 tablet by mouth Daily., Disp: , Rfl:   •  nitroglycerin (NITROSTAT) 0.4 MG SL tablet, DISSOLVE 1 TABLET UNDER THE TONGUE EVERY 5 MINUTES AS NEEDED FOR CHEST PAIN. DO NOT EXCEED A TOTAL OF 3 DOSES IN 15 MINUTES. IF NO RELIEF, CALL 911, Disp: , Rfl:   •  Polyethylene Glycol 3350 (MIRALAX PO), Take  by mouth Daily., Disp: , Rfl:   •  RABEprazole (ACIPHEX) 20 MG EC tablet, Take 1 tablet by mouth Daily., Disp: , Rfl:   •  Simethicone (GAS-X PO), Take  by mouth As Needed., Disp: , Rfl:       Family History   Problem Relation Age of Onset   • Malig Hyperthermia Neg Hx          Social History     Socioeconomic History   • Marital status:    Tobacco Use   • Smoking status: Never     Passive exposure: Never   • Smokeless tobacco: Never   Vaping Use   • Vaping status: Never Used   Substance and Sexual Activity   • Alcohol use: Never   • Drug use: Never   • Sexual activity: Defer         Allergies   Allergen Reactions   • Contrast Dye (Echo Or Unknown Ct/Mr) Hives and Unknown - High Severity     hives   • Iodinated Contrast Media Hives and Unknown - High Severity   • Penicillins Rash     Beta lactam allergy details    Antibiotic reaction: rash, hives    Age at reaction: adult    Dose to reaction time: unknown    Reason for antibiotic: unknown    Epinephrine required for reaction?: no    Tolerated antibiotics: unknown           Physical Exam:  Vitals:    05/07/25 1350   BP: 122/80   Pulse: 66   SpO2: 97%   Weight: 51 kg (112 lb 8 oz)   Height: 162.6 cm (64\")     Orthostatic BP:    Body mass index is 19.31 kg/m².    General appearance: Well developed, thin, well groomed, alert and cooperative.   HEENT: Normocephalic.   Cardiac: Regular rate and rhythm. No murmurs.   Chest Exam: Clear to auscultation bilaterally, no wheezes, no rhonchi.  Extremities: Normal, no edema.     Higher integrative " "function: Oriented to time, place, person, intact recent and remote memory, attention span, concentration and language. Spontaneous speech, fund of vocabulary are normal.   Cranial nerves: Visual fields intact bilaterally, extraocular moods full without nystagmus, PERRL, normal facial sensation, face symmetric, hearing intact, symmetric palate rise, tongue midline, no dysarthria.  Motor: Normal muscle strength, bulk, and tone in upper and lower extremities. No fasciculations, rigidity, spasticity or abnormal movements.   Sensation: Decreased to LT in LLE  Station and gait: Mildly antalgic gait.  Coordination: Finger to nose test showed no dysmetria.      Results:      Lab Results   Component Value Date    GLUCOSE 105 (H) 01/14/2025    BUN 14 01/14/2025    CREATININE 0.75 01/14/2025    BCR 18.7 01/14/2025    CO2 26.0 01/14/2025    CALCIUM 10.1 01/14/2025    ALBUMIN 4.3 01/14/2025    AST 38 (H) 01/14/2025    ALT 19 01/14/2025       Lab Results   Component Value Date    WBC 6.31 01/14/2025    HGB 12.4 01/14/2025    HCT 39.9 01/14/2025    MCV 91.3 01/14/2025     01/14/2025         .No results found for: \"RPR\"      Lab Results   Component Value Date    TSH 2.560 01/14/2025         Lab Results   Component Value Date    KCOZMMHZ68 577 05/13/2019         No results found for: \"FOLATE\"      Lab Results   Component Value Date    HGBA1C 5.90 (H) 10/10/2023         Lab Results   Component Value Date    GLUCOSE 105 (H) 01/14/2025    BUN 14 01/14/2025    CREATININE 0.75 01/14/2025    BCR 18.7 01/14/2025    K 5.0 01/14/2025    CO2 26.0 01/14/2025    CALCIUM 10.1 01/14/2025    ALBUMIN 4.3 01/14/2025    AST 38 (H) 01/14/2025    ALT 19 01/14/2025         Lab Results   Component Value Date    WBC 6.31 01/14/2025    HGB 12.4 01/14/2025    HCT 39.9 01/14/2025    MCV 91.3 01/14/2025     01/14/2025             Assessment/Plan:        Diagnoses and all orders for this visit:    1. History of stroke (Primary)    2. Paroxysmal " atrial fibrillation    For stroke prevention:  Continue Eliquis 2.5 mg twice daily and Plavix  Blood pressure control to <130/80  Goal LDL <70-recommend high dose statins-continue Lipitor 80 mg   Serum glucose < 140  Call 911 for stroke any stroke symptoms    Will continue to follow longitudinally. Follow-up in 1 year or sooner if needed      Total time:>20 min            Dictated utilizing Dragon dictation.

## 2025-05-07 NOTE — PATIENT INSTRUCTIONS
Fall Prevention in the Home, Adult  Falls can cause injuries and affect people of all ages. There are many simple things that you can do to make your home safe and to help prevent falls.  If you need it, ask for help making these changes.  What actions can I take to prevent falls?  General information  Use good lighting in all rooms. Make sure to:  Replace any light bulbs that burn out.  Turn on lights if it is dark and use night-lights.  Keep items that you use often in easy-to-reach places. Lower the shelves around your home if needed.  Move furniture so that there are clear paths around it.  Do not keep throw rugs or other things on the floor that can make you trip.  If any of your floors are uneven, fix them.  Add color or contrast paint or tape to clearly jordin and help you see:  Grab bars or handrails.  First and last steps of staircases.  Where the edge of each step is.  If you use a ladder or stepladder:  Make sure that it is fully opened. Do not climb a closed ladder.  Make sure the sides of the ladder are locked in place.  Have someone hold the ladder while you use it.  Know where your pets are as you move through your home.  What can I do in the bathroom?         Keep the floor dry. Clean up any water that is on the floor right away.  Remove soap buildup in the bathtub or shower. Buildup makes bathtubs and showers slippery.  Use non-skid mats or decals on the floor of the bathtub or shower.  Attach bath mats securely with double-sided, non-slip rug tape.  If you need to sit down while you are in the shower, use a non-slip stool.  Install grab bars by the toilet and in the bathtub and shower. Do not use towel bars as grab bars.  What can I do in the bedroom?  Make sure that you have a light by your bed that is easy to reach.  Do not use any sheets or blankets on your bed that hang to the floor.  Have a firm bench or chair with side arms that you can use for support when you get dressed.  What can I do in  the kitchen?  Clean up any spills right away.  If you need to reach something above you, use a sturdy step stool that has a grab bar.  Keep electrical cables out of the way.  Do not use floor polish or wax that makes floors slippery.  What can I do with my stairs?  Do not leave anything on the stairs.  Make sure that you have a light switch at the top and the bottom of the stairs. Have them installed if you do not have them.  Make sure that there are handrails on both sides of the stairs. Fix handrails that are broken or loose. Make sure that handrails are as long as the staircases.  Install non-slip stair treads on all stairs in your home if they do not have carpet.  Avoid having throw rugs at the top or bottom of stairs, or secure the rugs with carpet tape to prevent them from moving.  Choose a carpet design that does not hide the edge of steps on the stairs. Make sure that carpet is firmly attached to the stairs. Fix any carpet that is loose or worn.  What can I do on the outside of my home?  Use bright outdoor lighting.  Repair the edges of walkways and driveways and fix any cracks. Clear paths of anything that can make you trip, such as tools or rocks.  Add color or contrast paint or tape to clearly jordin and help you see high doorway thresholds.  Trim any bushes or trees on the main path into your home.  Check that handrails are securely fastened and in good repair. Both sides of all steps should have handrails.  Install guardrails along the edges of any raised decks or porches.  Have leaves, snow, and ice cleared regularly. Use sand, salt, or ice melt on walkways during winter months if you live where there is ice and snow.  In the garage, clean up any spills right away, including grease or oil spills.  What other actions can I take?  Review your medicines with your health care provider. Some medicines can make you confused or feel dizzy. This can increase your chance of falling.  Wear closed-toe shoes that  fit well and support your feet. Wear shoes that have rubber soles and low heels.  Use a cane, walker, scooter, or crutches that help you move around if needed.  Talk with your provider about other ways that you can decrease your risk of falls. This may include seeing a physical therapist to learn to do exercises to improve movement and strength.  Where to find more information  Centers for Disease Control and Prevention, [place]: cdc.gov  [employer] Aging: yonis.nih.gov  [employer]: yonis.nih.gov  Contact a health care provider if:  You are afraid of falling at home.  You feel weak, drowsy, or dizzy at home.  You fall at home.  Get help right away if you:  Lose consciousness or have trouble moving after a fall.  Have a fall that causes a head injury.  These symptoms may be an emergency. Get help right away. Call 911.  Do not wait to see if the symptoms will go away.  Do not drive yourself to the hospital.  This information is not intended to replace advice given to you by your health care provider. Make sure you discuss any questions you have with your health care provider.  Document Revised: 08/21/2023 Document Reviewed: 08/21/2023  Elsevier Patient Education © 2024 [employer]

## 2025-05-14 ENCOUNTER — TELEPHONE (OUTPATIENT)
Dept: FAMILY MEDICINE CLINIC | Age: 89
End: 2025-05-14
Payer: MEDICARE

## 2025-05-14 ENCOUNTER — LAB (OUTPATIENT)
Dept: LAB | Facility: HOSPITAL | Age: 89
End: 2025-05-14
Payer: MEDICARE

## 2025-05-14 DIAGNOSIS — N28.1 RENAL CYST: ICD-10-CM

## 2025-05-14 DIAGNOSIS — M54.6 ACUTE LEFT-SIDED THORACIC BACK PAIN: Primary | ICD-10-CM

## 2025-05-14 DIAGNOSIS — I10 ESSENTIAL HYPERTENSION: ICD-10-CM

## 2025-05-14 LAB
ALBUMIN SERPL-MCNC: 4 G/DL (ref 3.5–5.2)
ALBUMIN/GLOB SERPL: 1.3 G/DL
ALP SERPL-CCNC: 67 U/L (ref 39–117)
ALT SERPL W P-5'-P-CCNC: 16 U/L (ref 1–33)
ANION GAP SERPL CALCULATED.3IONS-SCNC: 12.5 MMOL/L (ref 5–15)
AST SERPL-CCNC: 38 U/L (ref 1–32)
BILIRUB SERPL-MCNC: 0.3 MG/DL (ref 0–1.2)
BUN SERPL-MCNC: 15 MG/DL (ref 8–23)
BUN/CREAT SERPL: 15.2 (ref 7–25)
CALCIUM SPEC-SCNC: 9.5 MG/DL (ref 8.6–10.5)
CHLORIDE SERPL-SCNC: 103 MMOL/L (ref 98–107)
CO2 SERPL-SCNC: 22.5 MMOL/L (ref 22–29)
CREAT SERPL-MCNC: 0.99 MG/DL (ref 0.57–1)
EGFRCR SERPLBLD CKD-EPI 2021: 55 ML/MIN/1.73
GLOBULIN UR ELPH-MCNC: 3.2 GM/DL
GLUCOSE SERPL-MCNC: 105 MG/DL (ref 65–99)
POTASSIUM SERPL-SCNC: 3.9 MMOL/L (ref 3.5–5.2)
PROT SERPL-MCNC: 7.2 G/DL (ref 6–8.5)
SODIUM SERPL-SCNC: 138 MMOL/L (ref 136–145)

## 2025-05-14 PROCEDURE — 36415 COLL VENOUS BLD VENIPUNCTURE: CPT

## 2025-05-14 PROCEDURE — 80053 COMPREHEN METABOLIC PANEL: CPT

## 2025-05-14 RX ORDER — LIDOCAINE 50 MG/G
1 PATCH TOPICAL EVERY 24 HOURS
Qty: 30 PATCH | Refills: 0 | Status: SHIPPED | OUTPATIENT
Start: 2025-05-14

## 2025-05-14 NOTE — TELEPHONE ENCOUNTER
I reviewed her ER visit/Scan and lab, they only did a u/a which was fine:  On CT :   There is a T11 compression   fracture that is new from a prior CT dated June 25, 2024.     there is a dominant 72 mm left   renal cyst. The previous CT in 2024, no mention of renal cyst     What is she taking for pain?   I will need to send her to urology for her renal cyst to see what recommendations they have for her.  Needs a BMP to test renal function

## 2025-05-14 NOTE — TELEPHONE ENCOUNTER
Pt informed.  Said she only take tylenol, she thinks it constipates her.  Do you want her to come in today for lab work?

## 2025-05-14 NOTE — TELEPHONE ENCOUNTER
Pt called asking for something for pain.  She said the pain is on the left side of her back up under her ribs.  The pain is a ache but if she moves certain way its a sharp pain.   Went to [facility] ER yesterday and they told her she has a cyst on her kidney but didn't say that was the source of her pain.   [facility] ER notes can be viewed in Epic.

## 2025-05-21 ENCOUNTER — OFFICE VISIT (OUTPATIENT)
Dept: FAMILY MEDICINE CLINIC | Age: 89
End: 2025-05-21
Payer: MEDICARE

## 2025-05-21 VITALS
TEMPERATURE: 98 F | WEIGHT: 112 LBS | HEIGHT: 64 IN | OXYGEN SATURATION: 98 % | BODY MASS INDEX: 19.12 KG/M2 | HEART RATE: 70 BPM | SYSTOLIC BLOOD PRESSURE: 144 MMHG | DIASTOLIC BLOOD PRESSURE: 53 MMHG

## 2025-05-21 DIAGNOSIS — Z79.899 HIGH RISK MEDICATION USE: ICD-10-CM

## 2025-05-21 DIAGNOSIS — M54.6 ACUTE LEFT-SIDED THORACIC BACK PAIN: ICD-10-CM

## 2025-05-21 DIAGNOSIS — N28.1 RENAL CYST: Primary | ICD-10-CM

## 2025-05-21 LAB
AMPHET+METHAMPHET UR QL: NEGATIVE
AMPHETAMINES UR QL: NEGATIVE
BARBITURATES UR QL SCN: NEGATIVE
BENZODIAZ UR QL SCN: NEGATIVE
BUPRENORPHINE SERPL-MCNC: NEGATIVE NG/ML
CANNABINOIDS SERPL QL: NEGATIVE
COCAINE UR QL: NEGATIVE
EXPIRATION DATE: NORMAL
Lab: NORMAL
MDMA UR QL SCN: NEGATIVE
METHADONE UR QL SCN: NEGATIVE
MORPHINE/OPIATES SCREEN, URINE: NEGATIVE
OXYCODONE UR QL SCN: NEGATIVE
PCP UR QL SCN: NEGATIVE

## 2025-05-21 PROCEDURE — 1126F AMNT PAIN NOTED NONE PRSNT: CPT | Performed by: NURSE PRACTITIONER

## 2025-05-21 PROCEDURE — 99214 OFFICE O/P EST MOD 30 MIN: CPT | Performed by: NURSE PRACTITIONER

## 2025-05-21 PROCEDURE — 1160F RVW MEDS BY RX/DR IN RCRD: CPT | Performed by: NURSE PRACTITIONER

## 2025-05-21 PROCEDURE — 80305 DRUG TEST PRSMV DIR OPT OBS: CPT | Performed by: NURSE PRACTITIONER

## 2025-05-21 PROCEDURE — 1159F MED LIST DOCD IN RCRD: CPT | Performed by: NURSE PRACTITIONER

## 2025-05-21 RX ORDER — HYDROCODONE BITARTRATE AND ACETAMINOPHEN 5; 325 MG/1; MG/1
TABLET ORAL
Qty: 20 TABLET | Refills: 0 | Status: SHIPPED | OUTPATIENT
Start: 2025-05-21

## 2025-05-21 NOTE — ASSESSMENT & PLAN NOTE
Reviewed the CT, last lab a week ago, sending to urology for a consult     Orders:    Ambulatory Referral to Urology

## 2025-05-21 NOTE — ASSESSMENT & PLAN NOTE
To continue to use topical treatment, try tylenol, and will send a few hydrocodone to take sparingly if needed (reviewed her chart, she has declined DEXA, discussed follow up on this in the future, she wants to get this court case completed before doing any further evaluation or treatment at this time    Orders:    HYDROcodone-acetaminophen (NORCO) 5-325 MG per tablet; 1/2 po TID prn moderate to severe pain

## 2025-05-21 NOTE — PROGRESS NOTES
Chief Complaint  Renal cyst (Follow up from [place] ED visit on 5/13/25. ) and thoracic back pain     Subjective          Danyell Osborne presents to [facility]  History of Present Illness  Abnormal finding on scan:  Thoracic back pain  There is a T11 compression   fracture that is new from a prior CT dated June 25, 2024.   She has tried lidoderm, but biofreeze helps more, has taken hydrocodone in the past and did okay, has a trial that she needs to attend in another [facility] next week, would like to be able to go and take something for the pain so she can go (this is a murder trial for one of her family members and they hope to get the information so they will know what happened to her grand daughter that has been missing for years and her son that was murdered after this)     there is a dominant 72 mm left   renal cyst.   No urinary symptoms     PAST MEDICAL HISTORY changes since 4-:           Atrial Fibrillation: dx'd in 3-2020;     Carotid Artery Stenosis: yuridia madera;     Coronary Artery Disease: dx'd in 3-23-20;     Myocardial Infarction: due to occlusion of the LAD, RCA, and stened again 3-25-20; heart cath/ stenting for NSTEMI;     AAA         Hospitalizations:     Acute cholecystits 7-2024  Fall/fractures 6-26-24 discharged on 7-  acute stroke 10-9-23   covid 6-2022  Coronary Artery Disease last admit date 3-23-20 anemia, admitted & 4-9-20   A fib 9-2022          CURRENT MEDICAL PROVIDERS:    Cardiologist: Fozia         PREVENTIVE HEALTH MAINTENANCE                 COLORECTAL CANCER SCREENING: Up to date (colonoscopy q10y; sigmoidoscopy q5y; Cologuard q3y) was last done  3-2023 no colitits   Also CLN done on 3/2017          Surgical History:      7-11-24 EGD Tuvlin    Cholecystecomy 7-5-2024  EGD and CLN 3-2023    Biopsy of breast; benign Left    Coronary Artery Stent Placement: 2011 2;     Hysterectomy: at age 30; ovaries intact;     left foot neuroma;      Procedures:    Colonoscopy ( 11/normal/Ronna )    EGD (  normal ) &  esophageal spasms         Family History:        Father:  at age 80's; Cause of death was TB;  Parkinson's Disease     Mother:  at age 30's; Cause of death was renal issues;  goiter     Sister(s): 6 sister(s) total; 1 ; Neurological/Genetic, 2 breast cancer, Cerebrovascular Accident; Endocrine Type 2 Diabetes   Brothers: 2, 1 , DM    Niece with breast cancer       Social History:        Occupation: quit working 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/[employer];     Marital Status:      Children: 6               Past Medical History:   Diagnosis Date    A-fib     Anemia     Coronary artery disease     HLD (hyperlipidemia)     Hypertension     Stroke        Allergies   Allergen Reactions    Contrast Dye (Echo Or Unknown Ct/Mr) Hives and Unknown - High Severity     hives    Iodinated Contrast Media Hives and Unknown - High Severity    Penicillins Rash     Beta lactam allergy details    Antibiotic reaction: rash, hives    Age at reaction: adult    Dose to reaction time: unknown    Reason for antibiotic: unknown    Epinephrine required for reaction?: no    Tolerated antibiotics: unknown        Past Surgical History:   Procedure Laterality Date    CHOLECYSTECTOMY WITH INTRAOPERATIVE CHOLANGIOGRAM N/A 2024    Procedure: Laparoscopic cholecystectomy with cholangiogram;  Surgeon: Tessa Montenegro MD;  Location: [facility] MAIN OR;  Service: General;  Laterality: N/A;    ENDOSCOPY N/A 2024    Procedure: ESOPHAGOGASTRODUODENOSCOPY WITH COLD BIOPSIES;  Surgeon: Santos Crabtree MD;  Location: [facility] ENDOSCOPY;  Service: Gastroenterology;  Laterality: N/A;  PRE- DYSPHAGIA  POST-DUODENAL, GASTRIC EROSIONS    HYSTERECTOMY          Social History     Tobacco Use    Smoking status: Never     Passive exposure: Never    Smokeless tobacco: Never   Substance Use Topics    Alcohol use: Never       Family History   Problem Relation  Age of Onset    Malig Hyperthermia Neg Hx         Health Maintenance Due   Topic Date Due    DXA SCAN  Never done    TDAP/TD VACCINES (1 - Tdap) Never done    Pneumococcal Vaccine 50+ (1 of 1 - PCV) Never done    ZOSTER VACCINE (1 of 2) Never done    RSV Vaccine - Adults (1 - 1-dose 75+ series) Never done    COVID-19 Vaccine (4 - 2024-25 season) 09/01/2024        Current Outpatient Medications on File Prior to Visit   Medication Sig    acetaminophen (TYLENOL) 325 MG tablet Take 2 tablets by mouth Every 6 (Six) Hours As Needed for Mild Pain.    amLODIPine (NORVASC) 5 MG tablet Take 1 tablet by mouth Daily.    apixaban (ELIQUIS) 2.5 MG tablet tablet Take 1 tablet by mouth Every 12 (Twelve) Hours. Indications: Atrial Fibrillation    Cholecalciferol (D3) 25 MCG (1000 UT) capsule Take 1 capsule by mouth Daily.    clopidogrel (PLAVIX) 75 MG tablet Take 1 tablet by mouth Daily.    lidocaine (LIDODERM) 5 % Place 1 patch on the skin as directed by provider Daily. Remove & Discard patch within 12 hours or as directed by MD    metoprolol succinate XL (TOPROL-XL) 50 MG 24 hr tablet Take 1 tablet by mouth 2 (Two) Times a Day.    multivitamin with minerals tablet tablet Take 1 tablet by mouth Daily.    nitroglycerin (NITROSTAT) 0.4 MG SL tablet DISSOLVE 1 TABLET UNDER THE TONGUE EVERY 5 MINUTES AS NEEDED FOR CHEST PAIN. DO NOT EXCEED A TOTAL OF 3 DOSES IN 15 MINUTES. IF NO RELIEF, CALL 911    Polyethylene Glycol 3350 (MIRALAX PO) Take  by mouth As Needed.    RABEprazole (ACIPHEX) 20 MG EC tablet Take 1 tablet by mouth Daily.    Simethicone (GAS-X PO) Take  by mouth As Needed.     No current facility-administered medications on file prior to visit.       Immunization History   Administered Date(s) Administered    COVID-19 (PFIZER) Purple Cap Monovalent 02/17/2021, 03/10/2021, 01/03/2022       Review of Systems   Constitutional:  Negative for fatigue and fever.   Respiratory:  Negative for cough and shortness of breath.   "  Cardiovascular:  Negative for chest pain, palpitations and leg swelling.   Musculoskeletal:  Positive for back pain.        Objective     Vitals:    05/21/25 1350   BP: 144/53   BP Location: Right arm   Patient Position: Sitting   Cuff Size: Adult   Pulse: 70   Temp: 98 °F (36.7 °C)   TempSrc: Oral   SpO2: 98%   Weight: 50.8 kg (112 lb)   Height: 162.6 cm (64\")            Physical Exam  Vitals reviewed.   Constitutional:       General: She is not in acute distress.     Appearance: Normal appearance.   Neck:      Vascular: No carotid bruit.   Cardiovascular:      Rate and Rhythm: Normal rate and regular rhythm.      Heart sounds: Normal heart sounds. No murmur heard.  Pulmonary:      Effort: Pulmonary effort is normal. No respiratory distress.      Breath sounds: Normal breath sounds.   Musculoskeletal:         General: Tenderness (to mid left thoracic para spinous muscles) present.      Right lower leg: No edema.      Left lower leg: No edema.   Neurological:      Mental Status: She is alert.   Psychiatric:         Mood and Affect: Mood normal.         Behavior: Behavior normal.         Result Review :   Results for orders placed or performed in visit on 05/21/25   POC Medline 12 Panel Urine Drug Screen    Collection Time: 05/21/25  2:22 PM    Specimen: Urine   Result Value Ref Range    Amphetamine Screen, Urine Negative Negative    Barbiturates Screen, Urine Negative Negative    Buprenorphine, Screen, Urine Negative Negative    Benzodiazepine Screen, Urine Negative Negative    Cocaine Screen, Urine Negative Negative    MDMA (ECSTASY) Negative Negative    Methamphetamine, Ur Negative Negative    Morphine/Opiates Screen, Urine Negative Negative    Methadone Screen, Urine Negative Negative    Oxycodone Screen, Urine Negative Negative    Phencyclidine (PCP), Urine Negative Negative    THC, Screen, Urine Negative Negative    Lot Number [id number]     Expiration Date 11-7-26        The following data was reviewed by: " Shanae Osborne, APRN on 05/21/2025:                       Assessment and Plan      Assessment & Plan  Renal cyst  Reviewed the CT, last lab a week ago, sending to urology for a consult     Orders:    Ambulatory Referral to Urology    Acute left-sided thoracic back pain  To continue to use topical treatment, try tylenol, and will send a few hydrocodone to take sparingly if needed (reviewed her chart, she has declined DEXA, discussed follow up on this in the future, she wants to get this court case completed before doing any further evaluation or treatment at this time    Orders:    HYDROcodone-acetaminophen (NORCO) 5-325 MG per tablet; 1/2 po TID prn moderate to severe pain    High risk medication use  Reviewed, discussed and signed controlleld consent form; lolly reviewed, UDS: negative   Orders:    POC Medline 12 Panel Urine Drug Screen              BMI is within normal parameters. No other follow-up for BMI required.         Follow Up     Return if symptoms worsen or fail to improve.    Patient was given instructions and counseling regarding her condition or for health maintenance advice. Please see specific information pulled into the AVS if appropriate.

## 2025-05-21 NOTE — ASSESSMENT & PLAN NOTE
Reviewed, discussed and signed controlleld consent form; lolly reviewed, UDS: negative   Orders:    POC Medline 12 Panel Urine Drug Screen

## 2025-06-10 ENCOUNTER — LAB (OUTPATIENT)
Dept: LAB | Facility: HOSPITAL | Age: 89
End: 2025-06-10
Payer: MEDICARE

## 2025-06-10 ENCOUNTER — OFFICE VISIT (OUTPATIENT)
Dept: FAMILY MEDICINE CLINIC | Age: 89
End: 2025-06-10
Payer: MEDICARE

## 2025-06-10 VITALS
HEART RATE: 64 BPM | BODY MASS INDEX: 18.95 KG/M2 | TEMPERATURE: 97.5 F | DIASTOLIC BLOOD PRESSURE: 52 MMHG | WEIGHT: 111 LBS | SYSTOLIC BLOOD PRESSURE: 141 MMHG | OXYGEN SATURATION: 98 % | HEIGHT: 64 IN

## 2025-06-10 DIAGNOSIS — R25.2 LEG CRAMPS: ICD-10-CM

## 2025-06-10 DIAGNOSIS — N28.1 RENAL CYST: ICD-10-CM

## 2025-06-10 DIAGNOSIS — G89.29 CHRONIC RIGHT-SIDED THORACIC BACK PAIN: ICD-10-CM

## 2025-06-10 DIAGNOSIS — I10 ESSENTIAL HYPERTENSION: ICD-10-CM

## 2025-06-10 DIAGNOSIS — M54.6 CHRONIC RIGHT-SIDED THORACIC BACK PAIN: ICD-10-CM

## 2025-06-10 DIAGNOSIS — I10 ESSENTIAL HYPERTENSION: Primary | ICD-10-CM

## 2025-06-10 LAB
ALBUMIN SERPL-MCNC: 4.1 G/DL (ref 3.5–5.2)
ALBUMIN/GLOB SERPL: 1.2 G/DL
ALP SERPL-CCNC: 77 U/L (ref 39–117)
ALT SERPL W P-5'-P-CCNC: 14 U/L (ref 1–33)
ANION GAP SERPL CALCULATED.3IONS-SCNC: 9 MMOL/L (ref 5–15)
AST SERPL-CCNC: 31 U/L (ref 1–32)
BILIRUB SERPL-MCNC: 0.3 MG/DL (ref 0–1.2)
BUN SERPL-MCNC: 17 MG/DL (ref 8–23)
BUN/CREAT SERPL: 19.3 (ref 7–25)
CALCIUM SPEC-SCNC: 9.6 MG/DL (ref 8.6–10.5)
CHLORIDE SERPL-SCNC: 102 MMOL/L (ref 98–107)
CO2 SERPL-SCNC: 27 MMOL/L (ref 22–29)
CREAT SERPL-MCNC: 0.88 MG/DL (ref 0.57–1)
EGFRCR SERPLBLD CKD-EPI 2021: 63.3 ML/MIN/1.73
GLOBULIN UR ELPH-MCNC: 3.4 GM/DL
GLUCOSE SERPL-MCNC: 115 MG/DL (ref 65–99)
MAGNESIUM SERPL-MCNC: 2.1 MG/DL (ref 1.6–2.4)
POTASSIUM SERPL-SCNC: 5 MMOL/L (ref 3.5–5.2)
PROT SERPL-MCNC: 7.5 G/DL (ref 6–8.5)
SODIUM SERPL-SCNC: 138 MMOL/L (ref 136–145)

## 2025-06-10 PROCEDURE — 36415 COLL VENOUS BLD VENIPUNCTURE: CPT

## 2025-06-10 PROCEDURE — 1126F AMNT PAIN NOTED NONE PRSNT: CPT | Performed by: NURSE PRACTITIONER

## 2025-06-10 PROCEDURE — 99214 OFFICE O/P EST MOD 30 MIN: CPT | Performed by: NURSE PRACTITIONER

## 2025-06-10 PROCEDURE — 80053 COMPREHEN METABOLIC PANEL: CPT

## 2025-06-10 PROCEDURE — 83735 ASSAY OF MAGNESIUM: CPT

## 2025-06-10 RX ORDER — AMLODIPINE BESYLATE 5 MG/1
5 TABLET ORAL DAILY
Qty: 90 TABLET | Refills: 1 | Status: SHIPPED | OUTPATIENT
Start: 2025-06-10

## 2025-06-10 NOTE — ASSESSMENT & PLAN NOTE
Reviewed her chart, refilled her norvasc, rechecking some labs     Orders:    amLODIPine (NORVASC) 5 MG tablet; Take 1 tablet by mouth Daily.    Comprehensive metabolic panel; Future

## 2025-06-10 NOTE — ASSESSMENT & PLAN NOTE
Reviewed her chart, keep upcoming appt with urology     Orders:    Comprehensive metabolic panel; Future

## 2025-06-10 NOTE — PROGRESS NOTES
Chief Complaint  Hypertension (6 month follow up ), Back Pain (Chronic back pain, pt states RT side is bothering her more ), and Spasms (Spasms in both feet at night ) and back pain and spasms     Subjective          Danyell Osborne presents to [facility]    History of Present Illness  Hypertension/AF  Sees cardiology, next appt 1-2026  Current medication:  norvasc, metoprolol   Tolerating Medication:  yes  Checking BP at home and it is:  not checking  Needs refills:  yes needs norvasc to crume    Labs:  Lab Results       Component                Value               Date                       GLUCOSE                  105 (H)             05/14/2025                 BUN                      15                  05/14/2025                 CREATININE               0.99                05/14/2025                 BCR                      15.2                05/14/2025                 K                        3.9                 05/14/2025                 CO2                      22.5                05/14/2025                 CALCIUM                  9.5                 05/14/2025                 ALBUMIN                  4.0                 05/14/2025                 AST                      38 (H)              05/14/2025                 ALT                      16                  05/14/2025              Back pain and spasms  (Last visit had a fall and has a compression fracture of her thoracic spine)  has R  mid side of back pain > L, both feet have spasms at HS (last labs for Mg normal in 1-2025, 3 weeks ago K was normal)   Sill using biofreeze that helps some for her back, only having pain when she exerts herself around the house     Has a renal cyst, sent her to urology, appt in 9-2025    PAST MEDICAL HISTORY changes since 5-:           Atrial Fibrillation: dx'd in 3-2020;     Carotid Artery Stenosis: seegill madera;     Coronary Artery Disease: dx'd in 3-23-20;     Myocardial  Infarction: due to occlusion of the LAD, RCA, and stened again 3-25-20; heart cath/ stenting for NSTEMI;     AAA         Hospitalizations:     Acute cholecystits   Fall/fractures 24 discharged on -  acute stroke 10-9-23   covid   Coronary Artery Disease last admit date 3-23-20 anemia, admitted & 20   A fib           CURRENT MEDICAL PROVIDERS:    Cardiologist: Fozia         PREVENTIVE HEALTH MAINTENANCE                 COLORECTAL CANCER SCREENING: Up to date (colonoscopy q10y; sigmoidoscopy q5y; Cologuard q3y) was last done  3-2023 no colitits   Also CLN done on 3/2017          Surgical History:      25 EGD Tuvlin    Cholecystecomy 2024  EGD and CLN 3-2023    Biopsy of breast; benign Left    Coronary Artery Stent Placement: 2011;     Hysterectomy: at age 30; ovaries intact;     left foot neuroma;     Procedures:    Colonoscopy ( 11/normal/Ronna )    EGD (  normal ) &  esophageal spasms         Family History:        Father:  at age 80's; Cause of death was TB;  Parkinson's Disease     Mother:  at age 30's; Cause of death was renal issues;  goiter     Sister(s): 6 sister(s) total; 1 ; Neurological/Genetic, 2 breast cancer, Cerebrovascular Accident; Endocrine Type 2 Diabetes   Brothers: 2, 1 , DM    Niece with breast cancer       Social History:        Occupation: quit working 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/brown [employer]el[employer];     Marital Status:      Children: 6            Past Medical History:   Diagnosis Date         Anemia     Coronary artery disease     HLD (hyperlipidemia)     Hypertension     Stroke        Allergies   Allergen Reactions    Contrast Dye (Echo Or Unknown Ct/Mr) Hives and Unknown - High Severity     hives    Iodinated Contrast Media Hives and Unknown - High Severity    Penicillins Rash     Beta lactam allergy details    Antibiotic reaction: rash, hives    Age at reaction: adult    Dose to reaction time: unknown    Reason  for antibiotic: unknown    Epinephrine required for reaction?: no    Tolerated antibiotics: unknown        Past Surgical History:   Procedure Laterality Date    CHOLECYSTECTOMY WITH INTRAOPERATIVE CHOLANGIOGRAM N/A 7/5/2024    Procedure: Laparoscopic cholecystectomy with cholangiogram;  Surgeon: Tessa Montenegro MD;  Location: [facility] MAIN OR;  Service: General;  Laterality: N/A;    ENDOSCOPY N/A 7/11/2024    Procedure: ESOPHAGOGASTRODUODENOSCOPY WITH COLD BIOPSIES;  Surgeon: Santos Crabtree MD;  Location: [facility] ENDOSCOPY;  Service: Gastroenterology;  Laterality: N/A;  PRE- DYSPHAGIA  POST-DUODENAL, GASTRIC EROSIONS    HYSTERECTOMY          Social History     Tobacco Use    Smoking status: Never     Passive exposure: Never    Smokeless tobacco: Never   Substance Use Topics    Alcohol use: Never       Family History   Problem Relation Age of Onset    Malig Hyperthermia Neg Hx         Health Maintenance Due   Topic Date Due    DXA SCAN  Never done    TDAP/TD VACCINES (1 - Tdap) Never done    Pneumococcal Vaccine 50+ (1 of 1 - PCV) Never done    ZOSTER VACCINE (1 of 2) Never done    RSV Vaccine - Adults (1 - 1-dose 75+ series) Never done    COVID-19 Vaccine (4 - 2024-25 season) 09/01/2024        Current Outpatient Medications on File Prior to Visit   Medication Sig    acetaminophen (TYLENOL) 325 MG tablet Take 2 tablets by mouth Every 6 (Six) Hours As Needed for Mild Pain.    apixaban (ELIQUIS) 2.5 MG tablet tablet Take 1 tablet by mouth Every 12 (Twelve) Hours. Indications: Atrial Fibrillation    Cholecalciferol (D3) 25 MCG (1000 UT) capsule Take 1 capsule by mouth Daily.    clopidogrel (PLAVIX) 75 MG tablet Take 1 tablet by mouth Daily.    HYDROcodone-acetaminophen (NORCO) 5-325 MG per tablet 1/2 po TID prn moderate to severe pain    Menthol, Topical Analgesic, (BIOFREEZE EX) Apply  topically As Needed.    metoprolol succinate XL (TOPROL-XL) 50 MG 24 hr tablet Take 1 tablet by mouth 2 (Two) Times a Day.     "multivitamin with minerals tablet tablet Take 1 tablet by mouth Daily.    nitroglycerin (NITROSTAT) 0.4 MG SL tablet DISSOLVE 1 TABLET UNDER THE TONGUE EVERY 5 MINUTES AS NEEDED FOR CHEST PAIN. DO NOT EXCEED A TOTAL OF 3 DOSES IN 15 MINUTES. IF NO RELIEF, CALL 911    Polyethylene Glycol 3350 (MIRALAX PO) Take  by mouth As Needed.    RABEprazole (ACIPHEX) 20 MG EC tablet Take 1 tablet by mouth Daily.    Simethicone (GAS-X PO) Take  by mouth As Needed.    [DISCONTINUED] amLODIPine (NORVASC) 5 MG tablet Take 1 tablet by mouth Daily.    [DISCONTINUED] lidocaine (LIDODERM) 5 % Place 1 patch on the skin as directed by provider Daily. Remove & Discard patch within 12 hours or as directed by MD (Patient not taking: Reported on 6/10/2025)     No current facility-administered medications on file prior to visit.       Immunization History   Administered Date(s) Administered    COVID-19 (PFIZER) Purple Cap Monovalent 02/17/2021, 03/10/2021, 01/03/2022       Review of Systems   Constitutional:  Negative for fatigue and fever.   Respiratory:  Negative for cough and shortness of breath.    Cardiovascular:  Negative for chest pain, palpitations and leg swelling.   Genitourinary:  Negative for difficulty urinating.        Objective     Vitals:    06/10/25 1336   BP: 141/52   BP Location: Right arm   Patient Position: Sitting   Cuff Size: Adult   Pulse: 64   Temp: 97.5 °F (36.4 °C)   TempSrc: Oral   SpO2: 98%   Weight: 50.3 kg (111 lb)   Height: 162.6 cm (64\")            Physical Exam  Vitals reviewed.   Constitutional:       General: She is not in acute distress.     Appearance: Normal appearance.   Neck:      Vascular: No carotid bruit.   Cardiovascular:      Rate and Rhythm: Normal rate and regular rhythm.      Heart sounds: Normal heart sounds. No murmur heard.  Pulmonary:      Effort: Pulmonary effort is normal. No respiratory distress.      Breath sounds: Normal breath sounds.   Abdominal:      Tenderness: There is no right CVA " tenderness or left CVA tenderness.   Musculoskeletal:         General: No tenderness (to thoracic para spinous muscles).      Right lower leg: No edema.      Left lower leg: No edema.   Neurological:      Mental Status: She is alert.   Psychiatric:         Mood and Affect: Mood normal.         Behavior: Behavior normal.         Result Review :     The following data was reviewed by: AGUSTIN Jimenez on 06/10/2025:                       Assessment and Plan      Assessment & Plan  Essential hypertension  Reviewed her chart, refilled her norvasc, rechecking some labs     Orders:    amLODIPine (NORVASC) 5 MG tablet; Take 1 tablet by mouth Daily.    Comprehensive metabolic panel; Future    Renal cyst  Reviewed her chart, keep upcoming appt with urology     Orders:    Comprehensive metabolic panel; Future    Leg cramps  Drink adequate water daily, may try tonic water, checking some labs, reviewed her chart   Orders:    Magnesium; Future    Comprehensive metabolic panel; Future    Chronic right-sided thoracic back pain  continue to use biofreeze                 BMI is within normal parameters. No other follow-up for BMI required.         Follow Up     Return if symptoms worsen or fail to improve, for followup pending lab results.    Patient was given instructions and counseling regarding her condition or for health maintenance advice. Please see specific information pulled into the AVS if appropriate.

## 2025-06-10 NOTE — ASSESSMENT & PLAN NOTE
Drink adequate water daily, may try tonic water, checking some labs, reviewed her chart   Orders:    Magnesium; Future    Comprehensive metabolic panel; Future

## 2025-06-11 ENCOUNTER — RESULTS FOLLOW-UP (OUTPATIENT)
Dept: FAMILY MEDICINE CLINIC | Age: 89
End: 2025-06-11
Payer: MEDICARE

## 2025-06-11 NOTE — LETTER
Danyell Osborne  [address] [facility] [place] [address]    June 11, 2025     Ms Child,     Your potassium and magnesium are normal  Your metabolic panel was fine, your kidney function in the normal range.  Make sure to drink adequate water daily.      Below are the results from your recent visit:    Resulted Orders   Comprehensive metabolic panel   Result Value Ref Range    Glucose 115 (H) 65 - 99 mg/dL    BUN 17.0 8.0 - 23.0 mg/dL    Creatinine 0.88 0.57 - 1.00 mg/dL    Sodium 138 136 - 145 mmol/L    Potassium 5.0 3.5 - 5.2 mmol/L    Chloride 102 98 - 107 mmol/L    CO2 27.0 22.0 - 29.0 mmol/L    Calcium 9.6 8.6 - 10.5 mg/dL    Total Protein 7.5 6.0 - 8.5 g/dL    Albumin 4.1 3.5 - 5.2 g/dL    ALT (SGPT) 14 1 - 33 U/L    AST (SGOT) 31 1 - 32 U/L    Alkaline Phosphatase 77 39 - 117 U/L    Total Bilirubin 0.3 0.0 - 1.2 mg/dL    Globulin 3.4 gm/dL    A/G Ratio 1.2 g/dL    BUN/Creatinine Ratio 19.3 7.0 - 25.0    Anion Gap 9.0 5.0 - 15.0 mmol/L    eGFR 63.3 >60.0 mL/min/1.73   Magnesium   Result Value Ref Range    Magnesium 2.1 1.6 - 2.4 mg/dL                   If you have any questions or concerns, please don't hesitate to call.         Sincerely,        AGUSTIN Jimenez

## 2025-08-11 ENCOUNTER — OFFICE VISIT (OUTPATIENT)
Dept: FAMILY MEDICINE CLINIC | Age: 89
End: 2025-08-11
Payer: MEDICARE

## 2025-08-11 ENCOUNTER — HOSPITAL ENCOUNTER (OUTPATIENT)
Dept: GENERAL RADIOLOGY | Facility: HOSPITAL | Age: 89
Discharge: HOME | End: 2025-08-11
Payer: MEDICARE

## 2025-08-11 ENCOUNTER — RESULTS FOLLOW-UP (OUTPATIENT)
Dept: FAMILY MEDICINE CLINIC | Age: 89
End: 2025-08-11

## 2025-08-11 VITALS
HEART RATE: 63 BPM | TEMPERATURE: 98.3 F | OXYGEN SATURATION: 98 % | WEIGHT: 111.4 LBS | SYSTOLIC BLOOD PRESSURE: 155 MMHG | BODY MASS INDEX: 19.02 KG/M2 | DIASTOLIC BLOOD PRESSURE: 76 MMHG | HEIGHT: 64 IN

## 2025-08-11 DIAGNOSIS — M79.18 LEFT BUTTOCK PAIN: ICD-10-CM

## 2025-08-11 DIAGNOSIS — M79.641 RIGHT HAND PAIN: ICD-10-CM
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
## 2025-08-11 DIAGNOSIS — S62.619A CLOSED AVULSION FRACTURE OF PROXIMAL PHALANX OF FINGER, INITIAL ENCOUNTER: Primary | ICD-10-CM

## 2025-08-11 DIAGNOSIS — M79.641 RIGHT HAND PAIN: Primary | ICD-10-CM

## 2025-08-11 PROCEDURE — 73130 X-RAY EXAM OF HAND: CPT

## 2025-08-12 ENCOUNTER — TELEPHONE (OUTPATIENT)
Dept: FAMILY MEDICINE CLINIC | Age: 89
End: 2025-08-12
Payer: MEDICARE

## 2025-08-19 ENCOUNTER — HOSPITAL ENCOUNTER (OUTPATIENT)
Dept: GENERAL RADIOLOGY | Facility: HOSPITAL | Age: 89
Discharge: HOME | End: 2025-08-19
Admitting: NURSE PRACTITIONER
Payer: MEDICARE

## 2025-08-19 ENCOUNTER — RESULTS FOLLOW-UP (OUTPATIENT)
Dept: FAMILY MEDICINE CLINIC | Age: 89
End: 2025-08-19

## 2025-08-19 ENCOUNTER — OFFICE VISIT (OUTPATIENT)
Dept: FAMILY MEDICINE CLINIC | Age: 89
End: 2025-08-19
Payer: MEDICARE

## 2025-08-19 VITALS
WEIGHT: 109 LBS | BODY MASS INDEX: 18.61 KG/M2 | OXYGEN SATURATION: 99 % | TEMPERATURE: 98.2 F | HEART RATE: 59 BPM | DIASTOLIC BLOOD PRESSURE: 72 MMHG | HEIGHT: 64 IN | SYSTOLIC BLOOD PRESSURE: 147 MMHG

## 2025-08-19 DIAGNOSIS — M79.644 FINGER PAIN, RIGHT: ICD-10-CM

## 2025-08-19 DIAGNOSIS — M25.552 LEFT HIP PAIN: Primary | ICD-10-CM

## 2025-08-19 DIAGNOSIS — I10 ESSENTIAL HYPERTENSION: ICD-10-CM

## 2025-08-19 DIAGNOSIS — M25.552 LEFT HIP PAIN: ICD-10-CM

## 2025-08-19 PROCEDURE — 1159F MED LIST DOCD IN RCRD: CPT | Performed by: NURSE PRACTITIONER

## 2025-08-19 PROCEDURE — 73502 X-RAY EXAM HIP UNI 2-3 VIEWS: CPT

## 2025-08-19 PROCEDURE — 1125F AMNT PAIN NOTED PAIN PRSNT: CPT | Performed by: NURSE PRACTITIONER

## 2025-08-19 PROCEDURE — 1160F RVW MEDS BY RX/DR IN RCRD: CPT | Performed by: NURSE PRACTITIONER

## 2025-08-19 PROCEDURE — 99214 OFFICE O/P EST MOD 30 MIN: CPT | Performed by: NURSE PRACTITIONER

## (undated) DEVICE — PK LAP CHOLE BG

## (undated) DEVICE — FRCP BX RADJAW4 NDL 2.8 240CM LG OG BX40

## (undated) DEVICE — ENDOPATH XCEL UNIVERSAL TROCAR STABLILITY SLEEVES: Brand: ENDOPATH XCEL

## (undated) DEVICE — CANN O2 ETCO2 FITS ALL CONN CO2 SMPL A/ 7IN DISP LF

## (undated) DEVICE — GOWN,SIRUS,NON REINFRCD,LARGE,SET IN SL: Brand: MEDLINE

## (undated) DEVICE — DISPOSABLE MONOPOLAR ENDOSCOPIC CORD 10 FT. (3M): Brand: KIRWAN

## (undated) DEVICE — GLV SURG SENSICARE POLYISPRN W/ALOE PF LF 6.5 GRN STRL

## (undated) DEVICE — LN SMPL CO2 SHTRM SD STREAM W/M LUER

## (undated) DEVICE — ENDOPATH XCEL BLADELESS TROCARS WITH STABILITY SLEEVES: Brand: ENDOPATH XCEL

## (undated) DEVICE — ANTIBACTERIAL UNDYED BRAIDED (POLYGLACTIN 910), SYNTHETIC ABSORBABLE SUTURE: Brand: COATED VICRYL

## (undated) DEVICE — LAPAROVUE VISIBILITY SYSTEM LAPAROSCOPIC SOLUTIONS: Brand: LAPAROVUE

## (undated) DEVICE — TUBING, SUCTION, 1/4" X 10', STRAIGHT: Brand: MEDLINE

## (undated) DEVICE — COVER,C-ARM,41X74: Brand: MEDLINE

## (undated) DEVICE — ADHS SKIN SURG TISS VISC PREMIERPRO EXOFIN HI/VISC FAST/DRY

## (undated) DEVICE — KT ORCA ORCAPOD DISP STRL

## (undated) DEVICE — SUT VIC 0 UR6 27IN VCP603H

## (undated) DEVICE — SOL NACL 0.9PCT 1000ML

## (undated) DEVICE — BLCK/BITE BLOX W/DENTL/RIM W/STRAP 54F

## (undated) DEVICE — GLV SURG BIOGEL LTX PF 6

## (undated) DEVICE — ENDOPATH XCEL BLUNT TIP TROCARS WITH SMOOTH SLEEVES: Brand: ENDOPATH XCEL

## (undated) DEVICE — STPCK 3WY D201 DISCOFIX

## (undated) DEVICE — ADAPT CLN BIOGUARD AIR/H2O DISP

## (undated) DEVICE — ENDOPOUCH RETRIEVER SPECIMEN RETRIEVAL BAGS: Brand: ENDOPOUCH RETRIEVER

## (undated) DEVICE — SENSR O2 OXIMAX FNGR A/ 18IN NONSTR

## (undated) DEVICE — APPL CHLORAPREP HI/LITE 26ML ORNG